# Patient Record
Sex: FEMALE | Race: BLACK OR AFRICAN AMERICAN | Employment: OTHER | URBAN - METROPOLITAN AREA
[De-identification: names, ages, dates, MRNs, and addresses within clinical notes are randomized per-mention and may not be internally consistent; named-entity substitution may affect disease eponyms.]

---

## 2017-03-07 ENCOUNTER — OFFICE VISIT (OUTPATIENT)
Dept: FAMILY MEDICINE CLINIC | Age: 82
End: 2017-03-07

## 2017-03-07 VITALS
BODY MASS INDEX: 31.14 KG/M2 | HEART RATE: 64 BPM | HEIGHT: 62 IN | OXYGEN SATURATION: 99 % | DIASTOLIC BLOOD PRESSURE: 92 MMHG | SYSTOLIC BLOOD PRESSURE: 148 MMHG | WEIGHT: 169.2 LBS | RESPIRATION RATE: 12 BRPM | TEMPERATURE: 97.1 F

## 2017-03-07 DIAGNOSIS — I10 ESSENTIAL HYPERTENSION, MALIGNANT: ICD-10-CM

## 2017-03-07 DIAGNOSIS — B37.89 CANDIDIASIS OF BREAST: ICD-10-CM

## 2017-03-07 DIAGNOSIS — I73.9 PERIPHERAL VASCULAR DISEASE (HCC): ICD-10-CM

## 2017-03-07 DIAGNOSIS — M79.652 LEFT THIGH PAIN: Primary | ICD-10-CM

## 2017-03-07 PROCEDURE — G8419 CALC BMI OUT NRM PARAM NOF/U: HCPCS | Performed by: FAMILY MEDICINE

## 2017-03-07 PROCEDURE — 1036F TOBACCO NON-USER: CPT | Performed by: FAMILY MEDICINE

## 2017-03-07 PROCEDURE — 4040F PNEUMOC VAC/ADMIN/RCVD: CPT | Performed by: FAMILY MEDICINE

## 2017-03-07 PROCEDURE — 99213 OFFICE O/P EST LOW 20 MIN: CPT | Performed by: FAMILY MEDICINE

## 2017-03-07 PROCEDURE — G8484 FLU IMMUNIZE NO ADMIN: HCPCS | Performed by: FAMILY MEDICINE

## 2017-03-07 PROCEDURE — 1123F ACP DISCUSS/DSCN MKR DOCD: CPT | Performed by: FAMILY MEDICINE

## 2017-03-07 PROCEDURE — 1090F PRES/ABSN URINE INCON ASSESS: CPT | Performed by: FAMILY MEDICINE

## 2017-03-07 PROCEDURE — G8427 DOCREV CUR MEDS BY ELIG CLIN: HCPCS | Performed by: FAMILY MEDICINE

## 2017-03-07 PROCEDURE — G8400 PT W/DXA NO RESULTS DOC: HCPCS | Performed by: FAMILY MEDICINE

## 2017-03-07 RX ORDER — CLOTRIMAZOLE 1 %
CREAM (GRAM) TOPICAL
Qty: 1 TUBE | Refills: 1 | Status: SHIPPED | OUTPATIENT
Start: 2017-03-07 | End: 2017-03-14

## 2017-03-07 RX ORDER — ISOSORBIDE MONONITRATE 60 MG/1
60 TABLET, EXTENDED RELEASE ORAL DAILY
Qty: 90 TABLET | Refills: 1 | Status: SHIPPED | OUTPATIENT
Start: 2017-03-07 | End: 2017-09-26 | Stop reason: SDUPTHER

## 2017-03-07 RX ORDER — LISINOPRIL 10 MG/1
10 TABLET ORAL DAILY
Qty: 90 TABLET | Refills: 1 | Status: SHIPPED | OUTPATIENT
Start: 2017-03-07 | End: 2017-10-17 | Stop reason: SDUPTHER

## 2017-03-07 RX ORDER — TIZANIDINE 2 MG/1
2 TABLET ORAL EVERY 8 HOURS PRN
Qty: 24 TABLET | Refills: 0 | Status: SHIPPED | OUTPATIENT
Start: 2017-03-07 | End: 2019-06-25

## 2017-03-07 RX ORDER — ACETAMINOPHEN 325 MG/1
TABLET ORAL
Qty: 90 TABLET | Refills: 2 | Status: ON HOLD | OUTPATIENT
Start: 2017-03-07 | End: 2019-08-24 | Stop reason: HOSPADM

## 2017-03-09 DIAGNOSIS — I10 ESSENTIAL HYPERTENSION, MALIGNANT: ICD-10-CM

## 2017-03-09 LAB
ANION GAP SERPL CALCULATED.3IONS-SCNC: 14 MMOL/L (ref 3–16)
BUN BLDV-MCNC: 36 MG/DL (ref 7–20)
CALCIUM SERPL-MCNC: 9 MG/DL (ref 8.3–10.6)
CHLORIDE BLD-SCNC: 111 MMOL/L (ref 99–110)
CO2: 19 MMOL/L (ref 21–32)
CREAT SERPL-MCNC: 1.7 MG/DL (ref 0.6–1.2)
GFR AFRICAN AMERICAN: 35
GFR NON-AFRICAN AMERICAN: 29
GLUCOSE BLD-MCNC: 82 MG/DL (ref 70–99)
POTASSIUM SERPL-SCNC: 5.1 MMOL/L (ref 3.5–5.1)
SODIUM BLD-SCNC: 144 MMOL/L (ref 136–145)

## 2017-04-18 ENCOUNTER — TELEPHONE (OUTPATIENT)
Dept: PRIMARY CARE CLINIC | Age: 82
End: 2017-04-18

## 2017-07-10 DIAGNOSIS — N18.30 CHRONIC KIDNEY DISEASE, STAGE 3 (MODERATE): Primary | ICD-10-CM

## 2017-09-26 ENCOUNTER — OFFICE VISIT (OUTPATIENT)
Dept: CARDIOLOGY CLINIC | Age: 82
End: 2017-09-26

## 2017-09-26 VITALS — BODY MASS INDEX: 30 KG/M2 | SYSTOLIC BLOOD PRESSURE: 150 MMHG | WEIGHT: 163 LBS | DIASTOLIC BLOOD PRESSURE: 78 MMHG

## 2017-09-26 DIAGNOSIS — I10 ESSENTIAL HYPERTENSION, MALIGNANT: ICD-10-CM

## 2017-09-26 DIAGNOSIS — I49.3 FREQUENT PVCS: Primary | ICD-10-CM

## 2017-09-26 PROCEDURE — 1036F TOBACCO NON-USER: CPT | Performed by: INTERNAL MEDICINE

## 2017-09-26 PROCEDURE — 1090F PRES/ABSN URINE INCON ASSESS: CPT | Performed by: INTERNAL MEDICINE

## 2017-09-26 PROCEDURE — G8419 CALC BMI OUT NRM PARAM NOF/U: HCPCS | Performed by: INTERNAL MEDICINE

## 2017-09-26 PROCEDURE — G8400 PT W/DXA NO RESULTS DOC: HCPCS | Performed by: INTERNAL MEDICINE

## 2017-09-26 PROCEDURE — 99215 OFFICE O/P EST HI 40 MIN: CPT | Performed by: INTERNAL MEDICINE

## 2017-09-26 PROCEDURE — 4040F PNEUMOC VAC/ADMIN/RCVD: CPT | Performed by: INTERNAL MEDICINE

## 2017-09-26 PROCEDURE — 1123F ACP DISCUSS/DSCN MKR DOCD: CPT | Performed by: INTERNAL MEDICINE

## 2017-09-26 PROCEDURE — 93000 ELECTROCARDIOGRAM COMPLETE: CPT | Performed by: INTERNAL MEDICINE

## 2017-09-26 PROCEDURE — G8427 DOCREV CUR MEDS BY ELIG CLIN: HCPCS | Performed by: INTERNAL MEDICINE

## 2017-09-26 RX ORDER — ISOSORBIDE MONONITRATE 60 MG/1
60 TABLET, EXTENDED RELEASE ORAL DAILY
Qty: 90 TABLET | Refills: 1 | Status: SHIPPED | OUTPATIENT
Start: 2017-09-26 | End: 2018-03-28 | Stop reason: SDUPTHER

## 2017-10-17 ENCOUNTER — TELEPHONE (OUTPATIENT)
Dept: FAMILY MEDICINE CLINIC | Age: 82
End: 2017-10-17

## 2017-10-17 DIAGNOSIS — I10 ESSENTIAL HYPERTENSION, MALIGNANT: ICD-10-CM

## 2017-10-17 RX ORDER — LISINOPRIL 10 MG/1
10 TABLET ORAL DAILY
Qty: 30 TABLET | Refills: 0 | Status: SHIPPED | OUTPATIENT
Start: 2017-10-17 | End: 2017-11-10 | Stop reason: SDUPTHER

## 2017-10-17 NOTE — TELEPHONE ENCOUNTER
Patient is requesting a 30 day supply since she is waiting for [facility] to deliver and she is out. Pharmacy is [facility]. Last OV 3-7-2017  Last labs 3-9-2017  Please advise. Thanks.

## 2017-10-20 RX ORDER — METOPROLOL SUCCINATE 50 MG/1
50 TABLET, EXTENDED RELEASE ORAL DAILY
Qty: 90 TABLET | Refills: 3 | Status: SHIPPED | OUTPATIENT
Start: 2017-10-20 | End: 2018-09-17 | Stop reason: SDUPTHER

## 2017-10-20 NOTE — TELEPHONE ENCOUNTER
Medication: Metoprolol succinate     Strength: 50 mg     Directions:  Take one daily     Last visit : 9/26/17    Next Visit :3 /28/18    Last fill: 10/18/16

## 2017-11-07 RX ORDER — AMLODIPINE BESYLATE 10 MG/1
10 TABLET ORAL DAILY
Qty: 90 TABLET | Refills: 3 | Status: SHIPPED | OUTPATIENT
Start: 2017-11-07 | End: 2018-10-15 | Stop reason: SDUPTHER

## 2017-11-10 DIAGNOSIS — I10 ESSENTIAL HYPERTENSION, MALIGNANT: ICD-10-CM

## 2017-11-10 RX ORDER — LISINOPRIL 10 MG/1
TABLET ORAL
Qty: 90 TABLET | Refills: 1 | Status: SHIPPED | OUTPATIENT
Start: 2017-11-10 | End: 2018-03-28 | Stop reason: SDUPTHER

## 2017-11-14 NOTE — TELEPHONE ENCOUNTER
Patient  called states that he called the pharmacy Friday and they did not have his wife refill for Lisinopril 10mg. I placed patient on hold and called the pharmacy and they do have the prescription.

## 2018-03-28 ENCOUNTER — OFFICE VISIT (OUTPATIENT)
Dept: CARDIOLOGY CLINIC | Age: 83
End: 2018-03-28

## 2018-03-28 VITALS
WEIGHT: 166.4 LBS | DIASTOLIC BLOOD PRESSURE: 100 MMHG | HEART RATE: 58 BPM | SYSTOLIC BLOOD PRESSURE: 158 MMHG | BODY MASS INDEX: 30.62 KG/M2

## 2018-03-28 DIAGNOSIS — R07.9 CHEST PAIN, UNSPECIFIED TYPE: Primary | ICD-10-CM

## 2018-03-28 DIAGNOSIS — I10 ESSENTIAL HYPERTENSION: ICD-10-CM

## 2018-03-28 DIAGNOSIS — R55 NEAR SYNCOPE: ICD-10-CM

## 2018-03-28 DIAGNOSIS — R06.02 SOB (SHORTNESS OF BREATH): ICD-10-CM

## 2018-03-28 DIAGNOSIS — I49.3 PVC'S (PREMATURE VENTRICULAR CONTRACTIONS): ICD-10-CM

## 2018-03-28 PROCEDURE — G8400 PT W/DXA NO RESULTS DOC: HCPCS | Performed by: NURSE PRACTITIONER

## 2018-03-28 PROCEDURE — G8484 FLU IMMUNIZE NO ADMIN: HCPCS | Performed by: NURSE PRACTITIONER

## 2018-03-28 PROCEDURE — G8427 DOCREV CUR MEDS BY ELIG CLIN: HCPCS | Performed by: NURSE PRACTITIONER

## 2018-03-28 PROCEDURE — 99214 OFFICE O/P EST MOD 30 MIN: CPT | Performed by: NURSE PRACTITIONER

## 2018-03-28 PROCEDURE — 1036F TOBACCO NON-USER: CPT | Performed by: NURSE PRACTITIONER

## 2018-03-28 PROCEDURE — 1090F PRES/ABSN URINE INCON ASSESS: CPT | Performed by: NURSE PRACTITIONER

## 2018-03-28 PROCEDURE — G8417 CALC BMI ABV UP PARAM F/U: HCPCS | Performed by: NURSE PRACTITIONER

## 2018-03-28 PROCEDURE — 4040F PNEUMOC VAC/ADMIN/RCVD: CPT | Performed by: NURSE PRACTITIONER

## 2018-03-28 PROCEDURE — 1123F ACP DISCUSS/DSCN MKR DOCD: CPT | Performed by: NURSE PRACTITIONER

## 2018-03-28 RX ORDER — ISOSORBIDE MONONITRATE 60 MG/1
60 TABLET, EXTENDED RELEASE ORAL DAILY
Qty: 90 TABLET | Refills: 3 | Status: SHIPPED | OUTPATIENT
Start: 2018-03-28 | End: 2018-03-28 | Stop reason: SDUPTHER

## 2018-03-28 RX ORDER — ISOSORBIDE MONONITRATE 60 MG/1
60 TABLET, EXTENDED RELEASE ORAL DAILY
Qty: 30 TABLET | Refills: 3 | Status: SHIPPED | OUTPATIENT
Start: 2018-03-28 | End: 2018-10-03 | Stop reason: SDUPTHER

## 2018-03-28 RX ORDER — LISINOPRIL 10 MG/1
10 TABLET ORAL DAILY
Qty: 30 TABLET | Refills: 3 | Status: SHIPPED | OUTPATIENT
Start: 2018-03-28 | End: 2018-08-31 | Stop reason: SDUPTHER

## 2018-03-28 RX ORDER — LISINOPRIL 10 MG/1
10 TABLET ORAL DAILY
Qty: 90 TABLET | Refills: 3 | Status: SHIPPED | OUTPATIENT
Start: 2018-03-28 | End: 2018-03-28 | Stop reason: SDUPTHER

## 2018-03-28 NOTE — PROGRESS NOTES
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 Darren Javier, [address] [place] [address] (2004), and pulm HTN originally seen after an episode of near syncope. 24 hour holter (12/2014) showed min HR 54, avg 64 and max 98, > 18,000 PVC's. LVEF normal. On Toprol XL 50 mg daily. Today: Patient has been out of her Imdur and lisinopril for a while and is hypertensive today. States she had some CP when she quit taking those meds. Describes as sharp pain on the left side of her chest, no radiation, lasted a couple of seconds in length, did not wake at night. Nothing makes better or worse. Has now subsided and she c/o SOB with exertion. She has not had any syncopal events and no longer c/o palpitations. Home medications:   Current Outpatient Prescriptions on File Prior to Visit   Medication Sig Dispense Refill    amLODIPine (NORVASC) 10 MG tablet Take 1 tablet by mouth daily 90 tablet 3    metoprolol succinate (TOPROL XL) 50 MG extended release tablet Take 1 tablet by mouth daily 90 tablet 3    acetaminophen (TYLENOL) 325 MG tablet Take 2 to 3 tablets po every 6 hours as needed for pain. Do not exceed 10 tablets/24h. 90 tablet 2    aspirin EC 81 MG EC tablet Take 1 tablet by mouth daily 30 tablet 3    tiZANidine (ZANAFLEX) 2 MG tablet Take 1 tablet by mouth every 8 hours as needed (back, thigh, and leg pain or spasm) 24 tablet 0     No current facility-administered medications on file prior to visit. Past Medical History:   Diagnosis Date    Chronic kidney disease     Hyperlipidemia     borderline per pt    Hypertension     Peripheral vascular disease ([facility] [address]         History reviewed. No pertinent surgical history.     Allergies   Allergen Reactions    Bactrim [Sulfamethoxazole-Trimethoprim]     Nebivolol Hcl Other (See Comments)

## 2018-07-16 NOTE — PROGRESS NOTES
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 Cricket Baker), CVA (2004), and pulm HTN. Originally seen in 11/2014 after an episode of near syncope while on nebivolol. 24 hour holter (12/2014) showed SR with first degree AV block, avg HR of 64 bpm (range of 54-98),  >18,000 PVC's (26% PVC burden). Normal LVEF (11/2014) and negative Lexiscan (11/2014). Currently on Toprol XL 50 mg daily, lisinopril 10 mg daily, amlodipine 10 mg daily, and isosorbide 60 mg daily. She reports SBPs have been running 140-160s. BP is managed by Dr. Cricket Baker, nephrology. She complains of fatigue, but denies complaints of CP, SOB, dizziness, palpitations, orthopnea, or presycope/syncope. She is active taking care of her home and denies difficulties with those activities. Past Medical History:   has a past medical history of Chronic kidney disease; Hyperlipidemia; Hypertension; and Peripheral vascular disease ([address] Surgical History:   has no past surgical history on file. Social History:   reports that she has quit smoking. She has a 0.75 pack-year smoking history. She has never used smokeless tobacco. She reports that she drinks alcohol. She reports that she does not use drugs. Family History:  family history includes High Blood Pressure in her mother; Kidney Disease in her brother.      Home Medications:  Outpatient Encounter Prescriptions as of 7/23/2018   Medication Sig Dispense Refill    lisinopril (PRINIVIL;ZESTRIL) 10 MG tablet Take 1 tablet by mouth daily 30 tablet 3    isosorbide mononitrate (IMDUR) 60 MG extended release tablet Take 1 tablet by mouth daily 30 tablet 3    amLODIPine (NORVASC) 10 MG tablet Take 1 tablet by mouth daily 90 tablet 3    metoprolol succinate (TOPROL XL) 50 MG extended release tablet Take 1 tablet by mouth daily 90 tablet 3    acetaminophen (TYLENOL) 325 MG tablet Take 2 to 3 tablets po every 6 hours as needed for pain. Do not exceed 10 tablets/24h. 90 tablet 2    aspirin EC 81 MG EC tablet Take 1 tablet by mouth daily 30 tablet 3    tiZANidine (ZANAFLEX) 2 MG tablet Take 1 tablet by mouth every 8 hours as needed (back, thigh, and leg pain or spasm) 24 tablet 0     No facility-administered encounter medications on file as of 7/23/2018. Allergies:  Bactrim [sulfamethoxazole-trimethoprim]; Nebivolol hcl; and Other     Review of Systems   Constitutional: Negative. HENT: Negative. Eyes: Negative. Respiratory: Negative. Cardiovascular: Negative. Gastrointestinal: Negative. Genitourinary: Negative. Musculoskeletal: Negative. Skin: Negative. Neurological: Negative. Hematological: Negative. Psychiatric/Behavioral: Negative. BP (!) 140/82   Pulse 56   Wt 149 lb 3.2 oz (67.7 kg)   BMI 27.46 kg/m²     ECG today, personally reviewed    Objective:  Physical Exam   Constitutional: She is oriented to person, place, and time. She appears well-developed and well-nourished. HENT:   Head: Normocephalic and atraumatic. Eyes: Pupils are equal, round, and reactive to light. Neck: Normal range of motion. Cardiovascular: Normal rate, regular rhythm and normal heart sounds. Pulmonary/Chest: Effort normal and breath sounds normal.   Abdominal: Soft. No tenderness. Musculoskeletal: Normal range of motion. She exhibits no edema. Neurological: She is alert and oriented to person, place, and time. Skin: Skin is warm and dry. Psychiatric: She has a normal mood and affect. Echo 11/8/14  Summary   Normal left ventricle size and systolic function with an estimated ejection   fraction of 55%. Moderate cLVH is present.  There is reversal of E/A inflow   velocities across the mitral valve suggesting impaired left ventricular   relaxation. Mitral annular calcification is present. Mild mitral   regurgitation is present.  Wayne Scarlet is moderate tricuspid regurgitation with RVSP estimated at 46   mmHg. Trivial pulmonic regurgitation present. Assessment:  1. Frequent PVC's- she has no palpitations and well preserved LV fuunction   2. Essential hypertension    3. 1st degree AV block with left axis deviation   4. Essential hypertension, malignant          Plan:  1. Continue current medication regimen. 2.  Follow up with nephrologist for BP management. 3.  Follow up with me in 1 year. Haley Borrero RN, am scribing for and in the presence of Dr. Sameer Cavanaugh. 07/23/18 8:41 AM  Yamil Castillo RN    I, Dr. Sameer Cavanaugh, personally performed the services described in this documentation as scribed by Yamil Castillo RN in my presence, and it is both accurate and complete.     Sameer Cavanaugh M.D.

## 2018-07-23 ENCOUNTER — OFFICE VISIT (OUTPATIENT)
Dept: CARDIOLOGY CLINIC | Age: 83
End: 2018-07-23

## 2018-07-23 VITALS
BODY MASS INDEX: 27.46 KG/M2 | WEIGHT: 149.2 LBS | SYSTOLIC BLOOD PRESSURE: 140 MMHG | DIASTOLIC BLOOD PRESSURE: 82 MMHG | HEART RATE: 56 BPM

## 2018-07-23 DIAGNOSIS — I44.0 1ST DEGREE AV BLOCK: ICD-10-CM

## 2018-07-23 DIAGNOSIS — I10 ESSENTIAL HYPERTENSION: ICD-10-CM

## 2018-07-23 DIAGNOSIS — I49.3 FREQUENT PVCS: ICD-10-CM

## 2018-07-23 DIAGNOSIS — I49.3 PVC'S (PREMATURE VENTRICULAR CONTRACTIONS): ICD-10-CM

## 2018-07-23 DIAGNOSIS — I10 ESSENTIAL HYPERTENSION, MALIGNANT: ICD-10-CM

## 2018-07-23 DIAGNOSIS — R55 NEAR SYNCOPE: Primary | ICD-10-CM

## 2018-07-23 PROCEDURE — 1101F PT FALLS ASSESS-DOCD LE1/YR: CPT | Performed by: INTERNAL MEDICINE

## 2018-07-23 PROCEDURE — 1036F TOBACCO NON-USER: CPT | Performed by: INTERNAL MEDICINE

## 2018-07-23 PROCEDURE — 4040F PNEUMOC VAC/ADMIN/RCVD: CPT | Performed by: INTERNAL MEDICINE

## 2018-07-23 PROCEDURE — 1090F PRES/ABSN URINE INCON ASSESS: CPT | Performed by: INTERNAL MEDICINE

## 2018-07-23 PROCEDURE — G8417 CALC BMI ABV UP PARAM F/U: HCPCS | Performed by: INTERNAL MEDICINE

## 2018-07-23 PROCEDURE — G8427 DOCREV CUR MEDS BY ELIG CLIN: HCPCS | Performed by: INTERNAL MEDICINE

## 2018-07-23 PROCEDURE — 99214 OFFICE O/P EST MOD 30 MIN: CPT | Performed by: INTERNAL MEDICINE

## 2018-07-23 PROCEDURE — 1123F ACP DISCUSS/DSCN MKR DOCD: CPT | Performed by: INTERNAL MEDICINE

## 2018-08-31 RX ORDER — LISINOPRIL 10 MG/1
10 TABLET ORAL DAILY
Qty: 30 TABLET | Refills: 5 | Status: ON HOLD | OUTPATIENT
Start: 2018-08-31 | End: 2019-03-22 | Stop reason: HOSPADM

## 2018-08-31 NOTE — TELEPHONE ENCOUNTER
Andre Duckworthr (Son) called to request her Lisinopril 10 mg be sent to [facility] in [place] on [address] for 30 days. It was previously sent to a mail order pharmacy but he would like this changed. Mrs. Carlos Mai saw Dr. Dorian Moyer 7-23-18 and is scheduled again in July 2019

## 2018-09-17 RX ORDER — METOPROLOL SUCCINATE 50 MG/1
50 TABLET, EXTENDED RELEASE ORAL DAILY
Qty: 30 TABLET | Refills: 5 | Status: SHIPPED | OUTPATIENT
Start: 2018-09-17 | End: 2019-05-03 | Stop reason: SDUPTHER

## 2018-10-03 RX ORDER — ISOSORBIDE MONONITRATE 60 MG/1
60 TABLET, EXTENDED RELEASE ORAL DAILY
Qty: 30 TABLET | Refills: 5 | Status: SHIPPED | OUTPATIENT
Start: 2018-10-03 | End: 2019-07-08 | Stop reason: SDUPTHER

## 2018-10-15 ENCOUNTER — TELEPHONE (OUTPATIENT)
Dept: CARDIOLOGY CLINIC | Age: 83
End: 2018-10-15

## 2018-10-16 RX ORDER — AMLODIPINE BESYLATE 10 MG/1
10 TABLET ORAL DAILY
Qty: 90 TABLET | Refills: 3 | Status: ON HOLD | OUTPATIENT
Start: 2018-10-16 | End: 2019-03-22 | Stop reason: HOSPADM

## 2019-03-17 ENCOUNTER — HOSPITAL ENCOUNTER (INPATIENT)
Age: 84
LOS: 5 days | Discharge: HOME HEALTH | DRG: 683 | End: 2019-03-22
Attending: EMERGENCY MEDICINE | Admitting: INTERNAL MEDICINE
Payer: MEDICARE

## 2019-03-17 ENCOUNTER — APPOINTMENT (OUTPATIENT)
Dept: GENERAL RADIOLOGY | Age: 84
DRG: 683 | End: 2019-03-17
Payer: MEDICARE

## 2019-03-17 DIAGNOSIS — R53.1 GENERAL WEAKNESS: ICD-10-CM

## 2019-03-17 DIAGNOSIS — S93.492A SPRAIN OF ANTERIOR TALOFIBULAR LIGAMENT OF LEFT ANKLE, INITIAL ENCOUNTER: ICD-10-CM

## 2019-03-17 DIAGNOSIS — R79.89 ELEVATED BRAIN NATRIURETIC PEPTIDE (BNP) LEVEL: ICD-10-CM

## 2019-03-17 DIAGNOSIS — N39.0 URINARY TRACT INFECTION WITH HEMATURIA, SITE UNSPECIFIED: Primary | ICD-10-CM

## 2019-03-17 DIAGNOSIS — R31.9 URINARY TRACT INFECTION WITH HEMATURIA, SITE UNSPECIFIED: Primary | ICD-10-CM

## 2019-03-17 PROBLEM — I10 UNCONTROLLED HYPERTENSION: Status: ACTIVE | Noted: 2019-03-17

## 2019-03-17 PROBLEM — R53.83 OTHER FATIGUE: Status: ACTIVE | Noted: 2019-03-17

## 2019-03-17 PROBLEM — I16.0 HYPERTENSIVE URGENCY: Status: ACTIVE | Noted: 2019-03-17

## 2019-03-17 LAB
ANION GAP SERPL CALCULATED.3IONS-SCNC: 14 MMOL/L (ref 3–16)
BACTERIA: ABNORMAL /HPF
BASOPHILS ABSOLUTE: 0.1 K/UL (ref 0–0.2)
BASOPHILS RELATIVE PERCENT: 0.9 %
BILIRUBIN URINE: NEGATIVE
BLOOD, URINE: ABNORMAL
BUN BLDV-MCNC: 39 MG/DL (ref 7–20)
C-REACTIVE PROTEIN: 11.3 MG/L (ref 0–5.1)
CALCIUM SERPL-MCNC: 8.7 MG/DL (ref 8.3–10.6)
CHLORIDE BLD-SCNC: 111 MMOL/L (ref 99–110)
CLARITY: ABNORMAL
CO2: 18 MMOL/L (ref 21–32)
COLOR: YELLOW
CREAT SERPL-MCNC: 2 MG/DL (ref 0.6–1.2)
EOSINOPHILS ABSOLUTE: 0.2 K/UL (ref 0–0.6)
EOSINOPHILS RELATIVE PERCENT: 1.8 %
EPITHELIAL CELLS, UA: ABNORMAL /HPF
GFR AFRICAN AMERICAN: 29
GFR NON-AFRICAN AMERICAN: 24
GLUCOSE BLD-MCNC: 99 MG/DL (ref 70–99)
GLUCOSE URINE: NEGATIVE MG/DL
HCT VFR BLD CALC: 42.8 % (ref 36–48)
HEMOGLOBIN: 14.1 G/DL (ref 12–16)
KETONES, URINE: NEGATIVE MG/DL
LEUKOCYTE ESTERASE, URINE: ABNORMAL
LYMPHOCYTES ABSOLUTE: 2.1 K/UL (ref 1–5.1)
LYMPHOCYTES RELATIVE PERCENT: 22.8 %
MCH RBC QN AUTO: 28.1 PG (ref 26–34)
MCHC RBC AUTO-ENTMCNC: 32.9 G/DL (ref 31–36)
MCV RBC AUTO: 85.5 FL (ref 80–100)
MICROSCOPIC EXAMINATION: YES
MONOCYTES ABSOLUTE: 1.1 K/UL (ref 0–1.3)
MONOCYTES RELATIVE PERCENT: 11.8 %
NEUTROPHILS ABSOLUTE: 5.6 K/UL (ref 1.7–7.7)
NEUTROPHILS RELATIVE PERCENT: 62.7 %
NITRITE, URINE: NEGATIVE
PDW BLD-RTO: 15.7 % (ref 12.4–15.4)
PH UA: 8 (ref 5–8)
PLATELET # BLD: 230 K/UL (ref 135–450)
PMV BLD AUTO: 9.1 FL (ref 5–10.5)
POTASSIUM REFLEX MAGNESIUM: 4.9 MMOL/L (ref 3.5–5.1)
PRO-BNP: 1327 PG/ML (ref 0–449)
PROTEIN UA: 100 MG/DL
RAPID INFLUENZA  B AGN: NEGATIVE
RAPID INFLUENZA A AGN: NEGATIVE
RBC # BLD: 5 M/UL (ref 4–5.2)
RBC UA: ABNORMAL /HPF (ref 0–2)
SEDIMENTATION RATE, ERYTHROCYTE: 36 MM/HR (ref 0–30)
SODIUM BLD-SCNC: 143 MMOL/L (ref 136–145)
SPECIFIC GRAVITY UA: 1.02 (ref 1–1.03)
TROPONIN: <0.01 NG/ML
TROPONIN: <0.01 NG/ML
TSH SERPL DL<=0.05 MIU/L-ACNC: 1.89 UIU/ML (ref 0.27–4.2)
URINE TYPE: ABNORMAL
UROBILINOGEN, URINE: 0.2 E.U./DL
VITAMIN D 25-HYDROXY: 11.7 NG/ML
WBC # BLD: 9 K/UL (ref 4–11)
WBC UA: ABNORMAL /HPF (ref 0–5)

## 2019-03-17 PROCEDURE — 84165 PROTEIN E-PHORESIS SERUM: CPT

## 2019-03-17 PROCEDURE — 73560 X-RAY EXAM OF KNEE 1 OR 2: CPT

## 2019-03-17 PROCEDURE — 86140 C-REACTIVE PROTEIN: CPT

## 2019-03-17 PROCEDURE — 84484 ASSAY OF TROPONIN QUANT: CPT

## 2019-03-17 PROCEDURE — 83883 ASSAY NEPHELOMETRY NOT SPEC: CPT

## 2019-03-17 PROCEDURE — 6360000002 HC RX W HCPCS: Performed by: PHYSICIAN ASSISTANT

## 2019-03-17 PROCEDURE — 81001 URINALYSIS AUTO W/SCOPE: CPT

## 2019-03-17 PROCEDURE — 84155 ASSAY OF PROTEIN SERUM: CPT

## 2019-03-17 PROCEDURE — 84443 ASSAY THYROID STIM HORMONE: CPT

## 2019-03-17 PROCEDURE — 71046 X-RAY EXAM CHEST 2 VIEWS: CPT

## 2019-03-17 PROCEDURE — 1200000000 HC SEMI PRIVATE

## 2019-03-17 PROCEDURE — 6360000002 HC RX W HCPCS: Performed by: INTERNAL MEDICINE

## 2019-03-17 PROCEDURE — 36415 COLL VENOUS BLD VENIPUNCTURE: CPT

## 2019-03-17 PROCEDURE — 6370000000 HC RX 637 (ALT 250 FOR IP): Performed by: PHYSICIAN ASSISTANT

## 2019-03-17 PROCEDURE — 6370000000 HC RX 637 (ALT 250 FOR IP): Performed by: INTERNAL MEDICINE

## 2019-03-17 PROCEDURE — 2580000003 HC RX 258: Performed by: INTERNAL MEDICINE

## 2019-03-17 PROCEDURE — 83880 ASSAY OF NATRIURETIC PEPTIDE: CPT

## 2019-03-17 PROCEDURE — 73610 X-RAY EXAM OF ANKLE: CPT

## 2019-03-17 PROCEDURE — 82306 VITAMIN D 25 HYDROXY: CPT

## 2019-03-17 PROCEDURE — 87804 INFLUENZA ASSAY W/OPTIC: CPT

## 2019-03-17 PROCEDURE — 93005 ELECTROCARDIOGRAM TRACING: CPT | Performed by: PHYSICIAN ASSISTANT

## 2019-03-17 PROCEDURE — 85025 COMPLETE CBC W/AUTO DIFF WBC: CPT

## 2019-03-17 PROCEDURE — 85652 RBC SED RATE AUTOMATED: CPT

## 2019-03-17 PROCEDURE — 87040 BLOOD CULTURE FOR BACTERIA: CPT

## 2019-03-17 PROCEDURE — 96361 HYDRATE IV INFUSION ADD-ON: CPT

## 2019-03-17 PROCEDURE — 99285 EMERGENCY DEPT VISIT HI MDM: CPT

## 2019-03-17 PROCEDURE — 80048 BASIC METABOLIC PNL TOTAL CA: CPT

## 2019-03-17 PROCEDURE — 87086 URINE CULTURE/COLONY COUNT: CPT

## 2019-03-17 PROCEDURE — 96365 THER/PROPH/DIAG IV INF INIT: CPT

## 2019-03-17 PROCEDURE — 2580000003 HC RX 258: Performed by: PHYSICIAN ASSISTANT

## 2019-03-17 RX ORDER — HYDROCODONE BITARTRATE AND ACETAMINOPHEN 10; 325 MG/1; MG/1
1 TABLET ORAL ONCE
Status: COMPLETED | OUTPATIENT
Start: 2019-03-17 | End: 2019-03-17

## 2019-03-17 RX ORDER — ONDANSETRON 2 MG/ML
4 INJECTION INTRAMUSCULAR; INTRAVENOUS EVERY 6 HOURS PRN
Status: DISCONTINUED | OUTPATIENT
Start: 2019-03-17 | End: 2019-03-22 | Stop reason: HOSPADM

## 2019-03-17 RX ORDER — ASPIRIN 81 MG/1
81 TABLET ORAL DAILY
Status: DISCONTINUED | OUTPATIENT
Start: 2019-03-17 | End: 2019-03-22 | Stop reason: HOSPADM

## 2019-03-17 RX ORDER — HYDRALAZINE HYDROCHLORIDE 10 MG/1
25 TABLET, FILM COATED ORAL EVERY 8 HOURS SCHEDULED
Status: DISCONTINUED | OUTPATIENT
Start: 2019-03-17 | End: 2019-03-19

## 2019-03-17 RX ORDER — 0.9 % SODIUM CHLORIDE 0.9 %
500 INTRAVENOUS SOLUTION INTRAVENOUS ONCE
Status: DISCONTINUED | OUTPATIENT
Start: 2019-03-17 | End: 2019-03-17

## 2019-03-17 RX ORDER — HEPARIN SODIUM 5000 [USP'U]/ML
5000 INJECTION, SOLUTION INTRAVENOUS; SUBCUTANEOUS EVERY 8 HOURS SCHEDULED
Status: DISCONTINUED | OUTPATIENT
Start: 2019-03-17 | End: 2019-03-22 | Stop reason: HOSPADM

## 2019-03-17 RX ORDER — SODIUM CHLORIDE 0.9 % (FLUSH) 0.9 %
10 SYRINGE (ML) INJECTION EVERY 12 HOURS SCHEDULED
Status: DISCONTINUED | OUTPATIENT
Start: 2019-03-17 | End: 2019-03-22 | Stop reason: HOSPADM

## 2019-03-17 RX ORDER — SODIUM CHLORIDE 0.9 % (FLUSH) 0.9 %
10 SYRINGE (ML) INJECTION PRN
Status: DISCONTINUED | OUTPATIENT
Start: 2019-03-17 | End: 2019-03-22 | Stop reason: HOSPADM

## 2019-03-17 RX ORDER — FUROSEMIDE 10 MG/ML
40 INJECTION INTRAMUSCULAR; INTRAVENOUS ONCE
Status: COMPLETED | OUTPATIENT
Start: 2019-03-17 | End: 2019-03-17

## 2019-03-17 RX ORDER — LISINOPRIL 10 MG/1
10 TABLET ORAL DAILY
Status: DISCONTINUED | OUTPATIENT
Start: 2019-03-17 | End: 2019-03-17

## 2019-03-17 RX ORDER — TIZANIDINE 4 MG/1
2 TABLET ORAL EVERY 8 HOURS PRN
Status: DISCONTINUED | OUTPATIENT
Start: 2019-03-17 | End: 2019-03-22 | Stop reason: HOSPADM

## 2019-03-17 RX ORDER — AMLODIPINE BESYLATE 10 MG/1
10 TABLET ORAL DAILY
Status: DISCONTINUED | OUTPATIENT
Start: 2019-03-17 | End: 2019-03-22 | Stop reason: HOSPADM

## 2019-03-17 RX ORDER — METOPROLOL SUCCINATE 50 MG/1
50 TABLET, EXTENDED RELEASE ORAL DAILY
Status: DISCONTINUED | OUTPATIENT
Start: 2019-03-17 | End: 2019-03-22 | Stop reason: HOSPADM

## 2019-03-17 RX ORDER — ISOSORBIDE MONONITRATE 60 MG/1
60 TABLET, EXTENDED RELEASE ORAL DAILY
Status: DISCONTINUED | OUTPATIENT
Start: 2019-03-17 | End: 2019-03-22 | Stop reason: HOSPADM

## 2019-03-17 RX ADMIN — METOPROLOL SUCCINATE 50 MG: 50 TABLET, EXTENDED RELEASE ORAL at 16:33

## 2019-03-17 RX ADMIN — FUROSEMIDE 40 MG: 10 INJECTION, SOLUTION INTRAMUSCULAR; INTRAVENOUS at 14:53

## 2019-03-17 RX ADMIN — TIZANIDINE 2 MG: 4 TABLET ORAL at 18:28

## 2019-03-17 RX ADMIN — HEPARIN SODIUM 5000 UNITS: 5000 INJECTION INTRAVENOUS; SUBCUTANEOUS at 22:41

## 2019-03-17 RX ADMIN — HYDRALAZINE HYDROCHLORIDE 25 MG: 10 TABLET, FILM COATED ORAL at 14:00

## 2019-03-17 RX ADMIN — Medication 10 ML: at 21:16

## 2019-03-17 RX ADMIN — HYDRALAZINE HYDROCHLORIDE 25 MG: 10 TABLET, FILM COATED ORAL at 22:41

## 2019-03-17 RX ADMIN — ASPIRIN 81 MG: 81 TABLET ORAL at 16:28

## 2019-03-17 RX ADMIN — CEFTRIAXONE 1 G: 1 INJECTION, POWDER, FOR SOLUTION INTRAMUSCULAR; INTRAVENOUS at 13:08

## 2019-03-17 RX ADMIN — ISOSORBIDE MONONITRATE 60 MG: 60 TABLET, EXTENDED RELEASE ORAL at 16:28

## 2019-03-17 RX ADMIN — SODIUM CHLORIDE 500 ML: 9 INJECTION, SOLUTION INTRAVENOUS at 10:24

## 2019-03-17 RX ADMIN — AMLODIPINE BESYLATE 10 MG: 10 TABLET ORAL at 16:28

## 2019-03-17 RX ADMIN — HYDROCODONE BITARTRATE AND ACETAMINOPHEN 1 TABLET: 10; 325 TABLET ORAL at 13:10

## 2019-03-17 RX ADMIN — LISINOPRIL 10 MG: 10 TABLET ORAL at 16:28

## 2019-03-17 ASSESSMENT — PAIN SCALES - GENERAL
PAINLEVEL_OUTOF10: 5
PAINLEVEL_OUTOF10: 3
PAINLEVEL_OUTOF10: 0

## 2019-03-17 ASSESSMENT — ENCOUNTER SYMPTOMS
WHEEZING: 0
VOMITING: 0
CHEST TIGHTNESS: 0
DIARRHEA: 0
COUGH: 0
NAUSEA: 0
ABDOMINAL PAIN: 0
SHORTNESS OF BREATH: 0

## 2019-03-18 ENCOUNTER — APPOINTMENT (OUTPATIENT)
Dept: GENERAL RADIOLOGY | Age: 84
DRG: 683 | End: 2019-03-18
Payer: MEDICARE

## 2019-03-18 LAB
A/G RATIO: 0.6 (ref 1.1–2.2)
ALBUMIN SERPL-MCNC: 2.6 G/DL (ref 3.4–5)
ALP BLD-CCNC: 203 U/L (ref 40–129)
ALT SERPL-CCNC: 19 U/L (ref 10–40)
ANION GAP SERPL CALCULATED.3IONS-SCNC: 14 MMOL/L (ref 3–16)
AST SERPL-CCNC: 28 U/L (ref 15–37)
BASOPHILS ABSOLUTE: 0 K/UL (ref 0–0.2)
BASOPHILS RELATIVE PERCENT: 0 %
BILIRUB SERPL-MCNC: 0.9 MG/DL (ref 0–1)
BUN BLDV-MCNC: 44 MG/DL (ref 7–20)
CALCIUM SERPL-MCNC: 8.5 MG/DL (ref 8.3–10.6)
CHLORIDE BLD-SCNC: 103 MMOL/L (ref 99–110)
CO2: 17 MMOL/L (ref 21–32)
CREAT SERPL-MCNC: 2.3 MG/DL (ref 0.6–1.2)
EKG ATRIAL RATE: 74 BPM
EKG DIAGNOSIS: NORMAL
EKG P AXIS: 54 DEGREES
EKG P-R INTERVAL: 260 MS
EKG Q-T INTERVAL: 420 MS
EKG QRS DURATION: 108 MS
EKG QTC CALCULATION (BAZETT): 466 MS
EKG R AXIS: -62 DEGREES
EKG T AXIS: 48 DEGREES
EKG VENTRICULAR RATE: 74 BPM
EOSINOPHILS ABSOLUTE: 0.1 K/UL (ref 0–0.6)
EOSINOPHILS RELATIVE PERCENT: 1 %
GFR AFRICAN AMERICAN: 24
GFR NON-AFRICAN AMERICAN: 20
GLOBULIN: 4.5 G/DL
GLUCOSE BLD-MCNC: 89 MG/DL (ref 70–99)
HCT VFR BLD CALC: 39.1 % (ref 36–48)
HEMOGLOBIN: 12.5 G/DL (ref 12–16)
LYMPHOCYTES ABSOLUTE: 1.9 K/UL (ref 1–5.1)
LYMPHOCYTES RELATIVE PERCENT: 16 %
MCH RBC QN AUTO: 27.4 PG (ref 26–34)
MCHC RBC AUTO-ENTMCNC: 32 G/DL (ref 31–36)
MCV RBC AUTO: 85.7 FL (ref 80–100)
MONOCYTES ABSOLUTE: 1.4 K/UL (ref 0–1.3)
MONOCYTES RELATIVE PERCENT: 12 %
NEUTROPHILS ABSOLUTE: 8.4 K/UL (ref 1.7–7.7)
NEUTROPHILS RELATIVE PERCENT: 71 %
PDW BLD-RTO: 15.4 % (ref 12.4–15.4)
PLATELET # BLD: 201 K/UL (ref 135–450)
PMV BLD AUTO: 9.2 FL (ref 5–10.5)
POTASSIUM REFLEX MAGNESIUM: 4.6 MMOL/L (ref 3.5–5.1)
RBC # BLD: 4.57 M/UL (ref 4–5.2)
SODIUM BLD-SCNC: 134 MMOL/L (ref 136–145)
TOTAL PROTEIN: 7.1 G/DL (ref 6.4–8.2)
URIC ACID, SERUM: 8.6 MG/DL (ref 2.6–6)
URINE CULTURE, ROUTINE: NORMAL
WBC # BLD: 11.8 K/UL (ref 4–11)

## 2019-03-18 PROCEDURE — 97535 SELF CARE MNGMENT TRAINING: CPT

## 2019-03-18 PROCEDURE — 36415 COLL VENOUS BLD VENIPUNCTURE: CPT

## 2019-03-18 PROCEDURE — 85025 COMPLETE CBC W/AUTO DIFF WBC: CPT

## 2019-03-18 PROCEDURE — 6370000000 HC RX 637 (ALT 250 FOR IP): Performed by: INTERNAL MEDICINE

## 2019-03-18 PROCEDURE — 97530 THERAPEUTIC ACTIVITIES: CPT

## 2019-03-18 PROCEDURE — 84550 ASSAY OF BLOOD/URIC ACID: CPT

## 2019-03-18 PROCEDURE — 80053 COMPREHEN METABOLIC PANEL: CPT

## 2019-03-18 PROCEDURE — 6360000002 HC RX W HCPCS: Performed by: INTERNAL MEDICINE

## 2019-03-18 PROCEDURE — 97162 PT EVAL MOD COMPLEX 30 MIN: CPT

## 2019-03-18 PROCEDURE — 97116 GAIT TRAINING THERAPY: CPT

## 2019-03-18 PROCEDURE — 2580000003 HC RX 258: Performed by: INTERNAL MEDICINE

## 2019-03-18 PROCEDURE — 73630 X-RAY EXAM OF FOOT: CPT

## 2019-03-18 PROCEDURE — 1200000000 HC SEMI PRIVATE

## 2019-03-18 PROCEDURE — 97166 OT EVAL MOD COMPLEX 45 MIN: CPT

## 2019-03-18 RX ORDER — TRAMADOL HYDROCHLORIDE 50 MG/1
50 TABLET ORAL EVERY 6 HOURS PRN
Status: DISCONTINUED | OUTPATIENT
Start: 2019-03-18 | End: 2019-03-18

## 2019-03-18 RX ORDER — SODIUM CHLORIDE, SODIUM LACTATE, POTASSIUM CHLORIDE, CALCIUM CHLORIDE 600; 310; 30; 20 MG/100ML; MG/100ML; MG/100ML; MG/100ML
INJECTION, SOLUTION INTRAVENOUS CONTINUOUS
Status: ACTIVE | OUTPATIENT
Start: 2019-03-18 | End: 2019-03-18

## 2019-03-18 RX ORDER — TRAMADOL HYDROCHLORIDE 50 MG/1
50 TABLET ORAL EVERY 12 HOURS PRN
Status: DISCONTINUED | OUTPATIENT
Start: 2019-03-18 | End: 2019-03-22 | Stop reason: HOSPADM

## 2019-03-18 RX ORDER — PREDNISONE 20 MG/1
40 TABLET ORAL ONCE
Status: COMPLETED | OUTPATIENT
Start: 2019-03-18 | End: 2019-03-18

## 2019-03-18 RX ORDER — ACETAMINOPHEN 325 MG/1
650 TABLET ORAL EVERY 8 HOURS SCHEDULED
Status: DISCONTINUED | OUTPATIENT
Start: 2019-03-18 | End: 2019-03-22 | Stop reason: HOSPADM

## 2019-03-18 RX ADMIN — HEPARIN SODIUM 5000 UNITS: 5000 INJECTION INTRAVENOUS; SUBCUTANEOUS at 22:09

## 2019-03-18 RX ADMIN — HYDRALAZINE HYDROCHLORIDE 25 MG: 10 TABLET, FILM COATED ORAL at 22:04

## 2019-03-18 RX ADMIN — HEPARIN SODIUM 5000 UNITS: 5000 INJECTION INTRAVENOUS; SUBCUTANEOUS at 14:11

## 2019-03-18 RX ADMIN — PREDNISONE 40 MG: 20 TABLET ORAL at 16:32

## 2019-03-18 RX ADMIN — Medication 10 ML: at 22:09

## 2019-03-18 RX ADMIN — SODIUM CHLORIDE, POTASSIUM CHLORIDE, SODIUM LACTATE AND CALCIUM CHLORIDE: 600; 310; 30; 20 INJECTION, SOLUTION INTRAVENOUS at 11:22

## 2019-03-18 RX ADMIN — TIZANIDINE 2 MG: 4 TABLET ORAL at 10:32

## 2019-03-18 RX ADMIN — TIZANIDINE 2 MG: 4 TABLET ORAL at 06:49

## 2019-03-18 RX ADMIN — HEPARIN SODIUM 5000 UNITS: 5000 INJECTION INTRAVENOUS; SUBCUTANEOUS at 05:50

## 2019-03-18 RX ADMIN — ASPIRIN 81 MG: 81 TABLET ORAL at 09:00

## 2019-03-18 RX ADMIN — HYDRALAZINE HYDROCHLORIDE 25 MG: 10 TABLET, FILM COATED ORAL at 14:11

## 2019-03-18 RX ADMIN — ACETAMINOPHEN 650 MG: 325 TABLET ORAL at 22:03

## 2019-03-18 RX ADMIN — HYDRALAZINE HYDROCHLORIDE 25 MG: 10 TABLET, FILM COATED ORAL at 05:50

## 2019-03-18 RX ADMIN — ACETAMINOPHEN 650 MG: 325 TABLET ORAL at 16:32

## 2019-03-18 RX ADMIN — CEFTRIAXONE 1 G: 1 INJECTION, POWDER, FOR SOLUTION INTRAMUSCULAR; INTRAVENOUS at 10:32

## 2019-03-18 RX ADMIN — Medication 10 ML: at 09:03

## 2019-03-18 ASSESSMENT — PAIN DESCRIPTION - FREQUENCY: FREQUENCY: CONTINUOUS

## 2019-03-18 ASSESSMENT — PAIN SCALES - GENERAL
PAINLEVEL_OUTOF10: 0

## 2019-03-18 ASSESSMENT — PAIN DESCRIPTION - ONSET: ONSET: ON-GOING

## 2019-03-18 ASSESSMENT — PAIN DESCRIPTION - LOCATION: LOCATION: FOOT

## 2019-03-18 ASSESSMENT — PAIN DESCRIPTION - DESCRIPTORS: DESCRIPTORS: PATIENT UNABLE TO DESCRIBE

## 2019-03-18 ASSESSMENT — PAIN DESCRIPTION - ORIENTATION: ORIENTATION: LEFT

## 2019-03-18 ASSESSMENT — PAIN DESCRIPTION - PROGRESSION: CLINICAL_PROGRESSION: NOT CHANGED

## 2019-03-18 ASSESSMENT — PAIN DESCRIPTION - PAIN TYPE: TYPE: ACUTE PAIN

## 2019-03-19 LAB
ANION GAP SERPL CALCULATED.3IONS-SCNC: 11 MMOL/L (ref 3–16)
BASOPHILS ABSOLUTE: 0 K/UL (ref 0–0.2)
BASOPHILS RELATIVE PERCENT: 0.1 %
BUN BLDV-MCNC: 56 MG/DL (ref 7–20)
CALCIUM SERPL-MCNC: 8.4 MG/DL (ref 8.3–10.6)
CHLORIDE BLD-SCNC: 102 MMOL/L (ref 99–110)
CO2: 19 MMOL/L (ref 21–32)
CREAT SERPL-MCNC: 2.6 MG/DL (ref 0.6–1.2)
EOSINOPHILS ABSOLUTE: 0 K/UL (ref 0–0.6)
EOSINOPHILS RELATIVE PERCENT: 0 %
FOLATE: 5.08 NG/ML (ref 4.78–24.2)
GFR AFRICAN AMERICAN: 21
GFR NON-AFRICAN AMERICAN: 17
GLUCOSE BLD-MCNC: 133 MG/DL (ref 70–99)
HCT VFR BLD CALC: 39.1 % (ref 36–48)
HEMOGLOBIN: 12.6 G/DL (ref 12–16)
LV EF: 55 %
LVEF MODALITY: NORMAL
LYMPHOCYTES ABSOLUTE: 1.3 K/UL (ref 1–5.1)
LYMPHOCYTES RELATIVE PERCENT: 8.6 %
MCH RBC QN AUTO: 27.9 PG (ref 26–34)
MCHC RBC AUTO-ENTMCNC: 32.2 G/DL (ref 31–36)
MCV RBC AUTO: 86.5 FL (ref 80–100)
MONOCYTES ABSOLUTE: 1.3 K/UL (ref 0–1.3)
MONOCYTES RELATIVE PERCENT: 8.6 %
NEUTROPHILS ABSOLUTE: 12.3 K/UL (ref 1.7–7.7)
NEUTROPHILS RELATIVE PERCENT: 82.7 %
PDW BLD-RTO: 15.9 % (ref 12.4–15.4)
PLATELET # BLD: 220 K/UL (ref 135–450)
PMV BLD AUTO: 8.9 FL (ref 5–10.5)
POTASSIUM REFLEX MAGNESIUM: 5.1 MMOL/L (ref 3.5–5.1)
RBC # BLD: 4.52 M/UL (ref 4–5.2)
SODIUM BLD-SCNC: 132 MMOL/L (ref 136–145)
VITAMIN B-12: 664 PG/ML (ref 211–911)
WBC # BLD: 14.9 K/UL (ref 4–11)

## 2019-03-19 PROCEDURE — 36415 COLL VENOUS BLD VENIPUNCTURE: CPT

## 2019-03-19 PROCEDURE — 6370000000 HC RX 637 (ALT 250 FOR IP): Performed by: INTERNAL MEDICINE

## 2019-03-19 PROCEDURE — 99222 1ST HOSP IP/OBS MODERATE 55: CPT | Performed by: ORTHOPAEDIC SURGERY

## 2019-03-19 PROCEDURE — 85025 COMPLETE CBC W/AUTO DIFF WBC: CPT

## 2019-03-19 PROCEDURE — 82607 VITAMIN B-12: CPT

## 2019-03-19 PROCEDURE — 6360000002 HC RX W HCPCS: Performed by: INTERNAL MEDICINE

## 2019-03-19 PROCEDURE — 80048 BASIC METABOLIC PNL TOTAL CA: CPT

## 2019-03-19 PROCEDURE — 93306 TTE W/DOPPLER COMPLETE: CPT

## 2019-03-19 PROCEDURE — 1200000000 HC SEMI PRIVATE

## 2019-03-19 PROCEDURE — 2580000003 HC RX 258: Performed by: INTERNAL MEDICINE

## 2019-03-19 PROCEDURE — 82746 ASSAY OF FOLIC ACID SERUM: CPT

## 2019-03-19 RX ORDER — PREDNISONE 20 MG/1
40 TABLET ORAL ONCE
Status: COMPLETED | OUTPATIENT
Start: 2019-03-19 | End: 2019-03-19

## 2019-03-19 RX ORDER — HYDRALAZINE HYDROCHLORIDE 50 MG/1
50 TABLET, FILM COATED ORAL EVERY 8 HOURS SCHEDULED
Status: DISCONTINUED | OUTPATIENT
Start: 2019-03-19 | End: 2019-03-22 | Stop reason: HOSPADM

## 2019-03-19 RX ORDER — ERGOCALCIFEROL 1.25 MG/1
50000 CAPSULE ORAL WEEKLY
Status: DISCONTINUED | OUTPATIENT
Start: 2019-03-19 | End: 2019-03-22 | Stop reason: HOSPADM

## 2019-03-19 RX ORDER — HYDRALAZINE HYDROCHLORIDE 10 MG/1
10 TABLET, FILM COATED ORAL EVERY 12 HOURS SCHEDULED
Status: DISCONTINUED | OUTPATIENT
Start: 2019-03-19 | End: 2019-03-19

## 2019-03-19 RX ORDER — SODIUM CHLORIDE 9 MG/ML
INJECTION, SOLUTION INTRAVENOUS CONTINUOUS
Status: DISCONTINUED | OUTPATIENT
Start: 2019-03-19 | End: 2019-03-22

## 2019-03-19 RX ADMIN — PREDNISONE 40 MG: 20 TABLET ORAL at 11:02

## 2019-03-19 RX ADMIN — ACETAMINOPHEN 650 MG: 325 TABLET ORAL at 21:10

## 2019-03-19 RX ADMIN — Medication 10 ML: at 21:10

## 2019-03-19 RX ADMIN — ISOSORBIDE MONONITRATE 60 MG: 60 TABLET, EXTENDED RELEASE ORAL at 08:37

## 2019-03-19 RX ADMIN — AMLODIPINE BESYLATE 10 MG: 10 TABLET ORAL at 08:37

## 2019-03-19 RX ADMIN — ERGOCALCIFEROL 50000 UNITS: 1.25 CAPSULE ORAL at 11:02

## 2019-03-19 RX ADMIN — HYDRALAZINE HYDROCHLORIDE 25 MG: 10 TABLET, FILM COATED ORAL at 06:02

## 2019-03-19 RX ADMIN — HEPARIN SODIUM 5000 UNITS: 5000 INJECTION INTRAVENOUS; SUBCUTANEOUS at 06:03

## 2019-03-19 RX ADMIN — ACETAMINOPHEN 650 MG: 325 TABLET ORAL at 06:02

## 2019-03-19 RX ADMIN — HYDRALAZINE HYDROCHLORIDE 50 MG: 50 TABLET, FILM COATED ORAL at 21:10

## 2019-03-19 RX ADMIN — HEPARIN SODIUM 5000 UNITS: 5000 INJECTION INTRAVENOUS; SUBCUTANEOUS at 21:12

## 2019-03-19 RX ADMIN — METOPROLOL SUCCINATE 50 MG: 50 TABLET, EXTENDED RELEASE ORAL at 08:37

## 2019-03-19 RX ADMIN — ASPIRIN 81 MG: 81 TABLET ORAL at 08:37

## 2019-03-19 RX ADMIN — CEFTRIAXONE 1 G: 1 INJECTION, POWDER, FOR SOLUTION INTRAMUSCULAR; INTRAVENOUS at 10:13

## 2019-03-19 RX ADMIN — SODIUM CHLORIDE: 9 INJECTION, SOLUTION INTRAVENOUS at 11:02

## 2019-03-19 RX ADMIN — ACETAMINOPHEN 650 MG: 325 TABLET ORAL at 14:11

## 2019-03-19 RX ADMIN — HEPARIN SODIUM 5000 UNITS: 5000 INJECTION INTRAVENOUS; SUBCUTANEOUS at 14:11

## 2019-03-19 RX ADMIN — HYDRALAZINE HYDROCHLORIDE 50 MG: 50 TABLET, FILM COATED ORAL at 14:11

## 2019-03-19 ASSESSMENT — PAIN SCALES - GENERAL
PAINLEVEL_OUTOF10: 0

## 2019-03-20 LAB
ALBUMIN SERPL-MCNC: 2.2 G/DL (ref 3.4–5)
ALBUMIN SERPL-MCNC: 2.8 G/DL (ref 3.1–4.9)
ALPHA-1-GLOBULIN: 0.2 G/DL (ref 0.2–0.4)
ALPHA-2-GLOBULIN: 0.7 G/DL (ref 0.4–1.1)
ANION GAP SERPL CALCULATED.3IONS-SCNC: 10 MMOL/L (ref 3–16)
BASOPHILS ABSOLUTE: 0 K/UL (ref 0–0.2)
BASOPHILS RELATIVE PERCENT: 0 %
BETA GLOBULIN: 1.2 G/DL (ref 0.9–1.6)
BUN BLDV-MCNC: 65 MG/DL (ref 7–20)
CALCIUM SERPL-MCNC: 7.9 MG/DL (ref 8.3–10.6)
CHLORIDE BLD-SCNC: 109 MMOL/L (ref 99–110)
CO2: 17 MMOL/L (ref 21–32)
CREAT SERPL-MCNC: 2.9 MG/DL (ref 0.6–1.2)
EOSINOPHILS ABSOLUTE: 0 K/UL (ref 0–0.6)
EOSINOPHILS RELATIVE PERCENT: 0 %
GAMMA GLOBULIN: 2.5 G/DL (ref 0.6–1.8)
GFR AFRICAN AMERICAN: 19
GFR NON-AFRICAN AMERICAN: 15
GLUCOSE BLD-MCNC: 103 MG/DL (ref 70–99)
HCT VFR BLD CALC: 33.7 % (ref 36–48)
HEMOGLOBIN: 11 G/DL (ref 12–16)
KAPPA, FREE LIGHT CHAINS, SERUM: 118.19 MG/L (ref 3.3–19.4)
KAPPA/LAMBDA RATIO: 1.71 (ref 0.26–1.65)
KAPPA/LAMBDA TEST COMMENT: ABNORMAL
LAMBDA, FREE LIGHT CHAINS, SERUM: 69.15 MG/L (ref 5.71–26.3)
LYMPHOCYTES ABSOLUTE: 1.1 K/UL (ref 1–5.1)
LYMPHOCYTES RELATIVE PERCENT: 10.1 %
MCH RBC QN AUTO: 28.6 PG (ref 26–34)
MCHC RBC AUTO-ENTMCNC: 32.6 G/DL (ref 31–36)
MCV RBC AUTO: 87.8 FL (ref 80–100)
MONOCYTES ABSOLUTE: 0.7 K/UL (ref 0–1.3)
MONOCYTES RELATIVE PERCENT: 6.6 %
NEUTROPHILS ABSOLUTE: 8.8 K/UL (ref 1.7–7.7)
NEUTROPHILS RELATIVE PERCENT: 83.3 %
PDW BLD-RTO: 15.7 % (ref 12.4–15.4)
PHOSPHORUS: 3.8 MG/DL (ref 2.5–4.9)
PLATELET # BLD: 195 K/UL (ref 135–450)
PMV BLD AUTO: 9 FL (ref 5–10.5)
POTASSIUM REFLEX MAGNESIUM: 5.2 MMOL/L (ref 3.5–5.1)
POTASSIUM SERPL-SCNC: 5.2 MMOL/L (ref 3.5–5.1)
RBC # BLD: 3.84 M/UL (ref 4–5.2)
SODIUM BLD-SCNC: 136 MMOL/L (ref 136–145)
SPE/IFE INTERPRETATION: NORMAL
TOTAL PROTEIN: 7.4 G/DL (ref 6.4–8.2)
WBC # BLD: 10.6 K/UL (ref 4–11)

## 2019-03-20 PROCEDURE — 2580000003 HC RX 258: Performed by: INTERNAL MEDICINE

## 2019-03-20 PROCEDURE — 6370000000 HC RX 637 (ALT 250 FOR IP): Performed by: INTERNAL MEDICINE

## 2019-03-20 PROCEDURE — 1200000000 HC SEMI PRIVATE

## 2019-03-20 PROCEDURE — 6360000002 HC RX W HCPCS: Performed by: INTERNAL MEDICINE

## 2019-03-20 PROCEDURE — 85025 COMPLETE CBC W/AUTO DIFF WBC: CPT

## 2019-03-20 PROCEDURE — 51798 US URINE CAPACITY MEASURE: CPT

## 2019-03-20 PROCEDURE — 36415 COLL VENOUS BLD VENIPUNCTURE: CPT

## 2019-03-20 PROCEDURE — 80069 RENAL FUNCTION PANEL: CPT

## 2019-03-20 RX ADMIN — TIZANIDINE 2 MG: 4 TABLET ORAL at 00:47

## 2019-03-20 RX ADMIN — HYDRALAZINE HYDROCHLORIDE 50 MG: 50 TABLET, FILM COATED ORAL at 23:13

## 2019-03-20 RX ADMIN — HEPARIN SODIUM 5000 UNITS: 5000 INJECTION INTRAVENOUS; SUBCUTANEOUS at 15:36

## 2019-03-20 RX ADMIN — AMLODIPINE BESYLATE 10 MG: 10 TABLET ORAL at 09:33

## 2019-03-20 RX ADMIN — HYDRALAZINE HYDROCHLORIDE 50 MG: 50 TABLET, FILM COATED ORAL at 06:43

## 2019-03-20 RX ADMIN — HEPARIN SODIUM 5000 UNITS: 5000 INJECTION INTRAVENOUS; SUBCUTANEOUS at 06:43

## 2019-03-20 RX ADMIN — ASPIRIN 81 MG: 81 TABLET ORAL at 09:33

## 2019-03-20 RX ADMIN — ISOSORBIDE MONONITRATE 60 MG: 60 TABLET, EXTENDED RELEASE ORAL at 09:33

## 2019-03-20 RX ADMIN — SODIUM CHLORIDE: 9 INJECTION, SOLUTION INTRAVENOUS at 11:10

## 2019-03-20 RX ADMIN — ACETAMINOPHEN 650 MG: 325 TABLET ORAL at 06:43

## 2019-03-20 RX ADMIN — HYDRALAZINE HYDROCHLORIDE 50 MG: 50 TABLET, FILM COATED ORAL at 15:36

## 2019-03-20 RX ADMIN — ACETAMINOPHEN 650 MG: 325 TABLET ORAL at 15:36

## 2019-03-20 RX ADMIN — ACETAMINOPHEN 650 MG: 325 TABLET ORAL at 21:06

## 2019-03-20 RX ADMIN — HEPARIN SODIUM 5000 UNITS: 5000 INJECTION INTRAVENOUS; SUBCUTANEOUS at 21:07

## 2019-03-20 RX ADMIN — Medication 10 ML: at 21:07

## 2019-03-20 ASSESSMENT — PAIN SCALES - GENERAL
PAINLEVEL_OUTOF10: 0
PAINLEVEL_OUTOF10: 2
PAINLEVEL_OUTOF10: 0

## 2019-03-20 ASSESSMENT — PAIN DESCRIPTION - LOCATION: LOCATION: HEAD

## 2019-03-20 ASSESSMENT — PAIN DESCRIPTION - FREQUENCY: FREQUENCY: CONTINUOUS

## 2019-03-20 ASSESSMENT — PAIN DESCRIPTION - DESCRIPTORS: DESCRIPTORS: ACHING

## 2019-03-20 ASSESSMENT — PAIN DESCRIPTION - ORIENTATION: ORIENTATION: MID

## 2019-03-20 ASSESSMENT — PAIN DESCRIPTION - ONSET: ONSET: ON-GOING

## 2019-03-20 ASSESSMENT — PAIN DESCRIPTION - PROGRESSION: CLINICAL_PROGRESSION: NOT CHANGED

## 2019-03-20 ASSESSMENT — PAIN DESCRIPTION - PAIN TYPE: TYPE: ACUTE PAIN

## 2019-03-21 LAB
ANION GAP SERPL CALCULATED.3IONS-SCNC: 10 MMOL/L (ref 3–16)
BACTERIA: ABNORMAL /HPF
BASOPHILS ABSOLUTE: 0 K/UL (ref 0–0.2)
BASOPHILS RELATIVE PERCENT: 0.2 %
BILIRUBIN URINE: NEGATIVE
BLOOD, URINE: NEGATIVE
BUN BLDV-MCNC: 67 MG/DL (ref 7–20)
CALCIUM SERPL-MCNC: 8 MG/DL (ref 8.3–10.6)
CHLORIDE BLD-SCNC: 112 MMOL/L (ref 99–110)
CLARITY: ABNORMAL
CO2: 15 MMOL/L (ref 21–32)
COLOR: YELLOW
CREAT SERPL-MCNC: 2.4 MG/DL (ref 0.6–1.2)
CREATININE URINE: 104 MG/DL (ref 28–259)
EOSINOPHILS ABSOLUTE: 0.1 K/UL (ref 0–0.6)
EOSINOPHILS RELATIVE PERCENT: 0.8 %
EPITHELIAL CELLS, UA: ABNORMAL /HPF
GFR AFRICAN AMERICAN: 23
GFR NON-AFRICAN AMERICAN: 19
GLUCOSE BLD-MCNC: 78 MG/DL (ref 70–99)
GLUCOSE URINE: NEGATIVE MG/DL
HCT VFR BLD CALC: 37.7 % (ref 36–48)
HEMOGLOBIN: 11.7 G/DL (ref 12–16)
KETONES, URINE: NEGATIVE MG/DL
LEUKOCYTE ESTERASE, URINE: ABNORMAL
LYMPHOCYTES ABSOLUTE: 1.7 K/UL (ref 1–5.1)
LYMPHOCYTES RELATIVE PERCENT: 20.9 %
MCH RBC QN AUTO: 27.4 PG (ref 26–34)
MCHC RBC AUTO-ENTMCNC: 31.2 G/DL (ref 31–36)
MCV RBC AUTO: 87.9 FL (ref 80–100)
MICROALBUMIN UR-MCNC: 27.7 MG/DL
MICROALBUMIN/CREAT UR-RTO: 266.3 MG/G (ref 0–30)
MICROSCOPIC EXAMINATION: YES
MONOCYTES ABSOLUTE: 0.7 K/UL (ref 0–1.3)
MONOCYTES RELATIVE PERCENT: 9 %
NEUTROPHILS ABSOLUTE: 5.6 K/UL (ref 1.7–7.7)
NEUTROPHILS RELATIVE PERCENT: 69.1 %
NITRITE, URINE: NEGATIVE
PDW BLD-RTO: 15.8 % (ref 12.4–15.4)
PH UA: 6 (ref 5–8)
PLATELET # BLD: 266 K/UL (ref 135–450)
PMV BLD AUTO: 8.8 FL (ref 5–10.5)
POTASSIUM REFLEX MAGNESIUM: 4.9 MMOL/L (ref 3.5–5.1)
PROTEIN UA: 30 MG/DL
RBC # BLD: 4.28 M/UL (ref 4–5.2)
RBC UA: ABNORMAL /HPF (ref 0–2)
SODIUM BLD-SCNC: 137 MMOL/L (ref 136–145)
SODIUM URINE: 47 MMOL/L
SPECIFIC GRAVITY UA: 1.02 (ref 1–1.03)
UREA NITROGEN, UR: 866 MG/DL (ref 800–1666)
URINE TYPE: ABNORMAL
UROBILINOGEN, URINE: 0.2 E.U./DL
WBC # BLD: 8.2 K/UL (ref 4–11)
WBC UA: ABNORMAL /HPF (ref 0–5)

## 2019-03-21 PROCEDURE — 84540 ASSAY OF URINE/UREA-N: CPT

## 2019-03-21 PROCEDURE — 84166 PROTEIN E-PHORESIS/URINE/CSF: CPT

## 2019-03-21 PROCEDURE — 84300 ASSAY OF URINE SODIUM: CPT

## 2019-03-21 PROCEDURE — 1200000000 HC SEMI PRIVATE

## 2019-03-21 PROCEDURE — 36415 COLL VENOUS BLD VENIPUNCTURE: CPT

## 2019-03-21 PROCEDURE — 6360000002 HC RX W HCPCS: Performed by: INTERNAL MEDICINE

## 2019-03-21 PROCEDURE — 51798 US URINE CAPACITY MEASURE: CPT

## 2019-03-21 PROCEDURE — 82043 UR ALBUMIN QUANTITATIVE: CPT

## 2019-03-21 PROCEDURE — 6370000000 HC RX 637 (ALT 250 FOR IP): Performed by: INTERNAL MEDICINE

## 2019-03-21 PROCEDURE — 80048 BASIC METABOLIC PNL TOTAL CA: CPT

## 2019-03-21 PROCEDURE — 2580000003 HC RX 258: Performed by: INTERNAL MEDICINE

## 2019-03-21 PROCEDURE — 84156 ASSAY OF PROTEIN URINE: CPT

## 2019-03-21 PROCEDURE — 85025 COMPLETE CBC W/AUTO DIFF WBC: CPT

## 2019-03-21 PROCEDURE — 82570 ASSAY OF URINE CREATININE: CPT

## 2019-03-21 PROCEDURE — 81001 URINALYSIS AUTO W/SCOPE: CPT

## 2019-03-21 RX ADMIN — ISOSORBIDE MONONITRATE 60 MG: 60 TABLET, EXTENDED RELEASE ORAL at 08:23

## 2019-03-21 RX ADMIN — HEPARIN SODIUM 5000 UNITS: 5000 INJECTION INTRAVENOUS; SUBCUTANEOUS at 15:00

## 2019-03-21 RX ADMIN — SODIUM CHLORIDE: 9 INJECTION, SOLUTION INTRAVENOUS at 06:55

## 2019-03-21 RX ADMIN — ACETAMINOPHEN 650 MG: 325 TABLET ORAL at 22:29

## 2019-03-21 RX ADMIN — HYDRALAZINE HYDROCHLORIDE 50 MG: 50 TABLET, FILM COATED ORAL at 15:00

## 2019-03-21 RX ADMIN — TIZANIDINE 2 MG: 4 TABLET ORAL at 20:29

## 2019-03-21 RX ADMIN — HYDRALAZINE HYDROCHLORIDE 50 MG: 50 TABLET, FILM COATED ORAL at 22:28

## 2019-03-21 RX ADMIN — ASPIRIN 81 MG: 81 TABLET ORAL at 08:23

## 2019-03-21 RX ADMIN — HEPARIN SODIUM 5000 UNITS: 5000 INJECTION INTRAVENOUS; SUBCUTANEOUS at 06:50

## 2019-03-21 RX ADMIN — ACETAMINOPHEN 650 MG: 325 TABLET ORAL at 15:00

## 2019-03-21 RX ADMIN — METOPROLOL SUCCINATE 50 MG: 50 TABLET, EXTENDED RELEASE ORAL at 08:23

## 2019-03-21 RX ADMIN — HEPARIN SODIUM 5000 UNITS: 5000 INJECTION INTRAVENOUS; SUBCUTANEOUS at 22:28

## 2019-03-21 RX ADMIN — AMLODIPINE BESYLATE 10 MG: 10 TABLET ORAL at 08:23

## 2019-03-21 RX ADMIN — ACETAMINOPHEN 650 MG: 325 TABLET ORAL at 06:48

## 2019-03-21 RX ADMIN — SODIUM CHLORIDE: 9 INJECTION, SOLUTION INTRAVENOUS at 15:33

## 2019-03-21 RX ADMIN — HYDRALAZINE HYDROCHLORIDE 50 MG: 50 TABLET, FILM COATED ORAL at 06:48

## 2019-03-21 ASSESSMENT — PAIN SCALES - GENERAL
PAINLEVEL_OUTOF10: 0
PAINLEVEL_OUTOF10: 0
PAINLEVEL_OUTOF10: 3
PAINLEVEL_OUTOF10: 0

## 2019-03-21 ASSESSMENT — PAIN DESCRIPTION - DESCRIPTORS: DESCRIPTORS: ACHING

## 2019-03-21 ASSESSMENT — PAIN DESCRIPTION - ONSET: ONSET: ON-GOING

## 2019-03-21 ASSESSMENT — PAIN DESCRIPTION - FREQUENCY: FREQUENCY: CONTINUOUS

## 2019-03-21 ASSESSMENT — PAIN DESCRIPTION - PROGRESSION: CLINICAL_PROGRESSION: GRADUALLY WORSENING

## 2019-03-21 ASSESSMENT — PAIN DESCRIPTION - ORIENTATION: ORIENTATION: LEFT

## 2019-03-21 ASSESSMENT — PAIN DESCRIPTION - PAIN TYPE: TYPE: ACUTE PAIN

## 2019-03-21 ASSESSMENT — PAIN DESCRIPTION - LOCATION: LOCATION: LEG

## 2019-03-22 VITALS
OXYGEN SATURATION: 96 % | WEIGHT: 172.62 LBS | BODY MASS INDEX: 28.76 KG/M2 | TEMPERATURE: 98 F | HEIGHT: 65 IN | DIASTOLIC BLOOD PRESSURE: 92 MMHG | HEART RATE: 72 BPM | RESPIRATION RATE: 16 BRPM | SYSTOLIC BLOOD PRESSURE: 173 MMHG

## 2019-03-22 LAB
ANION GAP SERPL CALCULATED.3IONS-SCNC: 10 MMOL/L (ref 3–16)
BASOPHILS ABSOLUTE: 0 K/UL (ref 0–0.2)
BASOPHILS RELATIVE PERCENT: 0.5 %
BLOOD CULTURE, ROUTINE: NORMAL
BUN BLDV-MCNC: 59 MG/DL (ref 7–20)
CALCIUM SERPL-MCNC: 8.1 MG/DL (ref 8.3–10.6)
CHLORIDE BLD-SCNC: 111 MMOL/L (ref 99–110)
CO2: 16 MMOL/L (ref 21–32)
CREAT SERPL-MCNC: 2.3 MG/DL (ref 0.6–1.2)
CULTURE, BLOOD 2: NORMAL
EOSINOPHILS ABSOLUTE: 0.2 K/UL (ref 0–0.6)
EOSINOPHILS RELATIVE PERCENT: 3.6 %
GFR AFRICAN AMERICAN: 24
GFR NON-AFRICAN AMERICAN: 20
GLUCOSE BLD-MCNC: 75 MG/DL (ref 70–99)
HCT VFR BLD CALC: 34.8 % (ref 36–48)
HEMOGLOBIN: 11.2 G/DL (ref 12–16)
LYMPHOCYTES ABSOLUTE: 1.2 K/UL (ref 1–5.1)
LYMPHOCYTES RELATIVE PERCENT: 21.6 %
MCH RBC QN AUTO: 28.1 PG (ref 26–34)
MCHC RBC AUTO-ENTMCNC: 32 G/DL (ref 31–36)
MCV RBC AUTO: 87.7 FL (ref 80–100)
MONOCYTES ABSOLUTE: 0.6 K/UL (ref 0–1.3)
MONOCYTES RELATIVE PERCENT: 10.8 %
NEUTROPHILS ABSOLUTE: 3.4 K/UL (ref 1.7–7.7)
NEUTROPHILS RELATIVE PERCENT: 63.5 %
PDW BLD-RTO: 15.8 % (ref 12.4–15.4)
PLATELET # BLD: 249 K/UL (ref 135–450)
PMV BLD AUTO: 8.4 FL (ref 5–10.5)
POTASSIUM REFLEX MAGNESIUM: 4.7 MMOL/L (ref 3.5–5.1)
RBC # BLD: 3.97 M/UL (ref 4–5.2)
REASON FOR REJECTION: NORMAL
REJECTED TEST: NORMAL
SODIUM BLD-SCNC: 137 MMOL/L (ref 136–145)
WBC # BLD: 5.4 K/UL (ref 4–11)

## 2019-03-22 PROCEDURE — 6360000002 HC RX W HCPCS: Performed by: INTERNAL MEDICINE

## 2019-03-22 PROCEDURE — 6370000000 HC RX 637 (ALT 250 FOR IP): Performed by: INTERNAL MEDICINE

## 2019-03-22 PROCEDURE — 80048 BASIC METABOLIC PNL TOTAL CA: CPT

## 2019-03-22 PROCEDURE — 36415 COLL VENOUS BLD VENIPUNCTURE: CPT

## 2019-03-22 PROCEDURE — 97116 GAIT TRAINING THERAPY: CPT

## 2019-03-22 PROCEDURE — 97535 SELF CARE MNGMENT TRAINING: CPT

## 2019-03-22 PROCEDURE — 2580000003 HC RX 258: Performed by: INTERNAL MEDICINE

## 2019-03-22 PROCEDURE — 97530 THERAPEUTIC ACTIVITIES: CPT

## 2019-03-22 PROCEDURE — 85025 COMPLETE CBC W/AUTO DIFF WBC: CPT

## 2019-03-22 RX ORDER — ERGOCALCIFEROL (VITAMIN D2) 1250 MCG
50000 CAPSULE ORAL WEEKLY
Qty: 12 CAPSULE | Refills: 0 | Status: SHIPPED | OUTPATIENT
Start: 2019-03-26 | End: 2019-03-22

## 2019-03-22 RX ORDER — TRAMADOL HYDROCHLORIDE 50 MG/1
50 TABLET ORAL EVERY 12 HOURS PRN
Qty: 10 TABLET | Refills: 0 | Status: ON HOLD | OUTPATIENT
Start: 2019-03-22 | End: 2019-03-26 | Stop reason: HOSPADM

## 2019-03-22 RX ORDER — HYDRALAZINE HYDROCHLORIDE 50 MG/1
50 TABLET, FILM COATED ORAL EVERY 8 HOURS SCHEDULED
Qty: 90 TABLET | Refills: 3 | Status: SHIPPED | OUTPATIENT
Start: 2019-03-22 | End: 2019-03-22

## 2019-03-22 RX ORDER — HYDRALAZINE HYDROCHLORIDE 50 MG/1
50 TABLET, FILM COATED ORAL EVERY 8 HOURS SCHEDULED
Qty: 90 TABLET | Refills: 3 | Status: ON HOLD | OUTPATIENT
Start: 2019-03-22 | End: 2019-06-11 | Stop reason: HOSPADM

## 2019-03-22 RX ORDER — ERGOCALCIFEROL (VITAMIN D2) 1250 MCG
50000 CAPSULE ORAL WEEKLY
Qty: 12 CAPSULE | Refills: 0 | Status: SHIPPED | OUTPATIENT
Start: 2019-03-26 | End: 2019-06-25 | Stop reason: SDUPTHER

## 2019-03-22 RX ADMIN — ISOSORBIDE MONONITRATE 60 MG: 60 TABLET, EXTENDED RELEASE ORAL at 09:03

## 2019-03-22 RX ADMIN — AMLODIPINE BESYLATE 10 MG: 10 TABLET ORAL at 09:03

## 2019-03-22 RX ADMIN — HYDRALAZINE HYDROCHLORIDE 50 MG: 50 TABLET, FILM COATED ORAL at 14:11

## 2019-03-22 RX ADMIN — SODIUM CHLORIDE: 9 INJECTION, SOLUTION INTRAVENOUS at 01:45

## 2019-03-22 RX ADMIN — ACETAMINOPHEN 650 MG: 325 TABLET ORAL at 07:59

## 2019-03-22 RX ADMIN — ACETAMINOPHEN 650 MG: 325 TABLET ORAL at 14:11

## 2019-03-22 RX ADMIN — HEPARIN SODIUM 5000 UNITS: 5000 INJECTION INTRAVENOUS; SUBCUTANEOUS at 07:59

## 2019-03-22 RX ADMIN — HYDRALAZINE HYDROCHLORIDE 50 MG: 50 TABLET, FILM COATED ORAL at 07:59

## 2019-03-22 RX ADMIN — METOPROLOL SUCCINATE 50 MG: 50 TABLET, EXTENDED RELEASE ORAL at 09:02

## 2019-03-22 RX ADMIN — ASPIRIN 81 MG: 81 TABLET ORAL at 09:02

## 2019-03-22 ASSESSMENT — PAIN SCALES - GENERAL
PAINLEVEL_OUTOF10: 3
PAINLEVEL_OUTOF10: 3

## 2019-03-22 ASSESSMENT — PAIN DESCRIPTION - ORIENTATION: ORIENTATION: LEFT

## 2019-03-22 ASSESSMENT — PAIN DESCRIPTION - LOCATION: LOCATION: LEG

## 2019-03-22 ASSESSMENT — PAIN DESCRIPTION - FREQUENCY: FREQUENCY: CONTINUOUS

## 2019-03-22 ASSESSMENT — PAIN DESCRIPTION - DESCRIPTORS: DESCRIPTORS: ACHING

## 2019-03-22 ASSESSMENT — PAIN DESCRIPTION - PROGRESSION: CLINICAL_PROGRESSION: NOT CHANGED

## 2019-03-22 ASSESSMENT — PAIN DESCRIPTION - ONSET: ONSET: ON-GOING

## 2019-03-23 ENCOUNTER — CARE COORDINATION (OUTPATIENT)
Dept: CASE MANAGEMENT | Age: 84
End: 2019-03-23

## 2019-03-24 ENCOUNTER — HOSPITAL ENCOUNTER (INPATIENT)
Age: 84
LOS: 1 days | Discharge: HOME | DRG: 291 | End: 2019-03-26
Attending: EMERGENCY MEDICINE | Admitting: INTERNAL MEDICINE
Payer: MEDICARE

## 2019-03-24 ENCOUNTER — APPOINTMENT (OUTPATIENT)
Dept: GENERAL RADIOLOGY | Age: 84
DRG: 291 | End: 2019-03-24
Payer: MEDICARE

## 2019-03-24 ENCOUNTER — APPOINTMENT (OUTPATIENT)
Dept: CT IMAGING | Age: 84
DRG: 291 | End: 2019-03-24
Payer: MEDICARE

## 2019-03-24 DIAGNOSIS — J18.9 PNEUMONIA DUE TO ORGANISM: Primary | ICD-10-CM

## 2019-03-24 DIAGNOSIS — R79.89 ELEVATED BRAIN NATRIURETIC PEPTIDE (BNP) LEVEL: ICD-10-CM

## 2019-03-24 DIAGNOSIS — R06.00 DYSPNEA AND RESPIRATORY ABNORMALITIES: ICD-10-CM

## 2019-03-24 DIAGNOSIS — R06.89 DYSPNEA AND RESPIRATORY ABNORMALITIES: ICD-10-CM

## 2019-03-24 PROBLEM — I50.33 ACUTE ON CHRONIC DIASTOLIC CHF (CONGESTIVE HEART FAILURE) (HCC): Status: ACTIVE | Noted: 2019-03-24

## 2019-03-24 PROBLEM — I48.91 ATRIAL FIBRILLATION (HCC): Status: ACTIVE | Noted: 2019-03-24

## 2019-03-24 PROBLEM — I27.20 PULMONARY HYPERTENSION (HCC): Status: ACTIVE | Noted: 2019-03-24

## 2019-03-24 PROBLEM — R06.02 SHORTNESS OF BREATH: Status: ACTIVE | Noted: 2019-03-24

## 2019-03-24 LAB
ANION GAP SERPL CALCULATED.3IONS-SCNC: 13 MMOL/L (ref 3–16)
BACTERIA: ABNORMAL /HPF
BASE EXCESS VENOUS: -12 (ref -3–3)
BILIRUBIN URINE: NEGATIVE
BLOOD, URINE: ABNORMAL
BUN BLDV-MCNC: 48 MG/DL (ref 7–20)
CALCIUM SERPL-MCNC: 8.6 MG/DL (ref 8.3–10.6)
CHLORIDE BLD-SCNC: 111 MMOL/L (ref 99–110)
CLARITY: CLEAR
CO2: 15 MMOL/L (ref 21–32)
COLOR: YELLOW
CREAT SERPL-MCNC: 2 MG/DL (ref 0.6–1.2)
EPITHELIAL CELLS, UA: ABNORMAL /HPF
GFR AFRICAN AMERICAN: 29
GFR NON-AFRICAN AMERICAN: 24
GLUCOSE BLD-MCNC: 75 MG/DL (ref 70–99)
GLUCOSE URINE: NEGATIVE MG/DL
HCO3 VENOUS: 13.8 MMOL/L (ref 23–29)
HCT VFR BLD CALC: 36.5 % (ref 36–48)
HEMOGLOBIN: 11.8 G/DL (ref 12–16)
KETONES, URINE: NEGATIVE MG/DL
LACTATE: 0.87 MMOL/L (ref 0.4–2)
LEUKOCYTE ESTERASE, URINE: ABNORMAL
MCH RBC QN AUTO: 27.7 PG (ref 26–34)
MCHC RBC AUTO-ENTMCNC: 32.3 G/DL (ref 31–36)
MCV RBC AUTO: 85.8 FL (ref 80–100)
MICROSCOPIC EXAMINATION: YES
NITRITE, URINE: NEGATIVE
O2 SAT, VEN: 74 %
PCO2, VEN: 25.8 MM HG (ref 40–50)
PDW BLD-RTO: 16 % (ref 12.4–15.4)
PERFORMED ON: ABNORMAL
PH UA: 5.5 (ref 5–8)
PH VENOUS: 7.34 (ref 7.35–7.45)
PLATELET # BLD: 266 K/UL (ref 135–450)
PMV BLD AUTO: 7.9 FL (ref 5–10.5)
PO2, VEN: 41 MM HG
POC SAMPLE TYPE: ABNORMAL
POTASSIUM REFLEX MAGNESIUM: 5.2 MMOL/L (ref 3.5–5.1)
PRO-BNP: 5464 PG/ML (ref 0–449)
PROTEIN UA: 30 MG/DL
RBC # BLD: 4.26 M/UL (ref 4–5.2)
RBC UA: ABNORMAL /HPF (ref 0–2)
SODIUM BLD-SCNC: 139 MMOL/L (ref 136–145)
SPECIFIC GRAVITY UA: 1.02 (ref 1–1.03)
TCO2 CALC VENOUS: 15 MMOL/L
TROPONIN: <0.01 NG/ML
URINE TYPE: ABNORMAL
UROBILINOGEN, URINE: 0.2 E.U./DL
WBC # BLD: 7 K/UL (ref 4–11)
WBC UA: ABNORMAL /HPF (ref 0–5)

## 2019-03-24 PROCEDURE — G0378 HOSPITAL OBSERVATION PER HR: HCPCS

## 2019-03-24 PROCEDURE — 2580000003 HC RX 258: Performed by: INTERNAL MEDICINE

## 2019-03-24 PROCEDURE — 6370000000 HC RX 637 (ALT 250 FOR IP): Performed by: STUDENT IN AN ORGANIZED HEALTH CARE EDUCATION/TRAINING PROGRAM

## 2019-03-24 PROCEDURE — 82803 BLOOD GASES ANY COMBINATION: CPT

## 2019-03-24 PROCEDURE — 36415 COLL VENOUS BLD VENIPUNCTURE: CPT

## 2019-03-24 PROCEDURE — 6370000000 HC RX 637 (ALT 250 FOR IP): Performed by: INTERNAL MEDICINE

## 2019-03-24 PROCEDURE — 94640 AIRWAY INHALATION TREATMENT: CPT

## 2019-03-24 PROCEDURE — 96376 TX/PRO/DX INJ SAME DRUG ADON: CPT

## 2019-03-24 PROCEDURE — 96366 THER/PROPH/DIAG IV INF ADDON: CPT

## 2019-03-24 PROCEDURE — 96372 THER/PROPH/DIAG INJ SC/IM: CPT

## 2019-03-24 PROCEDURE — 94760 N-INVAS EAR/PLS OXIMETRY 1: CPT

## 2019-03-24 PROCEDURE — 96368 THER/DIAG CONCURRENT INF: CPT

## 2019-03-24 PROCEDURE — 81001 URINALYSIS AUTO W/SCOPE: CPT

## 2019-03-24 PROCEDURE — 99285 EMERGENCY DEPT VISIT HI MDM: CPT

## 2019-03-24 PROCEDURE — 71046 X-RAY EXAM CHEST 2 VIEWS: CPT

## 2019-03-24 PROCEDURE — 6360000002 HC RX W HCPCS: Performed by: STUDENT IN AN ORGANIZED HEALTH CARE EDUCATION/TRAINING PROGRAM

## 2019-03-24 PROCEDURE — 71250 CT THORAX DX C-: CPT

## 2019-03-24 PROCEDURE — 96365 THER/PROPH/DIAG IV INF INIT: CPT

## 2019-03-24 PROCEDURE — 87086 URINE CULTURE/COLONY COUNT: CPT

## 2019-03-24 PROCEDURE — 94761 N-INVAS EAR/PLS OXIMETRY MLT: CPT

## 2019-03-24 PROCEDURE — 93005 ELECTROCARDIOGRAM TRACING: CPT | Performed by: STUDENT IN AN ORGANIZED HEALTH CARE EDUCATION/TRAINING PROGRAM

## 2019-03-24 PROCEDURE — 84484 ASSAY OF TROPONIN QUANT: CPT

## 2019-03-24 PROCEDURE — 85027 COMPLETE CBC AUTOMATED: CPT

## 2019-03-24 PROCEDURE — 83605 ASSAY OF LACTIC ACID: CPT

## 2019-03-24 PROCEDURE — 83880 ASSAY OF NATRIURETIC PEPTIDE: CPT

## 2019-03-24 PROCEDURE — 87040 BLOOD CULTURE FOR BACTERIA: CPT

## 2019-03-24 PROCEDURE — 6360000002 HC RX W HCPCS: Performed by: INTERNAL MEDICINE

## 2019-03-24 PROCEDURE — 96375 TX/PRO/DX INJ NEW DRUG ADDON: CPT

## 2019-03-24 PROCEDURE — 2580000003 HC RX 258: Performed by: STUDENT IN AN ORGANIZED HEALTH CARE EDUCATION/TRAINING PROGRAM

## 2019-03-24 PROCEDURE — 80048 BASIC METABOLIC PNL TOTAL CA: CPT

## 2019-03-24 RX ORDER — ASPIRIN 81 MG/1
324 TABLET, CHEWABLE ORAL ONCE
Status: COMPLETED | OUTPATIENT
Start: 2019-03-24 | End: 2019-03-24

## 2019-03-24 RX ORDER — FUROSEMIDE 10 MG/ML
40 INJECTION INTRAMUSCULAR; INTRAVENOUS 2 TIMES DAILY
Status: DISCONTINUED | OUTPATIENT
Start: 2019-03-24 | End: 2019-03-26

## 2019-03-24 RX ORDER — HYDRALAZINE HYDROCHLORIDE 50 MG/1
50 TABLET, FILM COATED ORAL EVERY 8 HOURS SCHEDULED
Status: DISCONTINUED | OUTPATIENT
Start: 2019-03-24 | End: 2019-03-26 | Stop reason: HOSPADM

## 2019-03-24 RX ORDER — FUROSEMIDE 10 MG/ML
80 INJECTION INTRAMUSCULAR; INTRAVENOUS ONCE
Status: COMPLETED | OUTPATIENT
Start: 2019-03-24 | End: 2019-03-24

## 2019-03-24 RX ORDER — ONDANSETRON 2 MG/ML
4 INJECTION INTRAMUSCULAR; INTRAVENOUS EVERY 6 HOURS PRN
Status: DISCONTINUED | OUTPATIENT
Start: 2019-03-24 | End: 2019-03-26 | Stop reason: HOSPADM

## 2019-03-24 RX ORDER — ASPIRIN 81 MG/1
81 TABLET ORAL DAILY
Status: DISCONTINUED | OUTPATIENT
Start: 2019-03-24 | End: 2019-03-26 | Stop reason: HOSPADM

## 2019-03-24 RX ORDER — METOPROLOL SUCCINATE 50 MG/1
50 TABLET, EXTENDED RELEASE ORAL DAILY
Status: DISCONTINUED | OUTPATIENT
Start: 2019-03-24 | End: 2019-03-26 | Stop reason: HOSPADM

## 2019-03-24 RX ORDER — SODIUM CHLORIDE 0.9 % (FLUSH) 0.9 %
10 SYRINGE (ML) INJECTION EVERY 12 HOURS SCHEDULED
Status: DISCONTINUED | OUTPATIENT
Start: 2019-03-24 | End: 2019-03-26 | Stop reason: HOSPADM

## 2019-03-24 RX ORDER — TIZANIDINE 4 MG/1
2 TABLET ORAL EVERY 8 HOURS PRN
Status: DISCONTINUED | OUTPATIENT
Start: 2019-03-24 | End: 2019-03-26 | Stop reason: HOSPADM

## 2019-03-24 RX ORDER — ISOSORBIDE MONONITRATE 60 MG/1
60 TABLET, EXTENDED RELEASE ORAL DAILY
Status: DISCONTINUED | OUTPATIENT
Start: 2019-03-24 | End: 2019-03-26 | Stop reason: HOSPADM

## 2019-03-24 RX ORDER — SODIUM CHLORIDE, SODIUM LACTATE, POTASSIUM CHLORIDE, AND CALCIUM CHLORIDE .6; .31; .03; .02 G/100ML; G/100ML; G/100ML; G/100ML
500 INJECTION, SOLUTION INTRAVENOUS ONCE
Status: COMPLETED | OUTPATIENT
Start: 2019-03-24 | End: 2019-03-24

## 2019-03-24 RX ORDER — IPRATROPIUM BROMIDE AND ALBUTEROL SULFATE 2.5; .5 MG/3ML; MG/3ML
1 SOLUTION RESPIRATORY (INHALATION) ONCE
Status: COMPLETED | OUTPATIENT
Start: 2019-03-24 | End: 2019-03-24

## 2019-03-24 RX ORDER — SODIUM CHLORIDE 0.9 % (FLUSH) 0.9 %
10 SYRINGE (ML) INJECTION PRN
Status: DISCONTINUED | OUTPATIENT
Start: 2019-03-24 | End: 2019-03-26 | Stop reason: HOSPADM

## 2019-03-24 RX ADMIN — TIZANIDINE 2 MG: 4 TABLET ORAL at 22:19

## 2019-03-24 RX ADMIN — METOPROLOL SUCCINATE 50 MG: 50 TABLET, EXTENDED RELEASE ORAL at 18:05

## 2019-03-24 RX ADMIN — HYDRALAZINE HYDROCHLORIDE 50 MG: 50 TABLET, FILM COATED ORAL at 18:05

## 2019-03-24 RX ADMIN — IPRATROPIUM BROMIDE AND ALBUTEROL SULFATE 1 AMPULE: .5; 3 SOLUTION RESPIRATORY (INHALATION) at 12:55

## 2019-03-24 RX ADMIN — Medication 10 ML: at 22:13

## 2019-03-24 RX ADMIN — VANCOMYCIN HYDROCHLORIDE 1250 MG: 10 INJECTION, POWDER, LYOPHILIZED, FOR SOLUTION INTRAVENOUS at 15:55

## 2019-03-24 RX ADMIN — HYDRALAZINE HYDROCHLORIDE 50 MG: 50 TABLET, FILM COATED ORAL at 22:19

## 2019-03-24 RX ADMIN — ENOXAPARIN SODIUM 30 MG: 30 INJECTION SUBCUTANEOUS at 18:05

## 2019-03-24 RX ADMIN — ASPIRIN 81 MG: 81 TABLET, COATED ORAL at 18:05

## 2019-03-24 RX ADMIN — ISOSORBIDE MONONITRATE 60 MG: 60 TABLET, EXTENDED RELEASE ORAL at 18:05

## 2019-03-24 RX ADMIN — SODIUM CHLORIDE, POTASSIUM CHLORIDE, SODIUM LACTATE AND CALCIUM CHLORIDE 500 ML: 600; 310; 30; 20 INJECTION, SOLUTION INTRAVENOUS at 13:43

## 2019-03-24 RX ADMIN — FUROSEMIDE 40 MG: 10 INJECTION, SOLUTION INTRAMUSCULAR; INTRAVENOUS at 22:20

## 2019-03-24 RX ADMIN — ASPIRIN 81 MG 324 MG: 81 TABLET ORAL at 12:38

## 2019-03-24 RX ADMIN — FUROSEMIDE 80 MG: 10 INJECTION, SOLUTION INTRAMUSCULAR; INTRAVENOUS at 18:04

## 2019-03-24 RX ADMIN — CEFEPIME HYDROCHLORIDE 2 G: 2 INJECTION, POWDER, FOR SOLUTION INTRAVENOUS at 13:44

## 2019-03-24 ASSESSMENT — ENCOUNTER SYMPTOMS
WHEEZING: 0
CHEST TIGHTNESS: 0
BACK PAIN: 0
SHORTNESS OF BREATH: 1
GASTROINTESTINAL NEGATIVE: 1
SORE THROAT: 0
COUGH: 0
PHOTOPHOBIA: 0

## 2019-03-24 ASSESSMENT — PAIN SCALES - GENERAL: PAINLEVEL_OUTOF10: 0

## 2019-03-24 ASSESSMENT — PAIN DESCRIPTION - PROGRESSION: CLINICAL_PROGRESSION: NOT CHANGED

## 2019-03-24 NOTE — CONSULTS
Pharmacy consulted to dose vancomycin x1 in ED per Dr. Tasha Ortiz. Will send vancomycin 1.25g IV x1. Provides ~ 15 mg/kg. Please re-consult pharmacy if patient is admitted and further dosing per pharmacy is warranted. Please call with questions.     Brooklynn Purcell, PharmD  [id number]  3/24/2019 12:58 PM

## 2019-03-24 NOTE — PROGRESS NOTES
Pharmacy Note - Renal Dosing    Lovenox 40 mg daily ordered for patient. This medication is renally eliminated. Will change to 30 mg daily per renal dose adjustment policy. Estimated Creatinine Clearance: 21 mL/min (A) (based on SCr of 2 mg/dL (H)). Pharmacy will continue to monitor renal function and adjust dose as necessary. Please call with any questions.       Darien Center [facility]   3/24/2019 5:51 PM

## 2019-03-24 NOTE — ED PROVIDER NOTES
8.6 8.3 - 10.6 mg/dL   Brain Natriuretic Peptide   Result Value Ref Range    Pro-BNP 5,464 (H) 0 - 449 pg/mL   Troponin   Result Value Ref Range    Troponin <0.01 <0.01 ng/mL   POCT Venous   Result Value Ref Range    pH, Tomas 7.337 (L) 7.350 - 7.450    pCO2, Tomas 25.8 (L) 40.0 - 50.0 mm Hg    pO2, Tomas 41 Not Established mm Hg    HCO3, Venous 13.8 (L) 23.0 - 29.0 mmol/L    Base Excess, Tomas -12 (L) -3 - 3    O2 Sat, Tomas 74 Not Established %    TC02 (Calc), Tomas 15 Not Established mmol/L    Lactate 0.87 0.40 - 2.00 mmol/L    Sample Type TOMAS     Performed on SEE BELOW        ED BEDSIDE ULTRASOUND:  None    RECENT VITALS:  BP: (!) 173/74, Temp: 97.7 °F (36.5 °C), Pulse: 73,Resp: 19, SpO2: 99 %     Procedures     None    ED Course     Nursing Notes, Past Medical Hx, Past Surgical Hx, Social Hx, Allergies, and Family Hx were reviewed. The patient was given the followingmedications:  Orders Placed This Encounter   Medications    aspirin chewable tablet 324 mg    ipratropium-albuterol (DUONEB) nebulizer solution 1 ampule    cefepime (MAXIPIME) 2 g IVPB minibag    vancomycin (VANCOCIN) 1,250 mg in dextrose 5 % 250 mL IVPB    lactated ringers bolus       CONSULTS:  PHARMACY TO DOSE VANCOMYCIN  IP CONSULT TO HOSPITALIST    MEDICAL DECISION MAKING / ASSESSMENT / Allison Krystina is a 80 y.o. female with CKD, HTN, PVD, AV block, CVA, and recent discharge from a 5 day hospital admission for fatigue and UTI who presents today with complaints of SOB. The patient's primary exam was concerning for volume overload versus pneumonia given the rattling on exam.  A DuoNeb was given to help with her breathing and showed some improvement in the rattling. She was also given aspirin given the concern for possible ACS. EKG showed new A. fib at a normal rate. Her troponin was ordered and was negative. It is possible that her new A. fib is contributing to her shortness of breath.   X-ray showed possible consolidation in her right lower lobe. Given her recent hospital admission and her dyspnea, the patient was placed on make a mycins at the lower possible hospital-acquired pneumonia. Her blood work returned with a pH of 7.33, PCO2 of 25 and a bicarb of 13.8. This increased our concern for infection with pneumonia or possible UTI given the patient was just hospitalized and treated. A UA and urine culture ordered but the patient has been unable to be progressive this time. Blood cultures were also ordered. The patient's BNP returned at 5464 which is elevated from 1500 on the 17th. Patient has no evidence of volume overload on exam and shows no evidence of edema on the chest x-ray. Given these findings, pneumonia is more likely. At this time, the patient will be admitted to the hospitalist service for management of her pneumonia versus congestive heart failure exacerbation. She will continue to be observed in the emergency department until she is moved to her new destination. This patient was also evaluated by the attending physician. All care plans werediscussed and agreed upon. Clinical Impression     1. Pneumonia due to organism    2. Elevated brain natriuretic peptide (BNP) level    3. Dyspnea and respiratory abnormalities        Disposition     PATIENT REFERRED TO:  No follow-up provider specified.     DISCHARGE MEDICATIONS:  New Prescriptions    No medications on file       Kamilah Comer MD  Resident  03/24/19 [id number]

## 2019-03-24 NOTE — H&P
Hospital Medicine History & Physical      PCP: FRANCESCO Tejada - CNP    Date of Admission: 3/24/2019    Date of Service: Pt seen/examined on 3/24/2019 and Placed in Observation. Chief Complaint:  Shortness of breath      History Of Present Illness:      Dereje Liz is a 80 y.o. female who presented with acute onset of shortness of breath with exertion after being discharged home 2 days ago. She was admitted for acute kidney injury, urinary tract infection and received IV hydration prior to being discharged. After discharge, patient reports feeling okay for a few hours, then developed dyspnea with minimal exertion. She denies having any fevers, chills, cough, chest pain, phlegm production. She does endorse wheezing when she gets dyspneic. She does report that she was unable to get all of her prescriptions filled on discharge and that she has not been taking her medications as prescribed. Due to persistent shortness of breath, she presented to the ED today. She was found to have a systolic blood pressure greater than 200. On initial evaluation, her oxygen saturation is 98% on room air and she is otherwise hemodynamically stable and afebrile. X-ray appears without acute infiltrate, however, there is suggestion of possible left lower lobe pneumonia. Her proBNP is elevated from prior discharge, creatinine is lower to 2.0 and potassium was increased to 5.2. Lactate is within normal limits, CBC is overall within normal limits, normal white cell count and stable anemia. Due to suspicion of pneumonia, she was given some IV fluids and broad-spectrum IV antibiotics in the ED. Old medical chart reviewed, and incorporated above    Past Medical History:          Diagnosis Date    Chronic kidney disease     Hyperlipidemia     borderline per pt    Hypertension     Peripheral vascular disease ([facility] [address]        Past Surgical History:      History reviewed.  No pertinent surgical history. Medications Prior to Admission:      Prior to Admission medications    Medication Sig Start Date End Date Taking? Authorizing Provider   traMADol (ULTRAM) 50 MG tablet Take 1 tablet by mouth every 12 hours as needed for Pain for up to 3 days. 3/22/19 3/25/19  Jamar Christianson MD   hydrALAZINE (APRESOLINE) 50 MG tablet Take 1 tablet by mouth every 8 hours 3/22/19   Jamar Christianson MD   diclofenac sodium 1 % GEL Apply 4 g topically 4 times daily as needed for Pain 3/22/19   Elmira Rodarte MD   ergocalciferol (ERGOCALCIFEROL) 63006 units capsule Take 1 capsule by mouth once a week for 12 doses 3/26/19 6/12/19  Jamar Christianson MD   isosorbide mononitrate (IMDUR) 60 MG extended release tablet Take 1 tablet by mouth daily 10/3/18   FRANCESCO Baum CNP   metoprolol succinate (TOPROL XL) 50 MG extended release tablet Take 1 tablet by mouth daily 9/17/18   FRANCESCO Baum CNP   acetaminophen (TYLENOL) 325 MG tablet Take 2 to 3 tablets po every 6 hours as needed for pain. Do not exceed 10 tablets/24h. 3/7/17   Domenic Snowden DO   tiZANidine (ZANAFLEX) 2 MG tablet Take 1 tablet by mouth every 8 hours as needed (back, thigh, and leg pain or spasm) 3/7/17   Domenic Snowden DO   aspirin EC 81 MG EC tablet Take 1 tablet by mouth daily 9/22/15   Yelitza Johnson MD       Allergies:  Bactrim [sulfamethoxazole-trimethoprim]; Nebivolol hcl; and Other    Social History:      The patient currently lives at home with her family    TOBACCO:   reports that she has quit smoking. She has a 0.75 pack-year smoking history. She has never used smokeless tobacco.  ETOH:   reports that she drinks alcohol. Family History:      Reviewed in detail and Positive as follows:        Problem Relation Age of Onset    High Blood Pressure Mother     Kidney Disease Brother        REVIEW OF SYSTEMS:   10 point ROS obtained. Pertinent positives as noted in the HPI. All other systems reviewed and negative.     PHYSICAL cardiology. Repeat chest x-ray tomorrow a.m.  UA suspicious for pyuria, however, patient denies any urinary symptoms. Obtain culture. May start empiric antibiotics if patient has urinary symptoms      DVT Prophylaxis: Lovenox  Diet: DIET CARDIAC; Daily Fluid Restriction: 1800 ml  Code Status: Full Code    PT/OT Eval Status: Ordered    Dispo - observation. Anticipate discharge in 24-48 hours after adequate diuresis      Sridhar Chappell MD  3/24/2019 3:28 PM    Thank you FRANCESCO Shirley CNP for the opportunity to be involved in this patient's care. If you have any questions or concerns please feel free to contact me at [phone].

## 2019-03-24 NOTE — ED PROVIDER NOTES
ED Attending Attestation Note     Date of evaluation: 3/24/2019      Medical Decision Making      This patient was seen by the resident. I have seen and examined the patient, agree with the workup, evaluation, management and diagnosis. The care plan has been discussed. My assessment reveals 79-year-old female recently discharged from the hospital presenting with 2 days of difficulty breathing. Patient satting mid 90s on room air. No gross tachypnea though does have coarse breath sounds without stridor. Imaging and laboratory studies are suggestive of possible right lower lobe pneumonia. Given recent auscultation we'll cover for healthcare associated pneumonia and plan admission to the hospital for further management with IV antibiotics especially given multiple risk factors and comorbidities. Had discussion with patient and family and they understand plan. Reevaluation [id number]  Patient has remained hemodynamically stable  Patient admitted for further management with IV abx      Diagnostic Results     EKG   Performed 1210 interpreted by ER physician  Atrial fibrillation rate 69  QTc 465 poor inferior and anterior R-wave  No ST changes    RADIOLOGY:  XR CHEST STANDARD (2 VW)   Final Result      Prominent appearance of the right hilum. Correlation with chest CT with IV contrast is recommended. Patchy consolidation right lung base which could represent pneumonia. LABS:   Labs Reviewed   CBC - Abnormal; Notable for the following components:       Result Value    Hemoglobin 11.8 (*)     RDW 16.0 (*)     All other components within normal limits    Narrative:     Performed at:   [facility]  [address], [facility] [address]   Phone [phone]   BASIC METABOLIC PANEL W/ REFLEX TO MG FOR LOW K - Abnormal; Notable for the following components:    Potassium reflex Magnesium 5.2 (*)     Chloride 111 (*)     CO2 15 (*)     BUN 48 (*)

## 2019-03-25 ENCOUNTER — APPOINTMENT (OUTPATIENT)
Dept: GENERAL RADIOLOGY | Age: 84
DRG: 291 | End: 2019-03-25
Payer: MEDICARE

## 2019-03-25 PROBLEM — N18.9 ACUTE KIDNEY INJURY SUPERIMPOSED ON CHRONIC KIDNEY DISEASE (HCC): Status: ACTIVE | Noted: 2019-03-25

## 2019-03-25 PROBLEM — I50.32 CHRONIC DIASTOLIC CHF (CONGESTIVE HEART FAILURE) (HCC): Status: ACTIVE | Noted: 2019-03-24

## 2019-03-25 PROBLEM — I50.33 ACUTE ON CHRONIC DIASTOLIC CHF (CONGESTIVE HEART FAILURE) (HCC): Status: ACTIVE | Noted: 2019-03-25

## 2019-03-25 PROBLEM — N17.9 ACUTE KIDNEY INJURY SUPERIMPOSED ON CHRONIC KIDNEY DISEASE (HCC): Status: ACTIVE | Noted: 2019-03-25

## 2019-03-25 LAB
ANION GAP SERPL CALCULATED.3IONS-SCNC: 11 MMOL/L (ref 3–16)
BUN BLDV-MCNC: 44 MG/DL (ref 7–20)
CALCIUM SERPL-MCNC: 8.4 MG/DL (ref 8.3–10.6)
CHLORIDE BLD-SCNC: 111 MMOL/L (ref 99–110)
CHOLESTEROL, TOTAL: 154 MG/DL (ref 0–199)
CO2: 17 MMOL/L (ref 21–32)
CREAT SERPL-MCNC: 2.1 MG/DL (ref 0.6–1.2)
EKG ATRIAL RATE: 68 BPM
EKG DIAGNOSIS: NORMAL
EKG Q-T INTERVAL: 434 MS
EKG QRS DURATION: 110 MS
EKG QTC CALCULATION (BAZETT): 465 MS
EKG R AXIS: -47 DEGREES
EKG T AXIS: 22 DEGREES
EKG VENTRICULAR RATE: 69 BPM
GFR AFRICAN AMERICAN: 27
GFR NON-AFRICAN AMERICAN: 22
GLUCOSE BLD-MCNC: 90 MG/DL (ref 70–99)
HDLC SERPL-MCNC: 42 MG/DL (ref 40–60)
LDL CHOLESTEROL CALCULATED: 97 MG/DL
MAGNESIUM: 2.1 MG/DL (ref 1.8–2.4)
POTASSIUM SERPL-SCNC: 4.9 MMOL/L (ref 3.5–5.1)
PROCALCITONIN: 0.81 NG/ML (ref 0–0.15)
SODIUM BLD-SCNC: 139 MMOL/L (ref 136–145)
TRIGL SERPL-MCNC: 73 MG/DL (ref 0–150)
VLDLC SERPL CALC-MCNC: 15 MG/DL

## 2019-03-25 PROCEDURE — 36415 COLL VENOUS BLD VENIPUNCTURE: CPT

## 2019-03-25 PROCEDURE — 84145 PROCALCITONIN (PCT): CPT

## 2019-03-25 PROCEDURE — 2580000003 HC RX 258: Performed by: INTERNAL MEDICINE

## 2019-03-25 PROCEDURE — 83735 ASSAY OF MAGNESIUM: CPT

## 2019-03-25 PROCEDURE — 80048 BASIC METABOLIC PNL TOTAL CA: CPT

## 2019-03-25 PROCEDURE — 99222 1ST HOSP IP/OBS MODERATE 55: CPT | Performed by: INTERNAL MEDICINE

## 2019-03-25 PROCEDURE — 6360000002 HC RX W HCPCS: Performed by: INTERNAL MEDICINE

## 2019-03-25 PROCEDURE — 1200000000 HC SEMI PRIVATE

## 2019-03-25 PROCEDURE — 71046 X-RAY EXAM CHEST 2 VIEWS: CPT

## 2019-03-25 PROCEDURE — 6370000000 HC RX 637 (ALT 250 FOR IP): Performed by: INTERNAL MEDICINE

## 2019-03-25 PROCEDURE — 80061 LIPID PANEL: CPT

## 2019-03-25 RX ADMIN — FUROSEMIDE 40 MG: 10 INJECTION, SOLUTION INTRAMUSCULAR; INTRAVENOUS at 22:42

## 2019-03-25 RX ADMIN — ASPIRIN 81 MG: 81 TABLET, COATED ORAL at 09:50

## 2019-03-25 RX ADMIN — HYDRALAZINE HYDROCHLORIDE 50 MG: 50 TABLET, FILM COATED ORAL at 04:40

## 2019-03-25 RX ADMIN — HYDRALAZINE HYDROCHLORIDE 50 MG: 50 TABLET, FILM COATED ORAL at 15:02

## 2019-03-25 RX ADMIN — HYDRALAZINE HYDROCHLORIDE 50 MG: 50 TABLET, FILM COATED ORAL at 22:42

## 2019-03-25 RX ADMIN — TIZANIDINE 2 MG: 4 TABLET ORAL at 22:42

## 2019-03-25 RX ADMIN — ISOSORBIDE MONONITRATE 60 MG: 60 TABLET, EXTENDED RELEASE ORAL at 09:46

## 2019-03-25 RX ADMIN — METOPROLOL SUCCINATE 50 MG: 50 TABLET, EXTENDED RELEASE ORAL at 09:46

## 2019-03-25 RX ADMIN — Medication 10 ML: at 22:43

## 2019-03-25 RX ADMIN — Medication 10 ML: at 09:47

## 2019-03-25 RX ADMIN — FUROSEMIDE 40 MG: 10 INJECTION, SOLUTION INTRAMUSCULAR; INTRAVENOUS at 09:46

## 2019-03-25 RX ADMIN — ENOXAPARIN SODIUM 30 MG: 30 INJECTION SUBCUTANEOUS at 09:46

## 2019-03-25 ASSESSMENT — PAIN DESCRIPTION - PROGRESSION
CLINICAL_PROGRESSION: NOT CHANGED
CLINICAL_PROGRESSION: GRADUALLY WORSENING
CLINICAL_PROGRESSION: NOT CHANGED

## 2019-03-25 ASSESSMENT — PAIN DESCRIPTION - ONSET: ONSET: ON-GOING

## 2019-03-25 ASSESSMENT — PAIN SCALES - GENERAL
PAINLEVEL_OUTOF10: 0
PAINLEVEL_OUTOF10: 4

## 2019-03-25 ASSESSMENT — PAIN DESCRIPTION - PAIN TYPE: TYPE: ACUTE PAIN

## 2019-03-25 ASSESSMENT — PAIN DESCRIPTION - LOCATION: LOCATION: LEG

## 2019-03-25 ASSESSMENT — PAIN DESCRIPTION - DESCRIPTORS: DESCRIPTORS: ACHING

## 2019-03-25 ASSESSMENT — PAIN DESCRIPTION - FREQUENCY: FREQUENCY: CONTINUOUS

## 2019-03-25 ASSESSMENT — PAIN DESCRIPTION - ORIENTATION: ORIENTATION: LEFT

## 2019-03-25 NOTE — CONSULTS
History:   has no past surgical history on file. Social History:   reports that she has quit smoking. She has a 0.75 pack-year smoking history. She has never used smokeless tobacco. She reports that she drinks alcohol. She reports that she does not use drugs. Family History:  No evidence for sudden cardiac death or premature CAD      Medications:       Home Medications  Were reviewed and are listed in nursing record. and/or listed below  Prior to Admission medications    Medication Sig Start Date End Date Taking? Authorizing Provider   isosorbide mononitrate (IMDUR) 60 MG extended release tablet Take 1 tablet by mouth daily 10/3/18  Yes FRANCESCO Izaguirre CNP   metoprolol succinate (TOPROL XL) 50 MG extended release tablet Take 1 tablet by mouth daily 9/17/18  Yes FRANCESCO Izaguirre CNP   acetaminophen (TYLENOL) 325 MG tablet Take 2 to 3 tablets po every 6 hours as needed for pain. Do not exceed 10 tablets/24h. 3/7/17  Yes Maury Snowden, DO   tiZANidine (ZANAFLEX) 2 MG tablet Take 1 tablet by mouth every 8 hours as needed (back, thigh, and leg pain or spasm) 3/7/17  Yes Maury Snowden, DO   aspirin EC 81 MG EC tablet Take 1 tablet by mouth daily 9/22/15  Yes Mary De Paz MD   traMADol (ULTRAM) 50 MG tablet Take 1 tablet by mouth every 12 hours as needed for Pain for up to 3 days.  3/22/19 3/25/19  Jamar Christianson MD   hydrALAZINE (APRESOLINE) 50 MG tablet Take 1 tablet by mouth every 8 hours 3/22/19   You Dwyer MD   diclofenac sodium 1 % GEL Apply 4 g topically 4 times daily as needed for Pain 3/22/19   You Dwyer MD   ergocalciferol (ERGOCALCIFEROL) 77564 units capsule Take 1 capsule by mouth once a week for 12 doses 3/26/19 6/12/19  You Dwyer MD          Inpatient Medications:   aspirin EC  81 mg Oral Daily    hydrALAZINE  50 mg Oral 3 times per day    isosorbide mononitrate  60 mg Oral Daily    metoprolol succinate  50 mg Oral Daily    sodium chloride flush  10 mL

## 2019-03-25 NOTE — CONSULTS
elevated. Creatinine was at his baseline. She was discharged with out DIURETICS. Is worth mentioning she received IV fluids during a prior admission due to acute kidney injury. Creatinine did improve with hydration. She is now admitted with fluid overload and possible pneumonia. Interval History:     She was given Lasix. She feels slightly better. Edema is stable. ROS:     Seen with- resident    positives in bold   Constitutional:  fever, chills, weakness, weight change, fatigue  Skin:  rash, pruritus, hair loss, bruising, dry skin, petechiae  Head, Face, Neck   headaches, swelling,  cervical adenopathy  Respiratory: shortness of breath, cough, or wheezing  Cardiovascular: chest pain, palpitations, dizzy, edema  Gastrointestinal: nausea, vomiting, diarrhea, constipation,belly pain    Yellow skin, blood in stool  Musculoskeletal:  back pain, muscle weakness, gait problems,       joint pain or swelling. Genitourinary:  dysuria, poor urine flow, flank pain, blood in urine  Neurologic:  vertigo, TIA'S, syncope, seizures, focal weakness  Psychosocial:  insomnia, anxiety, or depression. Additional positive findings:                          All other remaining systems are negative. PMH/PSH/SH/Family History:     Past Medical History:   Diagnosis Date    Acute on chronic diastolic CHF (congestive heart failure) (HCC) 3/24/2019    Atrial fibrillation ([facility] [address] 3/24/2019    Chronic kidney disease     Hyperlipidemia     borderline per pt    Hypertension     [facility]        History reviewed. No pertinent surgical history. Social History     Socioeconomic History    Marital status:       Spouse name: Not on file    Number of children: Not on file    Years of education: Not on file    Highest education level: Not on file   Occupational History    Not on file   Social Needs    Financial resource strain: Not on file    Food insecurity:     Worry: Not on file Inability: Not on file    Transportation needs:     Medical: Not on file     Non-medical: Not on file   Tobacco Use    Smoking status: Former Smoker     Packs/day: 0.25     Years: 3.00     Pack years: 0.75    Smokeless tobacco: Never Used   Substance and Sexual Activity    Alcohol use: Yes     Comment: occ    Drug use: No    Sexual activity: Never   Lifestyle    Physical activity:     Days per week: Not on file     Minutes per session: Not on file    Stress: Not on file   Relationships    Social connections:     Talks on phone: Not on file     Gets together: Not on file     Attends Jewish service: Not on file     Active member of club or organization: Not on file     Attends meetings of clubs or organizations: Not on file     Relationship status: Not on file    Intimate partner violence:     Fear of current or ex partner: Not on file     Emotionally abused: Not on file     Physically abused: Not on file     Forced sexual activity: Not on file   Other Topics Concern    Not on file   Social History Narrative    Not on file           Problem Relation Age of Onset    High Blood Pressure Mother     Kidney Disease Brother           Medication:     Scheduled Meds:   aspirin EC  81 mg Oral Daily    hydrALAZINE  50 mg Oral 3 times per day    isosorbide mononitrate  60 mg Oral Daily    metoprolol succinate  50 mg Oral Daily    sodium chloride flush  10 mL Intravenous 2 times per day    enoxaparin  30 mg Subcutaneous Daily    furosemide  40 mg Intravenous BID     Continuous Infusions:  PRN Meds:.diclofenac sodium, tiZANidine, sodium chloride flush, magnesium hydroxide, ondansetron       Vitals :     Vitals:    03/25/19 0852   BP: (!) 142/63   Pulse: 69   Resp: 18   Temp: 98.2 °F (36.8 °C)   SpO2: 99%       I & O :       Intake/Output Summary (Last 24 hours) at 3/25/2019 1119  Last data filed at 3/25/2019 1015  Gross per 24 hour   Intake 300 ml   Output 3125 ml   Net -2825 ml        Physical Examination :     General appearance: Anxious- no, distressed- no, in good spirits- yes  HEENT: Lips- normal, teeth- ok , oral mucosa- moist  Neck : Mass- no, appears symmetrical, JVD- not visible  Respiratory: Respiratory effort-  OK, wheeze- no, crackles - no  Cardiovascular:  Ausculation- No M/R/G, Edema ++  Abdomen: visible mass- no, distention- no, scar- no, tenderness- no                            hepatosplenomegaly-  no  Musculoskeletal:  clubbing no,cyanosis- no , digital ischemia- no                           muscle strength- grossly normal , tone - grossly normal  Skin: rashes- no , ulcers- no, induration- no, tightening - no  Psychiatric:  Judgement and insight- normal           AAO X 3     LABS:     Recent Labs     03/24/19  1249   WBC 7.0   HGB 11.8*   HCT 36.5        Recent Labs     03/24/19  1249 03/25/19  0820    139   K 5.2* 4.9   * 111*   CO2 15* 17*   BUN 48* 44*   CREATININE 2.0* 2.1*   GLUCOSE 75 90   MG  --  2.10      Nephrology  Rayray Gonzalez [address], [address]  Office: [phone]  Cell: [phone]  Fax: [phone]

## 2019-03-26 VITALS
HEART RATE: 65 BPM | WEIGHT: 158.07 LBS | SYSTOLIC BLOOD PRESSURE: 180 MMHG | TEMPERATURE: 97.7 F | DIASTOLIC BLOOD PRESSURE: 83 MMHG | HEIGHT: 66 IN | BODY MASS INDEX: 25.4 KG/M2 | OXYGEN SATURATION: 98 % | RESPIRATION RATE: 18 BRPM

## 2019-03-26 LAB
ANION GAP SERPL CALCULATED.3IONS-SCNC: 9 MMOL/L (ref 3–16)
BUN BLDV-MCNC: 44 MG/DL (ref 7–20)
CALCIUM SERPL-MCNC: 8.3 MG/DL (ref 8.3–10.6)
CHLORIDE BLD-SCNC: 109 MMOL/L (ref 99–110)
CO2: 19 MMOL/L (ref 21–32)
CREAT SERPL-MCNC: 2.2 MG/DL (ref 0.6–1.2)
GFR AFRICAN AMERICAN: 26
GFR NON-AFRICAN AMERICAN: 21
GLUCOSE BLD-MCNC: 82 MG/DL (ref 70–99)
MAGNESIUM: 1.9 MG/DL (ref 1.8–2.4)
POTASSIUM SERPL-SCNC: 4.5 MMOL/L (ref 3.5–5.1)
PRO-BNP: 4006 PG/ML (ref 0–449)
SODIUM BLD-SCNC: 137 MMOL/L (ref 136–145)
URINE CULTURE, ROUTINE: NORMAL

## 2019-03-26 PROCEDURE — 83735 ASSAY OF MAGNESIUM: CPT

## 2019-03-26 PROCEDURE — 6370000000 HC RX 637 (ALT 250 FOR IP): Performed by: INTERNAL MEDICINE

## 2019-03-26 PROCEDURE — 99232 SBSQ HOSP IP/OBS MODERATE 35: CPT | Performed by: INTERNAL MEDICINE

## 2019-03-26 PROCEDURE — 2580000003 HC RX 258: Performed by: INTERNAL MEDICINE

## 2019-03-26 PROCEDURE — 80048 BASIC METABOLIC PNL TOTAL CA: CPT

## 2019-03-26 PROCEDURE — 83880 ASSAY OF NATRIURETIC PEPTIDE: CPT

## 2019-03-26 PROCEDURE — 97116 GAIT TRAINING THERAPY: CPT

## 2019-03-26 PROCEDURE — 6360000002 HC RX W HCPCS: Performed by: INTERNAL MEDICINE

## 2019-03-26 PROCEDURE — 97161 PT EVAL LOW COMPLEX 20 MIN: CPT

## 2019-03-26 PROCEDURE — 36415 COLL VENOUS BLD VENIPUNCTURE: CPT

## 2019-03-26 RX ORDER — FUROSEMIDE 40 MG/1
40 TABLET ORAL DAILY
Qty: 60 TABLET | Refills: 3 | Status: SHIPPED | OUTPATIENT
Start: 2019-03-27 | End: 2019-04-05

## 2019-03-26 RX ORDER — FUROSEMIDE 40 MG/1
40 TABLET ORAL DAILY
Status: DISCONTINUED | OUTPATIENT
Start: 2019-03-26 | End: 2019-03-26 | Stop reason: HOSPADM

## 2019-03-26 RX ADMIN — ENOXAPARIN SODIUM 30 MG: 30 INJECTION SUBCUTANEOUS at 09:56

## 2019-03-26 RX ADMIN — TIZANIDINE 2 MG: 4 TABLET ORAL at 15:09

## 2019-03-26 RX ADMIN — HYDRALAZINE HYDROCHLORIDE 50 MG: 50 TABLET, FILM COATED ORAL at 15:09

## 2019-03-26 RX ADMIN — ISOSORBIDE MONONITRATE 60 MG: 60 TABLET, EXTENDED RELEASE ORAL at 09:56

## 2019-03-26 RX ADMIN — FUROSEMIDE 40 MG: 40 TABLET ORAL at 09:56

## 2019-03-26 RX ADMIN — METOPROLOL SUCCINATE 50 MG: 50 TABLET, EXTENDED RELEASE ORAL at 09:56

## 2019-03-26 RX ADMIN — DICLOFENAC 4 G: 10 GEL TOPICAL at 01:26

## 2019-03-26 RX ADMIN — Medication 10 ML: at 09:57

## 2019-03-26 RX ADMIN — HYDRALAZINE HYDROCHLORIDE 50 MG: 50 TABLET, FILM COATED ORAL at 08:03

## 2019-03-26 RX ADMIN — ASPIRIN 81 MG: 81 TABLET, COATED ORAL at 09:56

## 2019-03-26 ASSESSMENT — PAIN DESCRIPTION - ONSET: ONSET: ON-GOING

## 2019-03-26 ASSESSMENT — PAIN SCALES - GENERAL
PAINLEVEL_OUTOF10: 3
PAINLEVEL_OUTOF10: 0

## 2019-03-26 ASSESSMENT — PAIN DESCRIPTION - PROGRESSION: CLINICAL_PROGRESSION: GRADUALLY IMPROVING

## 2019-03-26 ASSESSMENT — PAIN DESCRIPTION - DESCRIPTORS: DESCRIPTORS: ACHING

## 2019-03-26 ASSESSMENT — PAIN DESCRIPTION - LOCATION: LOCATION: LEG

## 2019-03-26 ASSESSMENT — PAIN DESCRIPTION - FREQUENCY: FREQUENCY: CONTINUOUS

## 2019-03-26 ASSESSMENT — PAIN DESCRIPTION - PAIN TYPE: TYPE: ACUTE PAIN

## 2019-03-26 ASSESSMENT — PAIN DESCRIPTION - ORIENTATION: ORIENTATION: LEFT

## 2019-03-26 NOTE — PROGRESS NOTES
Appearance:  Alert, cooperative, no distress, appears stated age Appropriate weight   Head:  Normocephalic, without obvious abnormality, atraumatic   Eyes:  PERRL, conjunctiva/corneas clear EOM intact  Ears normal   Throat no lesions       Nose: Nares normal, no drainage or sinus tenderness   Throat: Lips, mucosa, and tongue normal   Neck: Supple, symmetrical, trachea midline, no adenopathy, thyroid: not enlarged, symmetric, no tenderness/mass/nodules, no carotid bruit. Lungs:   Normal respiratory rate, lungs clear to auscultation without any wheezes, rubs or ronchi bilaterally. Chest Wall:  No tenderness or deformity   Heart:  Regular rhythm, rate is controlled, S1, S2 normal, there is no murmur, there is no rub or gallop, no jvd, no bilateral lower extremity edema   Abdomen:   Soft, non-tender, bowel sounds active all four quadrants,  no masses, no organomegaly       Extremities: Extremities normal, atraumatic, no cyanosis. Pulses: 2+ and symmetric   Skin: Skin color, texture, turgor normal, no rashes or lesions   Pysch: Normal mood and affect   Neurologic: Normal gross motor and sensory exam.  Cranial nerves intact       Labs:   Recent Labs     03/24/19  1249 03/25/19  0820 03/26/19  0737    139 137   K 5.2* 4.9 4.5   BUN 48* 44* 44*   CREATININE 2.0* 2.1* 2.2*   * 111* 109   CO2 15* 17* 19*   GLUCOSE 75 90 82   CALCIUM 8.6 8.4 8.3   MG  --  2.10 1.90     Recent Labs     03/24/19  1249   WBC 7.0   HGB 11.8*   HCT 36.5      MCV 85.8     Recent Labs     03/25/19  0820   TRIG 73   HDL 42     No results for input(s): PTT, INR in the last 72 hours. Invalid input(s): PT  Recent Labs     03/24/19  1249 03/24/19  1704 03/24/19 2021   TROPONINI <0.01 <0.01 <0.01     No results for input(s): BNP in the last 72 hours. No results for input(s): NTPROBNP in the last 72 hours.   No results for input(s): TSH in the last 72 hours.       Telemetry:  NSR     I have personally reviewed patient's Statement Selected

## 2019-03-26 NOTE — PROGRESS NOTES
Yes  Additional Pertinent Hx: Pt admitted on 3/25/19 with SOB. Pt diagnosed with pneumonia. H/o acute on chronic CHF, afib, CKD, HTN, PVD. Family / Caregiver Present: No  Referring Practitioner: Dr. Delfin Roe  Diagnosis: SOB  Subjective  Subjective: Pt supine in bed and agreeable to PT. Pt hopeful to get up OOB.   Pain Screening  Patient Currently in Pain: Denies     Orientation  Orientation  Overall Orientation Status: Within Normal Limits     Social/Functional History  Social/Functional History  Lives With: Son  Type of Home: House  Home Layout: Able to Live on Main level with bedroom/bathroom, Two level  Home Access: Stairs to enter with rails  Entrance Stairs - Number of Steps: 1  Bathroom Shower/Tub: Tub/Shower unit  Bathroom Equipment: Shower chair  Home Equipment: [employer], Rolling walker, Cane  ADL Assistance: Independent  Homemaking Assistance: Needs assistance(Shares duties)  Ambulation Assistance: Independent(With or without wheeled walker)  Active : No  Leisure & Hobbies: Enjoys TV  Additional Comments: H/o falls    Objective  AROM RLE (degrees)  RLE AROM: WFL  AROM LLE (degrees)  LLE AROM : WFL     Strength RLE  Strength RLE: WFL  Strength LLE  Strength LLE: WFL     Bed mobility  Supine to Sit: Supervision(HOB elevated)  Scooting: Supervision     Transfers  Sit to Stand: Supervision  Stand to sit: Supervision     Ambulation  Ambulation?: Yes  Ambulation 1  Device: Rolling Walker  Assistance: Supervision  Quality of Gait: Slow and steady gait, flexed trunk, small B steps, decreased stance through L LE due to recent ankle sprain  Distance: 80 feet     Balance  Sitting - Static: Good  Standing - Dynamic: (Supervision with wheeled walker)     Exercises  Comments: Placed ice pack on pt's L ankle x 20 mins with OK from nursing     Treatment:  Functional mobility training and pt education    Plan   Plan  Times per week: 2-5  Current Treatment Recommendations: Gait Training, Balance Training,

## 2019-03-26 NOTE — PROGRESS NOTES
Transportation needs:     Medical: Not on file     Non-medical: Not on file   Tobacco Use    Smoking status: Former Smoker     Packs/day: 0.25     Years: 3.00     Pack years: 0.75    Smokeless tobacco: Never Used   Substance and Sexual Activity    Alcohol use: Yes     Comment: occ    Drug use: No    Sexual activity: Never   Lifestyle    Physical activity:     Days per week: Not on file     Minutes per session: Not on file    Stress: Not on file   Relationships    Social connections:     Talks on phone: Not on file     Gets together: Not on file     Attends Anglican service: Not on file     Active member of club or organization: Not on file     Attends meetings of clubs or organizations: Not on file     Relationship status: Not on file    Intimate partner violence:     Fear of current or ex partner: Not on file     Emotionally abused: Not on file     Physically abused: Not on file     Forced sexual activity: Not on file   Other Topics Concern    Not on file   Social History Narrative    Not on file           Problem Relation Age of Onset    High Blood Pressure Mother     Kidney Disease Brother           Medication:     Scheduled Meds:   influenza virus vaccine  0.5 mL Intramuscular Once    aspirin EC  81 mg Oral Daily    hydrALAZINE  50 mg Oral 3 times per day    isosorbide mononitrate  60 mg Oral Daily    metoprolol succinate  50 mg Oral Daily    sodium chloride flush  10 mL Intravenous 2 times per day    enoxaparin  30 mg Subcutaneous Daily    furosemide  40 mg Intravenous BID     Continuous Infusions:  PRN Meds:.diclofenac sodium, tiZANidine, sodium chloride flush, magnesium hydroxide, ondansetron       Vitals :     Vitals:    03/26/19 0822   BP: (!) 174/75   Pulse: 63   Resp: 18   Temp: 98.4 °F (36.9 °C)   SpO2: 97%       I & O :       Intake/Output Summary (Last 24 hours) at 3/26/2019 0914  Last data filed at 3/26/2019 3673  Gross per 24 hour   Intake 420 ml   Output 3650 ml   Net -3230 ml Physical Examination :     General appearance: Anxious- no, distressed- no, in good spirits- yes  HEENT: Lips- normal, teeth- ok , oral mucosa- moist  Neck : Mass- no, appears symmetrical, JVD- not visible  Respiratory: Respiratory effort-  OK, wheeze- no, crackles - no  Cardiovascular:  Ausculation- No M/R/G, Edema ++  Abdomen: visible mass- no, distention- no, scar- no, tenderness- no                            hepatosplenomegaly-  no  Musculoskeletal:  clubbing no,cyanosis- no , digital ischemia- no                           muscle strength- grossly normal , tone - grossly normal  Skin: rashes- no , ulcers- no, induration- no, tightening - no  Psychiatric:  Judgement and insight- normal           AAO X 3     LABS:     Recent Labs     03/24/19  1249   WBC 7.0   HGB 11.8*   HCT 36.5        Recent Labs     03/24/19  1249 03/25/19  0820 03/26/19  0737    139 137   K 5.2* 4.9 4.5   * 111* 109   CO2 15* 17* 19*   BUN 48* 44* 44*   CREATININE 2.0* 2.1* 2.2*   GLUCOSE 75 90 82   MG  --  2.10 1.90      Nephrology  Rayray Gonzalez [address], [address]  Office: [phone]  Cell: [phone]  Fax: [phone]

## 2019-03-26 NOTE — CARE COORDINATION
Case Management Discharge Assessment    3/26/2019  [facility] Case Management Department    Patient: Lore Bermudez  MRN: [id number] / : 1932  ACCT: [de-identified]          Admission Documentation  Attending Provider: Galileo Ivan MD  Admit date/time: 3/24/2019 11:58 AM  Status: Inpatient [101]  Diagnosis: Acute on chronic diastolic CHF (congestive heart failure) ([facility] [address]     Readmission within last 30 days:  yes   Discharge order noted. Patient returning home with her son-Reza. [facility] liaison-Amparo espinal orders. No further needs,  intervention complete. Living Situation  Discharge Planning  Living Arrangements: Children(son)  Support Systems: Family Members, Friends/Neighbors, Denominational/[employer] Community  Potential Assistance Needed: Home Care, Outpatient PT/OT  DME: Bedside Commode, Oxygen Therapy (Comment), Walker  Type of Home Care Services: None  Patient expects to be discharged to[de-identified] home  Expected Discharge Date: 19    Service Assessment:       Values / Beliefs  Do you have any ethnic, cultural, sacramental, or spiritual Baptist needs you would like us to be aware of while you are in the hospital?: No    Advance Directives (For Healthcare)  Pre-existing DNR Comfort Care/DNR Arrest/DNI Order: No  Healthcare Directive: Unknown, patient unable to respond due to medical condition(pt states does not know )  Information on Healthcare Directives Requested: Yes  Patient Requests Assistance: Yes, advice to complete post discharge  Advance Directives: Documents explained    Pharmacy:   [facility]  Potential Assistance Purchasing Medications:  Yes  Does patient want to participate in local refill/meds to beds program?: Not Assessed    Notification completed in [facility]/PAS?  No     IMM  No     Discharge disposition: Home     LOC -[facility]     Mode of Transport-private vehicle    Sasha and her family were provided with choice of provider; she and her family are in agreement with the
function   appears normal with an ejection fraction visually estimated at 55%. No   regional wall motion abnormalities are noted. Diastolic filling parameters   suggest grade II diastolic dysfunction.   Mild mitral regurgitation is present.   Mild to Moderate tricuspid regurgitation. Estimated pulmonary artery   systolic pressure is elevated at 43 mmHg assuming a right atrial pressure of   3 mmHg.   The left atrium is mildly dilated. Has patient been diagnosed with VALERIA: No  Is patient being treated: No    Cardiology Consulted: Yes  Heart Failure Order Set Used: Yes  Complete Intake and Output: Yes  Complete Daily Weights: Yes    BMP:  Lab Results   Component Value Date     03/25/2019     03/24/2019     03/22/2019    K 4.9 03/25/2019    K 5.2 03/24/2019    K 4.7 03/22/2019    K 4.9 03/21/2019     03/25/2019     03/24/2019     03/22/2019    CO2 17 03/25/2019    CO2 15 03/24/2019    CO2 16 03/22/2019    PHOS 3.8 03/20/2019    PHOS 3.8 12/16/2014    PHOS 3.6 11/13/2014    BUN 44 03/25/2019    BUN 48 03/24/2019    BUN 59 03/22/2019    CREATININE 2.1 03/25/2019    CREATININE 2.0 03/24/2019    CREATININE 2.3 03/22/2019     BNP:   Lab Results   Component Value Date    PROBNP 5,464 03/24/2019    PROBNP 1,327 03/17/2019           ACTIVITY/FUNCTIONAL ASSESSMENT:     PT/OT:  Assessment   Assessment: Pt is 81 yo F with decreased mobility related to pneumonia. Pt reports she is moving not far from her baseline status. Pt is hopeful to go home with home PT and son's assist.  No DME needs. Will follow. Treatment Diagnosis: Decreased functional mobility related to pneumonia  Prognosis: Good  Decision Making: Low Complexity  Patient Education: Role of PT- pt verbalized understanding  REQUIRES PT FOLLOW UP: Yes  Activity Tolerance  Activity Tolerance: Patient Tolerated treatment well       Patient Diagnosis(es): The primary encounter diagnosis was Pneumonia due to organism.  Diagnoses of

## 2019-03-27 ENCOUNTER — CARE COORDINATION (OUTPATIENT)
Dept: CASE MANAGEMENT | Age: 84
End: 2019-03-27

## 2019-03-27 NOTE — DISCHARGE SUMMARY
gallops. Abdomen: Soft, non-tender, non-distended with normal bowel sounds. Musculoskeletal:  No clubbing, cyanosis or edema bilaterally. Full range of motion without deformity. Skin: Skin color, texture, turgor normal.  No rashes or lesions. Neurologic:  Neurovascularly intact without any focal sensory/motor deficits. Cranial nerves: II-XII intact, grossly non-focal.  Psychiatric:  Alert and oriented, thought content appropriate, normal insight  Capillary Refill: Brisk,< 3 seconds   Peripheral Pulses: +2 palpable, equal bilaterally       Labs: For convenience and continuity at follow-up the following most recent labs are provided:      CBC:    Lab Results   Component Value Date    WBC 7.0 03/24/2019    HGB 11.8 03/24/2019    HCT 36.5 03/24/2019     03/24/2019       Renal:    Lab Results   Component Value Date     03/26/2019    K 4.5 03/26/2019    K 5.2 03/24/2019     03/26/2019    CO2 19 03/26/2019    BUN 44 03/26/2019    CREATININE 2.2 03/26/2019    CALCIUM 8.3 03/26/2019    PHOS 3.8 03/20/2019         Significant Diagnostic Studies    Radiology:   XR CHEST STANDARD (2 VW)   Final Result      No significant interval change in bilateral lower lobe pleural-parenchymal opacities consistent with pleural fluid atelectasis and consolidation or edema. 7 mm pulmonary nodule right lower lobe best demonstrated on previous CT. CT CHEST WO CONTRAST   Final Result   Impression:   1. No evidence of hilar mass. 2. Dilated main pulmonary artery consistent with arterial hypertension. 3. Bilateral pleural effusions. 4. Mild dependent consolidation in the lungs with mild ground glass opacity. 5. Subcutaneous edema. 6. Question mild cirrhosis, incompletely evaluated. Recommend correlation. XR CHEST STANDARD (2 VW)   Final Result   Addendum 1 of 1   [ ADDENDUM #1    Addendum:      These findings were communicated to Dr. Skyla Kirkpatrick at [phone] PM on March 24, 2019.        Final Consults:     PHARMACY TO DOSE VANCOMYCIN  IP CONSULT TO HOSPITALIST  IP CONSULT TO HEART FAILURE NURSE/COORDINATOR  IP CONSULT TO DIETITIAN  IP CONSULT TO NEPHROLOGY  IP CONSULT TO CARDIOLOGY  IP CONSULT TO HOME CARE NEEDS    Disposition:  Home     Condition at Discharge: Stable    Discharge Instructions/Follow-up:      Eber Gonzalez, APRN - CNP  [address]  [address] [facility],[address]          Sunita Mallory Commander, DO  [address] [facility]  [address] [facility],[address] Aaliyah, APRN - CNP  [address] [place] [address] [address]  [phone]    Go on 4/1/2019  Heart Failure Clinic hospital follow up @ 3:30 PM    Davonte Medina MD  [address] [facility]  [phone]            Code Status:  Prior     Activity: activity as tolerated    Diet: cardiac diet      Discharge Medications:     Discharge Medication List as of 3/26/2019  2:45 PM           Details   furosemide (LASIX) 40 MG tablet Take 1 tablet by mouth daily, Disp-60 tablet, R-3Print              Details   isosorbide mononitrate (IMDUR) 60 MG extended release tablet Take 1 tablet by mouth daily, Disp-30 tablet, R-5Normal      metoprolol succinate (TOPROL XL) 50 MG extended release tablet Take 1 tablet by mouth daily, Disp-30 tablet, R-5Normal      acetaminophen (TYLENOL) 325 MG tablet Take 2 to 3 tablets po every 6 hours as needed for pain.  Do not exceed 10 tablets/24h., Disp-90 tablet, R-2Normal      tiZANidine (ZANAFLEX) 2 MG tablet Take 1 tablet by mouth every 8 hours as needed (back, thigh, and leg pain or spasm), Disp-24 tablet, R-0Normal      aspirin EC 81 MG EC tablet Take 1 tablet by mouth daily, Disp-30 tablet, R-3      hydrALAZINE (APRESOLINE) 50 MG tablet Take 1 tablet by mouth every 8 hours, Disp-90 tablet, R-3Print      diclofenac sodium 1 % GEL Apply 4 g topically 4 times daily as needed for Pain, Topical, 4 TIMES DAILY PRN Starting Fri 3/22/2019,

## 2019-03-28 NOTE — PROGRESS NOTES
[facility]   Congestive Heart Failure    Primary Care Doctor:  No primary care provider on file. Primary Cardiologist: Kimberli Lou Pt: No    Chief Complaint:  fatigue    History of Present Illness:  Corrinne Gehrig is a 80 y.o. female with PMH PAF, HFpEF, PH, HTN, CKD4 who presents today for hospital f/u. Corrinne Gehrig was readmitted 3/24/19 with CHF exacerbation and discharged 3/26/19. Hospital course: admitted for acute on chronic diastolic heart failure due to fluid overload and noncompliance with home medications. She was treated with IV Lasix with good urine output and resolution of her respiratory symptoms. Since hospitalization she reports fatigue, edema and denies chest pain, dyspnea, palpitations, orthopnea, PND, exertional chest pressure/discomfort, early saiety, syncope. hospital weight on d/c was 158lb and discharge home weight: unsure, son is weighing her but she does not remember wts and he is not here with her    Baseline Weight: 158lb hosp scale    Admit BNP:  5464  Discharge BNP: 4006    EF: 55%  Cardiac Imaging: Echo 3/17/19  Normal left ventricle size. There is mild to moderately increased left   ventricular wall thickness. Overall left ventricular systolic function   appears normal with an ejection fraction visually estimated at 55%. No   regional wall motion abnormalities are noted. Diastolic filling parameters   suggest grade II diastolic dysfunction.   Mild mitral regurgitation is present.   Mild to Moderate tricuspid regurgitation. Estimated pulmonary artery   systolic pressure is elevated at 43 mmHg assuming a right atrial pressure of   3 mmHg.   The left atrium is mildly dilated.     Device: No     Activity: below baseline, has HHN, should have PT/OT  Can you walk 1-2 blocks or do a moderate amount of house/yard work? No  Cardiac Rehab Referral: No     NYHA Class: II     Sodium Restrictions: 2g  Fluid Restrictions: 48-64 oz/day  Sodium and fluid restriction compliance: good    Pt Education: The patient has received education on the following topics: dietary sodium restriction, heart failure medications, the importance of physical activity, symptom management and weight monitoring         Past Medical History:   has a past medical history of Acute on chronic diastolic CHF (congestive heart failure) ([facility] [address] Atrial fibrillation ([facility] [address] Chronic kidney disease, Hyperlipidemia, Hypertension, and Peripheral vascular disease ([facility] [address] Surgical History:   has no past surgical history on file. Social History:   reports that she has quit smoking. She has a 0.75 pack-year smoking history. She has never used smokeless tobacco. She reports that she drinks alcohol. She reports that she does not use drugs. Family History:   Family History   Problem Relation Age of Onset    High Blood Pressure Mother     Kidney Disease Brother        Home Medications:  Prior to Admission medications    Medication Sig Start Date End Date Taking? Authorizing Provider   furosemide (LASIX) 40 MG tablet Take 1 tablet by mouth daily 3/27/19   Jamar Christianson MD   hydrALAZINE (APRESOLINE) 50 MG tablet Take 1 tablet by mouth every 8 hours 3/22/19   Johnna Severance, MD   diclofenac sodium 1 % GEL Apply 4 g topically 4 times daily as needed for Pain 3/22/19   Johnna Severance, MD   ergocalciferol (ERGOCALCIFEROL) 45453 units capsule Take 1 capsule by mouth once a week for 12 doses 3/26/19 6/12/19  Jamar Christianson MD   isosorbide mononitrate (IMDUR) 60 MG extended release tablet Take 1 tablet by mouth daily 10/3/18   Leanne CoastFRANCESCO - CNP   metoprolol succinate (TOPROL XL) 50 MG extended release tablet Take 1 tablet by mouth daily 9/17/18   Leanne CoastFRANCESCO - CNP   acetaminophen (TYLENOL) 325 MG tablet Take 2 to 3 tablets po every 6 hours as needed for pain.  Do not exceed 10 tablets/24h. 3/7/17   Nuzhat Snowden DO   tiZANidine (ZANAFLEX) 2 MG tablet Take 1 tablet by mouth every 8 hours as needed (back, thigh, and leg pain or spasm) 3/7/17   Nettie Snowden,    aspirin EC 81 MG EC tablet Take 1 tablet by mouth daily 9/22/15   Vidhya Sanders MD        Allergies:  Bactrim [sulfamethoxazole-trimethoprim]; Nebivolol hcl; and Other     ROS:   Review of Systems   Constitutional: Positive for fatigue. Negative for chills and unexpected weight change. Respiratory: Negative. Cardiovascular: Negative. Gastrointestinal: Negative. Genitourinary: Negative. Musculoskeletal: Negative. Skin: Negative. Neurological: Negative. Hematological: Negative. Psychiatric/Behavioral: Negative. Physical Examination:    Vitals:    04/01/19 1538 04/01/19 1544   BP: (!) 150/76 130/64   Pulse: 64    Weight: 144 lb 3.2 oz (65.4 kg)            Physical Exam   Constitutional: She is oriented to person, place, and time. She appears well-developed and well-nourished. HENT:   Head: Normocephalic and atraumatic. Eyes: Pupils are equal, round, and reactive to light. EOM are normal.   Neck: Normal range of motion. Neck supple. Cardiovascular: Normal rate, regular rhythm, normal heart sounds and intact distal pulses. Pulmonary/Chest: Effort normal and breath sounds normal.   Abdominal: Soft. Musculoskeletal: Normal range of motion. She exhibits edema. Trace left   Neurological: She is alert and oriented to person, place, and time. Skin: Skin is warm and dry. Psychiatric: She has a normal mood and affect.  Her behavior is normal. Judgment and thought content normal.       Lab Data:    CBC:   Lab Results   Component Value Date    WBC 7.0 03/24/2019    WBC 5.4 03/22/2019    WBC 8.2 03/21/2019    RBC 4.26 03/24/2019    RBC 3.97 03/22/2019    RBC 4.28 03/21/2019    HGB 11.8 03/24/2019    HGB 11.2 03/22/2019    HGB 11.7 03/21/2019    HCT 36.5 03/24/2019    HCT 34.8 03/22/2019    HCT 37.7 03/21/2019    MCV 85.8 03/24/2019    MCV 87.7 03/22/2019    MCV 87.9 03/21/2019    RDW 16.0 03/24/2019    RDW 15.8 03/22/2019    RDW 15.8 03/21/2019     03/24/2019     03/22/2019     03/21/2019     BMP:  Lab Results   Component Value Date     03/26/2019     03/25/2019     03/24/2019    K 4.5 03/26/2019    K 4.9 03/25/2019    K 5.2 03/24/2019    K 4.7 03/22/2019    K 4.9 03/21/2019     03/26/2019     03/25/2019     03/24/2019    CO2 19 03/26/2019    CO2 17 03/25/2019    CO2 15 03/24/2019    PHOS 3.8 03/20/2019    PHOS 3.8 12/16/2014    PHOS 3.6 11/13/2014    BUN 44 03/26/2019    BUN 44 03/25/2019    BUN 48 03/24/2019    CREATININE 2.2 03/26/2019    CREATININE 2.1 03/25/2019    CREATININE 2.0 03/24/2019     BNP:   Lab Results   Component Value Date    PROBNP 4,006 03/26/2019    PROBNP 5,464 03/24/2019    PROBNP 1,327 03/17/2019     Iron Studies:  No results found for: FERRITIN  Iron Deficiency Anemia:  No IV Iron Therapy:  No  2017 ACC/AHA HF Guidelines:   intravenous iron replacement in patients with New York Heart Association (NYHA) class II and III HF and iron deficiency(ferritin <100 ng/ml or 100-300 ng/ml if transferrin saturation <20%), to improve functional status and QoL. Assessment/Plan:    1. Chronic diastolic congestive heart failure ([address] - compensated, labs next week       3. Essential hypertension, malignant - controlled   4. Shortness of breath - improved         Instructions:   1. Medications: Continue current medications  2. Labs: one week per Home Health  3. Lifestyle Recommendations: Weigh yourself every day in the morning after urination, call Humphrey if wt increases 2-3lb in one day or 5lb in one week, Limit sodium to 2000mg/day and fluids to 2L or 64oz/day. Add a fish oil supplement. 4. Follow up: Dr. Scottie Laws 2-3 weeks      CHF Resource Line: [phone] - [facility]    Heart Failure Websites:   www.heart. org  Www.cardiosmart. org    Websites with Low Sodium Recipes:

## 2019-03-29 ENCOUNTER — CARE COORDINATION (OUTPATIENT)
Dept: CASE MANAGEMENT | Age: 84
End: 2019-03-29

## 2019-03-29 LAB
BLOOD CULTURE, ROUTINE: NORMAL
CULTURE, BLOOD 2: NORMAL

## 2019-04-01 ENCOUNTER — CARE COORDINATION (OUTPATIENT)
Dept: CASE MANAGEMENT | Age: 84
End: 2019-04-01

## 2019-04-01 ENCOUNTER — OFFICE VISIT (OUTPATIENT)
Dept: CARDIOLOGY CLINIC | Age: 84
End: 2019-04-01
Payer: MEDICARE

## 2019-04-01 VITALS
HEART RATE: 64 BPM | SYSTOLIC BLOOD PRESSURE: 130 MMHG | DIASTOLIC BLOOD PRESSURE: 64 MMHG | WEIGHT: 144.2 LBS | BODY MASS INDEX: 23.27 KG/M2

## 2019-04-01 DIAGNOSIS — I10 ESSENTIAL HYPERTENSION, MALIGNANT: ICD-10-CM

## 2019-04-01 DIAGNOSIS — I50.32 CHRONIC DIASTOLIC CONGESTIVE HEART FAILURE (HCC): Primary | ICD-10-CM

## 2019-04-01 DIAGNOSIS — R06.02 SHORTNESS OF BREATH: ICD-10-CM

## 2019-04-01 PROCEDURE — 1111F DSCHRG MED/CURRENT MED MERGE: CPT | Performed by: NURSE PRACTITIONER

## 2019-04-01 PROCEDURE — 1036F TOBACCO NON-USER: CPT | Performed by: NURSE PRACTITIONER

## 2019-04-01 PROCEDURE — 4040F PNEUMOC VAC/ADMIN/RCVD: CPT | Performed by: NURSE PRACTITIONER

## 2019-04-01 PROCEDURE — G8420 CALC BMI NORM PARAMETERS: HCPCS | Performed by: NURSE PRACTITIONER

## 2019-04-01 PROCEDURE — G8427 DOCREV CUR MEDS BY ELIG CLIN: HCPCS | Performed by: NURSE PRACTITIONER

## 2019-04-01 PROCEDURE — 1123F ACP DISCUSS/DSCN MKR DOCD: CPT | Performed by: NURSE PRACTITIONER

## 2019-04-01 PROCEDURE — 1090F PRES/ABSN URINE INCON ASSESS: CPT | Performed by: NURSE PRACTITIONER

## 2019-04-01 PROCEDURE — 99214 OFFICE O/P EST MOD 30 MIN: CPT | Performed by: NURSE PRACTITIONER

## 2019-04-01 ASSESSMENT — ENCOUNTER SYMPTOMS
GASTROINTESTINAL NEGATIVE: 1
RESPIRATORY NEGATIVE: 1

## 2019-04-01 NOTE — PATIENT INSTRUCTIONS
Instructions:   1. Medications: Continue current medications  2. Labs: one week per Home Health  3. Lifestyle Recommendations: Weigh yourself every day in the morning after urination, call Bibiana Patel if wt increases 2-3lb in one day or 5lb in one week, Limit sodium to 2000mg/day and fluids to 2L or 64oz/day. Add a fish oil supplement. 4. Follow up: Dr. Cruz Boston 2-3 weeks      [facility] Resource Line: [phone] - [facility]  Daily Weight Instructions   Weigh yourself every morning after you go to the bathroom and before you have anything to eat or drink.  Weigh yourself in the same amount of clothing every day.  Keep a log of your daily weights.  Call the doctor if you gain 3 or more pounds over 1-7 days.  Work with your doctor to find the ideal weight gain or loss.     Baseline Weight: (hospital discharge wt)   Call Bibiana Patel at [phone] for weight changes

## 2019-04-01 NOTE — CARE COORDINATION
Received call from Adam Meredith son. He had multiple questions about [facility] and physician follow up. Writer provided answers to all his questions. Heather Boston was appreciative of information.

## 2019-04-02 ENCOUNTER — CARE COORDINATION (OUTPATIENT)
Dept: CASE MANAGEMENT | Age: 84
End: 2019-04-02

## 2019-04-02 NOTE — CARE COORDINATION
[facility] Transitions Follow Up Call    2019    Patient: Paula Bustamante  Patient : 1932   MRN: [id number]   Reason for Admission: SOB  Discharge Date: 3/26/19 RARS: Readmission Risk Score: 12         Spoke with: Paula Bustamante and Son     Care Transitions Subsequent and Final Call    Schedule Follow Up Appointment with PCP:  Declined  Subsequent and Final Calls  Do you have any ongoing symptoms?:  No  Have your medications changed?:  No  Do you have any questions related to your medications?:  No  Do you currently have any active services?:  Yes  Are you currently active with any services?:  Home Health  Do you have any needs or concerns that I can assist you with?:  No  Identified Barriers:  None  Care Transitions Interventions  No Identified Needs  Other Interventions:          Summary  CTC spoke with patient and Son this afternoon for follow up CTC call. Patient states she is doing well, denies any complaints of nausea, vomiting, fevers, chills, SOB or Cough. Patient with some swelling to L Ankle from fall several weeks ago, but no LE edema to to fluid retention. Patients weight this am was 144.8 lbs, states [facility] SN was in home today, no issues or concerns. Patient had follow up appointment with Cardiologist on yesterday, no new or changed medications at this time. nausea, vomiting, fevers, chills, SOB or Cough.     Follow Up  Future Appointments   Date Time Provider Fany Schreiber   2019  2:30 PM MD Victoria Coy   2019  8:30 AM MD Victoria Garcia RN

## 2019-04-05 ENCOUNTER — CARE COORDINATION (OUTPATIENT)
Dept: CASE MANAGEMENT | Age: 84
End: 2019-04-05

## 2019-04-05 RX ORDER — FUROSEMIDE 40 MG/1
20 TABLET ORAL DAILY
Qty: 60 TABLET | Refills: 3 | Status: ON HOLD | OUTPATIENT
Start: 2019-04-05 | End: 2019-06-11 | Stop reason: SDUPTHER

## 2019-04-05 NOTE — CARE COORDINATION
[facility] Transitions Follow Up Call    2019    Patient: Yoana Dumont  Patient : 1932   MRN: [id number]   Reason for Admission: SOB  Discharge Date: 3/26/19 RARS: Readmission Risk Score: 16         Spoke with: 12 José Antonio Helms Transitions Subsequent and Final Call    Schedule Follow Up Appointment with PCP:  Declined  Subsequent and Final Calls  Do you have any ongoing symptoms?:  No  Have your medications changed?:  No  Do you have any questions related to your medications?:  No  Do you currently have any active services?:  Yes  Are you currently active with any services?:  Home Health  Do you have any needs or concerns that I can assist you with?:  No  Identified Barriers:  None  Care Transitions Interventions  No Identified Needs  Other Interventions:          Summary  CTC spoke with patient this afternoon for follow up CTC call. Patient states she is doing well, denies any complaints of nausea, vomiting, fevers, chills, SOB or Cough. No LE edema, states she was not weighed this am, CTC encouraged patient to weigh herself daily, reporting a 3 lb weight gain overnight, or a 5 lb weight gain in a week. [facility] still following patient, PT in home today, SN was in home on yesterday, per patient. Follow up appointments have been scheduled with Cardiologist.  CTC advised Pt of use of urgent care or physicians 24 hr access line if assistance is needed after hours or the CTC provided education on s/s that require medical attention and when to seek medical attention. CTC weekend. Pt denies any needs or concerns and is agreeable with additional calls.     Follow Up  Future Appointments   Date Time Provider Fany Schreiber   2019  2:30 PM MD Victoria Petersen   2019  8:30 AM Griselda Scale, MD [facility] Sherilyn Sports, RN

## 2019-04-09 ENCOUNTER — TELEPHONE (OUTPATIENT)
Dept: FAMILY MEDICINE CLINIC | Age: 84
End: 2019-04-09

## 2019-04-09 ENCOUNTER — CARE COORDINATION (OUTPATIENT)
Dept: CASE MANAGEMENT | Age: 84
End: 2019-04-09

## 2019-04-09 LAB
ANION GAP SERPL CALCULATED.3IONS-SCNC: 13 MMOL/L (ref 3–16)
BUN BLDV-MCNC: 48 MG/DL (ref 7–20)
CALCIUM SERPL-MCNC: 8.7 MG/DL (ref 8.3–10.6)
CHLORIDE BLD-SCNC: 104 MMOL/L (ref 99–110)
CO2: 24 MMOL/L (ref 21–32)
CREAT SERPL-MCNC: 2.7 MG/DL (ref 0.6–1.2)
GFR AFRICAN AMERICAN: 20
GFR NON-AFRICAN AMERICAN: 17
GLUCOSE BLD-MCNC: 89 MG/DL (ref 70–99)
POTASSIUM SERPL-SCNC: 4 MMOL/L (ref 3.5–5.1)
PRO-BNP: 1623 PG/ML (ref 0–449)
SODIUM BLD-SCNC: 141 MMOL/L (ref 136–145)

## 2019-04-14 PROCEDURE — G0180 MD CERTIFICATION HHA PATIENT: HCPCS | Performed by: FAMILY MEDICINE

## 2019-04-15 DIAGNOSIS — R79.89 CREATININE ELEVATION: Primary | ICD-10-CM

## 2019-04-15 NOTE — PROGRESS NOTES
I recommend backing down to 0.5 tab of Lasix daily. Call with weight check at the end of this week (Friday). BMP is in place. I advise recheck early next week April 22 before her April 23 cardiology appt.

## 2019-04-17 ENCOUNTER — TELEPHONE (OUTPATIENT)
Dept: CASE MANAGEMENT | Age: 84
End: 2019-04-17

## 2019-04-23 ENCOUNTER — TELEPHONE (OUTPATIENT)
Dept: FAMILY MEDICINE CLINIC | Age: 84
End: 2019-04-23
Payer: MEDICARE

## 2019-04-23 DIAGNOSIS — I50.33 ACUTE ON CHRONIC DIASTOLIC HEART FAILURE (HCC): ICD-10-CM

## 2019-04-23 DIAGNOSIS — N18.4 CHRONIC KIDNEY DISEASE, STAGE IV (SEVERE) (HCC): ICD-10-CM

## 2019-04-23 DIAGNOSIS — Z79.82 ENCOUNTER FOR LONG-TERM (CURRENT) USE OF ASPIRIN: ICD-10-CM

## 2019-04-23 DIAGNOSIS — I48.0 PAROXYSMAL ATRIAL FIBRILLATION (HCC): ICD-10-CM

## 2019-04-23 DIAGNOSIS — Z87.891 PERSONAL HISTORY OF TOBACCO USE, PRESENTING HAZARDS TO HEALTH: ICD-10-CM

## 2019-04-23 DIAGNOSIS — I27.20 PROGRESSIVE PULMONARY HYPERTENSION (HCC): ICD-10-CM

## 2019-04-23 DIAGNOSIS — I11.0 HYPERTENSIVE HEART DISEASE WITH CONGESTIVE HEART FAILURE, UNSPECIFIED HEART FAILURE TYPE (HCC): Primary | ICD-10-CM

## 2019-04-23 DIAGNOSIS — I73.9 PERIPHERAL VASCULAR DISEASE, UNSPECIFIED (HCC): ICD-10-CM

## 2019-04-25 LAB
ANION GAP SERPL CALCULATED.3IONS-SCNC: 12 MMOL/L (ref 3–16)
BUN BLDV-MCNC: 35 MG/DL (ref 7–20)
CALCIUM SERPL-MCNC: 8.6 MG/DL (ref 8.3–10.6)
CHLORIDE BLD-SCNC: 106 MMOL/L (ref 99–110)
CO2: 18 MMOL/L (ref 21–32)
CREAT SERPL-MCNC: 2.1 MG/DL (ref 0.6–1.2)
GFR AFRICAN AMERICAN: 27
GFR NON-AFRICAN AMERICAN: 22
GLUCOSE BLD-MCNC: 95 MG/DL (ref 70–99)
POTASSIUM SERPL-SCNC: 4.8 MMOL/L (ref 3.5–5.1)
PRO-BNP: 5191 PG/ML (ref 0–449)
SODIUM BLD-SCNC: 136 MMOL/L (ref 136–145)

## 2019-05-03 RX ORDER — METOPROLOL SUCCINATE 50 MG/1
50 TABLET, EXTENDED RELEASE ORAL DAILY
Qty: 30 TABLET | Refills: 6 | Status: ON HOLD | OUTPATIENT
Start: 2019-05-03 | End: 2019-08-21 | Stop reason: CLARIF

## 2019-05-03 NOTE — TELEPHONE ENCOUNTER
[facility] Medication Refills:    Requested Prescriptions     Pending Prescriptions Disp Refills    metoprolol succinate (TOPROL XL) 50 MG extended release tablet 90 tablet 3     Sig: Take 1 tablet by mouth daily                     Last Office Visit:4/1/19    Next Office Visit: 7/22/19  Last Refill: 9/17/18  Last Labs:4/24/19

## 2019-06-05 ENCOUNTER — APPOINTMENT (OUTPATIENT)
Dept: GENERAL RADIOLOGY | Age: 84
DRG: 281 | End: 2019-06-05
Payer: MEDICARE

## 2019-06-05 ENCOUNTER — HOSPITAL ENCOUNTER (INPATIENT)
Age: 84
LOS: 6 days | Discharge: HOME | DRG: 281 | End: 2019-06-11
Attending: EMERGENCY MEDICINE | Admitting: INTERNAL MEDICINE
Payer: MEDICARE

## 2019-06-05 DIAGNOSIS — I21.4 NSTEMI (NON-ST ELEVATED MYOCARDIAL INFARCTION) (HCC): Primary | ICD-10-CM

## 2019-06-05 LAB
ANION GAP SERPL CALCULATED.3IONS-SCNC: 10 MMOL/L (ref 3–16)
ANTI-XA UNFRAC HEPARIN: 1.11 IU/ML (ref 0.3–0.7)
BACTERIA: ABNORMAL /HPF
BASOPHILS ABSOLUTE: 0.1 K/UL (ref 0–0.2)
BASOPHILS RELATIVE PERCENT: 1.3 %
BILIRUBIN URINE: NEGATIVE
BLOOD, URINE: NEGATIVE
BUN BLDV-MCNC: 37 MG/DL (ref 7–20)
CALCIUM SERPL-MCNC: 8.4 MG/DL (ref 8.3–10.6)
CHLORIDE BLD-SCNC: 110 MMOL/L (ref 99–110)
CLARITY: CLEAR
CO2: 16 MMOL/L (ref 21–32)
COLOR: YELLOW
CREAT SERPL-MCNC: 1.9 MG/DL (ref 0.6–1.2)
EKG ATRIAL RATE: 66 BPM
EKG ATRIAL RATE: 68 BPM
EKG DIAGNOSIS: NORMAL
EKG DIAGNOSIS: NORMAL
EKG P AXIS: 55 DEGREES
EKG P AXIS: 70 DEGREES
EKG P-R INTERVAL: 230 MS
EKG P-R INTERVAL: 264 MS
EKG Q-T INTERVAL: 468 MS
EKG Q-T INTERVAL: 468 MS
EKG QRS DURATION: 110 MS
EKG QRS DURATION: 112 MS
EKG QTC CALCULATION (BAZETT): 490 MS
EKG QTC CALCULATION (BAZETT): 497 MS
EKG R AXIS: -55 DEGREES
EKG R AXIS: -55 DEGREES
EKG T AXIS: 153 DEGREES
EKG T AXIS: 170 DEGREES
EKG VENTRICULAR RATE: 66 BPM
EKG VENTRICULAR RATE: 68 BPM
EOSINOPHILS ABSOLUTE: 0.2 K/UL (ref 0–0.6)
EOSINOPHILS RELATIVE PERCENT: 3.7 %
EPITHELIAL CELLS, UA: ABNORMAL /HPF
GFR AFRICAN AMERICAN: 30
GFR NON-AFRICAN AMERICAN: 25
GLUCOSE BLD-MCNC: 114 MG/DL (ref 70–99)
GLUCOSE URINE: NEGATIVE MG/DL
HCT VFR BLD CALC: 38.1 % (ref 36–48)
HCT VFR BLD CALC: 38.5 % (ref 36–48)
HEMOGLOBIN: 12.3 G/DL (ref 12–16)
HEMOGLOBIN: 12.3 G/DL (ref 12–16)
INR BLD: 1.18 (ref 0.86–1.14)
KETONES, URINE: NEGATIVE MG/DL
LEUKOCYTE ESTERASE, URINE: ABNORMAL
LYMPHOCYTES ABSOLUTE: 2.6 K/UL (ref 1–5.1)
LYMPHOCYTES RELATIVE PERCENT: 38.3 %
MCH RBC QN AUTO: 27.7 PG (ref 26–34)
MCH RBC QN AUTO: 27.7 PG (ref 26–34)
MCHC RBC AUTO-ENTMCNC: 32 G/DL (ref 31–36)
MCHC RBC AUTO-ENTMCNC: 32.4 G/DL (ref 31–36)
MCV RBC AUTO: 85.5 FL (ref 80–100)
MCV RBC AUTO: 86.5 FL (ref 80–100)
MICROSCOPIC EXAMINATION: YES
MONOCYTES ABSOLUTE: 0.6 K/UL (ref 0–1.3)
MONOCYTES RELATIVE PERCENT: 8.5 %
NEUTROPHILS ABSOLUTE: 3.2 K/UL (ref 1.7–7.7)
NEUTROPHILS RELATIVE PERCENT: 48.2 %
NITRITE, URINE: NEGATIVE
PDW BLD-RTO: 17.8 % (ref 12.4–15.4)
PDW BLD-RTO: 18.5 % (ref 12.4–15.4)
PH UA: 6 (ref 5–8)
PLATELET # BLD: 201 K/UL (ref 135–450)
PLATELET # BLD: 204 K/UL (ref 135–450)
PMV BLD AUTO: 8.9 FL (ref 5–10.5)
PMV BLD AUTO: 9.6 FL (ref 5–10.5)
POTASSIUM REFLEX MAGNESIUM: 5.4 MMOL/L (ref 3.5–5.1)
PROTEIN UA: 30 MG/DL
PROTHROMBIN TIME: 13.4 SEC (ref 9.8–13)
RBC # BLD: 4.45 M/UL (ref 4–5.2)
RBC # BLD: 4.46 M/UL (ref 4–5.2)
RBC UA: ABNORMAL /HPF (ref 0–2)
SODIUM BLD-SCNC: 136 MMOL/L (ref 136–145)
SPECIFIC GRAVITY UA: 1.01 (ref 1–1.03)
TROPONIN: 0.03 NG/ML
TROPONIN: 0.05 NG/ML
URINE REFLEX TO CULTURE: YES
URINE TYPE: ABNORMAL
UROBILINOGEN, URINE: 0.2 E.U./DL
WBC # BLD: 6.5 K/UL (ref 4–11)
WBC # BLD: 6.7 K/UL (ref 4–11)
WBC UA: ABNORMAL /HPF (ref 0–5)

## 2019-06-05 PROCEDURE — 93005 ELECTROCARDIOGRAM TRACING: CPT | Performed by: PHYSICIAN ASSISTANT

## 2019-06-05 PROCEDURE — 96365 THER/PROPH/DIAG IV INF INIT: CPT

## 2019-06-05 PROCEDURE — 87086 URINE CULTURE/COLONY COUNT: CPT

## 2019-06-05 PROCEDURE — 71046 X-RAY EXAM CHEST 2 VIEWS: CPT

## 2019-06-05 PROCEDURE — 85025 COMPLETE CBC W/AUTO DIFF WBC: CPT

## 2019-06-05 PROCEDURE — 84484 ASSAY OF TROPONIN QUANT: CPT

## 2019-06-05 PROCEDURE — 81001 URINALYSIS AUTO W/SCOPE: CPT

## 2019-06-05 PROCEDURE — 80048 BASIC METABOLIC PNL TOTAL CA: CPT

## 2019-06-05 PROCEDURE — 99285 EMERGENCY DEPT VISIT HI MDM: CPT

## 2019-06-05 PROCEDURE — 36415 COLL VENOUS BLD VENIPUNCTURE: CPT

## 2019-06-05 PROCEDURE — 85027 COMPLETE CBC AUTOMATED: CPT

## 2019-06-05 PROCEDURE — 93005 ELECTROCARDIOGRAM TRACING: CPT | Performed by: EMERGENCY MEDICINE

## 2019-06-05 PROCEDURE — 1200000000 HC SEMI PRIVATE

## 2019-06-05 PROCEDURE — 6370000000 HC RX 637 (ALT 250 FOR IP): Performed by: PHYSICIAN ASSISTANT

## 2019-06-05 PROCEDURE — 6360000002 HC RX W HCPCS: Performed by: PHYSICIAN ASSISTANT

## 2019-06-05 PROCEDURE — 85520 HEPARIN ASSAY: CPT

## 2019-06-05 PROCEDURE — 6370000000 HC RX 637 (ALT 250 FOR IP): Performed by: STUDENT IN AN ORGANIZED HEALTH CARE EDUCATION/TRAINING PROGRAM

## 2019-06-05 PROCEDURE — 96376 TX/PRO/DX INJ SAME DRUG ADON: CPT

## 2019-06-05 PROCEDURE — 85610 PROTHROMBIN TIME: CPT

## 2019-06-05 RX ORDER — HEPARIN SODIUM 5000 [USP'U]/ML
60 INJECTION, SOLUTION INTRAVENOUS; SUBCUTANEOUS ONCE
Status: COMPLETED | OUTPATIENT
Start: 2019-06-05 | End: 2019-06-05

## 2019-06-05 RX ORDER — METOPROLOL SUCCINATE 50 MG/1
50 TABLET, EXTENDED RELEASE ORAL DAILY
Status: DISCONTINUED | OUTPATIENT
Start: 2019-06-06 | End: 2019-06-11 | Stop reason: HOSPADM

## 2019-06-05 RX ORDER — TIZANIDINE 4 MG/1
2 TABLET ORAL EVERY 8 HOURS PRN
Status: DISCONTINUED | OUTPATIENT
Start: 2019-06-05 | End: 2019-06-11 | Stop reason: HOSPADM

## 2019-06-05 RX ORDER — ASPIRIN 325 MG
325 TABLET ORAL ONCE
Status: COMPLETED | OUTPATIENT
Start: 2019-06-05 | End: 2019-06-05

## 2019-06-05 RX ORDER — HEPARIN SODIUM 5000 [USP'U]/ML
30 INJECTION, SOLUTION INTRAVENOUS; SUBCUTANEOUS PRN
Status: DISCONTINUED | OUTPATIENT
Start: 2019-06-05 | End: 2019-06-07 | Stop reason: ALTCHOICE

## 2019-06-05 RX ORDER — SODIUM CHLORIDE 0.9 % (FLUSH) 0.9 %
10 SYRINGE (ML) INJECTION PRN
Status: DISCONTINUED | OUTPATIENT
Start: 2019-06-05 | End: 2019-06-11 | Stop reason: HOSPADM

## 2019-06-05 RX ORDER — HEPARIN SODIUM 10000 [USP'U]/100ML
10 INJECTION, SOLUTION INTRAVENOUS CONTINUOUS
Status: DISCONTINUED | OUTPATIENT
Start: 2019-06-05 | End: 2019-06-07

## 2019-06-05 RX ORDER — ASPIRIN 81 MG/1
81 TABLET ORAL DAILY
Status: DISCONTINUED | OUTPATIENT
Start: 2019-06-06 | End: 2019-06-11 | Stop reason: HOSPADM

## 2019-06-05 RX ORDER — HYDRALAZINE HYDROCHLORIDE 20 MG/ML
20 INJECTION INTRAMUSCULAR; INTRAVENOUS ONCE
Status: DISCONTINUED | OUTPATIENT
Start: 2019-06-05 | End: 2019-06-06

## 2019-06-05 RX ORDER — HYDRALAZINE HYDROCHLORIDE 50 MG/1
50 TABLET, FILM COATED ORAL EVERY 8 HOURS SCHEDULED
Status: DISCONTINUED | OUTPATIENT
Start: 2019-06-05 | End: 2019-06-06

## 2019-06-05 RX ORDER — HEPARIN SODIUM 5000 [USP'U]/ML
60 INJECTION, SOLUTION INTRAVENOUS; SUBCUTANEOUS PRN
Status: DISCONTINUED | OUTPATIENT
Start: 2019-06-05 | End: 2019-06-07 | Stop reason: ALTCHOICE

## 2019-06-05 RX ORDER — ONDANSETRON 2 MG/ML
4 INJECTION INTRAMUSCULAR; INTRAVENOUS EVERY 6 HOURS PRN
Status: DISCONTINUED | OUTPATIENT
Start: 2019-06-05 | End: 2019-06-11 | Stop reason: HOSPADM

## 2019-06-05 RX ORDER — HYDRALAZINE HYDROCHLORIDE 20 MG/ML
10 INJECTION INTRAMUSCULAR; INTRAVENOUS EVERY 6 HOURS PRN
Status: DISCONTINUED | OUTPATIENT
Start: 2019-06-05 | End: 2019-06-11 | Stop reason: HOSPADM

## 2019-06-05 RX ORDER — SODIUM CHLORIDE 0.9 % (FLUSH) 0.9 %
10 SYRINGE (ML) INJECTION EVERY 12 HOURS SCHEDULED
Status: DISCONTINUED | OUTPATIENT
Start: 2019-06-05 | End: 2019-06-11 | Stop reason: HOSPADM

## 2019-06-05 RX ORDER — ISOSORBIDE MONONITRATE 30 MG/1
60 TABLET, EXTENDED RELEASE ORAL DAILY
Status: DISCONTINUED | OUTPATIENT
Start: 2019-06-06 | End: 2019-06-11 | Stop reason: HOSPADM

## 2019-06-05 RX ADMIN — HEPARIN SODIUM 3900 UNITS: 5000 INJECTION INTRAVENOUS; SUBCUTANEOUS at 18:05

## 2019-06-05 RX ADMIN — HYDRALAZINE HYDROCHLORIDE 50 MG: 50 TABLET, FILM COATED ORAL at 22:55

## 2019-06-05 RX ADMIN — HEPARIN SODIUM AND DEXTROSE 12 UNITS/KG/HR: 10000; 5 INJECTION INTRAVENOUS at 18:13

## 2019-06-05 RX ADMIN — ASPIRIN 325 MG: 325 TABLET ORAL at 18:18

## 2019-06-05 NOTE — ED PROVIDER NOTES
ED Attending Attestation Note     Date of evaluation: 6/5/2019    This patient was seen by the advance practice provider. I have seen and examined the patient, agree with the workup, evaluation, management and diagnosis. The care plan has been discussed. I have reviewed the ECG and concur with the HEATHER's interpretation. My assessment reveals patient here with acute SOB. Found to have ECG changes (T wave inversions) and elevated troponin. Will admit for NSTEMI. Due to the immediate potential for life-threatening deterioration due to NSTEMI, I spent 30 minutes providing critical care. This time excludes time spent performing procedures but includes time spent on direct patient care, history retrieval, review of the chart, and discussions with patient, family, and consultant(s).        Dianna Harris MD  06/05/19 2339

## 2019-06-05 NOTE — ED PROVIDER NOTES
[id number] [facility]          PHYSICIAN ASSISTANT NOTE       Date of evaluation: 6/5/2019    Chief Complaint     Shortness of Breath      History of Present Illness     Sasha Verma is a 80 y.o. female with past medical history of CHF, A. fib, CK 80, hyperlipidemia, hypertension who presents to the emergency department complaining of shortness of breath. The patient states that she was resting I when she all of a sudden felt as if she could not catch her breath. She denies associated chest pain,  cough, fevers, chills, nausea, vomiting, recent travel, recent lower extremity, swelling or pain,  Changes to bowel or bladder habits. The patient states that her shortness of nonexertional in nature and denies a positional component. She states that it lasted until EMS arrived and put oxygen on her. Per EMS, the patient was satting 100% on room air, however the oxygen made her feel better. Review of Systems     Review of Systems  As above is otherwise negative. Past Medical, Surgical, Family, and Social History     She has a past medical history of Acute on chronic diastolic CHF (congestive heart failure) ([address] Atrial fibrillation ([address] Chronic kidney disease, Hyperlipidemia, Hypertension, and Peripheral vascular disease ([address] She has no past surgical history on file. Her family history includes High Blood Pressure in her mother; Kidney Disease in her brother. She reports that she has quit smoking. She has a 0.75 pack-year smoking history. She has never used smokeless tobacco. She reports that she drinks alcohol. She reports that she does not use drugs. Medications     Previous Medications    ACETAMINOPHEN (TYLENOL) 325 MG TABLET    Take 2 to 3 tablets po every 6 hours as needed for pain. Do not exceed 10 tablets/24h.     ASPIRIN EC 81 MG EC TABLET    Take 1 tablet by mouth daily    DICLOFENAC SODIUM 1 % GEL    Apply 4 g topically 4 times daily as needed for Pain Medical Hx,Past Surgical Hx, Social Hx, Allergies, and Family Hx were reviewed. The patient was given the following medications:  Orders Placed This Encounter   Medications    heparin (porcine) injection 3,900 Units    heparin (porcine) injection 3,900 Units    heparin (porcine) injection 1,950 Units    heparin 25,000 units in dextrose 5% 250 mL infusion    aspirin tablet 325 mg       CONSULTS:  IP CONSULT TO CARDIOLOGY  IP CONSULT TO HOSPITALIST    MEDICAL DECISION MAKING / ASSESSMENT / Sesar Que is a 80 y.o. female with past medical history of CHF as well as above who presents to the emergency department complaining of acute shortness of breath that came on all of a sudden while she was resting in her bed. She denies any associated chest pain, lower extremity swelling or pain, fevers, chills, cough. Per EMS, the patient was satting at 100% on room air on arrival, however she felt better after they placed her on 4 L of oxygen via nasal cannula. On arrival here, the patient is resting comfortably. She is immediately taken off of her oxygen and continues to saturate at 100% on room air. No increased work of breathing. Heart and lung sounds are normal.  No lower extremity swelling, edema, calf tenderness or tightness are appreciated. Vital signs are otherwise mildly hypertensive but not tachycardic. EKG reveals normal sinus rhythm with nonspecific changes including inverted T waves in her lateral leads that are new from her previous EKG. BMP, CBC are within normal limits. Troponin is mildly elevated to 0.03. Chest x-ray reveals mild bibasilar atelectasis but is otherwise clear. Given the patient's nonspecific EKG changes as well as her mildly elevated troponin, cardiology was consulted and recommended treating this as a NSTEMI with heparin drip and aspirin and admission to trend troponins overnight. I spoke with the patient and her family regarding this plan and they are amenable.   I spoke with the accepting hospitalist who is also amenable at this time. Heparin drip, aspirin, repeat troponin were ordered in the emergency department. She remained hemodynamically stable throughout her stay in the emergency department and will continue to be observed here until such time as she is transported to her inpatient bed. This patient was also evaluated by the attending physician. All care plans were discussed and agreed upon. Clinical Impression     1. NSTEMI (non-ST elevated myocardial infarction) ([facility] [address]        Disposition     PATIENT REFERRED TO:  No follow-up provider specified.     DISCHARGE MEDICATIONS:  New Prescriptions    No medications on file       DISPOSITION Decision To Admit 06/05/2019 05:11:09 PM        Loan Reno PA-C  06/05/19 1677

## 2019-06-05 NOTE — PROGRESS NOTES
Home Med List is unable to be completed. Source of medications in list is recent refill history. Pt was unable to recall what she takes or if she was given anything this morning. Her brother helps with her medications but could not be reached.       [id number] Jos Fischer intern   6/5/2019 7:07 PM

## 2019-06-05 NOTE — H&P
Internal Medicine PGY-1 Resident History & Physical      PCP: Suly Gonzalez DO    Date of Admission: 6/5/2019    Date of Service: Pt seen/examined on 6/5/2019 and Admitted to Inpatient with expected LOS greater than two midnights due to medical therapy. Chief Complaint:  SOB at rest      History Of Present Illness:  80 y.o. female w/ Hx CHF, afib, HLD, HTN presented to [facility] ED complaining of shortness of breath while resting at~3 pm earlier in the afternoon. She suddenly felt as if she could not catch her breath. She reports that the SOB does worsen when she exerts herself. EMS was called. She was satting at 100% - but she reported to EMS that she felt she could breathe better with 4L oxygen. No jaw, shoulder or arm pain. No associated chest pain, chest tightness, palpitations, cough, fevers, chills, nausea, vomiting, recent travel, recent lower extremity, swelling or pain. No abd pain. No changes in BM. No dysuria (no itching or burning) No orthopnea or PND. In the ED she was sating 100% on RA wo increased work of breathing. Hypertensive (-163) without abnormal pulse, respiration and temperature    EKG: NSR w nonspecific changes including inverted T waves in lateral leads (new from her previous)    BMP and CBC unremarkable. Tn 0.03. CXR: bibasilar atelectasis but otherwise clear. Given the patient's nonspecific EKG changes and mild troponin elevated, cardiology was consulted and recommended treating this as a NSTEMI with heparin gtt and ASA and admission to trend troponins overnight. Past Medical History:          Diagnosis Date    Acute on chronic diastolic CHF (congestive heart failure) (HCC) 3/24/2019    Atrial fibrillation ([address] 3/24/2019    Chronic kidney disease     Hyperlipidemia     borderline per pt    Hypertension     Peripheral vascular disease ([facility] [address]        Past Surgical History:      History reviewed. No pertinent surgical history.     Medications Prior to Admission:      Prior to Admission medications    Medication Sig Start Date End Date Taking? Authorizing Provider   metoprolol succinate (TOPROL XL) 50 MG extended release tablet Take 1 tablet by mouth daily 5/3/19   Mimi Diop MD   furosemide (LASIX) 40 MG tablet Take 0.5 tablets by mouth daily If wt increases by 3 pounds then take 20mg twice a day 4/5/19   FRANCESCO Jonas CNP   hydrALAZINE (APRESOLINE) 50 MG tablet Take 1 tablet by mouth every 8 hours 3/22/19   Ramya Delaney MD   diclofenac sodium 1 % GEL Apply 4 g topically 4 times daily as needed for Pain 3/22/19   Ramya Delaney MD   ergocalciferol (ERGOCALCIFEROL) 75090 units capsule Take 1 capsule by mouth once a week for 12 doses 3/26/19 6/12/19  Jamar Christianson MD   isosorbide mononitrate (IMDUR) 60 MG extended release tablet Take 1 tablet by mouth daily 10/3/18   FRANCESCO Danielle CNP   acetaminophen (TYLENOL) 325 MG tablet Take 2 to 3 tablets po every 6 hours as needed for pain. Do not exceed 10 tablets/24h. 3/7/17   Shar Snowden,    tiZANidine (ZANAFLEX) 2 MG tablet Take 1 tablet by mouth every 8 hours as needed (back, thigh, and leg pain or spasm) 3/7/17   Shar Snowden, DO   aspirin EC 81 MG EC tablet Take 1 tablet by mouth daily 9/22/15   Ana Webb MD       Allergies:  Bactrim [sulfamethoxazole-trimethoprim]; Nebivolol hcl; and Other    Social History:      The patient currently lives at home    TOBACCO:   reports that she has quit smoking. She has a 0.75 pack-year smoking history. She has never used smokeless tobacco.  ETOH:   reports that she drinks alcohol. Family History:          Problem Relation Age of Onset    High Blood Pressure Mother     Kidney Disease Brother        REVIEW OF SYSTEMS:   Pertinent positives as noted in the HPI. All other systems reviewed and negative.   ROS: negative except as above    PHYSICAL EXAM PERFORMED:    BP (!) 163/83   Pulse 64   Temp 98.2 °F (36.8 °C) (Oral) Resp 16   Ht 5' 3\" (1.6 m)   Wt 144 lb (65.3 kg)   BMI 25.51 kg/m²     General appearance:  No apparent distress. HEENT:  Normal cephalic, atraumatic without obvious deformity. PERRL. Extra ocular muscles intact. Conjunctivae/corneas clear. Neck: Supple. No JVD. Trachea midline. Respiratory:  Normal respiratory effort. CTAB w/o r/r/w. Cardiovascular:  RRR; nl S1&S2; w/o m/r/g. Abdomen: Soft, nt/nd w/ normal bowel sounds. Musculoskeletal:  No clubbing, cyanosis or edema bilaterally. Skin: Skin color, texture, turgor normal.  No rashes or lesions. Neurologic:  Neurovascularly intact without any focal sensory/motor deficits. Cranial nerves: II-XII intact, grossly non-focal.  Psychiatric:  Alert and oriented, thought content appropriate, normal insight  Capillary Refill: Brisk,< 3 seconds   Peripheral Pulses: +2 palpable, equal bilaterally       Labs:     Recent Labs     06/05/19  1554   WBC 6.7   HGB 12.3   HCT 38.5        Recent Labs     06/05/19  1554      K 5.4*      CO2 16*   BUN 37*   CREATININE 1.9*   CALCIUM 8.4     No results for input(s): AST, ALT, BILIDIR, BILITOT, ALKPHOS in the last 72 hours. No results for input(s): INR in the last 72 hours. Recent Labs     06/05/19  1554   TROPONINI 0.03*       Urinalysis:      Lab Results   Component Value Date    NITRU Negative 03/24/2019    WBCUA 6-10 03/24/2019    BACTERIA 1+ 03/24/2019    RBCUA 20-50 03/24/2019    BLOODU LARGE 03/24/2019    SPECGRAV 1.025 03/24/2019    GLUCOSEU Negative 03/24/2019       Radiology:     XR CHEST STANDARD (2 VW)   Final Result      Bibasilar atelectasis      Stable cardiac enlargement                ASSESSMENT & PLAN:  Akila Ordaz is a 80 y.o. female w/ Hx CHF, afib, HLD, HTN presented to [facility] ED complaining of shortness of breath while resting at~3 pm earlier in the afternoon. She suddenly felt as if she could not catch her breath. NSTEMI  SOB at rest. Tn 0.03.  EKG nonspecific ST changes & T-wave

## 2019-06-06 ENCOUNTER — APPOINTMENT (OUTPATIENT)
Dept: VASCULAR LAB | Age: 84
DRG: 281 | End: 2019-06-06
Payer: MEDICARE

## 2019-06-06 LAB
ALBUMIN SERPL-MCNC: 2.9 G/DL (ref 3.4–5)
ANION GAP SERPL CALCULATED.3IONS-SCNC: 15 MMOL/L (ref 3–16)
ANTI-XA UNFRAC HEPARIN: 0.44 IU/ML (ref 0.3–0.7)
ANTI-XA UNFRAC HEPARIN: 0.46 IU/ML (ref 0.3–0.7)
ANTI-XA UNFRAC HEPARIN: 0.68 IU/ML (ref 0.3–0.7)
BASOPHILS ABSOLUTE: 0.1 K/UL (ref 0–0.2)
BASOPHILS RELATIVE PERCENT: 0.9 %
BUN BLDV-MCNC: 40 MG/DL (ref 7–20)
CALCIUM SERPL-MCNC: 8.5 MG/DL (ref 8.3–10.6)
CHLORIDE BLD-SCNC: 109 MMOL/L (ref 99–110)
CO2: 15 MMOL/L (ref 21–32)
CREAT SERPL-MCNC: 2 MG/DL (ref 0.6–1.2)
EOSINOPHILS ABSOLUTE: 0.3 K/UL (ref 0–0.6)
EOSINOPHILS RELATIVE PERCENT: 4.4 %
GFR AFRICAN AMERICAN: 29
GFR NON-AFRICAN AMERICAN: 24
GLUCOSE BLD-MCNC: 111 MG/DL (ref 70–99)
HCT VFR BLD CALC: 36.6 % (ref 36–48)
HEMOGLOBIN: 11.7 G/DL (ref 12–16)
LV EF: 74 %
LVEF MODALITY: NORMAL
LYMPHOCYTES ABSOLUTE: 1.4 K/UL (ref 1–5.1)
LYMPHOCYTES RELATIVE PERCENT: 23.5 %
MCH RBC QN AUTO: 27.6 PG (ref 26–34)
MCHC RBC AUTO-ENTMCNC: 32 G/DL (ref 31–36)
MCV RBC AUTO: 86.2 FL (ref 80–100)
MONOCYTES ABSOLUTE: 0.7 K/UL (ref 0–1.3)
MONOCYTES RELATIVE PERCENT: 11.6 %
NEUTROPHILS ABSOLUTE: 3.6 K/UL (ref 1.7–7.7)
NEUTROPHILS RELATIVE PERCENT: 59.6 %
PDW BLD-RTO: 18.5 % (ref 12.4–15.4)
PHOSPHORUS: 3.7 MG/DL (ref 2.5–4.9)
PLATELET # BLD: 185 K/UL (ref 135–450)
PMV BLD AUTO: 8.9 FL (ref 5–10.5)
POTASSIUM SERPL-SCNC: 4.4 MMOL/L (ref 3.5–5.1)
POTASSIUM SERPL-SCNC: 4.5 MMOL/L (ref 3.5–5.1)
PRO-BNP: 5839 PG/ML (ref 0–449)
RBC # BLD: 4.25 M/UL (ref 4–5.2)
SODIUM BLD-SCNC: 139 MMOL/L (ref 136–145)
TROPONIN: 0.05 NG/ML
TROPONIN: 0.05 NG/ML
TROPONIN: 0.06 NG/ML
WBC # BLD: 6.1 K/UL (ref 4–11)

## 2019-06-06 PROCEDURE — 78452 HT MUSCLE IMAGE SPECT MULT: CPT

## 2019-06-06 PROCEDURE — A9502 TC99M TETROFOSMIN: HCPCS | Performed by: INTERNAL MEDICINE

## 2019-06-06 PROCEDURE — 6370000000 HC RX 637 (ALT 250 FOR IP): Performed by: STUDENT IN AN ORGANIZED HEALTH CARE EDUCATION/TRAINING PROGRAM

## 2019-06-06 PROCEDURE — 6360000002 HC RX W HCPCS: Performed by: INTERNAL MEDICINE

## 2019-06-06 PROCEDURE — 6370000000 HC RX 637 (ALT 250 FOR IP): Performed by: INTERNAL MEDICINE

## 2019-06-06 PROCEDURE — 80069 RENAL FUNCTION PANEL: CPT

## 2019-06-06 PROCEDURE — 3430000000 HC RX DIAGNOSTIC RADIOPHARMACEUTICAL: Performed by: INTERNAL MEDICINE

## 2019-06-06 PROCEDURE — 2580000003 HC RX 258: Performed by: STUDENT IN AN ORGANIZED HEALTH CARE EDUCATION/TRAINING PROGRAM

## 2019-06-06 PROCEDURE — 99222 1ST HOSP IP/OBS MODERATE 55: CPT | Performed by: INTERNAL MEDICINE

## 2019-06-06 PROCEDURE — 85025 COMPLETE CBC W/AUTO DIFF WBC: CPT

## 2019-06-06 PROCEDURE — 93017 CV STRESS TEST TRACING ONLY: CPT

## 2019-06-06 PROCEDURE — 1200000000 HC SEMI PRIVATE

## 2019-06-06 PROCEDURE — 94150 VITAL CAPACITY TEST: CPT

## 2019-06-06 PROCEDURE — 36415 COLL VENOUS BLD VENIPUNCTURE: CPT

## 2019-06-06 PROCEDURE — 83880 ASSAY OF NATRIURETIC PEPTIDE: CPT

## 2019-06-06 PROCEDURE — 84484 ASSAY OF TROPONIN QUANT: CPT

## 2019-06-06 PROCEDURE — 84132 ASSAY OF SERUM POTASSIUM: CPT

## 2019-06-06 PROCEDURE — 93971 EXTREMITY STUDY: CPT

## 2019-06-06 PROCEDURE — 85520 HEPARIN ASSAY: CPT

## 2019-06-06 RX ORDER — DEXTROSE MONOHYDRATE 50 MG/ML
100 INJECTION, SOLUTION INTRAVENOUS PRN
Status: DISCONTINUED | OUTPATIENT
Start: 2019-06-06 | End: 2019-06-11 | Stop reason: HOSPADM

## 2019-06-06 RX ORDER — ATORVASTATIN CALCIUM 40 MG/1
40 TABLET, FILM COATED ORAL NIGHTLY
Status: DISCONTINUED | OUTPATIENT
Start: 2019-06-06 | End: 2019-06-11 | Stop reason: HOSPADM

## 2019-06-06 RX ORDER — HYDRALAZINE HYDROCHLORIDE 20 MG/ML
INJECTION INTRAMUSCULAR; INTRAVENOUS
Status: DISPENSED
Start: 2019-06-06 | End: 2019-06-06

## 2019-06-06 RX ORDER — ACETAMINOPHEN 325 MG/1
650 TABLET ORAL EVERY 4 HOURS PRN
Status: DISCONTINUED | OUTPATIENT
Start: 2019-06-06 | End: 2019-06-11 | Stop reason: HOSPADM

## 2019-06-06 RX ORDER — HYDRALAZINE HYDROCHLORIDE 10 MG/1
25 TABLET, FILM COATED ORAL ONCE
Status: COMPLETED | OUTPATIENT
Start: 2019-06-06 | End: 2019-06-06

## 2019-06-06 RX ORDER — DEXTROSE MONOHYDRATE 25 G/50ML
12.5 INJECTION, SOLUTION INTRAVENOUS PRN
Status: DISCONTINUED | OUTPATIENT
Start: 2019-06-06 | End: 2019-06-11 | Stop reason: HOSPADM

## 2019-06-06 RX ORDER — NICOTINE POLACRILEX 4 MG
15 LOZENGE BUCCAL PRN
Status: DISCONTINUED | OUTPATIENT
Start: 2019-06-06 | End: 2019-06-11 | Stop reason: HOSPADM

## 2019-06-06 RX ADMIN — TETROFOSMIN 10 MILLICURIE: 1.38 INJECTION, POWDER, LYOPHILIZED, FOR SOLUTION INTRAVENOUS at 12:11

## 2019-06-06 RX ADMIN — Medication 10 ML: at 12:11

## 2019-06-06 RX ADMIN — REGADENOSON 0.4 MG: 0.08 INJECTION, SOLUTION INTRAVENOUS at 13:17

## 2019-06-06 RX ADMIN — ASPIRIN 81 MG: 81 TABLET, COATED ORAL at 08:15

## 2019-06-06 RX ADMIN — HYDRALAZINE HYDROCHLORIDE 75 MG: 50 TABLET, FILM COATED ORAL at 15:01

## 2019-06-06 RX ADMIN — ATORVASTATIN CALCIUM 40 MG: 40 TABLET, FILM COATED ORAL at 21:28

## 2019-06-06 RX ADMIN — HYDRALAZINE HYDROCHLORIDE 75 MG: 50 TABLET, FILM COATED ORAL at 21:29

## 2019-06-06 RX ADMIN — TETROFOSMIN 30 MILLICURIE: 1.38 INJECTION, POWDER, LYOPHILIZED, FOR SOLUTION INTRAVENOUS at 13:17

## 2019-06-06 RX ADMIN — ISOSORBIDE MONONITRATE 60 MG: 30 TABLET, EXTENDED RELEASE ORAL at 08:15

## 2019-06-06 RX ADMIN — HYDRALAZINE HYDROCHLORIDE 50 MG: 50 TABLET, FILM COATED ORAL at 06:16

## 2019-06-06 RX ADMIN — HYDRALAZINE HYDROCHLORIDE 25 MG: 10 TABLET, FILM COATED ORAL at 12:13

## 2019-06-06 RX ADMIN — Medication 10 ML: at 13:17

## 2019-06-06 RX ADMIN — METOPROLOL SUCCINATE 50 MG: 50 TABLET, EXTENDED RELEASE ORAL at 11:24

## 2019-06-06 NOTE — CONSULTS
[facility]  Cardiology Inpatient Consult Service                                                                                          Pt Name: Yoana Dumont  Age: 80 y.o. Sex: female  : 1932  Location: [place] [address]    Referring Physician: Nick Perez MD      Reason for Consult:       Reason for Consultation/Chief Complaint: Shortness of breath of 1 day duration       HPI:      Yoana Dumont is a 80 y.o. female with a past medical history of HFpEF, HTN, CKD, PAF  who presented to the hospital with  Shortness of breath of I day duration, patient symptoms started yesterday, constant, while she was in bed, pt denies any chest pain, palpitation, leg swelling, fever, weight change , nausea, vomiting, recent travel, recent lower extremity, swelling or pain. No abd pain. In ED trop was elevated 0.03- 0.05, EKG: NSR w nonspecific changes including inverted T waves in lateral leads (new from her previous), Bp 160/80  Last echo 2019 showed EF 55, diastolic dysfunction grade 2. Histories     Past Medical History:   has a past medical history of Acute on chronic diastolic CHF (congestive heart failure) ([address] Atrial fibrillation ([address] Chronic kidney disease, Hyperlipidemia, Hypertension, and Peripheral vascular disease ([facility] [address] Surgical History:   has no past surgical history on file. Social History:   reports that she has quit smoking. She has a 0.75 pack-year smoking history. She has never used smokeless tobacco. She reports that she drinks alcohol. She reports that she does not use drugs. Family History:  No evidence for sudden cardiac death or premature CAD      Medications:       Home Medications  Were reviewed and are listed in nursing record. and/or listed below  Prior to Admission medications    Medication Sig Start Date End Date Taking?  Authorizing Provider   metoprolol succinate (TOPROL XL) 50 MG extended release tablet Take 1 tablet by mouth daily 5/3/19   Bronson Ortiz MD   furosemide (LASIX) 40 MG tablet Take 0.5 tablets by mouth daily If wt increases by 3 pounds then take 20mg twice a day 4/5/19   FRANCESCO Montalvo CNP   hydrALAZINE (APRESOLINE) 50 MG tablet Take 1 tablet by mouth every 8 hours 3/22/19   Shruthi Reyes MD   diclofenac sodium 1 % GEL Apply 4 g topically 4 times daily as needed for Pain 3/22/19   Shruthi Reyes MD   ergocalciferol (ERGOCALCIFEROL) 57327 units capsule Take 1 capsule by mouth once a week for 12 doses 3/26/19 6/12/19  Jamar Christianson MD   isosorbide mononitrate (IMDUR) 60 MG extended release tablet Take 1 tablet by mouth daily 10/3/18   FRANCESCO Enamorado CNP   acetaminophen (TYLENOL) 325 MG tablet Take 2 to 3 tablets po every 6 hours as needed for pain. Do not exceed 10 tablets/24h. 3/7/17   Althea Snowden DO   tiZANidine (ZANAFLEX) 2 MG tablet Take 1 tablet by mouth every 8 hours as needed (back, thigh, and leg pain or spasm) 3/7/17   Suzette Masrhall DO   aspirin EC 81 MG EC tablet Take 1 tablet by mouth daily 9/22/15   Sean Paredes MD          Inpatient Medications:   aspirin EC  81 mg Oral Daily    hydrALAZINE  50 mg Oral 3 times per day    isosorbide mononitrate  60 mg Oral Daily    metoprolol succinate  50 mg Oral Daily    sodium chloride flush  10 mL Intravenous 2 times per day    hydrALAZINE  20 mg Intravenous Once       IV drips:   heparin (porcine) 10 Units/kg/hr (06/06/19 0217)       PRN:  heparin (porcine), heparin (porcine), tiZANidine, sodium chloride flush, ondansetron, hydrALAZINE    Allergy:     Bactrim [sulfamethoxazole-trimethoprim]; Nebivolol hcl; and Other       Review of Systems:     All 12 point review of symptoms completed. Pertinent positives identified in the HPI, all other review of symptoms negative as below. CONSTITUTIONAL: Nounanticipated weight loss. No change in energy level, sleep pattern, or activity level. SKIN: No rash or pruritis.   EYES: No visual changes or diplopia. No scleral icterus. ENT: No Headaches, hearing loss or vertigo. No mouth sores or sore throat. CARDIOVASCULAR: No chest pain/chest pressure/chest discomfort. No palpitations. RESPIRATORY: No cough or wheezing, no sputum production. No hematemesis. GASTROINTESTINAL: No N/V/D. No abdominal pain, appetite loss, blood in stools. GENITOURINARY: No dysuria, trouble voiding, or hematuria. MUSCULOSKELETAL:  No gait disturbance, weakness or joint complaints. NEUROLOGICAL: No headache, diplopia, change in muscle strength, numbness or tingling. No change in gait, balance, coordination, mood, affect, memory, mentation, behavior. PSHYCH: No anxiety, loss of interest, change in sexual behavior, feelings of self-harm, or confusion. ENDOCRINE: No malaise, fatigue or temperature intolerance. No excessive thirst, fluid intake, or urination. No tremor. HEMATOLOGIC: No abnormal bruising or bleeding. ALLERGY: No nasal congestion or hives.       Physical Examination:     Vitals:    06/06/19 0630 06/06/19 0702 06/06/19 0813 06/06/19 0955   BP: (!) 181/82 (!) 162/89 136/79    Pulse: 66 77 76 81   Resp: 16 16 18 22   Temp:   97.6 °F (36.4 °C)    TempSrc:   Oral    SpO2:   99%    Weight:       Height:           Wt Readings from Last 3 Encounters:   06/05/19 144 lb (65.3 kg)   04/01/19 144 lb 3.2 oz (65.4 kg)   03/26/19 158 lb 1.1 oz (71.7 kg)       Objective      General Appearance:  Alert, cooperative, no distress, appears stated age Appropriate weight   Head:  Normocephalic, without obvious abnormality, atraumatic   Eyes:  PERRL, conjunctiva/corneas clear EOM intact  Ears normal   Throat no lesions       Nose: Nares normal, no drainage or sinus tenderness   Throat: Lips, mucosa, and tongue normal   Neck: Supple, symmetrical, trachea midline, no adenopathy, thyroid: not enlarged, symmetric, no tenderness/mass/nodules, no carotid bruit or JVD       Lungs:   Clear to auscultation bilaterally, respirations unlabored Chest Wall:  No tenderness or deformity   Heart:  Regular rate and rhythm, S1, S2 normal, no murmur, rub or gallop PMI intact   Abdomen:   Soft, non-tender, bowel sounds active all four quadrants,  no masses, no organomegaly       Extremities: Extremities normal, atraumatic, no cyanosis or edema   Pulses: 2+ and symmetric   Skin: Skin color, texture, turgor normal, no rashes or lesions   Pysch: Normal mood and affect   Neurologic: Normal gross motor and sensory exam.  Cranial nerves intact        Labs:     Recent Labs     06/05/19  1554 06/06/19  0151 06/06/19  0504     --  139   K 5.4* 4.4 4.5   BUN 37*  --  40*   CREATININE 1.9*  --  2.0*     --  109   CO2 16*  --  15*   GLUCOSE 114*  --  111*   CALCIUM 8.4  --  8.5     Recent Labs     06/05/19  1554 06/05/19  1940 06/06/19  0504   WBC 6.7 6.5 6.1   HGB 12.3 12.3 11.7*   HCT 38.5 38.1 36.6    201 185   MCV 86.5 85.5 86.2     No results for input(s): CHOLTOT, TRIG, HDL in the last 72 hours. Invalid input(s): LIPIDCOMM, CHOLHDL, VLDCHOL, LDL  Recent Labs     06/05/19  1940   INR 1.18*     Recent Labs     06/05/19  1554 06/05/19  1941 06/06/19  0151 06/06/19  0504   TROPONINI 0.03* 0.05* 0.05* 0.05*     No results for input(s): BNP in the last 72 hours. No results for input(s): TSH in the last 72 hours.   No results for input(s): CHOL, HDL, LDLCALC, TRIG in the last 72 hours.]    Lab Results   Component Value Date    TROPONINI 0.05 (H) 06/06/2019           Assessment / Plan:      80 y.o. female with a past medical history of HFpEF, HTN, CKD, PAF  who presented to the hospital with  Shortness of breath of I day duration, patient symptoms started yesterday, constant, while she was in bed, pt denies any chest pain, palpitation, leg swelling, fever      NSTEMI:  80Year old lady w SOB, Trop elevated, no chest pain, unspecific EKG changes  -Stress test  -Continue heparin infusion for 48 hours, we will recommend stop it if stress test is negative  -continue home metoprolol XL 50 mg   - ASA    Chronic heart failure with Preserved ejection fraction:  -stable  -continue home hydralazine+imdur  -BB    HTN:  Continue home bb, hydralazine imdur. Tobacco use was discussed with the patient and educated on the negative effects. I have asked the patient to not utilize these agents. Thank you for allowing to us to participate in the care or Catrina MANNING. Further evaluation will be based upon the patient's clinical course and testing results. All questions and concerns were addressed to the patient/family. Alternatives to my treatment were discussed. The note was completed using EMR. Every effort was made to ensure accuracy; however, inadvertent computerized transcription errors may be present. Fatoumata Camarena MD  PGY1 internal medicine   [facility].  [address]ir Tang MD, [facility]

## 2019-06-06 NOTE — PROGRESS NOTES
Internal Medicine PGY-1 Resident Progress Note        PCP: Emilee Meléndez DO    Date of Admission: 6/5/2019    Chief Complaint: SOB at rest     Subjective:   Still endorses some SOB but mostly fatigue, is anxious about results. No CP       Medications:  Reviewed    Infusion Medications    heparin (porcine) 10 Units/kg/hr (06/06/19 0217)     Scheduled Medications    atorvastatin  40 mg Oral Nightly    hydrALAZINE  75 mg Oral 3 times per day    aspirin EC  81 mg Oral Daily    isosorbide mononitrate  60 mg Oral Daily    metoprolol succinate  50 mg Oral Daily    sodium chloride flush  10 mL Intravenous 2 times per day     PRN Meds: heparin (porcine), heparin (porcine), tiZANidine, sodium chloride flush, ondansetron, hydrALAZINE      Intake/Output Summary (Last 24 hours) at 6/6/2019 1523  Last data filed at 6/5/2019 2343  Gross per 24 hour   Intake 240 ml   Output 0 ml   Net 240 ml       Physical Exam Performed:    BP (!) 189/93   Pulse 78   Temp 97.4 °F (36.3 °C) (Oral)   Resp 18   Ht 5' 3\" (1.6 m)   Wt 144 lb (65.3 kg)   SpO2 97%   BMI 25.51 kg/m²     General appearance: Thin appearing AA F, No apparent distress, appears stated age and cooperative. HEENT: Pupils equal, round, and reactive to light. Conjunctivae/corneas clear. Neck: Supple, with full range of motion. No jugular venous distention. Trachea midline. Respiratory:  Normal respiratory effort. Clear to auscultation, bilaterally without Rales/Wheezes/Rhonchi. Cardiovascular: Regular rate and rhythm with normal S1/S2 without murmurs, rubs or gallops. Abdomen: Soft, non-tender, non-distended with normal bowel sounds. Musculoskeletal: No clubbing, cyanosis. LLE edema   Skin: Skin color, texture, turgor normal.  No rashes or lesions. Neurologic:  Neurovascularly intact without any focal sensory/motor deficits.  Cranial nerves: II-XII intact, grossly non-focal.  Psychiatric: Alert and oriented, thought content appropriate, normal insight  Capillary Refill: Brisk,< 3 seconds   Peripheral Pulses: +2 palpable, equal bilaterally       Labs:   Recent Labs     06/05/19  1554 06/05/19  1940 06/06/19  0504   WBC 6.7 6.5 6.1   HGB 12.3 12.3 11.7*   HCT 38.5 38.1 36.6    201 185     Recent Labs     06/05/19  1554 06/06/19  0151 06/06/19  0504     --  139   K 5.4* 4.4 4.5     --  109   CO2 16*  --  15*   BUN 37*  --  40*   CREATININE 1.9*  --  2.0*   CALCIUM 8.4  --  8.5   PHOS  --   --  3.7     No results for input(s): AST, ALT, BILIDIR, BILITOT, ALKPHOS in the last 72 hours. Recent Labs     06/05/19  1940   INR 1.18*     Recent Labs     06/06/19  0151 06/06/19  0504 06/06/19  1036   TROPONINI 0.05* 0.05* 0.06*       Urinalysis:      Lab Results   Component Value Date    NITRU Negative 06/05/2019    WBCUA 3-5 06/05/2019    BACTERIA Rare 06/05/2019    RBCUA 0-2 06/05/2019    BLOODU Negative 06/05/2019    SPECGRAV 1.015 06/05/2019    GLUCOSEU Negative 06/05/2019       Radiology:  VL Extremity Venous Left         XR CHEST STANDARD (2 VW)   Final Result      Bibasilar atelectasis      Stable cardiac enlargement            NM MYOCARDIAL SPECT REST EXERCISE OR RX    (Results Pending)           Assessment/Plan:    Swapna Martines is a 80 y.o. female w/ Hx CHF, afib, HLD, HTN presented to [facility] ED complaining of shortness of breath while resting at~3 pm earlier in the afternoon. She suddenly felt as if she could not catch her breath. Active Hospital Problems    Diagnosis Date Noted    NSTEMI (non-ST elevated myocardial infarction) ([facility] [address] [I21.4] 06/05/2019     NSTEMI  SOB at rest. Tn 0.03. EKG nonspecific ST changes & T-wave inversion at lateral leads. Cardiology (Michael)consulted by ED.  Recommended admitting and trending tn w/ heparin gtt + ASA.  - Cardiology following -stress test today   - cont heparin gtt  - cont ASA 81 mg  - PT/OT  - telemetry     HTN  - increase from 50 to 75 mg hydralazine TID, 60 mg IMDUR QD, 50 mg Toprol XL QD     DVT Prophylaxis: hep gtt  Diet: DIET CARDIAC; Diet NPO, After Midnight  Code Status: Full Code    PT/OT Eval Status: pending     Dispo - [facility]     Petr Moore MD    I will discuss the patient with the senior resident and MD Petr Lucio M.D.   Internal Medicine PGY-1  Pager: [phone]

## 2019-06-06 NOTE — PROGRESS NOTES
Patient seen and examined, plan of care discussed with residents. Agree with their assessment and plan with following addendum:  Patient continues to have exertional symptoms of fatigue, shortness of breath and fleeting chest discomfort. Everything resolves with rest. Her trop mika initially and plateau. Cardiology advised on stress test- will order. Increase hydralazine for HTN. Leg doppler given left leg edema. Cont with heparin drip, asa, add statin.        Nyra Begin

## 2019-06-06 NOTE — CARE COORDINATION
Met with pt at the bedside to complete assessment. She is alert and oriented x 3. She is able to provide answers to some questions. She stated, \"I can't remember things. I know I'm not thinking right right now. \" She lives at home with her son, Toni Bustamante. They take care of each other. Toni Bustamante goes to HD three times a week. She is independent with ADL's. She ambulates around the house holding onto the wall and furniture. No recent falls. She was active with [facility] but discharged at the end of May. Son assists with transportation to appointments and does the grocery shopping. At discharge she expects to return home. She was interested in ROS with [facility] if warranted.     Alice Mars RN  Case Management  [phone]

## 2019-06-06 NOTE — PROGRESS NOTES
Bedside report completed with Ulises White RN. Patient updated on plan of care, all questions answered. Denies any further needs at this time.  Corby Cancino R.N.

## 2019-06-06 NOTE — PROGRESS NOTES
Pts bp has been elevated in the 200's. Scheduled hydralazine 50mg given and rechecked and still elevated. MD notified, will cont to monitor pt at the bedside.

## 2019-06-06 NOTE — PROGRESS NOTES
4 Eyes Admission Assessment     I agree as the admission nurse that 2 RN's have performed a thorough Head to Toe Skin Assessment on the patient. ALL assessment sites listed below have been assessed on admission.        Areas assessed by both nurses:  [x]   Head, Face, and Ears   [x]   Shoulders, Back, and Chest  [x]   Arms, Elbows, and Hands   [x]   Coccyx, Sacrum, and Ischum  [x]   Legs, Feet, and Heels        Does the Patient have Skin Breakdown? no        Negro Prevention initiated: no  Wound Care Orders initiated:  no      [facility] nurse consulted for Pressure Injury (Stage 3,4, Unstageable, DTI, NWPT, and Complex wounds):  no      Nurse 1 eSignature: Electronically signed by Shabnam Gavin RN on 6/6/19 at 4:03 AM        **SHARE this note so that the co-signing nurse is able to place an eSignature**    Nurse 2 eSignature: Electronically signed by Marlin Mcdaniel RN on 6/6/19 at 4:06 AM

## 2019-06-06 NOTE — ED NOTES
Critical ANTI XA should be disregarded as it was not drawn per protocol at 6 hours after heparin infusion and from different arm than infusing. Resident team agrees to disregard this value and wait for 6 hour ANTI XA level. Silvia Chapman RN  06/05/19 2426

## 2019-06-07 LAB
ALBUMIN SERPL-MCNC: 2.9 G/DL (ref 3.4–5)
ANION GAP SERPL CALCULATED.3IONS-SCNC: 13 MMOL/L (ref 3–16)
ANTI-XA UNFRAC HEPARIN: 0.38 IU/ML (ref 0.3–0.7)
BASOPHILS ABSOLUTE: 0.1 K/UL (ref 0–0.2)
BASOPHILS RELATIVE PERCENT: 0.8 %
BUN BLDV-MCNC: 40 MG/DL (ref 7–20)
CALCIUM SERPL-MCNC: 8.5 MG/DL (ref 8.3–10.6)
CHLORIDE BLD-SCNC: 111 MMOL/L (ref 99–110)
CO2: 17 MMOL/L (ref 21–32)
CREAT SERPL-MCNC: 1.9 MG/DL (ref 0.6–1.2)
EOSINOPHILS ABSOLUTE: 0.2 K/UL (ref 0–0.6)
EOSINOPHILS RELATIVE PERCENT: 2.7 %
GFR AFRICAN AMERICAN: 30
GFR NON-AFRICAN AMERICAN: 25
GLUCOSE BLD-MCNC: 78 MG/DL (ref 70–99)
HCT VFR BLD CALC: 35.4 % (ref 36–48)
HEMOGLOBIN: 11.4 G/DL (ref 12–16)
LYMPHOCYTES ABSOLUTE: 1.7 K/UL (ref 1–5.1)
LYMPHOCYTES RELATIVE PERCENT: 26.3 %
MCH RBC QN AUTO: 27.9 PG (ref 26–34)
MCHC RBC AUTO-ENTMCNC: 32.4 G/DL (ref 31–36)
MCV RBC AUTO: 86.3 FL (ref 80–100)
MONOCYTES ABSOLUTE: 0.7 K/UL (ref 0–1.3)
MONOCYTES RELATIVE PERCENT: 11.3 %
NEUTROPHILS ABSOLUTE: 3.9 K/UL (ref 1.7–7.7)
NEUTROPHILS RELATIVE PERCENT: 58.9 %
PDW BLD-RTO: 18.1 % (ref 12.4–15.4)
PHOSPHORUS: 3.7 MG/DL (ref 2.5–4.9)
PLATELET # BLD: 201 K/UL (ref 135–450)
PMV BLD AUTO: 9.2 FL (ref 5–10.5)
POTASSIUM SERPL-SCNC: 4.5 MMOL/L (ref 3.5–5.1)
RBC # BLD: 4.1 M/UL (ref 4–5.2)
SODIUM BLD-SCNC: 141 MMOL/L (ref 136–145)
TROPONIN: 0.07 NG/ML
URINE CULTURE, ROUTINE: NORMAL
WBC # BLD: 6.6 K/UL (ref 4–11)

## 2019-06-07 PROCEDURE — 97165 OT EVAL LOW COMPLEX 30 MIN: CPT

## 2019-06-07 PROCEDURE — 97530 THERAPEUTIC ACTIVITIES: CPT

## 2019-06-07 PROCEDURE — 6370000000 HC RX 637 (ALT 250 FOR IP): Performed by: INTERNAL MEDICINE

## 2019-06-07 PROCEDURE — 97161 PT EVAL LOW COMPLEX 20 MIN: CPT

## 2019-06-07 PROCEDURE — 36415 COLL VENOUS BLD VENIPUNCTURE: CPT

## 2019-06-07 PROCEDURE — 6360000002 HC RX W HCPCS: Performed by: INTERNAL MEDICINE

## 2019-06-07 PROCEDURE — 1200000000 HC SEMI PRIVATE

## 2019-06-07 PROCEDURE — 6370000000 HC RX 637 (ALT 250 FOR IP): Performed by: STUDENT IN AN ORGANIZED HEALTH CARE EDUCATION/TRAINING PROGRAM

## 2019-06-07 PROCEDURE — 97535 SELF CARE MNGMENT TRAINING: CPT

## 2019-06-07 PROCEDURE — 84484 ASSAY OF TROPONIN QUANT: CPT

## 2019-06-07 PROCEDURE — 85025 COMPLETE CBC W/AUTO DIFF WBC: CPT

## 2019-06-07 PROCEDURE — 80069 RENAL FUNCTION PANEL: CPT

## 2019-06-07 PROCEDURE — 97116 GAIT TRAINING THERAPY: CPT

## 2019-06-07 PROCEDURE — 85520 HEPARIN ASSAY: CPT

## 2019-06-07 PROCEDURE — 99232 SBSQ HOSP IP/OBS MODERATE 35: CPT | Performed by: INTERNAL MEDICINE

## 2019-06-07 PROCEDURE — 2580000003 HC RX 258: Performed by: STUDENT IN AN ORGANIZED HEALTH CARE EDUCATION/TRAINING PROGRAM

## 2019-06-07 RX ORDER — FUROSEMIDE 10 MG/ML
40 INJECTION INTRAMUSCULAR; INTRAVENOUS 2 TIMES DAILY
Status: DISCONTINUED | OUTPATIENT
Start: 2019-06-07 | End: 2019-06-09

## 2019-06-07 RX ORDER — HEPARIN SODIUM 5000 [USP'U]/ML
5000 INJECTION, SOLUTION INTRAVENOUS; SUBCUTANEOUS EVERY 8 HOURS SCHEDULED
Status: DISCONTINUED | OUTPATIENT
Start: 2019-06-07 | End: 2019-06-11 | Stop reason: HOSPADM

## 2019-06-07 RX ADMIN — ASPIRIN 81 MG: 81 TABLET, COATED ORAL at 09:22

## 2019-06-07 RX ADMIN — HYDRALAZINE HYDROCHLORIDE 75 MG: 50 TABLET, FILM COATED ORAL at 06:14

## 2019-06-07 RX ADMIN — ATORVASTATIN CALCIUM 40 MG: 40 TABLET, FILM COATED ORAL at 20:47

## 2019-06-07 RX ADMIN — HEPARIN SODIUM AND DEXTROSE 10 UNITS/KG/HR: 10000; 5 INJECTION INTRAVENOUS at 02:40

## 2019-06-07 RX ADMIN — HEPARIN SODIUM 5000 UNITS: 5000 INJECTION INTRAVENOUS; SUBCUTANEOUS at 20:47

## 2019-06-07 RX ADMIN — FUROSEMIDE 40 MG: 10 INJECTION, SOLUTION INTRAMUSCULAR; INTRAVENOUS at 11:31

## 2019-06-07 RX ADMIN — FUROSEMIDE 40 MG: 10 INJECTION, SOLUTION INTRAMUSCULAR; INTRAVENOUS at 18:26

## 2019-06-07 RX ADMIN — HYDRALAZINE HYDROCHLORIDE 75 MG: 50 TABLET, FILM COATED ORAL at 20:47

## 2019-06-07 RX ADMIN — Medication 10 ML: at 20:52

## 2019-06-07 RX ADMIN — HEPARIN SODIUM 5000 UNITS: 5000 INJECTION INTRAVENOUS; SUBCUTANEOUS at 14:25

## 2019-06-07 RX ADMIN — METOPROLOL SUCCINATE 50 MG: 50 TABLET, EXTENDED RELEASE ORAL at 09:22

## 2019-06-07 RX ADMIN — ISOSORBIDE MONONITRATE 60 MG: 30 TABLET, EXTENDED RELEASE ORAL at 09:22

## 2019-06-07 RX ADMIN — HYDRALAZINE HYDROCHLORIDE 75 MG: 50 TABLET, FILM COATED ORAL at 14:25

## 2019-06-07 ASSESSMENT — PAIN SCALES - GENERAL
PAINLEVEL_OUTOF10: 0

## 2019-06-07 NOTE — PROGRESS NOTES
Internal Medicine PGY-1 Resident Progress Note        PCP: Edmond Motley DO    Date of Admission: 6/5/2019    Chief Complaint: SOB at rest    Subjective: Was confused overnight was calling out for her son thinking she was at home. Met pt at chairside. No more episodes of SOB at rest. She did feel SOB ambulating from the bathroom. No chest pain, chest tightness or palpitations. No abd pain. No changes in BM or diarrhea. No dysuria (no itching or burning). Overnight Hypertensive (-189); otherwise VS wnl afebrile sating appropriately on RA    Cr 1.9 (BL ~2.2)  Tn 0.03-->0.05-->0.05-->0.05-->0.06    Stress test: No evidence of myocardial ischemia. EF 74%  Doppler:  No evidence of deep vein or superficial thrombosis    Per Cardiology:  - dc heparin  - cont ASA + statin. - cont metoprolol XL 50 mg   - cont hydralazine + imdur    Will diurese w/ IV lasix 40 BID  PT 20    Hospital Course:   80 y.o. female w/ Hx CHF, afib, HLD, HTN presented to [facility] ED complaining of shortness of breath while resting at~3 pm earlier in the afternoon. She suddenly felt as if she could not catch her breath. She reports that the SOB does worsen when she exerts herself. EMS was called. She was satting at 100% - but she reported to EMS that she felt she could breathe better with 4L oxygen.     No jaw, shoulder or arm pain. No associated chest pain, chest tightness, palpitations, cough, fevers, chills, nausea, vomiting, recent travel, recent lower extremity, swelling or pain. No abd pain. No changes in BM. No dysuria (no itching or burning) No orthopnea or PND.     In the ED she was sating 100% on RA wo increased work of breathing.       Hypertensive (-163) without abnormal pulse, respiration and temperature     EKG: NSR w nonspecific changes including inverted T waves in lateral leads (new from her previous)     BMP and CBC unremarkable.       Tn 0.03.       CXR: bibasilar atelectasis but otherwise clear.       Given the patient's nonspecific EKG changes and mild troponin elevated, cardiology was consulted and recommended treating this as a NSTEMI with heparin gtt and ASA and admission to trend troponins overnight. Medications:  Reviewed    Infusion Medications    dextrose      heparin (porcine) 10 Units/kg/hr (06/07/19 0240)     Scheduled Medications    atorvastatin  40 mg Oral Nightly    hydrALAZINE  75 mg Oral 3 times per day    aspirin EC  81 mg Oral Daily    isosorbide mononitrate  60 mg Oral Daily    metoprolol succinate  50 mg Oral Daily    sodium chloride flush  10 mL Intravenous 2 times per day     PRN Meds: acetaminophen, glucose, dextrose, glucagon (rDNA), dextrose, heparin (porcine), heparin (porcine), tiZANidine, sodium chloride flush, ondansetron, hydrALAZINE      Intake/Output Summary (Last 24 hours) at 6/7/2019 0647  Last data filed at 6/7/2019 0547  Gross per 24 hour   Intake 120 ml   Output --   Net 120 ml       Physical Exam Performed:    BP (!) 173/86   Pulse 71   Temp 97.9 °F (36.6 °C) (Oral)   Resp 18   Ht 5' 3\" (1.6 m)   Wt 144 lb (65.3 kg)   SpO2 98%   BMI 25.51 kg/m²     General appearance:  No apparent distress. HEENT:  Normal cephalic, atraumatic without obvious deformity. PERRL. Extra ocular muscles intact. Conjunctivae/corneas clear. Neck: Supple. No JVD. Trachea midline. Respiratory:  Normal respiratory effort. CTAB w/o r/r/w. Cardiovascular:  RRR; nl S1&S2; w/o m/r/g. Abdomen: Soft, nt/nd w/ normal bowel sounds. Musculoskeletal:  No clubbing, cyanosis or edema bilaterally. Skin: Skin color, texture, turgor normal.  No rashes or lesions. Neurologic:  Neurovascularly intact without any focal sensory/motor deficits.  Cranial nerves: II-XII intact, grossly non-focal.  Psychiatric:  Alert and oriented, thought content appropriate, normal insight  Capillary Refill: Brisk,< 3 seconds   Peripheral Pulses: +2 palpable, equal bilaterally     Labs:   Recent Labs 06/05/19  1940 06/06/19  0504 06/07/19  0521   WBC 6.5 6.1 6.6   HGB 12.3 11.7* 11.4*   HCT 38.1 36.6 35.4*    185 201     Recent Labs     06/05/19  1554 06/06/19  0151 06/06/19  0504     --  139   K 5.4* 4.4 4.5     --  109   CO2 16*  --  15*   BUN 37*  --  40*   CREATININE 1.9*  --  2.0*   CALCIUM 8.4  --  8.5   PHOS  --   --  3.7     No results for input(s): AST, ALT, BILIDIR, BILITOT, ALKPHOS in the last 72 hours. Recent Labs     06/05/19  1940   INR 1.18*     Recent Labs     06/06/19  0151 06/06/19  0504 06/06/19  1036   TROPONINI 0.05* 0.05* 0.06*       Urinalysis:      Lab Results   Component Value Date    NITRU Negative 06/05/2019    WBCUA 3-5 06/05/2019    BACTERIA Rare 06/05/2019    RBCUA 0-2 06/05/2019    BLOODU Negative 06/05/2019    SPECGRAV 1.015 06/05/2019    GLUCOSEU Negative 06/05/2019       Radiology:  VL Extremity Venous Left   Final Result      NM MYOCARDIAL SPECT REST EXERCISE OR RX   Final Result      XR CHEST STANDARD (2 VW)   Final Result      Bibasilar atelectasis      Stable cardiac enlargement                    Assessment/Plan:    Kike Mo, 80 y.o. female w/ Hx CHF, afib, HLD, HTN presented to [facility] ED complaining of shortness of breath while resting at~3 pm earlier in the afternoon. She suddenly felt as if she could not catch her breath.     NSTEMI  SOB at rest. Tn 0.03. EKG nonspecific ST changes & T-wave inversion at lateral leads. Cardiology (Michael)consulted by ED.  Recommended admitting and trending tn w/ heparin gtt + ASA.  - Cardiology following -stress test: neg for ischemia   -   - cont ASA 81 mg, 40 mg lipitor QHS  - PT/OT  - telemetry     HTN  - increase from 50 to 75 mg hydralazine TID, 60 mg IMDUR QD, 50 mg Toprol XL QD    Left leg edema  - doppler:  No evidence of deep vein or superficial thrombosis     DVT Prophylaxis: hep gtt  Diet: Diet NPO, After Midnight  Code Status: Full Code    Dispo - Gerardo MD Negin    I will discuss the patient with the senior resident and MD Michelle Jacques MD  Internal Medicine Resident, PGY-1  Pager: [phone]        Patient seen and examined, plan of care discussed with residents. Agree with their assessment and plan with following addendum:    Patient had transient confusion overnight after being moved to new room, she is oriented now. Continues to have ALEMAN. Discussed with cardiology, Will add diuretics.     Khalif Sexton

## 2019-06-07 NOTE — CARE COORDINATION
Case Management Assessment           Initial Evaluation                Date / Time of Evaluation: 6/7/2019 4:18 PM                 Assessment Completed by: Peter Mo    Patient Name: Jo Adams     YOB: 1932  Diagnosis: NSTEMI (non-ST elevated myocardial infarction) [facility]) [I21.4]  NSTEMI (non-ST elevated myocardial infarction) [facility]) [I21.4]     Date / Time: 6/5/2019  2:54 PM    Patient Admission Status: Inpatient    If patient is discharged prior to next notation, then this note serves as note for discharge by case management.      Current PCP: Marianna Alexis DO  Clinic Patient: No    Chart Reviewed: Yes  Patient/ Family Interviewed: Yes    Initial assessment completed at bedside with: patient    Hospitalization in the last 30 days: No    Emergency Contacts:  Extended Emergency Contact Information  Primary Emergency Contact: [facility]  Address: [address] [place] [address] of [address]  Phone: [phone]  Work Phone: [phone]  Relation: Child  Secondary Emergency Contact: [address] [facility] of [address]  Phone: [phone]  Relation: Child    Advance Directives:   Code Status: [address]: No  Agent:   Contact Number:   Copy present: No      In paper Chart: No  Scanned into EMR No    Financial  Payor: MEDICARE / Plan: MEDICARE PART A AND B / Product Type: *No Product type* /     Pre-cert required for SNF: No    Pharmacy    [address] [facility], [address] [facility] [phone] - F [phone]  [facility] [address]  Phone: [phone] Fax: Fany Araizasusan Escamilla[address] [facility]  [address] [address]  Phone: [phone] Fax: [address] [facility], [address] [facility] 165  [address] [facility] [address]  Phone: [phone] Fax: [phone]      Potential assistance Purchasing Medications: Potential Assistance Purchasing Medications: No  Does Patient want to participate in local refill/ meds to beds program?: Not Assessed    Meds To Beds General Rules:  1. Can ONLY be done Monday- Friday between 8:30am-5pm  2. Prescription(s) must be in pharmacy by 3pm to be filled same day  3. Copy of patient's insurance/ prescription drug card and patient face sheet must be sent along with the prescription(s)  4. Cost of Rx cannot be added to hospital bill. If financial assistance is needed, please contact unit  or ;  or  CANNOT provide pharmacy voucher for patients co-pays  5.  Patients can then  the prescription on their way out of the hospital at discharge, or pharmacy can deliver to the bedside if staff is available. (payment due at time of pick-up or delivery - cash, check, or card accepted)     ADLS  Support Systems: Children, Family Members    PT AM-PAC: 20 /24  OT AM-PAC: 23 /24    [place]: from home with son; plans to return home with daughter at D/C  Steps: n/a    Plans to RETURN to current housing: No  Barriers to RETURNING to current housing: plans on returning to her daughters home at discharge    [place] [address]  Currently ACTIVE with [id number]: Yes  [address]: Vicenta Fischer Phone: [phone] Fax: [phone]    Currently ACTIVE with [place] on Aging: No  Passport/ Waiver: No  :  Phone:   Passport/ Waiver Services:     [address] [facility] Provider:   Equipment: walker    Home Oxygen and [address] [place] prior to admission: No  [address]:  Phone:     Informed of need to bring portable home O2 tank on day of DISCHARGE for nursing to connect prior to leaving: No  Verbalized agreement/Understanding: No  Person to bring portable tank at discharge:     Dialysis  Active with HD/PD prior to admission: No  Nephrologist:     HD Center:  Name:   Address:  Dialysis Schedule:  Phone:  Fax:    DISCHARGE PLAN:  Disposition: Home with [address] for discharge: family     Factors facilitating achievement of predicted outcomes: Family support, Cooperative and Pleasant    Barriers to discharge: Medical complications and Medication managment    Additional Case Management Notes:     Sasha and her family were provided with choice of provider; she and her family are in agreement with the discharge plan.     Care Transition patient: Yes    Nya Fish RN  [facility] [employer] [place]  Case Management Department  Ph: [phone]   Fax: [phone]

## 2019-06-07 NOTE — PROGRESS NOTES
Reviewed: Yes  Additional Pertinent Hx: Admit 6/5 with chest pain, found to have NSTEMI; LE dopplers: (-) DVT, stress test: LVEF 74%; PMHx: acute CHF, a-fib, CKD, HLD, HTN, PVD  Referring Practitioner: Allison Daugherty MD  Diagnosis: NSTEMI  Subjective  Subjective: Pt found supine in bed. Agreeable to PT. Voices concern about decreased memory and mild confusion lately but states its not new.   Pain Screening  Patient Currently in Pain: Denies       Orientation  Orientation  Overall Orientation Status: Within Functional Limits(\"I keep thinking I'm at home but I know I'm at the hospital\")  Social/Functional History  Social/Functional History  Lives With: Son(son attends dialysis 3x/week)  Type of Home: House  Home Layout: Multi-level, Able to Live on Main level with bedroom/bathroom(Only negotiates stairs with assist.)  Home Access: Stairs to enter without rails(hold onto screened in porch )  Entrance Stairs - Number of Steps: 1 step  Bathroom Shower/Tub: Tub/Shower unit  Bathroom Equipment: Shower chair  Home Equipment: Rolling walker, [address] Help From: Family  ADL Assistance: Needs assistance(assist from dtr with bathing, indep with dressing)  Homemaking Assistance: (Son cooks, cleans, and buys groceries. )  Homemaking Responsibilities: No(Son and daughter.)  Ambulation Assistance: Independent(furniture walking around house, recently Bulb" from using RW)  Transfer Assistance: Independent  Active : No  Mode of Transportation: Family  Cognition        Objective          AROM RLE (degrees)  RLE AROM: WFL  AROM LLE (degrees)  LLE AROM : WFL  Strength RLE  Comment: 4/5 throughout  Strength LLE  Comment: 4+/5 throughout (pt reports L LE is weaker leg during amb)        Bed mobility  Supine to Sit: Supervision(HOB up)  Scooting: Supervision  Transfers  Sit to Stand: Contact guard assistance  Stand to sit: Contact guard assistance  Ambulation  Ambulation?: Yes  Ambulation 1  Device: Rolling Walker  Assistance: Contact guard assistance  Quality of Gait: increased effort and limp noted with distance, decreased stance time L LE, effortful, ALEMAN, unsteady but no overt LOB  Distance: 30 ft  Comments: pt fatigued after amb, appears mildly SOB during conversation, O2 sats on RA: 100%, HR: 75bpm.  Improved after ~5 min seated rest in chair. RN notified. Balance  Sitting - Static: Good  Sitting - Dynamic: Good  Standing - Static: Fair  Standing - Dynamic: Fair(CGA with RW)      Treatment included gait and transfer training, pt education. Plan   Plan  Times per week: 2-5  Current Treatment Recommendations: Strengthening, Transfer Training, Endurance Training, Patient/Caregiver Education & Training, Balance Training, Functional Mobility Training, Gait Training, Equipment Evaluation, Education, & procurement  Safety Devices  Type of devices: Call light within reach, Chair alarm in place, Left in chair, Nurse notified, Gait belt    G-Code       OutComes Score                                                  AM-PAC Score  AM-PAC Inpatient Mobility Raw Score : 20 (06/07/19 0946)  AM-PAC Inpatient T-Scale Score : 47.67 (06/07/19 0946)  Mobility Inpatient CMS 0-100% Score: 35.83 (06/07/19 0946)  Mobility Inpatient CMS G-Code Modifier : [place] (06/07/19 8769)          Goals  Short term goals  Time Frame for Short term goals: discharge  Short term goal 1: Pt will transfer sit <--> stand with supervision  Short term goal 2: Pt will amb 50 ft with RW and supervision  Short term goal 3: Pt will participate in LE HEP  Patient Goals   Patient goals : eventually return home       Therapy Time   Individual Concurrent Group Co-treatment   Time In 0819         Time Out 0859         Minutes 40                 Timed Code Treatment Minutes:  25    Total Treatment Minutes:  40    If patient is discharged prior to next treatment, this note will serve as the discharge summary.   Rachele Camarena, PT, DPT  [id number]

## 2019-06-07 NOTE — PLAN OF CARE
Problem: Falls - Risk of:  Goal: Will remain free from falls  Description  Will remain free from falls  Outcome: Ongoing  Note:   Pt with absence of falls this shift. Pt has nonskid socks in place and bed alarm is on. Pt demonstrates safety awareness and calls to RN appropriately. Will continue to monitor. Problem: Cardiac Output - Decreased:  Goal: Hemodynamic stability will improve  Description  Hemodynamic stability will improve  Outcome: Ongoing  Note:   Pt hypertensive this shift. RN administers scheduled BP meds per orders. All other VSS. No complaints of chest pain or shortness of breath. Remains on heparin drip infusing at 10 units/kg/hr. Will continue to monitor.

## 2019-06-07 NOTE — CARE COORDINATION
[address] Transitions Interview     2019    Patient: Jim Mata Patient : 1932   MRN: [id number]   Reason for Admission: NSTEMI  RARS: Readmission Risk Score: 25         Spoke with: Jim Mata        Readmission Risk  Patient Active Problem List   Diagnosis    Chronic kidney disease    Essential hypertension, malignant    PVD [facility]peripheral vascular disease) ([facility]    2nd degree AV block    1st degree AV block    Heart block    Adverse reaction to beta-blocker    History of CVA (cerebrovascular accident)    Frequent PVCs    Near syncope    Other fatigue    Sprain of anterior talofibular ligament of left ankle    Shortness of breath    Chronic diastolic CHF (congestive heart failure) (HCC)    Atrial fibrillation (HCC)    Pulmonary hypertension ([facility]    Pneumonia due to organism    Acute kidney injury superimposed on chronic kidney disease ([facility]    Acute on chronic diastolic CHF (congestive heart failure) ([facility]    NSTEMI (non-ST elevated myocardial infarction) ([facility]    Essential hypertension    Abnormal ECG    (HFpEF) heart failure with preserved ejection fraction [facility])       Inpatient Assessment  Care Transitions Summary    Care Transitions Inpatient Review  Medication Review  Do you have all of your prescriptions and are they filled?:  Yes   Are you able to afford your medications?:  Yes  How often do you have difficulty taking your medications?:  I always take them as prescribed. Housing Review  Who do you live with?:  Partner/Spouse/SO  Are you an active caregiver in your home?:  No  Social Support  Do you have a ?:  No  Do you have a [facility]?:  No  Durable Medical Equipment  Patient DME:  Straight cane, Walker, Other  Other Patient DME:  [facility]  Functional Review  Ability to seek help/take action for Emergent/Urgent situations i.e. fire, crime, inclement weather or health crisis. :  Independent  Ability handle personal hygiene needs (bathing/dressing/grooming):  Needs Assistance  Ability to manage medications:  Dependent  Ability to prepare food:  Dependent  Ability to maintain home (clean home, laundry):  Dependent  Ability to drive and/or has transportation:  Dependent  Ability to do shopping:  Dependent  Ability to manage finances:  Dependent  Is patient able to live independently?:  No  Hearing and Vision  Visual Impairment:  Visual impairment (Glasses/contacts)  Hearing Impairment:  None  Care Transitions Interventions  No Identified Needs       Summary  CTC spoke with patient this afternoon for initial face to face interview. Patient is very familiar to CTC Team, was being followed after last discharge with follow up CTC calls to home. Patient reports nothing has changed in living situation, still lives in home with son. Did report Son has taken over managing her medications and most of Homemaking. Patient says [facility] had discharged her from services a couple weeks ago. Patient plans to discharge to daughter's apartment once discharged for a couple weeks, would like to resume services with [facility]. CTC spoke with patient again about CTC program, and ongoing follow up CTC calls, patient is agreeable at this time. Follow Up  Future Appointments   Date Time Provider Fany Schreiber   7/12/2019  8:30 AM [facility]       Health Maintenance  There are no preventive care reminders to display for this patient.     Cee Adam, RN

## 2019-06-07 NOTE — PROGRESS NOTES
Occupational Therapy   Occupational Therapy Initial Assessment/Treatment  Date: 2019   Patient Name: Nisa Hoyt  MRN: [id number]     : 1932    Date of Service: 2019    Discharge Recommendations:    Nisa Hoyt scored a 19/24 on the AM-PAC ADL Inpatient form. Current research shows that an AM-PAC score of 18 or greater is typically associated with a discharge to the patient's home setting. Based on the patients AM-PAC score and their current ADL deficits, it is recommended that the patient have 2-3 sessions per week of Occupational Therapy at d/c to increase the patients independence. OT Equipment Recommendations  Equipment Needed: No    Assessment   Performance deficits / Impairments: Decreased ADL status; Decreased endurance(Decreased activity tolerance)  Assessment: Pt standing balance and ambulation limited due to fatigue and SOB. Pt unable to complete ADL tasks she initally intended due to the fatigue and SOB. Pt would benefit from continued OT during acute stay for increased activity tolerance, ADL status and endurance. Pt plans to d/c to daughters home for 24 assist/supervision before returning to her home with son. Pt would benefit from home health OT for evaluation and intervention of safety and endurance strategies in home environment. Treatment Diagnosis: Decreased activity tolerance, ADL status and endurance  Prognosis: Good  Decision Making: Low Complexity  Patient Education: ot role, walker safety, transfer safety, energy conservation techniques during ADL tasks, pursed lip breathing- verbalized understanding  REQUIRES OT FOLLOW UP: Yes  Activity Tolerance  Activity Tolerance: Patient limited by fatigue;Treatment limited secondary to medical complications (free text)  Activity Tolerance: SOB  Safety Devices  Safety Devices in place: Yes  Type of devices: Left in chair;Call light within reach; Chair alarm in place;Nurse notified           Patient Diagnosis(es): The encounter diagnosis was NSTEMI (non-ST elevated myocardial infarction) ([facility] [address] has a past medical history of Acute on chronic diastolic CHF (congestive heart failure) ([facility] [address] Atrial fibrillation ([facility] [address] Chronic kidney disease, Hyperlipidemia, Hypertension, and Peripheral vascular disease ([facility] [address] has no past surgical history on file. Treatment Diagnosis: Decreased activity tolerance, ADL status and endurance      Restrictions  Position Activity Restriction  Other position/activity restrictions: up with assist    Subjective   General  Chart Reviewed: Yes  Patient assessed for rehabilitation services?: Yes  Additional Pertinent Hx: Pt presented at ER 6/5 with cheif complaint of SOB at rest. EKG showed non specific changes (T-wave inversions), mild troponin elevation, admitted with diagnosis of NSTEMI. Droppler (-) stress test (-) for MI, EF 74%. PMH: CHF, HTN, HLD, CJD, PVD, Afib. Diagnosis: NSTEMI (non-ST elevated myocardial infarction)   Subjective  Subjective: Pt seated in recliner upon arrival. Pt very pleasant and agreeable to therapy evaluation.  Pt denied pain     Social/Functional History  Social/Functional History  Lives With: Son(Son has Dialysis 3x/Wk)  Type of Home: House  Home Layout: Multi-level, Able to Live on Main level with bedroom/bathroom  Home Access: Stairs to enter without rails  Entrance Stairs - Number of Steps: 1 step  Bathroom Shower/Tub: Tub/Shower unit  Bathroom Toilet: Standard(Sink close for leverage)  Bathroom Equipment: Shower chair  Home Equipment: Rolling walker, [employer]  Receives Help From: Family  ADL Assistance: Needs assistance(Assist with bathing from daughter, I in dressing)  Homemaking Assistance: (Son cooks, cleans, and gets groceries)  Homemaking Responsibilities: No(Son)  Ambulation Assistance: Independent(Furnature walks but uses RW when she feels she needs, used all the time when having home OT/PT)  Transfer Assistance: Independent  Active : No  Mode of Transportation: Family  Additional Comments: Pt states she had 3 falls end of 2018/beginning of 2019 and was admitted to hospital after at least 2 of them. Pt states plan to d/c to daughter's second floor apartment. Objective   Vision: Impaired  Vision Exceptions: (Unable to read board in room. Pt states glasses need updating.)  Hearing: Within functional limits    Orientation  Overall Orientation Status: Within Functional Limits     Balance  Sitting Balance: Stand by assistance  Standing Balance: Minimal assistance  Standing Balance  Comment: Pt had LOB during standing activity at sink requiring min A to regain balance and seated to rest. SOB and fatigue came quickly, but once seated recovered quickly    Functional Mobility  Functional - Mobility Device: Rolling Walker  Activity: To/from bathroom; Other(To/from door)  Assist Level: Contact guard assistance  Functional Mobility Comments: slow and effortful, pt Steady during ambulation and did not have LOB. Fatigued easily and became SOB with minimal exertion. O2 was 100% after activity    Toilet Transfers  Toilet - Technique: Ambulating  Equipment Used: Standard toilet  Toilet Transfer: Contact guard assistance(Relied heavily on grab bar, needed VC for walker safety during transfer)    ADL  Grooming: Setup(At sink-Washing hands, rinsing mouth with mouthwash (seated at sink). Standing Balance fluctuated from CGA-Min A) Initiated task while standing, needed 2 seated rests 2/2 SOB/fatigue  Toileting: Stand by assistance     Transfers  Sit to stand: Minimal assistance(recliner, chair at sink 2 trials- at recliner CGA, first trial for chair at sink was Min A, second was CGA)  Stand to sit: Minimal assistance(Recliner, chair at sink.  CGA at recliner, Min A for chair at sink)     Cognition  Overall Cognitive Status: WFL           LUE AROM (degrees)  LUE AROM : WFL  RUE AROM (degrees)  RUE AROM : WFL  LUE Strength  L Hand General: 4/5  RUE Strength  R Hand General:

## 2019-06-07 NOTE — PLAN OF CARE
Problem: Falls - Risk of:  Goal: Will remain free from falls  Description  Will remain free from falls  6/7/2019 1209 by Cr Jackman  Outcome: Ongoing  6/7/2019 0319 by Alecia Hammond RN  Outcome: Ongoing  Note:   Pt with absence of falls this shift. Pt has nonskid socks in place and bed alarm is on. Pt demonstrates safety awareness and calls to RN appropriately. Will continue to monitor. Remains free from falls and accidental injury. Assessed as moderate fall risk, all precautions in place, utilizing call light appropriately for needs. Will monitor. Problem: Cardiac Output - Decreased:  Goal: Hemodynamic stability will improve  Description  Hemodynamic stability will improve  6/7/2019 1209 by Cr Jackman  Outcome: Ongoing  6/7/2019 0319 by Alecia Hammond RN  Outcome: Ongoing  Note:   Pt hypertensive this shift. RN administers scheduled BP meds per orders. All other VSS. No complaints of chest pain or shortness of breath. Remains on heparin drip infusing at 10 units/kg/hr. Will continue to monitor. Vss other than Bp. BP elevated in 160s. Heparin gtt dc. Continuing to monitor. Problem: Risk for Impaired Skin Integrity  Goal: Tissue integrity - skin and mucous membranes  Description  Structural intactness and normal physiological function of skin and  mucous membranes. Outcome: Ongoing   No new skin breakdown noted during this shift. Continuing to monitor.

## 2019-06-07 NOTE — PROGRESS NOTES
[facility]  Cardiology Inpatient Consult Service  Daily Progress Note        Admit Date:  6/5/2019    Referring Physician: Sharonda Cooper MD    Reason for Consultation/Chief Complaint: Shortness of breath     Subjective: Interval history:   80 y.o. female with a past medical history of HFpEF, HTN, CKD, PAF  who presented to the hospital with  Shortness of breath of I day duration,  -Pt was better today   -/81,   -Stress test yesterday was normal   -we will stop heparin since stress test was negative            Medications:   furosemide  40 mg Intravenous BID    atorvastatin  40 mg Oral Nightly    hydrALAZINE  75 mg Oral 3 times per day    aspirin EC  81 mg Oral Daily    isosorbide mononitrate  60 mg Oral Daily    metoprolol succinate  50 mg Oral Daily    sodium chloride flush  10 mL Intravenous 2 times per day       IV drips:   dextrose         PRN:  acetaminophen, glucose, dextrose, glucagon (rDNA), dextrose, tiZANidine, sodium chloride flush, ondansetron, hydrALAZINE      Objective:     Vitals:    06/06/19 2004 06/07/19 0001 06/07/19 0347 06/07/19 0918   BP: (!) 179/90 (!) 160/83 (!) 173/86 (!) 161/81   Pulse: 72 77 71 67   Resp: 18 18 18 20   Temp: 97.6 °F (36.4 °C) 98.3 °F (36.8 °C) 97.9 °F (36.6 °C) 98 °F (36.7 °C)   TempSrc: Oral Oral Oral Oral   SpO2: 100% 96% 98% 97%   Weight:       Height:           Intake/Output Summary (Last 24 hours) at 6/7/2019 1118  Last data filed at 6/7/2019 0918  Gross per 24 hour   Intake 120 ml   Output --   Net 120 ml     I/O last 3 completed shifts:   In: 120 [P.O.:120]  Out: -   Wt Readings from Last 3 Encounters:   06/05/19 144 lb (65.3 kg)   04/01/19 144 lb 3.2 oz (65.4 kg)   03/26/19 158 lb 1.1 oz (71.7 kg)       Admit Wt: Weight: 144 lb (65.3 kg)   Todays Wt: Weight: 144 lb (65.3 kg)    TELEMETRY: Sinus     Physical Exam:     General:  Awake, alert, NAD  Skin:  Warm and dry  Neck:   No JVP  Chest:  Clear to auscultation, respiration normal  Cardiovascular:  RRR S1S2  Abdomen:  Soft   Extremities:  No leg  edema      Objective    Labs:   Recent Labs     06/05/19  1554 06/06/19  0151 06/06/19  0504 06/07/19  0521     --  139 141   K 5.4* 4.4 4.5 4.5   BUN 37*  --  40* 40*   CREATININE 1.9*  --  2.0* 1.9*     --  109 111*   CO2 16*  --  15* 17*   GLUCOSE 114*  --  111* 78   CALCIUM 8.4  --  8.5 8.5     Recent Labs     06/05/19  1940 06/06/19  0504 06/07/19  0521   WBC 6.5 6.1 6.6   HGB 12.3 11.7* 11.4*   HCT 38.1 36.6 35.4*    185 201   MCV 85.5 86.2 86.3     No results for input(s): CHOLTOT, TRIG, HDL in the last 72 hours. Invalid input(s): LIPIDCOMM, CHOLHDL, VLDCHOL, LDL  Recent Labs     06/05/19  1940   INR 1.18*     Recent Labs     06/05/19  1941 06/06/19  0151 06/06/19  0504 06/06/19  1036 06/07/19  0521   TROPONINI 0.05* 0.05* 0.05* 0.06* 0.07*     No results for input(s): BNP in the last 72 hours. No results for input(s): NTPROBNP in the last 72 hours. No results for input(s): TSH in the last 72 hours. Imaging:   I personally reviewed imaging studies including CXR, Stress test, TTE/WON.       Assessment & Plan:      80 y.o. female with a past medical history of HFpEF, HTN, CKD, PAF  who presented to the hospital with  Shortness of breath of I day duration, patient symptoms started yesterday, constant, while she was in bed, pt denies any chest pain, palpitation, leg swelling, fever        NSTEMI:  80 Year old lady w SOB, Trop elevated, no chest pain, unspecific EKG changes  -Stress test ws negative yesterday   -we will stop heparin infusion since stress test is negative.   -continue home metoprolol XL 50 mg   - ASA     Chronic heart failure with Preserved ejection fraction:  -stable  -continue home hydralazine+imdur  -Continue IV lasix today, recommend to switch to oral Lasix 40 mg BID tomorrow,.   -BB     HTN:  Continue home bb, hydralazine imdur.            Thank you for allowing to us to participate in the care of Catrina SIMPSON Please call our service with questions. All questions and concerns were addressed to the patient/family. Alternatives to my treatment were discussed. The note was completed using EMR. Every effort was made to ensure accuracy; however, inadvertent computerized transcription errors may be present. Malaika Armando MD  pgy 1 Internal medicine     Everette Johnston MD, [facility]    6/7/2019 11:18 AM

## 2019-06-08 LAB
ALBUMIN SERPL-MCNC: 2.9 G/DL (ref 3.4–5)
ANION GAP SERPL CALCULATED.3IONS-SCNC: 14 MMOL/L (ref 3–16)
BASOPHILS ABSOLUTE: 0.1 K/UL (ref 0–0.2)
BASOPHILS RELATIVE PERCENT: 1 %
BUN BLDV-MCNC: 41 MG/DL (ref 7–20)
CALCIUM SERPL-MCNC: 8.6 MG/DL (ref 8.3–10.6)
CHLORIDE BLD-SCNC: 108 MMOL/L (ref 99–110)
CO2: 17 MMOL/L (ref 21–32)
CREAT SERPL-MCNC: 2.1 MG/DL (ref 0.6–1.2)
EOSINOPHILS ABSOLUTE: 0.2 K/UL (ref 0–0.6)
EOSINOPHILS RELATIVE PERCENT: 4 %
GFR AFRICAN AMERICAN: 27
GFR NON-AFRICAN AMERICAN: 22
GLUCOSE BLD-MCNC: 76 MG/DL (ref 70–99)
HCT VFR BLD CALC: 33.9 % (ref 36–48)
HEMOGLOBIN: 11 G/DL (ref 12–16)
LYMPHOCYTES ABSOLUTE: 1.5 K/UL (ref 1–5.1)
LYMPHOCYTES RELATIVE PERCENT: 26.2 %
MCH RBC QN AUTO: 28 PG (ref 26–34)
MCHC RBC AUTO-ENTMCNC: 32.5 G/DL (ref 31–36)
MCV RBC AUTO: 86.1 FL (ref 80–100)
MONOCYTES ABSOLUTE: 0.7 K/UL (ref 0–1.3)
MONOCYTES RELATIVE PERCENT: 11.7 %
NEUTROPHILS ABSOLUTE: 3.4 K/UL (ref 1.7–7.7)
NEUTROPHILS RELATIVE PERCENT: 57.1 %
PDW BLD-RTO: 18 % (ref 12.4–15.4)
PHOSPHORUS: 4.3 MG/DL (ref 2.5–4.9)
PLATELET # BLD: 201 K/UL (ref 135–450)
PMV BLD AUTO: 8.7 FL (ref 5–10.5)
POTASSIUM SERPL-SCNC: 4.3 MMOL/L (ref 3.5–5.1)
RBC # BLD: 3.94 M/UL (ref 4–5.2)
SODIUM BLD-SCNC: 139 MMOL/L (ref 136–145)
WBC # BLD: 5.9 K/UL (ref 4–11)

## 2019-06-08 PROCEDURE — 36415 COLL VENOUS BLD VENIPUNCTURE: CPT

## 2019-06-08 PROCEDURE — 99233 SBSQ HOSP IP/OBS HIGH 50: CPT | Performed by: NURSE PRACTITIONER

## 2019-06-08 PROCEDURE — 6360000002 HC RX W HCPCS: Performed by: INTERNAL MEDICINE

## 2019-06-08 PROCEDURE — 1200000000 HC SEMI PRIVATE

## 2019-06-08 PROCEDURE — 80069 RENAL FUNCTION PANEL: CPT

## 2019-06-08 PROCEDURE — 6370000000 HC RX 637 (ALT 250 FOR IP): Performed by: NURSE PRACTITIONER

## 2019-06-08 PROCEDURE — 2580000003 HC RX 258: Performed by: STUDENT IN AN ORGANIZED HEALTH CARE EDUCATION/TRAINING PROGRAM

## 2019-06-08 PROCEDURE — 6370000000 HC RX 637 (ALT 250 FOR IP): Performed by: STUDENT IN AN ORGANIZED HEALTH CARE EDUCATION/TRAINING PROGRAM

## 2019-06-08 PROCEDURE — 85025 COMPLETE CBC W/AUTO DIFF WBC: CPT

## 2019-06-08 PROCEDURE — 6370000000 HC RX 637 (ALT 250 FOR IP): Performed by: INTERNAL MEDICINE

## 2019-06-08 RX ORDER — HYDRALAZINE HYDROCHLORIDE 100 MG/1
100 TABLET, FILM COATED ORAL EVERY 8 HOURS SCHEDULED
Qty: 60 TABLET | Refills: 3 | Status: CANCELLED | OUTPATIENT
Start: 2019-06-08

## 2019-06-08 RX ORDER — AMLODIPINE BESYLATE 5 MG/1
5 TABLET ORAL DAILY
Status: DISCONTINUED | OUTPATIENT
Start: 2019-06-08 | End: 2019-06-09

## 2019-06-08 RX ORDER — HYDRALAZINE HYDROCHLORIDE 100 MG/1
100 TABLET, FILM COATED ORAL EVERY 8 HOURS SCHEDULED
Status: DISCONTINUED | OUTPATIENT
Start: 2019-06-08 | End: 2019-06-11 | Stop reason: HOSPADM

## 2019-06-08 RX ADMIN — HYDRALAZINE HYDROCHLORIDE 100 MG: 100 TABLET, FILM COATED ORAL at 13:49

## 2019-06-08 RX ADMIN — METOPROLOL SUCCINATE 50 MG: 50 TABLET, EXTENDED RELEASE ORAL at 10:20

## 2019-06-08 RX ADMIN — Medication 10 ML: at 10:21

## 2019-06-08 RX ADMIN — HEPARIN SODIUM 5000 UNITS: 5000 INJECTION INTRAVENOUS; SUBCUTANEOUS at 21:56

## 2019-06-08 RX ADMIN — ASPIRIN 81 MG: 81 TABLET, COATED ORAL at 10:20

## 2019-06-08 RX ADMIN — FUROSEMIDE 40 MG: 10 INJECTION, SOLUTION INTRAMUSCULAR; INTRAVENOUS at 10:21

## 2019-06-08 RX ADMIN — HEPARIN SODIUM 5000 UNITS: 5000 INJECTION INTRAVENOUS; SUBCUTANEOUS at 06:39

## 2019-06-08 RX ADMIN — Medication 10 ML: at 21:55

## 2019-06-08 RX ADMIN — ATORVASTATIN CALCIUM 40 MG: 40 TABLET, FILM COATED ORAL at 21:54

## 2019-06-08 RX ADMIN — HYDRALAZINE HYDROCHLORIDE 75 MG: 50 TABLET, FILM COATED ORAL at 06:39

## 2019-06-08 RX ADMIN — HEPARIN SODIUM 5000 UNITS: 5000 INJECTION INTRAVENOUS; SUBCUTANEOUS at 13:49

## 2019-06-08 RX ADMIN — AMLODIPINE BESYLATE 5 MG: 5 TABLET ORAL at 13:49

## 2019-06-08 RX ADMIN — HYDRALAZINE HYDROCHLORIDE 100 MG: 100 TABLET, FILM COATED ORAL at 21:54

## 2019-06-08 RX ADMIN — ISOSORBIDE MONONITRATE 60 MG: 30 TABLET, EXTENDED RELEASE ORAL at 10:20

## 2019-06-08 RX ADMIN — FUROSEMIDE 40 MG: 10 INJECTION, SOLUTION INTRAMUSCULAR; INTRAVENOUS at 19:34

## 2019-06-08 ASSESSMENT — PAIN SCALES - GENERAL
PAINLEVEL_OUTOF10: 0

## 2019-06-08 NOTE — PLAN OF CARE
Problem: Falls - Risk of:  Goal: Will remain free from falls  Description  Will remain free from falls  Outcome: Ongoing  Note:   Pt with absence of falls this shift. Pt has nonskid socks in place and bed alarm is on. Pt demonstrates safety awareness and calls to RN appropriately. Will continue to monitor. Problem: Cardiac Output - Decreased:  Goal: Hemodynamic stability will improve  Description  Hemodynamic stability will improve  Outcome: Ongoing  Note:   VSS this shift. Pt does not complain of chest pain or shortness of breath. Will continue to monitor.

## 2019-06-08 NOTE — PROGRESS NOTES
Resident Progress Note  PGY 3    Admit Date: 2019    PCP: Jorge Chapman DO                  : 1932  MRN: [id number]    Subjective: Interval History:     No acute events overnight. Patient seen at bedside this morning. She is eager to get home, states that she has been feeling well today. Swelling has gone down. The patient denies fevers, chills, chest pain, shortness of breath, nausea, vomiting, diarrhea, or constipation. Diet: DIET CARDIAC;    Data:     Scheduled Meds:   furosemide  40 mg Intravenous BID    heparin (porcine)  5,000 Units Subcutaneous 3 times per day    atorvastatin  40 mg Oral Nightly    hydrALAZINE  75 mg Oral 3 times per day    aspirin EC  81 mg Oral Daily    isosorbide mononitrate  60 mg Oral Daily    metoprolol succinate  50 mg Oral Daily    sodium chloride flush  10 mL Intravenous 2 times per day     Continuous Infusions:   dextrose       PRN Meds:acetaminophen, glucose, dextrose, glucagon (rDNA), dextrose, tiZANidine, sodium chloride flush, ondansetron, hydrALAZINE  I/O last 3 completed shifts: In: 260 [P.O.:260]  Out: 500 [Urine:500]  No intake/output data recorded. Intake/Output Summary (Last 24 hours) at 2019 0751  Last data filed at 2019 0616  Gross per 24 hour   Intake 260 ml   Output 500 ml   Net -240 ml       Objective:     Vitals: BP (!) 153/70   Pulse 69   Temp 97.5 °F (36.4 °C) (Oral)   Resp 18   Ht 5' 3\" (1.6 m)   Wt 144 lb (65.3 kg)   SpO2 99%   BMI 25.51 kg/m²     Physical Exam  General appearance:  No apparent distress. HEENT:  Normal cephalic, atraumatic without obvious deformity. PERRL. Extra ocular muscles intact. Conjunctivae/corneas clear. Neck: Supple. No JVD. Trachea midline. Respiratory:  Normal respiratory effort. CTAB w/o r/r/w. Cardiovascular:  RRR; nl S1&S2; w/o m/r/g. Abdomen: Soft, nt/nd w/ normal bowel sounds. Musculoskeletal:  No clubbing, cyanosis or edema bilaterally.   Skin: Skin color, texture, turgor normal.  No rashes or lesions. Neurologic:  Neurovascularly intact without any focal sensory/motor deficits. Cranial nerves: II-XII intact, grossly non-focal.  Psychiatric:  Alert and oriented, thought content appropriate, normal insight  Capillary Refill: Brisk,< 3 seconds   Peripheral Pulses: +2 palpable, equal bilaterally     LABS:    CBC:   Recent Labs     06/06/19  0504 06/07/19  0521 06/08/19  0517   WBC 6.1 6.6 5.9   HGB 11.7* 11.4* 11.0*   HCT 36.6 35.4* 33.9*   MCV 86.2 86.3 86.1    201 201                                                                BMP:    Recent Labs     06/06/19  0504 06/07/19  0521 06/08/19  0517    141 139   K 4.5 4.5 4.3    111* 108   CO2 15* 17* 17*   BUN 40* 40* 41*   CREATININE 2.0* 1.9* 2.1*   GLUCOSE 111* 78 76     Troponin:   Recent Labs     06/06/19  0504 06/06/19  1036 06/07/19  0521   TROPONINI 0.05* 0.06* 0.07*       INR:   Recent Labs     06/05/19 1940   INR 1.18*       U/A:  Recent Labs     06/05/19 2029   COLORU Yellow   PHUR 6.0   WBCUA 3-5   RBCUA 0-2   BACTERIA Rare*   CLARITYU Clear   SPECGRAV 1.015   LEUKOCYTESUR TRACE*   UROBILINOGEN 0.2   BILIRUBINUR Negative   BLOODU Negative   GLUCOSEU Negative      -----------------------------------------------------------------  RAD:   VL Extremity Venous Left   Final Result      NM MYOCARDIAL SPECT REST EXERCISE OR RX   Final Result      XR CHEST STANDARD (2 VW)   Final Result      Bibasilar atelectasis      Stable cardiac enlargement                Assessment/Plan:     NSTEMI - patient with shortness of breath and troponin to 0.03. Nonspecific ST-T wave changes on EKG.  Now off heparin gtt  - cardiology consulted  - continue ASA  - continue atorvastatin   - PT/OT  - tele    HFpEF - LVEF 55%, does not appear to be in acute exacerbation   - metoprolol  - furosemide  - not on ACEI/ARB due to prior hyperkalemia    HTN  - continue metoprolol  - continue hydralazine  - continue imdur    Left left edema  - lasix 40mg IV BID    CKD III - creatinine 2.1 today, baseline ~2.   - monitor     Code Status: Full Code   FEN: DIET CARDIAC;  PPx: heparin   Dispo: Floor    Patient will be discussed with attending, Luis Wolff MD.    Eleazar Roz  6/8/2019  7:51 AM        Patient seen and examined, plan of care discussed with residents. Agree with their assessment and plan with following addendum:    Patient had a restful night. She does not feel short of breath at rest, but has not gotten up much yet today. Her BP responding to medications. Creatinine is at baseline with diuresis. Would keep on IV lasix for one more day and change tmrw, but will follow cardiology recommendations.      Luis Wolff

## 2019-06-08 NOTE — DISCHARGE SUMMARY
Normocephalic, without obvious abnormality, atraumatic  Eyes: conjunctivae/corneas clear. PERRL, EOM's intact. Fundi benign. Ears: normal TM's and external ear canals both ears  Nose: Nares normal. Septum midline. Mucosa normal. No drainage or sinus tenderness. Throat: lips, mucosa, and tongue normal; teeth and gums normal  Neck: no adenopathy, no carotid bruit, no JVD, supple, symmetrical, trachea midline and thyroid not enlarged, symmetric, no tenderness/mass/nodules  Lungs: clear to auscultation bilaterally  Heart: regular rate and rhythm, S1, S2 normal, no murmur, click, rub or gallop  Abdomen: soft, non-tender; bowel sounds normal; no masses,  no organomegaly  Extremities: extremities normal, atraumatic, no cyanosis or edema  Neurologic: Grossly normal    Consults:  IP CONSULT TO CARDIOLOGY  IP CONSULT TO HOSPITALIST  IP CONSULT TO SOCIAL WORK  IP CONSULT TO CARDIOLOGY    Disposition: {disposition:47640}  Discharged Condition: {STABLE/UNSTABLE:835174945}  Follow Up: Primary Care Physician in {Time; 1 week to 1 month:82152}    DISCHARGE MEDICATION:     Medication List      ASK your doctor about these medications    acetaminophen 325 MG tablet  Commonly known as:  TYLENOL  Take 2 to 3 tablets po every 6 hours as needed for pain.  Do not exceed 10 tablets/24h.     aspirin EC 81 MG EC tablet  Take 1 tablet by mouth daily     diclofenac sodium 1 % Gel  Apply 4 g topically 4 times daily as needed for Pain     ergocalciferol 27387 units capsule  Commonly known as:  ERGOCALCIFEROL  Take 1 capsule by mouth once a week for 12 doses     furosemide 40 MG tablet  Commonly known as:  LASIX  Take 0.5 tablets by mouth daily If wt increases by 3 pounds then take 20mg twice a day     hydrALAZINE 50 MG tablet  Commonly known as:  APRESOLINE  Take 1 tablet by mouth every 8 hours     isosorbide mononitrate 60 MG extended release tablet  Commonly known as:  IMDUR  Take 1 tablet by mouth daily     metoprolol succinate 50 MG extended release tablet  Commonly known as:  TOPROL XL  Take 1 tablet by mouth daily     tiZANidine 2 MG tablet  Commonly known as:  ZANAFLEX  Take 1 tablet by mouth every 8 hours as needed (back, thigh, and leg pain or spasm)          Activity: {discharge activity:19698}  Diet: {diet:62920}  Wound Care: {wound care:58999}    Time Spent on discharge is more than {Time; 15 min - 8 hours:61134}    Signed:  Leeanne Bunn MD   6/8/2019

## 2019-06-08 NOTE — PLAN OF CARE
Alert and oriented. Able to follow commands. In bed with call light in reach. Will keep room free of clutter and monitor for safety.

## 2019-06-08 NOTE — PROGRESS NOTES
Electrophysiology - PROGRESS NOTE    Admit Date: 6/5/2019     Chief Complaint: SOB    Interval History:   Patient seen and examined and notes reviewed. Admitted for SOB x 1 day at home, on IV lasix, BNP 5,839 (6/6/19). Elevated trop on admission with a negative MPI (6/6/19)  This am she had c/o SOB. Has had SOB in the past with elevated BP.     In: 61 [P.O.:60]  Out: 950    Wt Readings from Last 2 Encounters:   06/05/19 144 lb (65.3 kg)   04/01/19 144 lb 3.2 oz (65.4 kg)         Data:   Scheduled Meds:   Scheduled Meds:   hydrALAZINE  100 mg Oral 3 times per day    furosemide  40 mg Intravenous BID    heparin (porcine)  5,000 Units Subcutaneous 3 times per day    atorvastatin  40 mg Oral Nightly    aspirin EC  81 mg Oral Daily    isosorbide mononitrate  60 mg Oral Daily    metoprolol succinate  50 mg Oral Daily    sodium chloride flush  10 mL Intravenous 2 times per day     Continuous Infusions:   dextrose       PRN Meds:.acetaminophen, glucose, dextrose, glucagon (rDNA), dextrose, tiZANidine, sodium chloride flush, ondansetron, hydrALAZINE  Continuous Infusions:   dextrose         Intake/Output Summary (Last 24 hours) at 6/8/2019 1011  Last data filed at 6/8/2019 0942  Gross per 24 hour   Intake 260 ml   Output 950 ml   Net -690 ml       CBC:   Lab Results   Component Value Date    WBC 5.9 06/08/2019    HGB 11.0 06/08/2019     06/08/2019     BMP:  Lab Results   Component Value Date     06/08/2019    K 4.3 06/08/2019    K 5.4 06/05/2019     06/08/2019    CO2 17 06/08/2019    BUN 41 06/08/2019    CREATININE 2.1 06/08/2019    GLUCOSE 76 06/08/2019     INR:   Lab Results   Component Value Date    INR 1.18 06/05/2019    INR 1.15 11/08/2014        CARDIAC LABS  ENZYMES:  Recent Labs     06/06/19  0504 06/06/19  1036 06/07/19  0521   TROPONINI 0.05* 0.06* 0.07*     FASTING LIPID PANEL:  Lab Results   Component Value Date    HDL 42 03/25/2019    LDLCALC 97 03/25/2019    TRIG 73 03/25/2019    TSH 1.89 03/17/2019     LIVER PROFILE:  Lab Results   Component Value Date    AST 28 03/18/2019    AST 25 11/11/2014    ALT 19 03/18/2019    ALT 22 11/11/2014       -----------------------------------------------------------------  Telemetry: Personally reviewed NSR    Objective:   Vitals: BP (!) 165/83   Pulse 62   Temp 97.5 °F (36.4 °C) (Oral)   Resp 16   Ht 5' 3\" (1.6 m)   Wt 144 lb (65.3 kg)   SpO2 99%   BMI 25.51 kg/m²   General appearance: alert, appears stated age and cooperative, No acute distress   Skin: Skin color, texture, turgor normal. No rashes or ecchymosis. Neck: no JVD, supple, symmetrical, trachea midline   Lungs: , no accessory muscle use, no respiratory distress  Heart: S1S2  Abdomen: soft, non-tender; bowel sounds normal  Extremities: No edema, DP +  Psychiatric: normal insight and affect    Patient Active Problem List:     Chronic kidney disease     Essential hypertension, malignant     PVD (peripheral vascular disease) ([facility])     2nd degree AV block     1st degree AV block     Heart block     Adverse reaction to beta-blocker     History of CVA (cerebrovascular accident)     Frequent PVCs     Near syncope     Other fatigue     Sprain of anterior talofibular ligament of left ankle     Shortness of breath     Chronic diastolic CHF (congestive heart failure) (HCC)     Atrial fibrillation (HCC)     Pulmonary hypertension (HCC)     Pneumonia due to organism     Acute kidney injury superimposed on chronic kidney disease (HCC)     Acute on chronic diastolic CHF (congestive heart failure) (HCC)     NSTEMI (non-ST elevated myocardial infarction) (HCC)     Essential hypertension     Abnormal ECG     (HFpEF) heart failure with preserved ejection fraction ([facility] [address]        Assessment & Plan:      1. Elevated trops  2. Acute on chronic diastolic heart failure  3.  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 Cherelle Salazar, [facility] (2004). Admitted to hosp with SOB on exertion,trops elevated,placed on IV Lasix.        Elevated trops   -MPI no perfusion defect  -on ASA 81 mg, statin    HFpEF  -EF 55%  -Echo shows grade II DD  -Lasix 40 mg IV BID, neg 0.3 L  -Cr 2.1 - at baseline  -BNP 5,839   -NYHA Class II  -metoprolol 50 mg daily     HTN  -not well controlled - SBP's 160- 180  -on hydralizine 100 mg TID  - metoprolol 50 mg daily  -Imdur 60 mg daily  -add amlopdipine 5mg daily           Brock Query [address]

## 2019-06-09 LAB
ALBUMIN SERPL-MCNC: 3.2 G/DL (ref 3.4–5)
ANION GAP SERPL CALCULATED.3IONS-SCNC: 15 MMOL/L (ref 3–16)
BASOPHILS ABSOLUTE: 0.1 K/UL (ref 0–0.2)
BASOPHILS RELATIVE PERCENT: 0.9 %
BUN BLDV-MCNC: 46 MG/DL (ref 7–20)
CALCIUM SERPL-MCNC: 8.8 MG/DL (ref 8.3–10.6)
CHLORIDE BLD-SCNC: 107 MMOL/L (ref 99–110)
CO2: 17 MMOL/L (ref 21–32)
CREAT SERPL-MCNC: 2.5 MG/DL (ref 0.6–1.2)
EOSINOPHILS ABSOLUTE: 0.2 K/UL (ref 0–0.6)
EOSINOPHILS RELATIVE PERCENT: 2.9 %
GFR AFRICAN AMERICAN: 22
GFR NON-AFRICAN AMERICAN: 18
GLUCOSE BLD-MCNC: 82 MG/DL (ref 70–99)
HCT VFR BLD CALC: 37.6 % (ref 36–48)
HEMOGLOBIN: 12 G/DL (ref 12–16)
LYMPHOCYTES ABSOLUTE: 1.5 K/UL (ref 1–5.1)
LYMPHOCYTES RELATIVE PERCENT: 25.4 %
MCH RBC QN AUTO: 27.8 PG (ref 26–34)
MCHC RBC AUTO-ENTMCNC: 32.1 G/DL (ref 31–36)
MCV RBC AUTO: 86.6 FL (ref 80–100)
MONOCYTES ABSOLUTE: 0.7 K/UL (ref 0–1.3)
MONOCYTES RELATIVE PERCENT: 13 %
NEUTROPHILS ABSOLUTE: 3.3 K/UL (ref 1.7–7.7)
NEUTROPHILS RELATIVE PERCENT: 57.8 %
PDW BLD-RTO: 18.2 % (ref 12.4–15.4)
PHOSPHORUS: 4.2 MG/DL (ref 2.5–4.9)
PLATELET # BLD: 190 K/UL (ref 135–450)
PMV BLD AUTO: 8.8 FL (ref 5–10.5)
POTASSIUM SERPL-SCNC: 4.4 MMOL/L (ref 3.5–5.1)
RBC # BLD: 4.34 M/UL (ref 4–5.2)
SODIUM BLD-SCNC: 139 MMOL/L (ref 136–145)
WBC # BLD: 5.8 K/UL (ref 4–11)

## 2019-06-09 PROCEDURE — 6370000000 HC RX 637 (ALT 250 FOR IP): Performed by: STUDENT IN AN ORGANIZED HEALTH CARE EDUCATION/TRAINING PROGRAM

## 2019-06-09 PROCEDURE — 6360000002 HC RX W HCPCS: Performed by: INTERNAL MEDICINE

## 2019-06-09 PROCEDURE — 1200000000 HC SEMI PRIVATE

## 2019-06-09 PROCEDURE — 6370000000 HC RX 637 (ALT 250 FOR IP): Performed by: INTERNAL MEDICINE

## 2019-06-09 PROCEDURE — 36415 COLL VENOUS BLD VENIPUNCTURE: CPT

## 2019-06-09 PROCEDURE — 80069 RENAL FUNCTION PANEL: CPT

## 2019-06-09 PROCEDURE — 99233 SBSQ HOSP IP/OBS HIGH 50: CPT | Performed by: NURSE PRACTITIONER

## 2019-06-09 PROCEDURE — 85025 COMPLETE CBC W/AUTO DIFF WBC: CPT

## 2019-06-09 PROCEDURE — 2580000003 HC RX 258: Performed by: STUDENT IN AN ORGANIZED HEALTH CARE EDUCATION/TRAINING PROGRAM

## 2019-06-09 RX ORDER — AMLODIPINE BESYLATE 5 MG/1
5 TABLET ORAL ONCE
Status: DISCONTINUED | OUTPATIENT
Start: 2019-06-09 | End: 2019-06-09 | Stop reason: SDUPTHER

## 2019-06-09 RX ORDER — AMLODIPINE BESYLATE 10 MG/1
10 TABLET ORAL DAILY
Status: DISCONTINUED | OUTPATIENT
Start: 2019-06-09 | End: 2019-06-11 | Stop reason: HOSPADM

## 2019-06-09 RX ADMIN — ISOSORBIDE MONONITRATE 60 MG: 30 TABLET, EXTENDED RELEASE ORAL at 10:01

## 2019-06-09 RX ADMIN — ATORVASTATIN CALCIUM 40 MG: 40 TABLET, FILM COATED ORAL at 22:10

## 2019-06-09 RX ADMIN — METOPROLOL SUCCINATE 50 MG: 50 TABLET, EXTENDED RELEASE ORAL at 10:01

## 2019-06-09 RX ADMIN — AMLODIPINE BESYLATE 10 MG: 10 TABLET ORAL at 10:01

## 2019-06-09 RX ADMIN — HEPARIN SODIUM 5000 UNITS: 5000 INJECTION INTRAVENOUS; SUBCUTANEOUS at 14:31

## 2019-06-09 RX ADMIN — HYDRALAZINE HYDROCHLORIDE 100 MG: 100 TABLET, FILM COATED ORAL at 06:49

## 2019-06-09 RX ADMIN — ASPIRIN 81 MG: 81 TABLET, COATED ORAL at 10:01

## 2019-06-09 RX ADMIN — HEPARIN SODIUM 5000 UNITS: 5000 INJECTION INTRAVENOUS; SUBCUTANEOUS at 22:09

## 2019-06-09 RX ADMIN — Medication 10 ML: at 22:10

## 2019-06-09 RX ADMIN — HYDRALAZINE HYDROCHLORIDE 100 MG: 100 TABLET, FILM COATED ORAL at 14:31

## 2019-06-09 RX ADMIN — HYDRALAZINE HYDROCHLORIDE 100 MG: 100 TABLET, FILM COATED ORAL at 22:09

## 2019-06-09 RX ADMIN — ACETAMINOPHEN 650 MG: 325 TABLET ORAL at 20:04

## 2019-06-09 RX ADMIN — HEPARIN SODIUM 5000 UNITS: 5000 INJECTION INTRAVENOUS; SUBCUTANEOUS at 06:49

## 2019-06-09 RX ADMIN — Medication 10 ML: at 10:02

## 2019-06-09 ASSESSMENT — PAIN DESCRIPTION - PROGRESSION: CLINICAL_PROGRESSION: GRADUALLY WORSENING

## 2019-06-09 ASSESSMENT — PAIN DESCRIPTION - DESCRIPTORS: DESCRIPTORS: DISCOMFORT

## 2019-06-09 ASSESSMENT — PAIN SCALES - GENERAL
PAINLEVEL_OUTOF10: 5
PAINLEVEL_OUTOF10: 0

## 2019-06-09 ASSESSMENT — PAIN DESCRIPTION - ORIENTATION: ORIENTATION: MID;LOWER

## 2019-06-09 ASSESSMENT — PAIN DESCRIPTION - LOCATION: LOCATION: BACK

## 2019-06-09 ASSESSMENT — PAIN DESCRIPTION - FREQUENCY: FREQUENCY: INTERMITTENT

## 2019-06-09 ASSESSMENT — PAIN - FUNCTIONAL ASSESSMENT: PAIN_FUNCTIONAL_ASSESSMENT: ACTIVITIES ARE NOT PREVENTED

## 2019-06-09 ASSESSMENT — PAIN DESCRIPTION - PAIN TYPE: TYPE: ACUTE PAIN

## 2019-06-09 ASSESSMENT — PAIN DESCRIPTION - ONSET: ONSET: ON-GOING

## 2019-06-09 NOTE — PLAN OF CARE
Resting in bed with call light in reach. Has remained free of injury. Calls out appropriately for assistance. Will continue to monitor for safety.

## 2019-06-09 NOTE — PLAN OF CARE
Remains NSR with HR in the 70's. BP continues to be elevated, last at 160/70. Has denied chest pain. Will continue to monitor and treat elevated BP as ordered.

## 2019-06-09 NOTE — PLAN OF CARE
Problem: Falls - Risk of:  Goal: Will remain free from falls  Description  Will remain free from falls  Outcome: Ongoing   Fall precautions are in place. Bed alarm is on and in lowest position. Pt is using call light appropriately. Will continue to monitor. Problem: Cardiac Output - Decreased:  Goal: Hemodynamic stability will improve  Description  Hemodynamic stability will improve  Outcome: Ongoing   Pt's VSS. NSR on tele.    Vitals:    06/09/19 0107   BP: 135/72   Pulse: 64   Resp: 20   Temp: 97.2 °F (36.2 °C)   SpO2: 99%

## 2019-06-09 NOTE — PROGRESS NOTES
Electrophysiology - PROGRESS NOTE    Admit Date: 6/5/2019     Chief Complaint: SOB    Interval History:   Patient seen and examined and notes reviewed. Admitted for SOB x 1 day at home,started on IV lasix,now off,  BNP 5,839 (6/6/19). Elevated trop on admission with a negative MPI (6/6/19)  No c/o SOB today.         In: 360 [P.O.:360]  Out: 925    Wt Readings from Last 2 Encounters:   06/09/19 138 lb 7.2 oz (62.8 kg)   04/01/19 144 lb 3.2 oz (65.4 kg)         Data:   Scheduled Meds:   Scheduled Meds:   hydrALAZINE  100 mg Oral 3 times per day    amLODIPine  5 mg Oral Daily    heparin (porcine)  5,000 Units Subcutaneous 3 times per day    atorvastatin  40 mg Oral Nightly    aspirin EC  81 mg Oral Daily    isosorbide mononitrate  60 mg Oral Daily    metoprolol succinate  50 mg Oral Daily    sodium chloride flush  10 mL Intravenous 2 times per day     Continuous Infusions:   dextrose       PRN Meds:.acetaminophen, glucose, dextrose, glucagon (rDNA), dextrose, tiZANidine, sodium chloride flush, ondansetron, hydrALAZINE  Continuous Infusions:   dextrose         Intake/Output Summary (Last 24 hours) at 6/9/2019 0841  Last data filed at 6/9/2019 0400  Gross per 24 hour   Intake 490 ml   Output 1375 ml   Net -885 ml       CBC:   Lab Results   Component Value Date    WBC 5.8 06/09/2019    HGB 12.0 06/09/2019     06/09/2019     BMP:  Lab Results   Component Value Date     06/09/2019    K 4.4 06/09/2019    K 5.4 06/05/2019     06/09/2019    CO2 17 06/09/2019    BUN 46 06/09/2019    CREATININE 2.5 06/09/2019    GLUCOSE 82 06/09/2019     INR:   Lab Results   Component Value Date    INR 1.18 06/05/2019    INR 1.15 11/08/2014        CARDIAC LABS  ENZYMES:  Recent Labs     06/06/19  1036 06/07/19  0521   TROPONINI 0.06* 0.07*     FASTING LIPID PANEL:  Lab Results   Component Value Date    HDL 42 03/25/2019    LDLCALC 97 03/25/2019    TRIG 73 03/25/2019    TSH 1.89 03/17/2019     LIVER PROFILE:  Lab Results   Component Value Date    AST 28 03/18/2019    AST 25 11/11/2014    ALT 19 03/18/2019    ALT 22 11/11/2014       -----------------------------------------------------------------  Telemetry: Personally reviewed NSR    Objective:   Vitals: BP (!) 160/70   Pulse 72   Temp 97.8 °F (36.6 °C) (Oral)   Resp 20   Ht 5' 3\" (1.6 m)   Wt 138 lb 7.2 oz (62.8 kg)   SpO2 96%   BMI 24.53 kg/m²   General appearance: alert, appears stated age and cooperative, No acute distress   Skin: Skin color, texture, turgor normal. No rashes or ecchymosis. Neck: no JVD, supple, symmetrical, trachea midline   Lungs: , no accessory muscle use, no respiratory distress  Heart: S1S2  Abdomen: soft, non-tender; bowel sounds normal  Extremities: No edema, DP +  Psychiatric: normal insight and affect    Patient Active Problem List:     Chronic kidney disease     Essential hypertension, malignant     PVD (peripheral vascular disease) ([facility])     2nd degree AV block     1st degree AV block     Heart block     Adverse reaction to beta-blocker     History of CVA (cerebrovascular accident)     Frequent PVCs     Near syncope     Other fatigue     Sprain of anterior talofibular ligament of left ankle     Shortness of breath     Chronic diastolic CHF (congestive heart failure) (HCC)     Atrial fibrillation (HCC)     Pulmonary hypertension (HCC)     Pneumonia due to organism     Acute kidney injury superimposed on chronic kidney disease (HCC)     Acute on chronic diastolic CHF (congestive heart failure) (HCC)     NSTEMI (non-ST elevated myocardial infarction) (HCC)     Essential hypertension     Abnormal ECG     (HFpEF) heart failure with preserved ejection fraction ([facility]        Assessment & Plan:      1. Elevated trops  2. Acute on chronic diastolic heart failure  3.  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 Gertrudis Garcia, [address] [place] [address] (2004). Admitted to hosp with SOB on exertion,trops elevated,placed on IV Lasix.        Elevated trops   -MPI no perfusion defect  -on ASA 81 mg, statin    HFpEF  -EF 55%  -Echo shows grade II DD  -Off Lasix, neg 0.3 L - consider starting home dose   -Cr 2.5 - baseline 1.9-2.2, follows with Dr. Nilo Alexander  -BNP 5,839 (6/6/19), check in am   -NYHA Class II  -Toprol XL 50 mg daily     HTN  -not well controlled - SBP's 150-160  -on hydralizine 100 mg TID  -metoprolol 50 mg daily  -Imdur 60 mg daily  -amlopdipine 5mg daily - increased to 10 mg today           Jose Burton [address]

## 2019-06-10 LAB
ALBUMIN SERPL-MCNC: 3.1 G/DL (ref 3.4–5)
ANION GAP SERPL CALCULATED.3IONS-SCNC: 12 MMOL/L (ref 3–16)
BASOPHILS ABSOLUTE: 0.1 K/UL (ref 0–0.2)
BASOPHILS RELATIVE PERCENT: 1.4 %
BUN BLDV-MCNC: 48 MG/DL (ref 7–20)
CALCIUM SERPL-MCNC: 8.6 MG/DL (ref 8.3–10.6)
CHLORIDE BLD-SCNC: 105 MMOL/L (ref 99–110)
CO2: 19 MMOL/L (ref 21–32)
CREAT SERPL-MCNC: 2.7 MG/DL (ref 0.6–1.2)
EOSINOPHILS ABSOLUTE: 0.2 K/UL (ref 0–0.6)
EOSINOPHILS RELATIVE PERCENT: 4.1 %
GFR AFRICAN AMERICAN: 20
GFR NON-AFRICAN AMERICAN: 17
GLUCOSE BLD-MCNC: 86 MG/DL (ref 70–99)
HCT VFR BLD CALC: 34.5 % (ref 36–48)
HEMOGLOBIN: 11.1 G/DL (ref 12–16)
LYMPHOCYTES ABSOLUTE: 1.9 K/UL (ref 1–5.1)
LYMPHOCYTES RELATIVE PERCENT: 31.9 %
MCH RBC QN AUTO: 27.7 PG (ref 26–34)
MCHC RBC AUTO-ENTMCNC: 32.3 G/DL (ref 31–36)
MCV RBC AUTO: 85.6 FL (ref 80–100)
MONOCYTES ABSOLUTE: 0.7 K/UL (ref 0–1.3)
MONOCYTES RELATIVE PERCENT: 11.9 %
NEUTROPHILS ABSOLUTE: 2.9 K/UL (ref 1.7–7.7)
NEUTROPHILS RELATIVE PERCENT: 50.7 %
PARATHYROID HORMONE INTACT: 81.3 PG/ML (ref 14–72)
PDW BLD-RTO: 17.8 % (ref 12.4–15.4)
PHOSPHORUS: 3.9 MG/DL (ref 2.5–4.9)
PLATELET # BLD: 202 K/UL (ref 135–450)
PMV BLD AUTO: 8.5 FL (ref 5–10.5)
POTASSIUM SERPL-SCNC: 4 MMOL/L (ref 3.5–5.1)
PRO-BNP: 2662 PG/ML (ref 0–449)
RBC # BLD: 4.03 M/UL (ref 4–5.2)
SODIUM BLD-SCNC: 136 MMOL/L (ref 136–145)
WBC # BLD: 5.8 K/UL (ref 4–11)

## 2019-06-10 PROCEDURE — 36415 COLL VENOUS BLD VENIPUNCTURE: CPT

## 2019-06-10 PROCEDURE — 99232 SBSQ HOSP IP/OBS MODERATE 35: CPT | Performed by: INTERNAL MEDICINE

## 2019-06-10 PROCEDURE — 83880 ASSAY OF NATRIURETIC PEPTIDE: CPT

## 2019-06-10 PROCEDURE — 6370000000 HC RX 637 (ALT 250 FOR IP): Performed by: STUDENT IN AN ORGANIZED HEALTH CARE EDUCATION/TRAINING PROGRAM

## 2019-06-10 PROCEDURE — 1200000000 HC SEMI PRIVATE

## 2019-06-10 PROCEDURE — 6370000000 HC RX 637 (ALT 250 FOR IP): Performed by: INTERNAL MEDICINE

## 2019-06-10 PROCEDURE — 80069 RENAL FUNCTION PANEL: CPT

## 2019-06-10 PROCEDURE — 2580000003 HC RX 258: Performed by: INTERNAL MEDICINE

## 2019-06-10 PROCEDURE — 83970 ASSAY OF PARATHORMONE: CPT

## 2019-06-10 PROCEDURE — 6360000002 HC RX W HCPCS: Performed by: INTERNAL MEDICINE

## 2019-06-10 PROCEDURE — 85025 COMPLETE CBC W/AUTO DIFF WBC: CPT

## 2019-06-10 PROCEDURE — 2580000003 HC RX 258: Performed by: STUDENT IN AN ORGANIZED HEALTH CARE EDUCATION/TRAINING PROGRAM

## 2019-06-10 RX ORDER — SODIUM CHLORIDE 9 MG/ML
INJECTION, SOLUTION INTRAVENOUS CONTINUOUS
Status: DISCONTINUED | OUTPATIENT
Start: 2019-06-10 | End: 2019-06-11 | Stop reason: HOSPADM

## 2019-06-10 RX ADMIN — METOPROLOL SUCCINATE 50 MG: 50 TABLET, EXTENDED RELEASE ORAL at 08:31

## 2019-06-10 RX ADMIN — HEPARIN SODIUM 5000 UNITS: 5000 INJECTION INTRAVENOUS; SUBCUTANEOUS at 16:15

## 2019-06-10 RX ADMIN — HYDRALAZINE HYDROCHLORIDE 100 MG: 100 TABLET, FILM COATED ORAL at 16:15

## 2019-06-10 RX ADMIN — AMLODIPINE BESYLATE 10 MG: 10 TABLET ORAL at 08:29

## 2019-06-10 RX ADMIN — HEPARIN SODIUM 5000 UNITS: 5000 INJECTION INTRAVENOUS; SUBCUTANEOUS at 21:22

## 2019-06-10 RX ADMIN — Medication 10 ML: at 08:31

## 2019-06-10 RX ADMIN — ISOSORBIDE MONONITRATE 60 MG: 30 TABLET, EXTENDED RELEASE ORAL at 08:29

## 2019-06-10 RX ADMIN — HYDRALAZINE HYDROCHLORIDE 100 MG: 100 TABLET, FILM COATED ORAL at 06:27

## 2019-06-10 RX ADMIN — SODIUM CHLORIDE: 9 INJECTION, SOLUTION INTRAVENOUS at 22:55

## 2019-06-10 RX ADMIN — Medication 10 ML: at 21:22

## 2019-06-10 RX ADMIN — ATORVASTATIN CALCIUM 40 MG: 40 TABLET, FILM COATED ORAL at 21:22

## 2019-06-10 RX ADMIN — SODIUM CHLORIDE: 9 INJECTION, SOLUTION INTRAVENOUS at 12:17

## 2019-06-10 RX ADMIN — HEPARIN SODIUM 5000 UNITS: 5000 INJECTION INTRAVENOUS; SUBCUTANEOUS at 06:27

## 2019-06-10 RX ADMIN — HYDRALAZINE HYDROCHLORIDE 100 MG: 100 TABLET, FILM COATED ORAL at 21:22

## 2019-06-10 RX ADMIN — ASPIRIN 81 MG: 81 TABLET, COATED ORAL at 08:29

## 2019-06-10 ASSESSMENT — PAIN SCALES - GENERAL
PAINLEVEL_OUTOF10: 0

## 2019-06-10 NOTE — CONSULTS
[facility]    [facility]              [phone]                       Plan :       Hypertension is well controlled and no changes are required. Acute elevation of creatinine is due to aggressive diuresis and I will hold further Lasix. I will give her 1 L of normal saline overnight  I will check PTH, urine for protein creatinine   renal panel every day. No further work-up is needed. Assessment :       Stage III B chronic kidney disease with acute kidney injury: Current creatinine is 2.7 with a BUN of 48.     Stop Lasix     Echocardiogram showed LVEF of 55%.  She has grade 2 diastolic dysfunction.     Baseline creatinine is 1.9 with a GFR of 30 mL per minute.  She has underlying stage III B chronic kidney dis lisinopril was discontinued ease from hypertensive nephrosclerosis.     She also has 1 g per day of proteinuria and was on lisinopril.  She has history of hyperkalemia and is on low potassium diet. In the face of acute kidney injury    Hypertension: It is  labile.  She arrived with a high blood pressure due to noncompliance with medication.     I will continue with amlodipine, hydralazine, Imdur and Toprol-XL.    Blood pressure is well controlled now     No further workup is required as she had extensive workup.       Free light chain ratio is 1.7. [facility] would like to thank Iain Alvarez MD   for opportunity to serve this patient      Please call with questions at-   24 Hrs Answering service [phone] or  7 am- 5 pm via Perfect serve or cell phone  Shannon Salazar          CC/reason for consult :     Chronic kidney disease with acute kidney injury     HPI :     Rigoberto Magaña is a 80 y.o. female presented to   the hospital on 6/5/2019 with shortness of breath. She has past medical history of congestive heart failure, atrial fibrillation, hyperlipidemia, hypertension and chronic kidney disease.   She presented with shortness of breath on June 5, 2019. Breathing was worse on exertion. EMS was called and she was saturating 100%. P breath better with 4 L of oxygen. There was no associated chest pain, chest tightness or palpitation. She was noted to be hypertensive with nonspecific EKG changes. Cardiology was consulted and stress test was negative for ischemia. She was placed on aspirin and Lipitor. She is also on metoprolol, hydralazine and Imdur. Amlodipine was also added. Dopplers of lower extremity did not reveal any evidence for DVT. She arrived with a creatinine of 1.9 and has gone up to 2.7 with diuresis. I was called for further management of acute on chronic kidney disease. She was last seen by me in the hospital in March 2019 for stage III kidney disease. She was seen by me in the office in August 2018. Interval History:     She was initiated on Lasix 40 mg IV twice daily for shortness of breath and possible fluid overload. ROS:     Seen with-residents    positives in bold   Constitutional:  fever, chills, weakness, weight change, fatigue  Skin:  rash, pruritus, hair loss, bruising, dry skin, petechiae  Head, Face, Neck   headaches, swelling,  cervical adenopathy  Respiratory: shortness of breath, cough, or wheezing  Cardiovascular: chest pain, palpitations, dizzy, edema  Gastrointestinal: nausea, vomiting, diarrhea, constipation,belly pain    Yellow skin, blood in stool  Musculoskeletal:  back pain, muscle weakness, gait problems,       joint pain or swelling. Genitourinary:  dysuria, poor urine flow, flank pain, blood in urine  Neurologic:  vertigo, TIA'S, syncope, seizures, focal weakness  Psychosocial:  insomnia, anxiety, or depression. Additional positive findings:                          All other remaining systems are negative.         PMH/PSH/SH/Family History:     Past Medical History:   Diagnosis Date    Acute on chronic diastolic CHF (congestive heart failure) (HCC) 3/24/2019    Atrial fibrillation ([facility] 3/24/2019    Chronic kidney disease     Hyperlipidemia     borderline per pt    Hypertension     [facility]        History reviewed. No pertinent surgical history. Social History     Socioeconomic History    Marital status:       Spouse name: Not on file    Number of children: Not on file    Years of education: Not on file    Highest education level: Not on file   Occupational History    Not on file   Social Needs    Financial resource strain: Not on file    Food insecurity:     Worry: Not on file     Inability: Not on file    Transportation needs:     Medical: Not on file     Non-medical: Not on file   Tobacco Use    Smoking status: Former Smoker     Packs/day: 0.25     Years: 3.00     Pack years: 0.75    Smokeless tobacco: Never Used   Substance and Sexual Activity    Alcohol use: Yes     Comment: occ    Drug use: No    Sexual activity: Never   Lifestyle    Physical activity:     Days per week: Not on file     Minutes per session: Not on file    Stress: Not on file   Relationships    Social connections:     Talks on phone: Not on file     Gets together: Not on file     Attends Gnosticist service: Not on file     Active member of club or organization: Not on file     Attends meetings of clubs or organizations: Not on file     Relationship status: Not on file    Intimate partner violence:     Fear of current or ex partner: Not on file     Emotionally abused: Not on file     Physically abused: Not on file     Forced sexual activity: Not on file   Other Topics Concern    Not on file   Social History Narrative    Not on file           Problem Relation Age of Onset    High Blood Pressure Mother     Kidney Disease Brother           Medication:     Scheduled Meds:   amLODIPine  10 mg Oral Daily    hydrALAZINE  100 mg Oral 3 times per day    heparin (porcine)  5,000 Units Subcutaneous 3 times per day    atorvastatin  40 mg Oral Nightly    aspirin EC  81 mg

## 2019-06-10 NOTE — PLAN OF CARE
Problem: Falls - Risk of:  Goal: Will remain free from falls  Description  Will remain free from falls  Outcome: Ongoing   Fall precautions are in place. Bed alarm is on and in lowest position. Pt is using call light appropriately. Will continue to monitor. Problem: Cardiac Output - Decreased:  Goal: Hemodynamic stability will improve  Description  Hemodynamic stability will improve  Outcome: Ongoing     VSS. BP slightly elevated. NSR with 1st degree block on tele. Denies SOB and chest pain. Will continue to monitor.    Vitals:    06/09/19 2345   BP: (!) 140/75   Pulse: 72   Resp: 18   Temp: 96.9 °F (36.1 °C)   SpO2: 94%

## 2019-06-10 NOTE — PROGRESS NOTES
Internal Medicine PGY-1 Resident Progress Note        PCP: Cookie Setting, DO    Date of Admission: 6/5/2019    Chief Complaint: SOB at rest    Subjective: Stew Shea. Met pt at bedside and she reports no more episodes of SOB at rest or dyspnea when ambulating from the bathroom. She denies chest pain, chest tightness or palpitations. No lightheadedness, dizziness, headache. No more reported confusion. No abd pain. No changes in BM or diarrhea. No dysuria (no itching or burning). Overnight (140/75); otherwise VS wnl afebrile sating appropriately on RA  Yesterday -140    Cr 2.7 up from 2.5 yesterday (BL ~2.2)  Stopped IV lasix 6/9 due to GREG  Pro-BMP 2662 down from 5839 on 6/6    I yesterday: 240mL; O yesterday 250mL (2 unmeasured O's); net since admission -775 mL    Stress test: No evidence of myocardial ischemia. EF 74%  Doppler:  No evidence of deep vein or superficial thrombosis    Per EP: consider starting home dose of lasix; recheck BNP today    Per Cardiology:  - dc heparin  - cont ASA + statin. - cont metoprolol XL 50 mg   - cont hydralazine 100 mg TID + imdur 60 mg QD    Per Nephrology: HTN well controlled; no changes in meds  - 1L NS overnight, check PTH, and urine for protein creatinine    Nephrology consulted for GREG  Will ambulate Q shift  possible dc tomorrow if Cr improves (6/11)    PT 20    Hospital Course:   80 y.o. female w/ Hx CHF, afib, HLD, HTN presented to [facility] ED complaining of shortness of breath while resting at~3 pm earlier in the afternoon. She suddenly felt as if she could not catch her breath. She reports that the SOB does worsen when she exerts herself. EMS was called. She was satting at 100% - but she reported to EMS that she felt she could breathe better with 4L oxygen.     No jaw, shoulder or arm pain. No associated chest pain, chest tightness, palpitations, cough, fevers, chills, nausea, vomiting, recent travel, recent lower extremity, swelling or pain.  No abd pain. No changes in BM. No dysuria (no itching or burning) No orthopnea or PND.     In the ED she was sating 100% on RA wo increased work of breathing.       Hypertensive (-163) without abnormal pulse, respiration and temperature     EKG: NSR w nonspecific changes including inverted T waves in lateral leads (new from her previous)     BMP and CBC unremarkable.       Tn 0.03.       CXR: bibasilar atelectasis but otherwise clear.       Given the patient's nonspecific EKG changes and mild troponin elevated, cardiology was consulted and recommended treating this as a NSTEMI with heparin gtt and ASA and admission to trend troponins overnight. Medications:  Reviewed    Infusion Medications    dextrose       Scheduled Medications    amLODIPine  10 mg Oral Daily    hydrALAZINE  100 mg Oral 3 times per day    heparin (porcine)  5,000 Units Subcutaneous 3 times per day    atorvastatin  40 mg Oral Nightly    aspirin EC  81 mg Oral Daily    isosorbide mononitrate  60 mg Oral Daily    metoprolol succinate  50 mg Oral Daily    sodium chloride flush  10 mL Intravenous 2 times per day     PRN Meds: acetaminophen, glucose, dextrose, glucagon (rDNA), dextrose, tiZANidine, sodium chloride flush, ondansetron, hydrALAZINE      Intake/Output Summary (Last 24 hours) at 6/10/2019 0607  Last data filed at 6/10/2019 0435  Gross per 24 hour   Intake 240 ml   Output 250 ml   Net -10 ml       Physical Exam Performed:    /65   Pulse 60   Temp 97.6 °F (36.4 °C) (Oral)   Resp 18   Ht 5' 3\" (1.6 m)   Wt 138 lb 0.1 oz (62.6 kg)   SpO2 97%   BMI 24.45 kg/m²     General appearance:  No apparent distress. HEENT:  Normal cephalic, atraumatic without obvious deformity. PERRL. Extra ocular muscles intact. Conjunctivae/corneas clear. Neck: Supple. No JVD. Trachea midline. Respiratory:  Normal respiratory effort. CTAB w/o r/r/w. Cardiovascular:  RRR; nl S1&S2; w/o m/r/g.   Abdomen: Soft, nt/nd w/ normal bowel sounds. Musculoskeletal:  No clubbing, cyanosis or edema bilaterally. Skin: Skin color, texture, turgor normal.  No rashes or lesions. Neurologic:  Neurovascularly intact without any focal sensory/motor deficits. Cranial nerves: II-XII intact, grossly non-focal.  Psychiatric:  Alert and oriented, thought content appropriate, normal insight  Capillary Refill: Brisk,< 3 seconds   Peripheral Pulses: +2 palpable, equal bilaterally     Labs:   Recent Labs     06/08/19  0517 06/09/19  0457 06/10/19  0513   WBC 5.9 5.8 5.8   HGB 11.0* 12.0 11.1*   HCT 33.9* 37.6 34.5*    190 202     Recent Labs     06/08/19  0517 06/09/19  0457 06/10/19  0513    139 136   K 4.3 4.4 4.0    107 105   CO2 17* 17* 19*   BUN 41* 46* 48*   CREATININE 2.1* 2.5* 2.7*   CALCIUM 8.6 8.8 8.6   PHOS 4.3 4.2 3.9     No results for input(s): AST, ALT, BILIDIR, BILITOT, ALKPHOS in the last 72 hours. No results for input(s): INR in the last 72 hours. No results for input(s): [place] Daily in the last 72 hours. Urinalysis:      Lab Results   Component Value Date    NITRU Negative 06/05/2019    WBCUA 3-5 06/05/2019    BACTERIA Rare 06/05/2019    RBCUA 0-2 06/05/2019    BLOODU Negative 06/05/2019    SPECGRAV 1.015 06/05/2019    GLUCOSEU Negative 06/05/2019       Radiology:  VL Extremity Venous Left   Final Result      NM MYOCARDIAL SPECT REST EXERCISE OR RX   Final Result      XR CHEST STANDARD (2 VW)   Final Result      Bibasilar atelectasis      Stable cardiac enlargement                Assessment/Plan:    Yoana Dumont, 80 y.o. female w/ Hx CHF, afib, HLD, HTN presented to [facility] ED complaining of shortness of breath while resting at~3 pm earlier in the afternoon. She suddenly felt as if she could not catch her breath.     NSTEMI  SOB at rest. Tn 0.03. EKG nonspecific ST changes & T-wave inversion at lateral leads. Cardiology (Michael)consulted by ED. Recommended admitting and trending tn w/ heparin gtt + ASA.   - Tn 0.03-->0.05-->0.05-->0.05-->0.06-->0.07  - Cardiology following -stress test: neg for ischemia   -   - cont ASA 81 mg, 40 mg lipitor QHS  - PT/OT  - telemetry  Per cardiology: - cont ASA + statin.    - cont metoprolol XL 50 mg   - cont hydralazine 100 mg TID + imdur 60 mg QD, amlodipine 10 mg QD    GREG BL Cr 2.2; Cr 2.7 (6/10)  - nephrology consulted     HTN  - cont75 mg hydralazine TID, 60 mg IMDUR QD, 50 mg Toprol XL QD, cont amlodipine 10 mg QD    Left leg edema  - doppler:  No evidence of deep vein or superficial thrombosis     DVT Prophylaxis: SQ heparin  Diet: DIET CARDIAC;  Code Status: Full Code    Dispo - GMF    Sunita Cody MD    I will discuss the patient with the senior resident and MD Sunita Ritter Caro, MD  Internal Medicine Resident, PGY-1  Pager: [phone]

## 2019-06-10 NOTE — PLAN OF CARE
Problem: Falls - Risk of:  Goal: Will remain free from falls  Description  Will remain free from falls  Outcome: Ongoing  Note:   Pt free from injury or falls at this time, fall precautions in place, bed in low position, side rail up x2, [place] Fall Risk: Medium (25-44), bed alarm on, reoriented to room and call light, reminded not to get up without assistance. Pt verbalizes understanding of fall risk procedures. Will continue with hourly rounds for PO intake, pain needs, toileting, and repositioning as needed. No needs expressed at this time. Call light in reach, will continue to monitor. Problem: HEMODYNAMIC STATUS  Goal: Patient has stable vital signs and fluid balance  Outcome: Ongoing  Note:   Pt remains hemodynamically stable at this time. Vss. Denies chest pain, sob, lightheadedness, dizziness, or palpitations. Strict I/Os maintained with daily weights. SB on tele. SpO2 remains >92% on room air . Will continue to monitor.

## 2019-06-10 NOTE — PROGRESS NOTES
[facility]  Cardiology Inpatient Consult Service  Daily Progress Note        Admit Date:  6/5/2019    Referring Physician: Alejandra Urbina MD    Reason for Consultation/Chief Complaint: HFpEF/HTN/SOB/NSTEMI/CKD    Subjective: Interval history:  FARAZ  Cr trending up   BP better    Medications:   amLODIPine  10 mg Oral Daily    hydrALAZINE  100 mg Oral 3 times per day    heparin (porcine)  5,000 Units Subcutaneous 3 times per day    atorvastatin  40 mg Oral Nightly    aspirin EC  81 mg Oral Daily    isosorbide mononitrate  60 mg Oral Daily    metoprolol succinate  50 mg Oral Daily    sodium chloride flush  10 mL Intravenous 2 times per day       IV drips:   dextrose         PRN:  acetaminophen, glucose, dextrose, glucagon (rDNA), dextrose, tiZANidine, sodium chloride flush, ondansetron, hydrALAZINE      Objective:     Vitals:    06/09/19 1954 06/09/19 2345 06/10/19 0435 06/10/19 0827   BP: (!) 108/55 (!) 140/75 127/65 103/60   Pulse: 62 72 60 58   Resp: 18 18 18 18   Temp: 96.7 °F (35.9 °C) 96.9 °F (36.1 °C) 97.6 °F (36.4 °C) 97.5 °F (36.4 °C)   TempSrc: Oral Oral Oral Oral   SpO2: 100% 94% 97% 96%   Weight:   138 lb 0.1 oz (62.6 kg)    Height:           Intake/Output Summary (Last 24 hours) at 6/10/2019 0928  Last data filed at 6/10/2019 0435  Gross per 24 hour   Intake 240 ml   Output 250 ml   Net -10 ml     I/O last 3 completed shifts:   In: 240 [P.O.:240]  Out: 250 [Urine:250]  Wt Readings from Last 3 Encounters:   06/10/19 138 lb 0.1 oz (62.6 kg)   04/01/19 144 lb 3.2 oz (65.4 kg)   03/26/19 158 lb 1.1 oz (71.7 kg)       Admit Wt: Weight: 144 lb (65.3 kg)   Todays Wt: Weight: 138 lb 0.1 oz (62.6 kg)    TELEMETRY: Sinus     Physical Exam:     General:  Awake, alert, NAD  Skin:  Warm and dry  Neck:  -JVP  Chest:  Clear to auscultation, respiration normal  Cardiovascular:  RRR S1S2  Abdomen:  Soft -  Extremities:  - edema      Objective    Labs:   Recent Labs 06/08/19 0517 06/09/19 0457 06/10/19  0513    139 136   K 4.3 4.4 4.0   BUN 41* 46* 48*   CREATININE 2.1* 2.5* 2.7*    107 105   CO2 17* 17* 19*   GLUCOSE 76 82 86   CALCIUM 8.6 8.8 8.6     Recent Labs     06/08/19  0517 06/09/19  0457 06/10/19  0513   WBC 5.9 5.8 5.8   HGB 11.0* 12.0 11.1*   HCT 33.9* 37.6 34.5*    190 202   MCV 86.1 86.6 85.6     No results for input(s): CHOLTOT, TRIG, HDL in the last 72 hours. Invalid input(s): LIPIDCOMM, CHOLHDL, VLDCHOL, LDL  No results for input(s): PTT, INR in the last 72 hours. Invalid input(s): PT  No results for input(s): CKTOTAL, CKMB, CKMBINDEX, TROPONINI in the last 72 hours. No results for input(s): BNP in the last 72 hours. No results for input(s): NTPROBNP in the last 72 hours. No results for input(s): TSH in the last 72 hours. Imaging:   I personally reviewed imaging studies including CXR, Stress test, TTE/WON. Assessment & Plan:     HFpEF/HTN/SOB/NSTEMI/CKD  - SOB significantly improved  - Cr trending up, 2.7 today. - Negative MPI, no need for further ischemic w/u  - continue ASA,statin  - follows with renal  - may need slightly higher dose than usual home dose once Cr improves. - continue Metoprolol  - continue Hydralazine, Imdur, Norvasc    Thank you for allowing to us to participate in the care of Catrina MANNING. Please call our service with questions. All questions and concerns were addressed to the patient/family. Alternatives to my treatment were discussed. The note was completed using EMR. Every effort was made to ensure accuracy; however, inadvertent computerized transcription errors may be present. Everette Dean MD, [facility]    6/10/2019 9:28 AM

## 2019-06-10 NOTE — CARE COORDINATION
The [address] [place]  CHF Team Conference Note      Patient ID: Georges Julian 80 y.o. 5/12/1932    Admission Date: 6/5/2019   Admission Weight: 144 lb  Discharge Date: 6/11/2019  Discharge Weight: 141 lb    30 Day Readmit? No    Pt History and Precipitating Cause of Decompensation:   Hx CHF, afib, HLD, HTN presented to [facility] ED complaining of shortness of breath while resting at~3 pm earlier in the afternoon. She suddenly felt as if she could not catch her breath. She reports that the SOB does worsen when she exerts herself. EMS was called. She was satting at 100% - but she reported to EMS that she felt she could breathe better with 4L oxygen. Ejection Fraction: ECHO 3/19/19   Normal left ventricle size. There is mild to moderately increased left   ventricular wall thickness. Overall left ventricular systolic function   appears normal with an ejection fraction visually estimated at 55%. No   regional wall motion abnormalities are noted. Diastolic filling parameters   suggest grade II diastolic dysfunction.   Mild mitral regurgitation is present.   Mild to Moderate tricuspid regurgitation. Estimated pulmonary artery   systolic pressure is elevated at 43 mmHg assuming a right atrial pressure of   3 mmHg.   The left atrium is mildly dilated.     Has patient been diagnosed with VALERIA: No  Is patient being treated: No    Cardiology Consulted: Yes  Heart Failure Order Set Used: No  Complete Intake and Output: Yes  Complete Daily Weights: Yes    BMP:  Lab Results   Component Value Date     06/10/2019     06/09/2019     06/08/2019    K 4.0 06/10/2019    K 4.4 06/09/2019    K 4.3 06/08/2019    K 5.4 06/05/2019    K 5.2 03/24/2019    K 4.7 03/22/2019     06/10/2019     06/09/2019     06/08/2019    CO2 19 06/10/2019    CO2 17 06/09/2019    CO2 17 06/08/2019    PHOS 3.9 06/10/2019    PHOS 4.2 06/09/2019    PHOS 4.3 06/08/2019    BUN 48 06/10/2019    BUN 46 06/09/2019    BUN 41 Stand: Stand by assistance(x2 trials; slow and effortful)  Stand to sit: Stand by assistance(x2 trials; slow and effortful)      Ambulation 1  Device: Rolling Walker  Assistance: Contact guard assistance  Quality of Gait: increased effort and limp noted with distance, decreased stance time L LE, effortful, ALEMAN, unsteady but no overt LOB  Distance: 40 ft, 20 ft  Comments: Pt fatigued quickly and required rest breaks.                          AM-PAC Score  AM-PAC Inpatient Mobility Raw Score : 20 (06/11/19 1116)  AM-PAC Inpatient T-Scale Score : 47.67 (06/11/19 1116)  Mobility Inpatient CMS 0-100% Score: 35.83 (06/11/19 1116)  Mobility Inpatient CMS G-Code Modifier : CJ (06/11/19 1116)     Goals  Short term goals  Time Frame for Short term goals: discharge  Short term goal 1: Pt will transfer sit <--> stand with supervision  ongoing  Short term goal 2: Pt will amb 50 ft with RW and supervision   ongoing  Short term goal 3: Pt will participate in LE HEP  ongoing  Patient Goals   Patient goals : eventually return home     Plan    Plan  Times per week: 2-5  Current Treatment Recommendations: Strengthening, Transfer Training, Endurance Training, Patient/Caregiver Education & Training, Balance Training, Functional Mobility Training, Gait Training, Equipment Evaluation, Education, & procurement  Safety Devices  Type of devices: Call light within reach, Chair alarm in place, Left in chair, Nurse notified, Gait belt(pt reclined)      Therapy Time    Individual Concurrent Group Co-treatment   Time In 1050      Time Out 1115      Minutes 25        Timed Code Treatment Minutes: 25       Total Treatment Minutes:  25     Jonathan Almanza PT            Cardiac Rehab:  Exercise Summary    Active Range of motion exercises performed:  Yes    Chair Exercises:arm abductions, arm adductions, arm butterflies, straight arm raises, knee raises, leg adductions and leg abductions     Ambulation by day 2: Yes  Cardiac Rehab Referral for Ph2: No  Time Spent Educating/Exercising:       NUTRITION:  Type and Reason for Visit:  Consult, Patient Education    Diet Orders / Intake / Nutrition Support  Current diet/supplement order: DIET CARDIAC;       Subjective:  Consult received for CHF diet education. Pt accepted verbal review and Heart Failure folder. Will follow per standards of care. Pt currently tries to follow a low sodium diet at home and does not add salt to food. Pt states understanding of education. Provided heart failure diet education that included:     Instructed the Patient on:   [x] Low Sodium foods  [] Sodium free  [x] Fluids       Educational Materials Provided:   [x] [facility] Failure Education folder- Nutrition Therapy       -General Diet Recommendations: low fat, low cholesterol, substitute healthy fats, such as olive oil, canola oil, grapeseed oil for saturated fats like butter, margarine, and shortening, minimize processed foods and reduce sodium intake.     Time spent with patient: 15 minutes     Kings Wilkerson RD, LD  Pager:  [phone]  Office:  [phone]        PHARMACY  During Admission  If EF <40% ACE ordered ___, or ARB ___ or contraindication documented ____EF >40%  If EF <40% Evidence-Based Beta Blocker ordered ____ or contraindication documented____ EF >40%, Toprol XL  If EF 35% or less, K <5on admit, Cr<2 (female) <2.5 (male), Aldosterone antagonist ordered_ EF >35%  Upon Discharge  If EF <40% ACE ordered ___, or ARB ___ or contraindication documented ____EF >40%  If EF <40% Evidence-Based Beta Blocker ordered ____ or contraindication documented____ EF >40%, Toprol XL  If EF 35% or less, K <5on admit, Cr<2 (female) <2.5 (male), Aldosterone antagonist ordered_ EF >35%  Received Influenza Vaccine (Oct-Mar) N/A  DVT Prophylaxis by Day 2: Yes  Prescription drug coverage: Yes  Can afford prescription co-pay: Yes  Time Spent Educatin minutes      NURSING  Patient Education:       Worsening HF symptoms Yes    fluid restriction  Yes    Tobacco cessation (Smoked in the last 12 months) No  daily weights and parameters  Yes  Avoid NSAIDS in HF Yes  written HF education given Yes     Follow up appointment Yes    ICD counseling No and EF >40%  Patient can Teach Back:   Worsening HF symptoms and when to report Yes   Weight gain to report to healthcare provider Yes  Amount of sodium to eat per day Yes  Name of their Theoplis Nice Yes    Time Spent Educatin MINUTES      Total Time Spent on Patient Education:  60minutesYes    CASE MANAGEMENT:  Assessment:      DISCHARGE PLAN:  [facility]  Community Resources:   Equipment at Discharge: wheelchair and walker  Destination: home health    Patient Self-Management:   Working scale at home: Yes  Reliable transportation: Yes  PCP: Yes  Advance Directive: No  Palliative Care Consult: No    Discharge Instructions:   Daily Weights: Weigh each morning at the same time and write down weights to bring to clinic visit  Goal Weight: 140 lb    Follow Up Instructions: 7 day hospital f/u scheduled  FRANCESCO Jordan CNP  Go on 2019  CHF Clinic hospital follow up @ [address] [address]  [phone]       I approve the established CHF interdisciplinary plan of care as documented within the medical record of [address]        Signature Cuca Yee, RN  EXT [phone]

## 2019-06-11 VITALS
OXYGEN SATURATION: 97 % | HEIGHT: 63 IN | TEMPERATURE: 98 F | RESPIRATION RATE: 18 BRPM | DIASTOLIC BLOOD PRESSURE: 72 MMHG | BODY MASS INDEX: 25 KG/M2 | WEIGHT: 141.09 LBS | HEART RATE: 61 BPM | SYSTOLIC BLOOD PRESSURE: 129 MMHG

## 2019-06-11 LAB
ALBUMIN SERPL-MCNC: 3.1 G/DL (ref 3.4–5)
ANION GAP SERPL CALCULATED.3IONS-SCNC: 12 MMOL/L (ref 3–16)
BASOPHILS ABSOLUTE: 0.1 K/UL (ref 0–0.2)
BASOPHILS RELATIVE PERCENT: 1 %
BUN BLDV-MCNC: 45 MG/DL (ref 7–20)
CALCIUM SERPL-MCNC: 8.3 MG/DL (ref 8.3–10.6)
CHLORIDE BLD-SCNC: 106 MMOL/L (ref 99–110)
CO2: 19 MMOL/L (ref 21–32)
CREAT SERPL-MCNC: 2.6 MG/DL (ref 0.6–1.2)
EOSINOPHILS ABSOLUTE: 0.2 K/UL (ref 0–0.6)
EOSINOPHILS RELATIVE PERCENT: 3.3 %
GFR AFRICAN AMERICAN: 21
GFR NON-AFRICAN AMERICAN: 17
GLUCOSE BLD-MCNC: 83 MG/DL (ref 70–99)
HCT VFR BLD CALC: 34.1 % (ref 36–48)
HEMOGLOBIN: 11.1 G/DL (ref 12–16)
LYMPHOCYTES ABSOLUTE: 1.8 K/UL (ref 1–5.1)
LYMPHOCYTES RELATIVE PERCENT: 31 %
MCH RBC QN AUTO: 28.2 PG (ref 26–34)
MCHC RBC AUTO-ENTMCNC: 32.7 G/DL (ref 31–36)
MCV RBC AUTO: 86.1 FL (ref 80–100)
MONOCYTES ABSOLUTE: 0.9 K/UL (ref 0–1.3)
MONOCYTES RELATIVE PERCENT: 14.6 %
NEUTROPHILS ABSOLUTE: 3 K/UL (ref 1.7–7.7)
NEUTROPHILS RELATIVE PERCENT: 50.1 %
PDW BLD-RTO: 17.4 % (ref 12.4–15.4)
PHOSPHORUS: 3.4 MG/DL (ref 2.5–4.9)
PLATELET # BLD: 199 K/UL (ref 135–450)
PMV BLD AUTO: 8.7 FL (ref 5–10.5)
POTASSIUM SERPL-SCNC: 3.9 MMOL/L (ref 3.5–5.1)
RBC # BLD: 3.95 M/UL (ref 4–5.2)
SODIUM BLD-SCNC: 137 MMOL/L (ref 136–145)
WBC # BLD: 5.9 K/UL (ref 4–11)

## 2019-06-11 PROCEDURE — 6370000000 HC RX 637 (ALT 250 FOR IP): Performed by: INTERNAL MEDICINE

## 2019-06-11 PROCEDURE — 6360000002 HC RX W HCPCS: Performed by: INTERNAL MEDICINE

## 2019-06-11 PROCEDURE — 97116 GAIT TRAINING THERAPY: CPT

## 2019-06-11 PROCEDURE — 85025 COMPLETE CBC W/AUTO DIFF WBC: CPT

## 2019-06-11 PROCEDURE — 6370000000 HC RX 637 (ALT 250 FOR IP): Performed by: STUDENT IN AN ORGANIZED HEALTH CARE EDUCATION/TRAINING PROGRAM

## 2019-06-11 PROCEDURE — 97535 SELF CARE MNGMENT TRAINING: CPT

## 2019-06-11 PROCEDURE — 2580000003 HC RX 258: Performed by: STUDENT IN AN ORGANIZED HEALTH CARE EDUCATION/TRAINING PROGRAM

## 2019-06-11 PROCEDURE — 80069 RENAL FUNCTION PANEL: CPT

## 2019-06-11 PROCEDURE — 36415 COLL VENOUS BLD VENIPUNCTURE: CPT

## 2019-06-11 PROCEDURE — 97530 THERAPEUTIC ACTIVITIES: CPT

## 2019-06-11 PROCEDURE — 99232 SBSQ HOSP IP/OBS MODERATE 35: CPT | Performed by: INTERNAL MEDICINE

## 2019-06-11 RX ORDER — PSEUDOEPHEDRINE HCL 30 MG
100 TABLET ORAL DAILY
Qty: 30 CAPSULE | Refills: 0 | Status: SHIPPED | OUTPATIENT
Start: 2019-06-11 | End: 2019-06-25 | Stop reason: SDUPTHER

## 2019-06-11 RX ORDER — HYDRALAZINE HYDROCHLORIDE 100 MG/1
100 TABLET, FILM COATED ORAL EVERY 8 HOURS SCHEDULED
Qty: 60 TABLET | Refills: 0 | Status: SHIPPED | OUTPATIENT
Start: 2019-06-11 | End: 2019-06-25 | Stop reason: SDUPTHER

## 2019-06-11 RX ORDER — ATORVASTATIN CALCIUM 40 MG/1
40 TABLET, FILM COATED ORAL NIGHTLY
Qty: 30 TABLET | Refills: 3 | Status: ON HOLD | OUTPATIENT
Start: 2019-06-11 | End: 2019-08-21

## 2019-06-11 RX ORDER — FUROSEMIDE 40 MG/1
20 TABLET ORAL DAILY
Qty: 60 TABLET | Refills: 0 | Status: SHIPPED | OUTPATIENT
Start: 2019-06-11 | End: 2019-06-25 | Stop reason: SDUPTHER

## 2019-06-11 RX ORDER — DOCUSATE SODIUM 100 MG/1
100 CAPSULE, LIQUID FILLED ORAL DAILY
Status: DISCONTINUED | OUTPATIENT
Start: 2019-06-11 | End: 2019-06-11 | Stop reason: HOSPADM

## 2019-06-11 RX ORDER — AMLODIPINE BESYLATE 10 MG/1
10 TABLET ORAL DAILY
Qty: 30 TABLET | Refills: 0 | Status: SHIPPED | OUTPATIENT
Start: 2019-06-12 | End: 2019-06-25 | Stop reason: SDUPTHER

## 2019-06-11 RX ADMIN — ACETAMINOPHEN 650 MG: 325 TABLET ORAL at 15:41

## 2019-06-11 RX ADMIN — DOCUSATE SODIUM 100 MG: 100 CAPSULE, LIQUID FILLED ORAL at 11:41

## 2019-06-11 RX ADMIN — METOPROLOL SUCCINATE 50 MG: 50 TABLET, EXTENDED RELEASE ORAL at 09:49

## 2019-06-11 RX ADMIN — TIZANIDINE 2 MG: 4 TABLET ORAL at 15:41

## 2019-06-11 RX ADMIN — HEPARIN SODIUM 5000 UNITS: 5000 INJECTION INTRAVENOUS; SUBCUTANEOUS at 06:25

## 2019-06-11 RX ADMIN — Medication 10 ML: at 09:50

## 2019-06-11 RX ADMIN — ASPIRIN 81 MG: 81 TABLET, COATED ORAL at 09:49

## 2019-06-11 RX ADMIN — ISOSORBIDE MONONITRATE 60 MG: 30 TABLET, EXTENDED RELEASE ORAL at 09:50

## 2019-06-11 RX ADMIN — HYDRALAZINE HYDROCHLORIDE 100 MG: 100 TABLET, FILM COATED ORAL at 06:26

## 2019-06-11 RX ADMIN — AMLODIPINE BESYLATE 10 MG: 10 TABLET ORAL at 09:49

## 2019-06-11 ASSESSMENT — PAIN SCALES - GENERAL
PAINLEVEL_OUTOF10: 0
PAINLEVEL_OUTOF10: 5
PAINLEVEL_OUTOF10: 0

## 2019-06-11 NOTE — DISCHARGE INSTR - COC
Continuity of Care Form    Patient Name: Jose Huntley   :  1932  MRN:  [id number]    Admit date:  2019  Discharge date:  ***    Code Status Order: Full Code   Advance Directives:   Advance Care Flowsheet Documentation     Date/Time Healthcare Directive Type of Healthcare Directive Copy in [address] Agent's Name Healthcare Agent's Phone Number    [phone]  No, patient does not have an advance directive for healthcare treatment -- -- -- -- --          Admitting Physician:  Sharonda Cooper MD  PCP: Art Canas DO    Discharging Nurse: [facility] Unit/Room#: 3886/0021-73  Discharging Unit Phone Number: ***    Emergency Contact:   Extended Emergency Contact Information  Primary Emergency Contact: [facility]  Address: [address] [place] [address] Phone: [phone]  Work Phone: [phone]  Relation: Child  Secondary Emergency Contact: [address] Phone: [phone]  Relation: Child    Past Surgical History:  History reviewed. No pertinent surgical history. Immunization History: There is no immunization history for the selected administration types on file for this patient. Active Problems:  Patient Active Problem List   Diagnosis Code    Chronic kidney disease N18.9    Essential hypertension, malignant I10    PVD (peripheral vascular disease) ([facility]) I73.9    2nd degree AV block I44.1    1st degree AV block I44.0    Heart block I45.9    Adverse reaction to beta-blocker T44. 10X5A    History of CVA (cerebrovascular accident) Z80.78    Frequent PVCs I49.3    Near syncope R55    Other fatigue R53.83    Sprain of anterior talofibular ligament of left ankle S93.492A    Shortness of breath R06.02    Chronic diastolic CHF (congestive heart failure) ([facility]) I50.32    Atrial fibrillation ([facility]) I48.91    Pulmonary hypertension ([facility]) I27.20    Other Feedings:691994250}  Liquids: {Slp liquid thickness:34711}  Daily Fluid Restriction: {CHP DME Yes amt example:712016344}  Last Modified Barium Swallow with Video (Video Swallowing Test): {Done Not Done UY:853977364}    Treatments at the Time of Hospital Discharge:   Respiratory Treatments: ***  Oxygen Therapy:  {Therapy; copd oxygen:58571}  Ventilator:    {[facility] Vent XEOM:717369180}    Rehab Therapies: {THERAPEUTIC INTERVENTION:5244609956}  Weight Bearing Status/Restrictions: { CC Weight Bearin}  Other Medical Equipment (for information only, NOT a DME order):  {EQUIPMENT:932299751}  Other Treatments: ***    Patient's personal belongings (please select all that are sent with patient):  {P DME Belongings:932350040}    RN SIGNATURE:  {Esignature:479264825}    CASE MANAGEMENT/SOCIAL WORK SECTION    Inpatient Status Date: ***    Readmission Risk Assessment Score:  Readmission Risk              Risk of Unplanned Readmission:        24           Discharging to Facility/ Agency   · Name:   · Address:  · Phone:  · Fax:    Dialysis Facility (if applicable)   · Name:  · Address:  · Dialysis Schedule:  · Phone:  · Fax:    / signature: {Esignature:119192988}    PHYSICIAN SECTION    Prognosis: {Prognosis:0366659617}    Condition at Discharge: [facility] Patient Condition:976272666}    Rehab Potential (if transferring to Rehab): {Prognosis:4909358189}    Recommended Labs or Other Treatments After Discharge: ***    Physician Certification: I certify the above information and transfer of Kike Mo  is necessary for the continuing treatment of the diagnosis listed and that she requires {Admit to Appropriate Level of Care:75185} for {GREATER/LESS:008579123} 30 days.      Update Admission H&P: {CHP DME Changes in LIFKO:839882757}    PHYSICIAN SIGNATURE:  {Esignature:836608908}

## 2019-06-11 NOTE — PROGRESS NOTES
Physical Therapy  Facility/Department: [facility] 4 PCU  Daily Treatment Note  NAME: Jose Huntley  : 1932  MRN: [id number]    Date of Service: 2019    Discharge Recommendations:Sasha Tidwell scored a 20/24 on the AM-PAC short mobility form. Current research shows that an AM-PAC score of 18 or greater is typically associated with a discharge to the patient's home setting. Based on the patients AM-PAC score and their current functional mobility deficits, it is recommended that the patient have 2-3 sessions per week of Physical Therapy at d/c to increase the patients independence. PT Equipment Recommendations  Equipment Needed: No    Assessment   Body structures, Functions, Activity limitations: Decreased functional mobility   Assessment: Pt is below her functional baseline. Demonstrates decreased activity tolerance and requires CGA for safe amb. Would benefit from continued IP PT to maximize endurance prior to returning home. Pt plans to go to her daughters house at discharge. Rec 24hr assist initially if pt goes home and home PT  Treatment Diagnosis: impaired gait, decreased endurance  Patient Education: Pt educated on importance of OOB mobility, use of call light and she verbalized understanding. REQUIRES PT FOLLOW UP: Yes  Activity Tolerance  Activity Tolerance: Patient limited by endurance     Patient Diagnosis(es): The encounter diagnosis was NSTEMI (non-ST elevated myocardial infarction) ([address] has a past medical history of Acute on chronic diastolic CHF (congestive heart failure) ([address] Atrial fibrillation ([address] Chronic kidney disease, Hyperlipidemia, Hypertension, and Peripheral vascular disease ([address] has no past surgical history on file. Restrictions  Position Activity Restriction  Other position/activity restrictions: up with assist  Subjective   General  Chart Reviewed:  Yes  Additional Pertinent Hx: Admit  with chest pain, found to have NSTEMI; LE dopplers: (-) DVT, stress test: LVEF 74%; PMHx: acute CHF, a-fib, CKD, HLD, HTN, PVD  Family / Caregiver Present: Yes(son)  Subjective  Subjective: Pt found reclined in chair and agreeable to PT. \" I'm going home today. \"  Pain Screening  Patient Currently in Pain: Denies         Orientation  Orientation  Overall Orientation Status: Within Functional Limits       Objective      Transfers  Sit to Stand: Stand by assistance(x2 trials; slow and effortful)  Stand to sit: Stand by assistance(x2 trials; slow and effortful)     Ambulation 1  Device: Rolling Walker  Assistance: Contact guard assistance  Quality of Gait: increased effort and limp noted with distance, decreased stance time L LE, effortful, ALEMAN, unsteady but no overt LOB  Distance: 40 ft, 20 ft  Comments: Pt fatigued quickly and required rest breaks.                                 AM-PAC Score  AM-PAC Inpatient Mobility Raw Score : 20 (06/11/19 1116)  -PAC Inpatient T-Scale Score : 47.67 (06/11/19 1116)  Mobility Inpatient CMS 0-100% Score: 35.83 (06/11/19 1116)  Mobility Inpatient CMS G-Code Modifier : CJ (06/11/19 1116)          Goals  Short term goals  Time Frame for Short term goals: discharge  Short term goal 1: Pt will transfer sit <--> stand with supervision  ongoing  Short term goal 2: Pt will amb 50 ft with RW and supervision   ongoing  Short term goal 3: Pt will participate in LE HEP  ongoing  Patient Goals   Patient goals : eventually return home    Plan    Plan  Times per week: 2-5  Current Treatment Recommendations: Strengthening, Transfer Training, Endurance Training, Patient/Caregiver Education & Training, Balance Training, Functional Mobility Training, Gait Training, Equipment Evaluation, Education, & procurement  Safety Devices  Type of devices: Call light within reach, Chair alarm in place, Left in chair, Nurse notified, Gait belt(pt reclined)     Therapy Time   Individual Concurrent Group Co-treatment   Time In 1050         Time Out 1115         Minutes 25 Timed Code Treatment Minutes: 25      Total Treatment Minutes:  25    This note will serve as a discharge summary if patient is discharged from hospital before next treatment session.        Jennifer Alves, PT

## 2019-06-11 NOTE — PROGRESS NOTES
Discharge instructions reviewed with pt/family, discussed medications, VU, denies any further questions or anxiety related to discharge. Educational handouts in regards to new medications, amlodipine, atorvastatin, and colace, given via Lexicomp, side effects and purpose of medcations discussed, VU. IV/tele discontinued. Pt discharged to home with family. Taken from room via wheelchair by PCA, personal belongings taken with. Follow up appointment made with Cynthia Verma for patient. New medications supplied through outpatient pharmacy. Son called and said he will be picking up the prescriptions for the patient and will be taking patient to her daughters house where she will be staying.

## 2019-06-11 NOTE — CARE COORDINATION
patient: Yes    CAROL Patel, [facility] [employer]  Case Management Department  Ph: [phone]

## 2019-06-11 NOTE — PROGRESS NOTES
CLINICAL PHARMACY NOTE: MEDS TO [address] Select Patient?: Yes  Total # of Prescriptions Filled: 5   The following medications were delivered to the patient:  · On file  ·   Total # of Interventions Completed: 0  Time Spent (min): 30    Additional Documentation:

## 2019-06-11 NOTE — PLAN OF CARE
Problem: Cardiac Output - Decreased:  Goal: Hemodynamic stability will improve  Description  Hemodynamic stability will improve  Outcome: Ongoing  Note:   Pt vss this shift with no s/s of hemodynamic instability. Problem: ACTIVITY INTOLERANCE/IMPAIRED MOBILITY  Goal: Mobility/activity is maintained at optimum level for patient  Outcome: Ongoing  Note:   Pt tolerated activity well this shift, ambulating with a standby assist to the bathroom this shift. However pt refused to ambulate in the oswaldo d/t SOB and fatigue.

## 2019-06-11 NOTE — PROGRESS NOTES
S1S2  Abdomen:  Soft, Nontender, Nondistended  Extremities:  No Lower extremity edema      Objective    Labs:   Recent Labs     06/09/19  0457 06/10/19  0513 06/11/19  0508    136 137   K 4.4 4.0 3.9   BUN 46* 48* 45*   CREATININE 2.5* 2.7* 2.6*    105 106   CO2 17* 19* 19*   GLUCOSE 82 86 83   CALCIUM 8.8 8.6 8.3     Recent Labs     06/09/19 0457 06/10/19  0513 06/11/19  0508   WBC 5.8 5.8 5.9   HGB 12.0 11.1* 11.1*   HCT 37.6 34.5* 34.1*    202 199   MCV 86.6 85.6 86.1     No results for input(s): CHOLTOT, TRIG, HDL in the last 72 hours. Invalid input(s): LIPIDCOMM, CHOLHDL, VLDCHOL, LDL  No results for input(s): PTT, INR in the last 72 hours. Invalid input(s): PT  No results for input(s): CKTOTAL, CKMB, CKMBINDEX, TROPONINI in the last 72 hours. No results for input(s): BNP in the last 72 hours. No results for input(s): NTPROBNP in the last 72 hours. No results for input(s): TSH in the last 72 hours. Imaging:   I personally reviewed imaging studies including CXR, Stress test, TTE/WON. Assessment & Plan:     HFpEF/HTN/NSTEMI/SOB/CKD  - Feels a lot better  - Cr improving, remains off diuretics  - Negative ischemic w/u  - diuretic as outpatient, appreciate renal input. - continue current meds   Aspirin 81 mg and Atorvastatin 40mg   Amlodipine, Hydralazine, Metoprolol and Imdur      Thank you for allowing to us to participate in the care of Catrina MANNING. Please call our service with questions. All questions and concerns were addressed to the patient/family. Alternatives to my treatment were discussed. The note was completed using EMR. Every effort was made to ensure accuracy; however, inadvertent computerized transcription errors may be present. Everette Dean MD, [facility]    6/11/2019 9:59 AM

## 2019-06-11 NOTE — CARE COORDINATION
[address] predict tool not given, recommending SNF placement. Patient's plan is to return home with family and resume services with [address] [place] [address]. [facility] forwarded the above to [facility] and RN with [facility].     Thank Lisette Prescott RN  Care Transition Coordinator  Contact QVYQUZ:[phone]

## 2019-06-11 NOTE — DISCHARGE SUMMARY
Hospital Medicine Discharge Summary    Patient ID: Dmitriy Dumont   Gender: female  : 1932   Age: 80 y.o. MRN: [id number]  Code Status: Full Code    Patient's PCP: Ramon Padilla DO    Admit Date: 2019     Discharge Date:   2019    Admitting Physician: Pressley Shone, MD     Discharge Physician: Margo Blandon MD     Discharge Diagnoses: Active Hospital Problems    Diagnosis Date Noted    (HFpEF) heart failure with preserved ejection fraction (HCC) [I50.30]     NSTEMI (non-ST elevated myocardial infarction) ([facility] [address] [I21.4] 2019       The patient was seen and examined on day of discharge and this discharge summary is in conjunction with any daily progress note from day of discharge. Hospital Course:   80 y. o. female w/ Hx CHF, afib, HLD, HTN presented to [facility] ED complaining of shortness of breath while resting at~3 pm earlier in the afternoon. She suddenly felt as if she could not catch her breath. She reports that the SOB does worsen when she exerts herself. EMS was called. She was satting at 100% - but she reported to EMS that she felt she could breathe better with 4L oxygen.     No jaw, shoulder or arm pain. No associated chest pain, chest tightness, palpitations, cough, fevers, chills, nausea, vomiting, recent travel, recent lower extremity, swelling or pain. No abd pain. No changes in BM.  No dysuria (no itching or burning) No orthopnea or PND.     In the ED she was sating 100% on RA wo increased work of breathing.       Hypertensive (-163) without abnormal pulse, respiration and temperature     EKG: NSR w nonspecific changes including inverted T waves in lateral leads (new from her previous)     BMP and CBC unremarkable.       Tn 0.03.       CXR: bibasilar atelectasis but otherwise clear.       Given the patient's nonspecific EKG changes and mild troponin elevated, cardiology was consulted and recommended treating this as a NSTEMI with heparin gtt and ASA and admission to trend troponins overnight.     NSTEMI  SOB at rest. Tn 0.03. EKG nonspecific ST changes & T-wave inversion at lateral leads. Cardiology (Michael)consulted by ED. Recommended admitting and trending tn w/ heparin gtt + ASA. - Tn 0.03-->0.05-->0.05-->0.05-->0.06-->0.07  - Cardiology following -stress test: neg for ischemia   -   - cont ASA 81 mg, 40 mg lipitor QHS  - PT/OT  - telemetry  Per cardiology: - cont ASA + statin. - cont metoprolol XL 50 mg   - cont hydralazine 100 mg TID + imdur 60 mg QD, amlodipine 10 mg QD     GREG BL Cr 2.2; Cr 2.7 (6/10)  - nephrology consulted     HTN  - cont75 mg hydralazine TID, 60 mg IMDUR QD, 50 mg Toprol XL QD, cont amlodipine 10 mg QD     Left leg edema  - doppler:  No evidence of deep vein or superficial thrombosis     Met pt at chairside. She had just finished walking with PT/OT and said she felt better. No SOB at rest or dyspnea when ambulating. No BM since admission.     No chest pain, chest tightness or palpitations. No lightheadedness, dizziness, headache. No more reported confusion. No abd pain. No changes in BM or diarrhea. No dysuria (no itching or burning). Disposition:  Home with Home Health Care    Physical Exam Performed:     /79   Pulse 62   Temp 97.7 °F (36.5 °C) (Oral)   Resp 16   Ht 5' 3\" (1.6 m)   Wt 141 lb 1.5 oz (64 kg)   SpO2 96%   BMI 24.99 kg/m²       General appearance:  No apparent distress, appears stated age and cooperative. HEENT:  Normal cephalic, atraumatic without obvious deformity. Pupils equal, round, and reactive to light. Extra ocular muscles intact. Conjunctivae/corneas clear. Neck: Supple, with full range of motion. No jugular venous distention. Trachea midline. Respiratory:  Normal respiratory effort. Clear to auscultation, bilaterally without Rales/Wheezes/Rhonchi. Cardiovascular:  Regular rate and rhythm with normal S1/S2 without murmurs, rubs or gallops.   Abdomen: Soft, non-tender, non-distended with normal bowel sounds. Musculoskeletal:  No clubbing, cyanosis or edema bilaterally. Full range of motion without deformity. Skin: Skin color, texture, turgor normal.  No rashes or lesions. Neurologic:  Neurovascularly intact without any focal sensory/motor deficits. Cranial nerves: II-XII intact, grossly non-focal.  Psychiatric:  Alert and oriented, thought content appropriate, normal insight  Capillary Refill: Brisk,< 3 seconds   Peripheral Pulses: +2 palpable, equal bilaterally       Labs: For convenience and continuity at follow-up the following most recent labs are provided:      CBC:    Lab Results   Component Value Date    WBC 5.9 06/11/2019    HGB 11.1 06/11/2019    HCT 34.1 06/11/2019     06/11/2019       Renal:    Lab Results   Component Value Date     06/11/2019    K 3.9 06/11/2019    K 5.4 06/05/2019     06/11/2019    CO2 19 06/11/2019    BUN 45 06/11/2019    CREATININE 2.6 06/11/2019    CALCIUM 8.3 06/11/2019    PHOS 3.4 06/11/2019         Significant Diagnostic Studies    Radiology:   VL Extremity Venous Left   Final Result      NM MYOCARDIAL SPECT REST EXERCISE OR RX   Final Result      XR CHEST STANDARD (2 VW)   Final Result      Bibasilar atelectasis      Stable cardiac enlargement                   Consults:     IP CONSULT TO CARDIOLOGY  IP CONSULT TO HOSPITALIST  IP CONSULT TO SOCIAL WORK  IP CONSULT TO CARDIOLOGY  IP CONSULT TO HEART FAILURE NURSE/COORDINATOR  IP CONSULT TO DIETITIAN  IP CONSULT TO NEPHROLOGY    Disposition[place]  [place] [address]     Condition at Discharge: Stable    Discharge Instructions/Follow-up:    Follow up with PCP in 1 week. Follow up with Nephrology in 1 week. Follow up with CHF Clinic on 6/17/2019 at 4 pm.  Stop taking Lasix. Measure your weight daily before breakfast. If wt increases by 2 pounds in 1 day or or 5 pounds in 1 week then take 20mg lasix twice a day. Start taking 40 mg Lipitor nightly. Start taking 10 mg norvasc daily.   Start taking 100 mg colace daily. Increase hydralazine dose to 100 mg three times per day. Code Status:  Full Code     Activity: activity as tolerated    Diet: cardiac diet      Discharge Medications:     Current Discharge Medication List           Details   metoprolol succinate (TOPROL XL) 50 MG extended release tablet Take 1 tablet by mouth daily  Qty: 30 tablet, Refills: 6      furosemide (LASIX) 40 MG tablet Take 0.5 tablets by mouth daily If wt increases by 3 pounds then take 20mg twice a day  Qty: 60 tablet, Refills: 3      hydrALAZINE (APRESOLINE) 50 MG tablet Take 1 tablet by mouth every 8 hours  Qty: 90 tablet, Refills: 3      diclofenac sodium 1 % GEL Apply 4 g topically 4 times daily as needed for Pain  Qty: 1 Tube, Refills: 3      ergocalciferol (ERGOCALCIFEROL) 73247 units capsule Take 1 capsule by mouth once a week for 12 doses  Qty: 12 capsule, Refills: 0      isosorbide mononitrate (IMDUR) 60 MG extended release tablet Take 1 tablet by mouth daily  Qty: 30 tablet, Refills: 5      acetaminophen (TYLENOL) 325 MG tablet Take 2 to 3 tablets po every 6 hours as needed for pain. Do not exceed 10 tablets/24h. Qty: 90 tablet, Refills: 2    Associated Diagnoses: Left thigh pain      tiZANidine (ZANAFLEX) 2 MG tablet Take 1 tablet by mouth every 8 hours as needed (back, thigh, and leg pain or spasm)  Qty: 24 tablet, Refills: 0    Associated Diagnoses: Left thigh pain      aspirin EC 81 MG EC tablet Take 1 tablet by mouth daily  Qty: 30 tablet, Refills: 3    Associated Diagnoses: History of CVA (cerebrovascular accident)             Time Spent on discharge is more than 30 minutes in the examination, evaluation, counseling and review of medications and discharge plan. Signed:    Paola Pendleton MD   6/11/2019      Thank you Edmond Motley DO for the opportunity to be involved in this patient's care. If you have any questions or concerns please feel free to contact me at [phone].

## 2019-06-11 NOTE — PROGRESS NOTES
[facility]    [facility]              [phone]                       Plan :       Hypertension is well controlled and no changes are required. Acute elevation of creatinine is due to aggressive diuresis and I will hold further Lasix. SP IVF with mild improvement in creatinine  PTH is 81    OK to DC from renal perspective and will follow up in 2 weeks. Assessment :       Stage III B chronic kidney disease with acute kidney injury: Current creatinine is 2.6 with a BUN of 45.     Stop Lasix     Echocardiogram showed LVEF of 55%.  She has grade 2 diastolic dysfunction.     Baseline creatinine is 1.9 with a GFR of 30 mL per minute.  She has underlying stage III B chronic kidney dis lisinopril was discontinued ease from hypertensive nephrosclerosis.     She also has 1 g per day of proteinuria and was on lisinopril.  She has history of hyperkalemia and is on low potassium diet. In the face of acute kidney injury    Hypertension: It is  labile.  She arrived with a high blood pressure due to noncompliance with medication.     I will continue with amlodipine, hydralazine, Imdur and Toprol-XL.    Blood pressure is well controlled now     No further workup is required as she had extensive workup.       Free light chain ratio is 1.7. [facility] would like to thank Makenzie Milan MD   for opportunity to serve this patient      Please call with questions at-   24 Hrs Answering service [phone] or  7 am- 5 pm via Perfect serve or cell phone  Brianna Alegria          CC/reason for consult :     Chronic kidney disease with acute kidney injury     HPI :     Diaz Sinclair is a 80 y.o. female presented to   the hospital on 6/5/2019 with shortness of breath. She has past medical history of congestive heart failure, atrial fibrillation, hyperlipidemia, hypertension and chronic kidney disease. She presented with shortness of breath on June 5, 2019.   Breathing was worse on exertion. EMS was called and she was saturating 100%. P breath better with 4 L of oxygen. There was no associated chest pain, chest tightness or palpitation. She was noted to be hypertensive with nonspecific EKG changes. Cardiology was consulted and stress test was negative for ischemia. She was placed on aspirin and Lipitor. She is also on metoprolol, hydralazine and Imdur. Amlodipine was also added. Dopplers of lower extremity did not reveal any evidence for DVT. She arrived with a creatinine of 1.9 and has gone up to 2.7 with diuresis. I was called for further management of acute on chronic kidney disease. She was last seen by me in the hospital in March 2019 for stage III kidney disease. She was seen by me in the office in August 2018. Interval History:     She was initiated on Lasix 40 mg IV twice daily for shortness of breath and possible fluid overload. Urine not collected  BP is stable    ROS:     Seen with-residents    positives in bold   Constitutional:  fever, chills, weakness, weight change, fatigue  Skin:  rash, pruritus, hair loss, bruising, dry skin, petechiae  Head, Face, Neck   headaches, swelling,  cervical adenopathy  Respiratory: shortness of breath, cough, or wheezing  Cardiovascular: chest pain, palpitations, dizzy, edema  Gastrointestinal: nausea, vomiting, diarrhea, constipation,belly pain    Yellow skin, blood in stool  Musculoskeletal:  back pain, muscle weakness, gait problems,       joint pain or swelling. Genitourinary:  dysuria, poor urine flow, flank pain, blood in urine  Neurologic:  vertigo, TIA'S, syncope, seizures, focal weakness  Psychosocial:  insomnia, anxiety, or depression. Additional positive findings:                          All other remaining systems are negative.         PMH/PSH/SH/Family History:     Past Medical History:   Diagnosis Date    Acute on chronic diastolic [facility]congestive [facility] 3/24/2019    Atrial fibrillation ([facility] 3/24/2019    Chronic kidney disease     Hyperlipidemia     borderline per pt    Hypertension     [facility]        History reviewed. No pertinent surgical history. Social History     Socioeconomic History    Marital status:       Spouse name: Not on file    Number of children: Not on file    Years of education: Not on file    Highest education level: Not on file   Occupational History    Not on file   Social Needs    Financial resource strain: Not on file    Food insecurity:     Worry: Not on file     Inability: Not on file    Transportation needs:     Medical: Not on file     Non-medical: Not on file   Tobacco Use    Smoking status: Former Smoker     Packs/day: 0.25     Years: 3.00     Pack years: 0.75    Smokeless tobacco: Never Used   Substance and Sexual Activity    Alcohol use: Yes     Comment: occ    Drug use: No    Sexual activity: Never   Lifestyle    Physical activity:     Days per week: Not on file     Minutes per session: Not on file    Stress: Not on file   Relationships    Social connections:     Talks on phone: Not on file     Gets together: Not on file     Attends Rastafarian service: Not on file     Active member of club or organization: Not on file     Attends meetings of clubs or organizations: Not on file     Relationship status: Not on file    Intimate partner violence:     Fear of current or ex partner: Not on file     Emotionally abused: Not on file     Physically abused: Not on file     Forced sexual activity: Not on file   Other Topics Concern    Not on file   Social History Narrative    Not on file           Problem Relation Age of Onset    High Blood Pressure Mother     Kidney Disease Brother           Medication:     Scheduled Meds:   amLODIPine  10 mg Oral Daily    hydrALAZINE  100 mg Oral 3 times per day    heparin (porcine)  5,000 Units Subcutaneous 3 times per day    atorvastatin  40 mg Oral Nightly    aspirin EC  81 mg Oral Daily    isosorbide mononitrate  60 mg Oral Daily    metoprolol succinate  50 mg Oral Daily    sodium chloride flush  10 mL Intravenous 2 times per day     Continuous Infusions:   sodium chloride 100 mL/hr at 06/10/19 2255    dextrose       PRN Meds:.acetaminophen, glucose, dextrose, glucagon (rDNA), dextrose, tiZANidine, sodium chloride flush, ondansetron, hydrALAZINE       Vitals :     Vitals:    06/11/19 0826   BP: 138/79   Pulse: 62   Resp: 16   Temp: 97.7 °F (36.5 °C)   SpO2: 96%       I & O :       Intake/Output Summary (Last 24 hours) at 6/11/2019 0925  Last data filed at 6/11/2019 0344  Gross per 24 hour   Intake 1992 ml   Output 500 ml   Net 1492 ml        Physical Examination :     General appearance: Anxious- no, distressed- no, in good spirits- yes  HEENT: Lips- normal, teeth- ok , oral mucosa- moist  Neck : Mass- no, appears symmetrical, JVD- not visible  Respiratory: Respiratory effort-OK, wheeze- no, crackles - no  Cardiovascular:  Ausculation- No M/R/G, Edema ++  Abdomen: visible mass- no, distention- no, scar- no, tenderness- no                            hepatosplenomegaly-  no  Musculoskeletal:  clubbing no,cyanosis- no , digital ischemia- no                           muscle strength- grossly normal , tone - grossly normal  Skin: rashes- no , ulcers- no, induration- no, tightening - no  Psychiatric:  Judgement and insight- normal           AAO X 3     LABS:     Recent Labs     06/09/19  0457 06/10/19  0513 06/11/19  0508   WBC 5.8 5.8 5.9   HGB 12.0 11.1* 11.1*   HCT 37.6 34.5* 34.1*    202 199     Recent Labs     06/09/19  0457 06/10/19  0513 06/11/19  0508    136 137   K 4.4 4.0 3.9    105 106   CO2 17* 19* 19*   BUN 46* 48* 45*   CREATININE 2.5* 2.7* 2.6*   GLUCOSE 82 86 83   PHOS 4.2 3.9 3.4      Nephrology  [facility] [address], [address]  Office: [phone]  Cell: [phone]  Fax: [phone]

## 2019-06-11 NOTE — CARE COORDINATION
[facility]    Spoke with patient regarding [facility] services. Patient aware and agreeable to services. Faxed orders to [facility].     Danielito Villar LPN  Care Transition Nurse  [address]  [phone]      Patient will be staying with daughter Maryelizabeth Eisenmenger [phone]  [id number]

## 2019-06-11 NOTE — PROGRESS NOTES
activity tolerance, ADL status and endurance  Prognosis: Good  Patient Education: ot role, walker safety, transfer safety, energy conservation techniques during ADL tasks, pursed lip breathing- verbalized understanding  Activity Tolerance  Activity Tolerance: Patient limited by fatigue;Patient Tolerated treatment well  Activity Tolerance: Pt demos increased exertion following ADLs standing sink, requires brief seated rest period. Pulse ox reads 98% O2 seated, /80. RN aware. Safety Devices  Safety Devices in place: Yes  Type of devices: Left in chair;Call light within reach; Chair alarm in place;Nurse notified         Patient Diagnosis(es): The encounter diagnosis was NSTEMI (non-ST elevated myocardial infarction) ([facility] [address] has a past medical history of Acute on chronic diastolic CHF (congestive heart failure) ([facility] [address] Atrial fibrillation ([facility] [address] Chronic kidney disease, Hyperlipidemia, Hypertension, and Peripheral vascular disease ([facility] [address] has no past surgical history on file. Restrictions  Position Activity Restriction  Other position/activity restrictions: up with assist     Subjective   General  Chart Reviewed: Yes  Patient assessed for rehabilitation services?: Yes  Additional Pertinent Hx: Pt presented at ER 6/5 with cheif complaint of SOB at rest. EKG showed non specific changes (T-wave inversions), mild troponin elevation, admitted with diagnosis of NSTEMI. Droppler (-) stress test (-) for MI, EF 74%. PMH: CHF, HTN, HLD, CJD, PVD, Afib. Diagnosis: NSTEMI (non-ST elevated myocardial infarction)   Subjective  Subjective: Pt seated in recliner upon entry; pleasant and agreeable throughout session, \"It feels good to be out of that bed. \"  Vital Signs  Patient Currently in Pain: Other (comment)(unrated mild pain with movement in B LE's; RN aware.)     Orientation  Orientation  Overall Orientation Status: Within Functional Limits     Objective    ADL  Grooming: Supervision(standing at sink complete hand/face washing.)  Toileting: Stand by assistance(with heavy reliance on grab bar; completes thorough carly-care standing.)     Balance  Sitting Balance: Supervision(unsupported seated EOB; independent in recliner.)  Standing Balance: Contact guard assistance(with RW; occasional cueing for hand placement.)  Standing Balance  Time: ~3min + 2 min + 3.5 min  Activity: room/bathroom mobility, ADLs standing, carroll/room mobility. Comment: Requires brief rest period following ADLs standing at sink. Functional Mobility  Functional - Mobility Device: Rolling Walker  Activity: To/from bathroom; Other(and carroll.)  Assist Level: Contact guard assistance  Functional Mobility Comments: Steady, slow and cautious during functional mobility.     Toilet Transfers  Toilet - Technique: Ambulating  Equipment Used: Standard toilet  Toilet Transfer: (heavy reliance on grab bar.)    Bed mobility  Supine to Sit: Supervision(HOB elevated, slow and effortful.)  Scooting: Supervision(to EOB; back in chair.)     Transfers  Sit to stand: Contact guard assistance(x3 trials; from EOB/standard commode/recliner)  Stand to sit: Contact guard assistance(x3; standard commode/EOB/recliner, cueing required for hand placement.)      Cognition  Overall Cognitive Status: WFL     Type of ROM/Therapeutic Exercise  Type of ROM/Therapeutic Exercise: AROM  Comment: Bilateral UE exercises seated in chair - shoulder shrugs x10 reps, elbow flexion/extension x10 reps, horizontal ab/adduction x10 reps        Plan   Plan  Times per week: 2-5x  Times per day: Daily  Current Treatment Recommendations: Endurance Training, Self-Care / ADL, Other (comment)    AM-PAC Score  AM-[facility] Inpatient Daily Activity Raw Score: 19 (06/11/19 1010)  AM-PAC Inpatient ADL T-Scale Score : 40.22 (06/11/19 1010)  ADL Inpatient CMS 0-100% Score: 42.8 (06/11/19 1010)  ADL Inpatient CMS G-Code Modifier : CK (06/11/19 1010)    Goals  Short term goals  Time Frame for Short term goals: by d/c  Short term goal 1: Increase standing tolerance to 3 minutes without SOB for increased activity tolerance - Met 6/11; Increase standing tolerance to 6 min - Not met  Short term goal 2: Complete toilet transfer SBA with minimal reliance of grab bar and no VC for walker safety - Not met  Short term goal 3: Complete LB dressing assessment - Not assessed  Short term goal 4: Verbalize 3 energy conservation techniques for increased ADL independence - Not met       Therapy Time   Individual Concurrent Group Co-treatment   Time In 0820         Time Out 0910         Minutes 50         Timed Code Treatment Minutes: 50  Total Treatment Minutes: 50    If pt discharges prior to next treatment session, this note will serve as discharge summary. Boom Martino, Student DOTY    Occupational Therapist was present, directed patient care, made skilled judgement, and was responsible for the assessment and treatment of the patient.     (Kathy Jensen, OTR/L, [id number])

## 2019-06-12 ENCOUNTER — TELEPHONE (OUTPATIENT)
Dept: FAMILY MEDICINE CLINIC | Age: 84
End: 2019-06-12

## 2019-06-12 ENCOUNTER — CARE COORDINATION (OUTPATIENT)
Dept: CASE MANAGEMENT | Age: 84
End: 2019-06-12

## 2019-06-12 NOTE — CARE COORDINATION
[facility] Transitions Initial Follow Up Call    Call within 2 business days of discharge: Yes    Patient: Airam Cook Patient : [id number]   MRN: [id number]   Reason for Admission: NSTEMI, CHF   Discharge Date: 19 RARS: Readmission Risk Score: 24      Last Discharge [facility]       Complaint Diagnosis Description Type Department Provider    19 Shortness of Breath NSTEMI (non-ST elevated myocardial infarction) [facility]) ED to Hosp-Admission (Discharged) (ADMITTED) [facility] 4 U Malu Zamora MD; Thyra Mortimer Will. .. Spoke with: Daughter Simon Ivan: [facility]     Non-face-to-face services provided:  Obtained and reviewed discharge summary and/or continuity of care documents  Education of patient/family/caregiver/guardian to support self-management-. Assessment and support for treatment adherence and medication management-.    Care Transitions 24 Hour Call    Schedule Follow Up Appointment with PCP:  Declined  Do you have any ongoing symptoms?:  No  Do you have a copy of your discharge instructions?:  Yes  Do you have all of your prescriptions and are they filled?:  Yes  Have you been contacted by a [facility] Pharmacist?:  No  Have you scheduled your follow up appointment?:  Yes  How are you going to get to your appointment?:  Car - family or friend to transport  Were you discharged with any Home Care or Post Acute Services:  Yes  Post Acute Services:  Home Health (Comment: [facility])  Patient DME:  Sabina cane, Walker, Other  Other Patient DME:  [facility]  Do you have support at home?:  Partner/Spouse/SO  Do you feel like you have everything you need to keep you well at home?:  Yes  Are you an active caregiver in your home?:  No  Care Transitions Interventions  No Identified Needs       Summary  CTC spoke with patient's daughter Tricia Llamas this am for initial 24 hour discharge follow up CTC call.  Daughter states patient is doing well, denies any complaints of nausea, vomiting, fevers, chills, SOB or Cough. No LE Edema, states patients weight was 143 lbs this am, denies any chest pain, dizziness or lightheadedness. Patient with no difficulty with urination, BM's or Appetite. CTC encouraged daughter to make sure patient is weighing herself daily, reporting a 3 lb weight gain overnight, or a 5 lb weight gain in week. CTC also encouraged daughter to have patient follow low sodium diet and fluid restriction. SN with [facility] in home at time of CTC call. Daughter declined to review medications with CTC, reviewed meds with SN from [facility] already. Follow up with Cardiologist has been scheduled already. CTC advised Pt of use of urgent care or physicians 24 hr access line if assistance is needed after hours or CTC weekend. CTC provided education on s/s that require medical attention and when to seek medical attention. Pt denies any needs or concerns and is agreeable with additional calls.     Follow Up  Future Appointments   Date Time Provider Fany Schreiber   6/17/2019  4:00 PM FRANCESCO Hernadez - CNP [place] [facility]   7/12/2019  8:30 AM MD Victoria [facility]       Jermaine Guerrero RN

## 2019-06-14 ENCOUNTER — CARE COORDINATION (OUTPATIENT)
Dept: CASE MANAGEMENT | Age: 84
End: 2019-06-14

## 2019-06-14 NOTE — CARE COORDINATION
[facility] Transitions Follow Up Call    2019    Patient: Yoana Dumont  Patient : 1932   MRN: [id number]   Reason for Admission: NSTEMI  Discharge Date: 19 RARS: Readmission Risk Score: 24         Spoke with: Yoana Dumont and Daughter [facility] Transitions Subsequent and Final Call    Schedule Follow Up Appointment with PCP:  Declined  Subsequent and Final Calls  Do you have any ongoing symptoms?:  No  Have your medications changed?:  No  Do you have any questions related to your medications?:  No  Do you currently have any active services?:  Yes  Are you currently active with any services?:  Home Health  Do you have any needs or concerns that I can assist you with?:  No  Identified Barriers:  None  Care Transitions Interventions  No Identified Needs  Other Interventions:          Summary  CTC spoke with patient and daughter Benjamín Blue this afternoon for follow up CTC call. Daughter reports weight was 143 lbs, no LE Edema present, no chest pain at this time. CTC encouraged daughter to continue to monitor weight and when to report to MD.  No complaints of nausea, vomiting, fevers, chills, SOB or Cough. Patient with no difficulty with urination, BM's or Appetite. Daughter states weakness is improving slowly. BP was 116/71, HR was 68. CTC encouraged daughter to have patient rest as needed, SN to be back on 2019. CTC advised Pt of use of urgent care or physicians 24 hr access line if assistance is needed after hours or CTC weekend. CTC provided education on s/s that require medical attention and when to seek medical attention. Pt denies any needs or concerns and is agreeable with additional calls.     Follow Up  Future Appointments   Date Time Provider Fany Schreiber   2019  4:00 PM FRANCESCO Zee - CNP [place] [facility]   2019  8:30 AM MD Victoria Watson RN

## 2019-06-18 ENCOUNTER — TELEPHONE (OUTPATIENT)
Dept: PHARMACY | Facility: CLINIC | Age: 84
End: 2019-06-18

## 2019-06-18 DIAGNOSIS — I21.4 NSTEMI (NON-ST ELEVATED MYOCARDIAL INFARCTION) (HCC): Primary | ICD-10-CM

## 2019-06-18 PROCEDURE — 1111F DSCHRG MED/CURRENT MED MERGE: CPT | Performed by: PHARMACIST

## 2019-06-18 NOTE — TELEPHONE ENCOUNTER
daily To cook, daughter uses spices/olive oil/taking out salt to help dx states    diclofenac sodium 1 % GEL Apply 4 g topically 4 times daily as needed for Pain Does not have a supply of at the moment     ergocalciferol (ERGOCALCIFEROL) 26761 units capsule Take 1 capsule by mouth once a week for 12 doses     isosorbide mononitrate (IMDUR) 60 MG extended release tablet Take 1 tablet by mouth daily     acetaminophen (TYLENOL) 325 MG tablet Take 2 to 3 tablets po every 6 hours as needed for pain. Do not exceed 10 tablets/24h. Takes usually once daily at least. Drugs to avoid: NSAIDs.  tiZANidine (ZANAFLEX) 2 MG tablet Take 1 tablet by mouth every 8 hours as needed (back, thigh, and leg pain or spasm) Pt's daughter states she reads all side effects of drugs, and states most can cause dizziness/drowzines. Counseled on BEER's criteria/fall risk and that this one should especially be watched when given for these side effects. Also, not so good for her because of renal function.  aspirin EC 81 MG EC tablet Take 1 tablet by mouth daily        Additional Medications:  May try melatonin to help sleep better, she has not decided yet. Educated on natural product, risk vs benefit, and no interactions with current medications. No pill box used -  Written down - sectioned off with bottles accordingly (fior and son have used this system for a long time and its worked). Estimated Creatinine Clearance: 14 mL/min (A) (based on SCr of 2.6 mg/dL (H)). Assessment/Plan:  - Medication reconciliation completed. - Pt is taking medications as directed by discharging physician. Number of discrepancies: 1. Instructions per discharge list provided except per below documentation. Identified medication discrepancies/issues:  · Category 4 (1):  1. Patient refused to take atorvastatin last two days. Daughter gives medications/who I spoke with, therefore education was provided to her.  Education included age considerations, why she was put on med after NSTEMI, and long term (10 year) prevention goals are (prevention of another heart attack or a stroke). Daughter felt confident giving statin to pt, is well educated on side effects, and will call the doctor right away if she notices any myalgia or myopathy.     - Identified Potential Medication Interactions: The following clinically significant interactions were identified via [employer] Interaction Analysis as category D or higher: metoprolol and tizanidine; Alpha2-Agonists may enhance the AV-blocking effect of Beta-Blockers. Sinus node dysfunction may also be enhanced. Beta-Blockers may enhance the rebound hypertensive effect of Alpha2-Agonists. Significance of this interaction may be greater in patients with heart failure. - Renal Dosing: According to [employer], the following should have renal adjustment: hydralazine (accounted for) and tizanidine (CrCl <25 mL/minute: Use with caution; clearance reduced >50%). .  Component      Latest Ref Rng & Units 6/5/2019 4/24/2019 4/9/2019           3:54 PM  1:10 PM  2:30 PM   GFR Non-      >60 25 (A) 22 (A) 17 (A)   GFR       >60 30 (A) 27 (A) 20 (A)     Estimated Creatinine Clearance: 14.8 mL/min (based on SCr of 2.6 mg/dL).     - Follow up appointment date (7 days for more severe illness, 14 days for others):  · Patient encouraged to follow up with PCP/specialists as advised at discharge: Cardiologist (6/24/19 and 7/12/19)    Thank you,    Miles Dorsey, PharmD [place] [address]  Dept: [phone] (toll free [phone], option 7)

## 2019-06-18 NOTE — TELEPHONE ENCOUNTER
CLINICAL PHARMACY NOTE  Post-Discharge Transitions of Care (NEGIN)    Patient was discharged from [address] [place] [address] on 6/11/19. Please attempt to reach patient/daughter to review medications.      Princess Alfonso, PharmD, [address] [facility]  Direct: [phone]  Department, toll free: [phone], option 7

## 2019-06-19 LAB
ANION GAP SERPL CALCULATED.3IONS-SCNC: 13 MMOL/L (ref 3–16)
BUN BLDV-MCNC: 40 MG/DL (ref 7–20)
CALCIUM SERPL-MCNC: 8.4 MG/DL (ref 8.3–10.6)
CHLORIDE BLD-SCNC: 102 MMOL/L (ref 99–110)
CO2: 17 MMOL/L (ref 21–32)
CREAT SERPL-MCNC: 2.4 MG/DL (ref 0.6–1.2)
GFR AFRICAN AMERICAN: 23
GFR NON-AFRICAN AMERICAN: 19
GLUCOSE BLD-MCNC: 109 MG/DL (ref 70–99)
POTASSIUM SERPL-SCNC: 3.6 MMOL/L (ref 3.5–5.1)
PRO-BNP: 3605 PG/ML (ref 0–449)
SODIUM BLD-SCNC: 132 MMOL/L (ref 136–145)

## 2019-06-19 NOTE — TELEPHONE ENCOUNTER
Reviewed and agree with PharmD candidate below.     Lei Alfonso PharmD, [address]  Direct: [phone]  Department, toll free: [phone], option 7     =======================================================   For Pharmacy Admin Tracking Only    TCM Call Made?: Yes  [facility] Select Patient?: Yes  Total # of Interventions Recommended: 0  Total # Interventions Accepted: 0  Intervention Severity:   - Level 1 Intervention Present?: No   - Level 2 #: 0   - Level 3 #: 0  Outreach Status: Review Complete  Care Coordinator Outreach to Patient?: Yes  Provider Contacted?: No  Time Spent (min): 20

## 2019-06-20 ENCOUNTER — CARE COORDINATION (OUTPATIENT)
Dept: CASE MANAGEMENT | Age: 84
End: 2019-06-20

## 2019-06-20 NOTE — CARE COORDINATION
[facility] Transitions Follow Up Call    2019    Patient: Swapna Martines  Patient : 1932   MRN: [id number]   Reason for Admission: NSTEMI  Discharge Date: 19 RARS: Readmission Risk Score: 24         Spoke with: daughter- Salvador Lopez Transitions Subsequent and Final Call    Schedule Follow Up Appointment with PCP:  Declined  Subsequent and Final Calls  Do you have any ongoing symptoms?:  No  Have your medications changed?:  No  Do you currently have any active services?:  Yes  Are you currently active with any services?:  Home Health  Do you have any needs or concerns that I can assist you with?:  No  Identified Barriers:  None  Care Transitions Interventions  No Identified Needs  Other Interventions:          Summary  CTN spoke with patient's daughter this afternoon for follow up CTN call. Daughter states patient is doing well, denies any complaints of nausea, vomiting, fevers, chills, SOB or Cough. No difficulty with urination, BM's or appetite. Patient states PT with [facility] was in home today, SN to be in tomorrow. Patients weight was 139 lbs this am.  CTN encouraged daughter to continue with current care, doing a great job with patient's daily weights and keeping weight off. No new or changed medications at this time, no issues or concerns. CTN advised Pt of use of urgent care or physicians 24 hr access line if assistance is needed after hours or CTN weekend. CTN provided education on s/s that require medical attention and when to seek medical attention. Pt denies any needs or concerns and is agreeable with additional calls.       Follow Up  Future Appointments   Date Time Provider Fany Schreiber   2019 10:30 AM FRANCESCO Contreras - CNP [place] [facility]   2019 12:20 PM Eduardo Sahu, DO [facility] [facility]   2019  8:30 AM MD Victoria Bishop [facility]       Liliam Campos RN

## 2019-06-21 ASSESSMENT — ENCOUNTER SYMPTOMS
RESPIRATORY NEGATIVE: 1
GASTROINTESTINAL NEGATIVE: 1

## 2019-06-21 NOTE — PROGRESS NOTES
[facility]   Congestive Heart Failure    Primary Care Doctor:  Francy Porter DO  Primary Cardiologist: Lawrence Velasco Pt: yes    Chief Complaint:  fatigue    History of Present Illness:  Brandee Poole is a 80 y.o. female with PMH HFpEF, PH, HTN, CKD4 who presents today for hospital f/u. Brandee Poole was readmitted 6/5/19 with CHF exacerbation and discharged 6/11/19. Presents today with improvement in SOB, and increased exercise tolerance. Reports only taking 20 mg lasix prn when weight increases >2lb in one day, or >5lb weight gain in one week, hasn't taken Lasix since hospital discharge. Hospital course: admitted for NSTEMI, and acute on chronic diastolic heart failure due to fluid overload. Stress test was negative. She was treated with IV Lasix with good urine output and resolution of her respiratory symptoms. Since hospitalization she reports mild fatigue, denies chest pain, dyspnea, palpitations, orthopnea, PND, exertional chest pressure/discomfort, early saiety, syncope. Hospital weight on d/c was Wt 141 lb; Today's Home Wt: 139 lb    Baseline Weight: 139-home scale-Consistent       Admit BNP:  5,839     Discharge BNP: 2,662  EF: 55%  Cardiac Imaging: Echo 3/17/19  Normal left ventricle size. There is mild to moderately increased left   ventricular wall thickness. Overall left ventricular systolic function   appears normal with an ejection fraction visually estimated at 55%. No   regional wall motion abnormalities are noted. Diastolic filling parameters   suggest grade II diastolic dysfunction.   Mild mitral regurgitation is present.   Mild to Moderate tricuspid regurgitation.  Estimated pulmonary artery   systolic pressure is elevated at 43 mmHg assuming a right atrial pressure of   3 mmHg.   The left atrium is mildly dilated.      NM MYOCARDIAL SPECT REST EXERCISE OR RX 6/5/19    Summary    There is normal isotope uptake at stress and rest. There is no evidence of  myocardial ischemia or scar.    Normal LV size and systolic function.    Left ventricular ejection fraction of 74 %.    Septal wall hypokinesis.    Overall findings represent a low risk scan.         Device: No     Activity: below baseline, but improving, has HHN  Can you walk 1-2 blocks or do a moderate amount of house/yard work? No  Cardiac Rehab Referral: No     NYHA Class: II     Sodium Restrictions: 2g  Fluid Restrictions: 48-64 oz/day  Sodium and fluid restriction compliance: good    Pt Education: The patient has received education on the following topics: dietary sodium restriction, heart failure medications, the importance of physical activity, symptom management and weight monitoring         Past Medical History:   has a past medical history of Acute on chronic diastolic CHF (congestive heart failure) ([facility] [address] Atrial fibrillation ([facility] [address] Chronic kidney disease, Hyperlipidemia, Hypertension, and Peripheral vascular disease ([facility] [address] Surgical History:   has no past surgical history on file. Social History:   reports that she has quit smoking. She has a 0.75 pack-year smoking history. She has never used smokeless tobacco. She reports that she drinks alcohol. She reports that she does not use drugs. Family History:   Family History   Problem Relation Age of Onset    High Blood Pressure Mother     Kidney Disease Brother        Home Medications:  Prior to Admission medications    Medication Sig Start Date End Date Taking?  Authorizing Provider   atorvastatin (LIPITOR) 40 MG tablet Take 1 tablet by mouth nightly 6/11/19   Lluvia Mendoza MD   hydrALAZINE (APRESOLINE) 100 MG tablet Take 1 tablet by mouth every 8 hours 6/11/19   Lluvia Mendoza MD   amLODIPine (NORVASC) 10 MG tablet Take 1 tablet by mouth daily 6/12/19   Lluvia Mendoza MD   furosemide (LASIX) 40 MG tablet Take 0.5 tablets by mouth daily If wt increases by 2 pounds in 1 day or or 5 pounds in 1 week then take 20mg twice a day 6/11/19   [facility] Cardiovascular: Normal rate, regular rhythm, normal heart sounds and intact distal pulses. Pulmonary/Chest: Effort normal and breath sounds normal.   Abdominal: Soft. Musculoskeletal: Normal range of motion. She exhibits no edema. Neurological: She is alert and oriented to person, place, and time. Skin: Skin is warm and dry. Psychiatric: She has a normal mood and affect.  Her behavior is normal. Judgment and thought content normal.       Lab Data:    CBC:   Lab Results   Component Value Date    WBC 5.9 06/11/2019    WBC 5.8 06/10/2019    WBC 5.8 06/09/2019    RBC 3.95 06/11/2019    RBC 4.03 06/10/2019    RBC 4.34 06/09/2019    HGB 11.1 06/11/2019    HGB 11.1 06/10/2019    HGB 12.0 06/09/2019    HCT 34.1 06/11/2019    HCT 34.5 06/10/2019    HCT 37.6 06/09/2019    MCV 86.1 06/11/2019    MCV 85.6 06/10/2019    MCV 86.6 06/09/2019    RDW 17.4 06/11/2019    RDW 17.8 06/10/2019    RDW 18.2 06/09/2019     06/11/2019     06/10/2019     06/09/2019     BMP:  Lab Results   Component Value Date     06/19/2019     06/11/2019     06/10/2019    K 3.6 06/19/2019    K 3.9 06/11/2019    K 4.0 06/10/2019    K 5.4 06/05/2019    K 5.2 03/24/2019    K 4.7 03/22/2019     06/19/2019     06/11/2019     06/10/2019    CO2 17 06/19/2019    CO2 19 06/11/2019    CO2 19 06/10/2019    PHOS 3.4 06/11/2019    PHOS 3.9 06/10/2019    PHOS 4.2 06/09/2019    BUN 40 06/19/2019    BUN 45 06/11/2019    BUN 48 06/10/2019    CREATININE 2.4 06/19/2019    CREATININE 2.6 06/11/2019    CREATININE 2.7 06/10/2019     BNP:   Lab Results   Component Value Date    PROBNP 3,605 06/19/2019    PROBNP 2,662 06/10/2019    PROBNP 5,839 06/06/2019     Iron Studies:  No results found for: FERRITIN  Iron Deficiency Anemia:  No IV Iron Therapy:  No  2017 ACC/AHA HF Guidelines:   intravenous iron replacement in patients with New York Heart Association (NYHA) class II and III HF and iron deficiency(ferritin <100

## 2019-06-24 ENCOUNTER — OFFICE VISIT (OUTPATIENT)
Dept: CARDIOLOGY CLINIC | Age: 84
End: 2019-06-24
Payer: MEDICARE

## 2019-06-24 VITALS
BODY MASS INDEX: 24.69 KG/M2 | HEART RATE: 70 BPM | DIASTOLIC BLOOD PRESSURE: 64 MMHG | WEIGHT: 139.4 LBS | SYSTOLIC BLOOD PRESSURE: 118 MMHG

## 2019-06-24 DIAGNOSIS — I10 ESSENTIAL HYPERTENSION, MALIGNANT: ICD-10-CM

## 2019-06-24 DIAGNOSIS — I50.32 CHRONIC DIASTOLIC CONGESTIVE HEART FAILURE (HCC): Primary | ICD-10-CM

## 2019-06-24 DIAGNOSIS — R06.02 SHORTNESS OF BREATH: ICD-10-CM

## 2019-06-24 PROCEDURE — G8427 DOCREV CUR MEDS BY ELIG CLIN: HCPCS | Performed by: NURSE PRACTITIONER

## 2019-06-24 PROCEDURE — G8598 ASA/ANTIPLAT THER USED: HCPCS | Performed by: NURSE PRACTITIONER

## 2019-06-24 PROCEDURE — 99214 OFFICE O/P EST MOD 30 MIN: CPT | Performed by: NURSE PRACTITIONER

## 2019-06-24 PROCEDURE — 4040F PNEUMOC VAC/ADMIN/RCVD: CPT | Performed by: NURSE PRACTITIONER

## 2019-06-24 PROCEDURE — 1090F PRES/ABSN URINE INCON ASSESS: CPT | Performed by: NURSE PRACTITIONER

## 2019-06-24 PROCEDURE — 1111F DSCHRG MED/CURRENT MED MERGE: CPT | Performed by: NURSE PRACTITIONER

## 2019-06-24 PROCEDURE — 1036F TOBACCO NON-USER: CPT | Performed by: NURSE PRACTITIONER

## 2019-06-24 PROCEDURE — 1123F ACP DISCUSS/DSCN MKR DOCD: CPT | Performed by: NURSE PRACTITIONER

## 2019-06-24 PROCEDURE — G8420 CALC BMI NORM PARAMETERS: HCPCS | Performed by: NURSE PRACTITIONER

## 2019-06-24 ASSESSMENT — ENCOUNTER SYMPTOMS
SHORTNESS OF BREATH: 0
WHEEZING: 0

## 2019-06-24 NOTE — PATIENT INSTRUCTIONS
Instructions:   1. Medications: Continue current medications. Continue taking Lasix with >2lb wt gain in one day, or >5lb in one week  2. Labs: one week per Home Health  3. Lifestyle Recommendations: Weigh yourself every day in the morning after urination, call Jordan Whitaker if wt increases 2-3lb in one day or 5lb in one week, Limit sodium to 2000mg/day and fluids to 2L or 64oz/day. Add a fish oil supplement.    4. Follow up: Dr. Oskar Mayen 2 weeks       CHF Resource Line: [phone] - Katie Lips

## 2019-06-25 ENCOUNTER — OFFICE VISIT (OUTPATIENT)
Dept: FAMILY MEDICINE CLINIC | Age: 84
End: 2019-06-25
Payer: MEDICARE

## 2019-06-25 ENCOUNTER — CARE COORDINATION (OUTPATIENT)
Dept: CASE MANAGEMENT | Age: 84
End: 2019-06-25

## 2019-06-25 VITALS
HEART RATE: 61 BPM | OXYGEN SATURATION: 99 % | DIASTOLIC BLOOD PRESSURE: 61 MMHG | SYSTOLIC BLOOD PRESSURE: 124 MMHG | RESPIRATION RATE: 12 BRPM

## 2019-06-25 DIAGNOSIS — N18.4 CHRONIC KIDNEY DISEASE, STAGE IV (SEVERE) (HCC): ICD-10-CM

## 2019-06-25 DIAGNOSIS — I11.0 HYPERTENSIVE HEART DISEASE WITH CONGESTIVE HEART FAILURE, UNSPECIFIED HEART FAILURE TYPE (HCC): Primary | ICD-10-CM

## 2019-06-25 DIAGNOSIS — Z28.21 VACCINATION REFUSED BY PATIENT: ICD-10-CM

## 2019-06-25 PROCEDURE — G8420 CALC BMI NORM PARAMETERS: HCPCS | Performed by: FAMILY MEDICINE

## 2019-06-25 PROCEDURE — 4040F PNEUMOC VAC/ADMIN/RCVD: CPT | Performed by: FAMILY MEDICINE

## 2019-06-25 PROCEDURE — 99213 OFFICE O/P EST LOW 20 MIN: CPT | Performed by: FAMILY MEDICINE

## 2019-06-25 PROCEDURE — G8427 DOCREV CUR MEDS BY ELIG CLIN: HCPCS | Performed by: FAMILY MEDICINE

## 2019-06-25 PROCEDURE — 1090F PRES/ABSN URINE INCON ASSESS: CPT | Performed by: FAMILY MEDICINE

## 2019-06-25 PROCEDURE — 1036F TOBACCO NON-USER: CPT | Performed by: FAMILY MEDICINE

## 2019-06-25 PROCEDURE — 1123F ACP DISCUSS/DSCN MKR DOCD: CPT | Performed by: FAMILY MEDICINE

## 2019-06-25 PROCEDURE — 1111F DSCHRG MED/CURRENT MED MERGE: CPT | Performed by: FAMILY MEDICINE

## 2019-06-25 PROCEDURE — G8598 ASA/ANTIPLAT THER USED: HCPCS | Performed by: FAMILY MEDICINE

## 2019-06-25 RX ORDER — AMLODIPINE BESYLATE 10 MG/1
10 TABLET ORAL DAILY
Qty: 30 TABLET | Refills: 5 | Status: ON HOLD | OUTPATIENT
Start: 2019-06-25 | End: 2019-08-21

## 2019-06-25 RX ORDER — PSEUDOEPHEDRINE HCL 30 MG
100 TABLET ORAL DAILY
Qty: 30 CAPSULE | Refills: 5 | Status: SHIPPED | OUTPATIENT
Start: 2019-06-25 | End: 2019-07-25

## 2019-06-25 RX ORDER — AMLODIPINE BESYLATE 10 MG/1
10 TABLET ORAL DAILY
Qty: 30 TABLET | Refills: 0 | Status: SHIPPED | OUTPATIENT
Start: 2019-06-25 | End: 2019-06-25 | Stop reason: SDUPTHER

## 2019-06-25 RX ORDER — FUROSEMIDE 40 MG/1
20 TABLET ORAL DAILY
Qty: 60 TABLET | Refills: 5 | Status: ON HOLD | OUTPATIENT
Start: 2019-06-25 | End: 2019-10-01 | Stop reason: HOSPADM

## 2019-06-25 RX ORDER — ERGOCALCIFEROL (VITAMIN D2) 1250 MCG
50000 CAPSULE ORAL WEEKLY
Qty: 12 CAPSULE | Refills: 0 | Status: SHIPPED | OUTPATIENT
Start: 2019-06-25 | End: 2019-06-25 | Stop reason: SDUPTHER

## 2019-06-25 RX ORDER — FUROSEMIDE 40 MG/1
20 TABLET ORAL DAILY
Qty: 60 TABLET | Refills: 0 | Status: SHIPPED | OUTPATIENT
Start: 2019-06-25 | End: 2019-06-25 | Stop reason: SDUPTHER

## 2019-06-25 RX ORDER — PSEUDOEPHEDRINE HCL 30 MG
100 TABLET ORAL DAILY
Qty: 30 CAPSULE | Refills: 0 | Status: SHIPPED | OUTPATIENT
Start: 2019-06-25 | End: 2019-06-25 | Stop reason: SDUPTHER

## 2019-06-25 RX ORDER — HYDRALAZINE HYDROCHLORIDE 100 MG/1
100 TABLET, FILM COATED ORAL EVERY 8 HOURS SCHEDULED
Qty: 60 TABLET | Refills: 0 | Status: SHIPPED | OUTPATIENT
Start: 2019-06-25 | End: 2019-06-25 | Stop reason: SDUPTHER

## 2019-06-25 RX ORDER — HYDRALAZINE HYDROCHLORIDE 100 MG/1
100 TABLET, FILM COATED ORAL EVERY 8 HOURS SCHEDULED
Qty: 60 TABLET | Refills: 5 | Status: ON HOLD | OUTPATIENT
Start: 2019-06-25 | End: 2019-08-21 | Stop reason: CLARIF

## 2019-06-25 RX ORDER — ERGOCALCIFEROL (VITAMIN D2) 1250 MCG
50000 CAPSULE ORAL WEEKLY
Qty: 12 CAPSULE | Refills: 1 | Status: ON HOLD | OUTPATIENT
Start: 2019-06-25 | End: 2019-08-21

## 2019-06-25 NOTE — PROGRESS NOTES
[facility]  Progress Note  DO Sasha Leroy  5/12/1932 06/26/19    Chief Complaint:   Akila Ordaz is a 80 y.o. female who is here for hospital f/u and HTN    HPI:   Hospital records reviewed. Brought here by son. Is chronically fatigued and uses a wheelchair. States she feels fine and is eating. Taking her medications and has scheduled cardiology appt. Hypertension: Patient here for follow-up of elevated blood pressure. She is not exercising and is adherent to low salt diet. Blood pressure is well controlled at home. Cardiac symptoms none. Patient denies chest pain, chest pressure/discomfort, claudication and dyspnea. Cardiovascular risk factors: advanced age (older than 54 for men, 72 for women), hypertension and sedentary lifestyle. Use of agents associated with hypertension: none. History of target organ damage: heart failure. ROS negative for headache, visionchanges, chest pain, abdominal pain, urinary sx, bowel changes. Past medical, surgical, and social history reviewed. and allergies reviewed. Allergies   Allergen Reactions    Bactrim [Sulfamethoxazole-Trimethoprim]     Nebivolol Hcl Other (See Comments)     Second degree heart block - changed to metoprolol    Other Nausea And Vomiting     Allergic to flu shot per pt, rxn years ago     Prior to Visit Medications    Medication Sig Taking?  Authorizing Provider   hydrALAZINE (APRESOLINE) 100 MG tablet Take 1 tablet by mouth every 8 hours Yes Jeffrey Thrasher, DO   amLODIPine (NORVASC) 10 MG tablet Take 1 tablet by mouth daily Yes Kelley Snowden DO   furosemide (LASIX) 40 MG tablet Take 0.5 tablets by mouth daily If wt increases by 2 pounds in 1 day or or 5 pounds in 1 week then take 20mg twice a day Yes Kelley Snowden DO   docusate (COLACE, DULCOLAX) 100 MG CAPS Take 100 mg by mouth daily Yes Kelley Snowden DO   diclofenac sodium 1 % GEL Apply 4 g topically 4 times daily as needed for Pain Yes Claudine Tapia, DO   ergocalciferol (ERGOCALCIFEROL) 65160 units capsule Take 1 capsule by mouth once a week for 12 doses Yes Nuzhat Snowden DO   atorvastatin (LIPITOR) 40 MG tablet Take 1 tablet by mouth nightly Yes Katy Allen MD   metoprolol succinate (TOPROL XL) 50 MG extended release tablet Take 1 tablet by mouth daily Yes Alejandra Fernandez MD   isosorbide mononitrate (IMDUR) 60 MG extended release tablet Take 1 tablet by mouth daily Yes FRANCESCO Gan CNP   acetaminophen (TYLENOL) 325 MG tablet Take 2 to 3 tablets po every 6 hours as needed for pain. Do not exceed 10 tablets/24h.  Yes Nuzhat Snowden DO   aspirin EC 81 MG EC tablet Take 1 tablet by mouth daily Yes Ralf Ramirez MD          Vitals:    06/25/19 1244   BP: 124/61   Pulse: 61   Resp: 12   SpO2: 99%      Wt Readings from Last 3 Encounters:   06/24/19 139 lb 6.4 oz (63.2 kg)   06/11/19 141 lb 1.5 oz (64 kg)   04/01/19 144 lb 3.2 oz (65.4 kg)     BP Readings from Last 3 Encounters:   06/25/19 124/61   06/24/19 118/64   06/11/19 129/72       Patient Active Problem List   Diagnosis    Chronic kidney disease    Essential hypertension, malignant    PVD (peripheral vascular disease) ([facility])    2nd degree AV block    1st degree AV block    Heart block    Adverse reaction to beta-blocker    History of CVA (cerebrovascular accident)    Frequent PVCs    Near syncope    Other fatigue    Sprain of anterior talofibular ligament of left ankle    Shortness of breath    Chronic diastolic CHF (congestive heart failure) (HCC)    Atrial fibrillation (HCC)    Pulmonary hypertension (HCC)    Pneumonia due to organism    Acute kidney injury superimposed on chronic kidney disease ([facility]    Acute on chronic diastolic CHF (congestive heart failure) ([facility])    NSTEMI (non-ST elevated myocardial infarction) ([facility]    Essential hypertension    Abnormal ECG    (HFpEF) heart failure with preserved ejection fraction ([facility]    Vaccination refused by patient       There is no immunization history for the selected administration types on file for this patient. Past Medical History:   Diagnosis Date    Acute on chronic diastolic CHF (congestive heart failure) (HCC) 3/24/2019    Atrial fibrillation ([facility] 3/24/2019    Chronic kidney disease     Hyperlipidemia     borderline per pt    Hypertension     Peripheral vascular disease ([facility]      No past surgical history on file. Family History   Problem Relation Age of Onset    High Blood Pressure Mother     Kidney Disease Brother      Social History     Socioeconomic History    Marital status:       Spouse name: Not on file    Number of children: Not on file    Years of education: Not on file    Highest education level: Not on file   Occupational History    Not on file   Social Needs    Financial resource strain: Not on file    Food insecurity:     Worry: Not on file     Inability: Not on file    Transportation needs:     Medical: Not on file     Non-medical: Not on file   Tobacco Use    Smoking status: Former Smoker     Packs/day: 0.25     Years: 3.00     Pack years: 0.75    Smokeless tobacco: Never Used   Substance and Sexual Activity    Alcohol use: Yes     Comment: occ    Drug use: No    Sexual activity: Never   Lifestyle    Physical activity:     Days per week: Not on file     Minutes per session: Not on file    Stress: Not on file   Relationships    Social connections:     Talks on phone: Not on file     Gets together: Not on file     Attends Latter-day service: Not on file     Active member of club or organization: Not on file     Attends meetings of clubs or organizations: Not on file     Relationship status: Not on file    Intimate partner violence:     Fear of current or ex partner: Not on file     Emotionally abused: Not on file     Physically abused: Not on file     Forced sexual activity: Not on file   Other Topics Concern    Not on file   Social History Narrative    Not on file       O: /61   Pulse 61   Resp 12   SpO2 99%   Physical Exam  GEN: No acute distress,cooperative, well nourished, alert. HEENT: PEERLA, EOMI , normocephalic/atraumatic, nares and oropharynx clear. Mucus membranes normal, Tympanic membranes clear bilaterally. Neck: soft, supple, no thyromegaly,mass, no Lymphadenopathy  CV: Regular rate and rhythm, no murmur, rubs, gallops. No edema. Resp: Clear to auscultation bilaterally good air entry bilaterally  No crackles, wheeze. Breathing comfortably. Psych: blunt affect. Neuro: AOx3      ASSESSMENT   Diagnosis Orders   1. Hypertensive heart disease with congestive heart failure, unspecified heart failure type ([facility]  COMPREHENSIVE METABOLIC PANEL   2. Chronic kidney disease, stage IV (severe) ([facility]     3. Vaccination refused by patient       #1: Will proceed with monitoring. #2: per nephrology. #3: addressed  Today. PLAN          If applicable, see additional patient information and instructions under \"Patient Instructions. \"    Return in about 3 months (around 9/25/2019) for Hypertension. Patient Instructions   1) Your family doctor sent renewals of prescriptions to [facility] on [address]. 2) Keep your appointments with cardiology. 3) Your family doctor want you to keep up with labwork and to do your labwork this summer. --You can get your lab work, x-ray, MRI, or ultrasound at our nearest [address] location across the parking lot at   [address] [facility]  Lab hours ([address] Monday through Friday; 8AM - noon on Saturdays),   Ph# [phone]  Radiology hours (7:00AM - 5PM Monday through Friday only)  --Call our office in 1 week if you have not heard about the results. Or check [facility] if you have previously enrolled. Please note a portion of this chart was generated using dragon dictation software.  Although every effort was made to ensure the accuracy of this automated transcription,some errors in transcription may have occurred.

## 2019-06-25 NOTE — CARE COORDINATION
[facility] Transitions Follow Up Call    2019    Patient: John Nascimento  Patient : 1932   MRN: [id number]   Reason for Admission: CHF  Discharge Date: 19 RARS: Readmission Risk Score: 24         Spoke with: [address] Transitions Subsequent and Final Call    Schedule Follow Up Appointment with PCP:  Declined  Subsequent and Final Calls  Do you have any ongoing symptoms?:  No  Have your medications changed?:  No  Do you have any questions related to your medications?:  No  Do you currently have any active services?:  Yes  Are you currently active with any services?:  Home Health  Do you have any needs or concerns that I can assist you with?:  No  Identified Barriers:  None  Care Transitions Interventions  No Identified Needs  Other Interventions:          Summary  CTN spoke with patient's daughter Camilo Maldonado this afternoon for final follow up CTN call. Daughter states patient is doing well, denies any complaints of nausea, vomiting, fevers, chills, SOB or Cough. No LE Edema, weight this am was 137.6 lbs. Daughter states she is also following fluid restriction and low sodium diet. SN with [facility] to be in later this week, instructed daughter to let SN know that patient will be returning home soon, as they will need to resume services at her home. Patient had follow up with PCP and Cardiologist, no new or changed medications since last CTN call. No new issues or concerns. CTN advised Pt of use of urgent care or physicians 24 hr access line if assistance is needed after hours or CTN weekend. CTN provided education on s/s that require medical attention and when to seek medical attention.       Follow Up  Future Appointments   Date Time Provider Fany Schreiber   2019  8:30 AM [facility]       Melodie Bowden RN

## 2019-06-26 ENCOUNTER — HOSPITAL ENCOUNTER (OUTPATIENT)
Age: 84
Setting detail: SPECIMEN
Discharge: HOME | End: 2019-06-26
Payer: MEDICARE

## 2019-06-26 LAB
ANION GAP SERPL CALCULATED.3IONS-SCNC: 14 MMOL/L (ref 3–16)
BUN BLDV-MCNC: 43 MG/DL (ref 7–20)
CALCIUM SERPL-MCNC: 8.7 MG/DL (ref 8.3–10.6)
CHLORIDE BLD-SCNC: 104 MMOL/L (ref 99–110)
CO2: 19 MMOL/L (ref 21–32)
CREAT SERPL-MCNC: 2.5 MG/DL (ref 0.6–1.2)
GFR AFRICAN AMERICAN: 22
GFR NON-AFRICAN AMERICAN: 18
GLUCOSE BLD-MCNC: 125 MG/DL (ref 70–99)
POTASSIUM SERPL-SCNC: 3.5 MMOL/L (ref 3.5–5.1)
PRO-BNP: 2120 PG/ML (ref 0–449)
SODIUM BLD-SCNC: 137 MMOL/L (ref 136–145)

## 2019-06-26 PROCEDURE — 36415 COLL VENOUS BLD VENIPUNCTURE: CPT

## 2019-06-26 PROCEDURE — 80048 BASIC METABOLIC PNL TOTAL CA: CPT

## 2019-06-26 PROCEDURE — 83880 ASSAY OF NATRIURETIC PEPTIDE: CPT

## 2019-06-27 ENCOUNTER — TELEPHONE (OUTPATIENT)
Dept: PAIN MANAGEMENT | Age: 84
End: 2019-06-27

## 2019-06-27 NOTE — TELEPHONE ENCOUNTER
Submitted PA for Diclofenac Sodium 1% gel, via CMM. Medication is APPROVED until 06/26/2020. Please advise. Thank you.

## 2019-07-02 ENCOUNTER — TELEPHONE (OUTPATIENT)
Dept: FAMILY MEDICINE CLINIC | Age: 84
End: 2019-07-02

## 2019-07-03 LAB
ANION GAP SERPL CALCULATED.3IONS-SCNC: 18 MMOL/L (ref 3–16)
BUN BLDV-MCNC: 48 MG/DL (ref 7–20)
CALCIUM SERPL-MCNC: 9 MG/DL (ref 8.3–10.6)
CHLORIDE BLD-SCNC: 104 MMOL/L (ref 99–110)
CO2: 16 MMOL/L (ref 21–32)
CREAT SERPL-MCNC: 2.5 MG/DL (ref 0.6–1.2)
GFR AFRICAN AMERICAN: 22
GFR NON-AFRICAN AMERICAN: 18
GLUCOSE BLD-MCNC: 111 MG/DL (ref 70–99)
POTASSIUM SERPL-SCNC: 4.2 MMOL/L (ref 3.5–5.1)
PRO-BNP: 1414 PG/ML (ref 0–449)
SODIUM BLD-SCNC: 138 MMOL/L (ref 136–145)

## 2019-07-08 RX ORDER — ISOSORBIDE MONONITRATE 60 MG/1
60 TABLET, EXTENDED RELEASE ORAL DAILY
Qty: 90 TABLET | Refills: 1 | Status: SHIPPED | OUTPATIENT
Start: 2019-07-08

## 2019-07-09 ENCOUNTER — TELEPHONE (OUTPATIENT)
Dept: FAMILY MEDICINE CLINIC | Age: 84
End: 2019-07-09

## 2019-07-10 LAB
ANION GAP SERPL CALCULATED.3IONS-SCNC: 13 MMOL/L (ref 3–16)
BACTERIA: ABNORMAL /HPF
BILIRUBIN URINE: NEGATIVE
BLOOD, URINE: NEGATIVE
BUN BLDV-MCNC: 26 MG/DL (ref 7–20)
CALCIUM SERPL-MCNC: 8.7 MG/DL (ref 8.3–10.6)
CHLORIDE BLD-SCNC: 108 MMOL/L (ref 99–110)
CLARITY: ABNORMAL
CO2: 18 MMOL/L (ref 21–32)
COLOR: ABNORMAL
CREAT SERPL-MCNC: 1.9 MG/DL (ref 0.6–1.2)
EPITHELIAL CELLS, UA: 4 /HPF (ref 0–5)
GFR AFRICAN AMERICAN: 30
GFR NON-AFRICAN AMERICAN: 25
GLUCOSE BLD-MCNC: 142 MG/DL (ref 70–99)
GLUCOSE URINE: NEGATIVE MG/DL
HYALINE CASTS: 6 /LPF (ref 0–8)
KETONES, URINE: NEGATIVE MG/DL
LEUKOCYTE ESTERASE, URINE: NEGATIVE
MICROSCOPIC EXAMINATION: YES
NITRITE, URINE: NEGATIVE
PH UA: 6 (ref 5–8)
POTASSIUM SERPL-SCNC: 4.1 MMOL/L (ref 3.5–5.1)
PRO-BNP: 1994 PG/ML (ref 0–449)
PROTEIN UA: 30 MG/DL
RBC UA: 1 /HPF (ref 0–4)
SODIUM BLD-SCNC: 139 MMOL/L (ref 136–145)
SPECIFIC GRAVITY UA: 1.02 (ref 1–1.03)
URINE TYPE: ABNORMAL
UROBILINOGEN, URINE: 1 E.U./DL
WBC UA: 3 /HPF (ref 0–5)

## 2019-07-12 ENCOUNTER — TELEPHONE (OUTPATIENT)
Dept: FAMILY MEDICINE CLINIC | Age: 84
End: 2019-07-12

## 2019-07-12 DIAGNOSIS — M25.572 ACUTE LEFT ANKLE PAIN: ICD-10-CM

## 2019-07-12 DIAGNOSIS — M79.672 LEFT FOOT PAIN: Primary | ICD-10-CM

## 2019-07-12 DIAGNOSIS — R26.89 ANTALGIC GAIT: ICD-10-CM

## 2019-07-12 LAB — URINE CULTURE, ROUTINE: NORMAL

## 2019-07-12 NOTE — TELEPHONE ENCOUNTER
Navneet Marrero, occupational therapist from [address] [place] [address], called to give an update on Sasha. She fell 2 days ago and her physical therapist notified our office. Navneet Marrero saw her for OT yesterday and noticed that she cannot put weigh on her left foot and she can barely walk. She recommended that Sasha go to the ER for an x-ray but Sasha and her son refused. Navneet Marrero wanted to make sure Dr. Bony Avila is aware that she tried to advise the pt.           Navneet Marrero [phone]

## 2019-08-19 ENCOUNTER — TELEPHONE (OUTPATIENT)
Dept: FAMILY MEDICINE CLINIC | Age: 84
End: 2019-08-19

## 2019-08-19 RX ORDER — ONDANSETRON 4 MG/1
4 TABLET, FILM COATED ORAL 3 TIMES DAILY PRN
Qty: 12 TABLET | Refills: 0 | Status: SHIPPED | OUTPATIENT
Start: 2019-08-19 | End: 2019-08-19 | Stop reason: SDUPTHER

## 2019-08-19 RX ORDER — ONDANSETRON 4 MG/1
4 TABLET, FILM COATED ORAL 3 TIMES DAILY PRN
Qty: 12 TABLET | Refills: 0 | Status: ON HOLD | OUTPATIENT
Start: 2019-08-19 | End: 2019-08-24 | Stop reason: HOSPADM

## 2019-08-21 ENCOUNTER — HOSPITAL ENCOUNTER (INPATIENT)
Age: 84
LOS: 3 days | Discharge: HOME HEALTH | DRG: 433 | End: 2019-08-24
Attending: EMERGENCY MEDICINE | Admitting: INTERNAL MEDICINE
Payer: MEDICARE

## 2019-08-21 ENCOUNTER — APPOINTMENT (OUTPATIENT)
Dept: ULTRASOUND IMAGING | Age: 84
DRG: 433 | End: 2019-08-21
Payer: MEDICARE

## 2019-08-21 ENCOUNTER — APPOINTMENT (OUTPATIENT)
Dept: GENERAL RADIOLOGY | Age: 84
DRG: 433 | End: 2019-08-21
Payer: MEDICARE

## 2019-08-21 ENCOUNTER — APPOINTMENT (OUTPATIENT)
Dept: CT IMAGING | Age: 84
DRG: 433 | End: 2019-08-21
Payer: MEDICARE

## 2019-08-21 DIAGNOSIS — I73.9 PVD (PERIPHERAL VASCULAR DISEASE) (HCC): ICD-10-CM

## 2019-08-21 DIAGNOSIS — I50.9 ACUTE ON CHRONIC HEART FAILURE, UNSPECIFIED HEART FAILURE TYPE (HCC): Primary | ICD-10-CM

## 2019-08-21 PROBLEM — R18.8 ASCITES: Status: ACTIVE | Noted: 2019-08-21

## 2019-08-21 PROBLEM — E78.5 HYPERLIPIDEMIA: Status: ACTIVE | Noted: 2019-08-21

## 2019-08-21 PROBLEM — R11.2 NAUSEA AND VOMITING: Status: ACTIVE | Noted: 2019-08-21

## 2019-08-21 PROBLEM — N18.30 STAGE 3 CHRONIC KIDNEY DISEASE (HCC): Status: ACTIVE | Noted: 2019-08-21

## 2019-08-21 PROBLEM — R10.9 ABDOMINAL PAIN: Status: ACTIVE | Noted: 2019-08-21

## 2019-08-21 PROBLEM — I50.33 CHF NYHA CLASS II, ACUTE ON CHRONIC, DIASTOLIC (HCC): Status: ACTIVE | Noted: 2019-08-21

## 2019-08-21 LAB
A/G RATIO: 0.7 (ref 1.1–2.2)
ALBUMIN SERPL-MCNC: 2.7 G/DL (ref 3.4–5)
ALP BLD-CCNC: 472 U/L (ref 40–129)
ALT SERPL-CCNC: 26 U/L (ref 10–40)
AMORPHOUS: ABNORMAL /HPF
AMYLASE: 126 U/L (ref 25–115)
ANION GAP SERPL CALCULATED.3IONS-SCNC: 10 MMOL/L (ref 3–16)
AST SERPL-CCNC: 64 U/L (ref 15–37)
BACTERIA: ABNORMAL /HPF
BASE EXCESS VENOUS: -6 (ref -3–3)
BASOPHILS ABSOLUTE: 0.1 K/UL (ref 0–0.2)
BASOPHILS RELATIVE PERCENT: 1.5 %
BILIRUB SERPL-MCNC: 0.8 MG/DL (ref 0–1)
BILIRUBIN URINE: NEGATIVE
BLOOD, URINE: NEGATIVE
BUN BLDV-MCNC: 35 MG/DL (ref 7–20)
CALCIUM SERPL-MCNC: 7.9 MG/DL (ref 8.3–10.6)
CHLORIDE BLD-SCNC: 108 MMOL/L (ref 99–110)
CLARITY: ABNORMAL
CO2: 21 MMOL/L (ref 21–32)
COLOR: YELLOW
CREAT SERPL-MCNC: 2.5 MG/DL (ref 0.6–1.2)
EKG ATRIAL RATE: 84 BPM
EKG DIAGNOSIS: NORMAL
EKG Q-T INTERVAL: 392 MS
EKG QRS DURATION: 112 MS
EKG QTC CALCULATION (BAZETT): 463 MS
EKG R AXIS: -54 DEGREES
EKG T AXIS: 36 DEGREES
EKG VENTRICULAR RATE: 84 BPM
EOSINOPHILS ABSOLUTE: 0.5 K/UL (ref 0–0.6)
EOSINOPHILS RELATIVE PERCENT: 7.2 %
EPITHELIAL CELLS, UA: ABNORMAL /HPF
GFR AFRICAN AMERICAN: 22
GFR NON-AFRICAN AMERICAN: 18
GLOBULIN: 4.1 G/DL
GLUCOSE BLD-MCNC: 111 MG/DL (ref 70–99)
GLUCOSE URINE: NEGATIVE MG/DL
HAV IGM SER IA-ACNC: NORMAL
HCO3 VENOUS: 19.9 MMOL/L (ref 23–29)
HCT VFR BLD CALC: 36.3 % (ref 36–48)
HEMOGLOBIN: 11.8 G/DL (ref 12–16)
HEPATITIS B CORE IGM ANTIBODY: NORMAL
HEPATITIS B SURFACE ANTIGEN INTERPRETATION: NORMAL
HEPATITIS C ANTIBODY INTERPRETATION: NORMAL
INR BLD: 1.03 (ref 0.86–1.14)
KETONES, URINE: NEGATIVE MG/DL
LACTATE: 1.26 MMOL/L (ref 0.4–2)
LEUKOCYTE ESTERASE, URINE: ABNORMAL
LIPASE: 47 U/L (ref 13–60)
LYMPHOCYTES ABSOLUTE: 2.4 K/UL (ref 1–5.1)
LYMPHOCYTES RELATIVE PERCENT: 33 %
MCH RBC QN AUTO: 28 PG (ref 26–34)
MCHC RBC AUTO-ENTMCNC: 32.5 G/DL (ref 31–36)
MCV RBC AUTO: 86.4 FL (ref 80–100)
MICROSCOPIC EXAMINATION: YES
MONOCYTES ABSOLUTE: 0.7 K/UL (ref 0–1.3)
MONOCYTES RELATIVE PERCENT: 9.9 %
NEUTROPHILS ABSOLUTE: 3.6 K/UL (ref 1.7–7.7)
NEUTROPHILS RELATIVE PERCENT: 48.4 %
NITRITE, URINE: NEGATIVE
O2 SAT, VEN: 48 %
PCO2, VEN: 35.6 MM HG (ref 40–50)
PDW BLD-RTO: 20.3 % (ref 12.4–15.4)
PERFORMED ON: ABNORMAL
PH UA: 6 (ref 5–8)
PH VENOUS: 7.35 (ref 7.35–7.45)
PLATELET # BLD: 223 K/UL (ref 135–450)
PMV BLD AUTO: 8.4 FL (ref 5–10.5)
PO2, VEN: 27 MM HG
POC SAMPLE TYPE: ABNORMAL
POTASSIUM SERPL-SCNC: 4.2 MMOL/L (ref 3.5–5.1)
PRO-BNP: 2655 PG/ML (ref 0–449)
PROTEIN UA: ABNORMAL MG/DL
PROTHROMBIN TIME: 11.7 SEC (ref 9.8–13)
RBC # BLD: 4.21 M/UL (ref 4–5.2)
RBC UA: ABNORMAL /HPF (ref 0–2)
SODIUM BLD-SCNC: 139 MMOL/L (ref 136–145)
SPECIFIC GRAVITY UA: 1.01 (ref 1–1.03)
TCO2 CALC VENOUS: 21 MMOL/L
TOTAL PROTEIN: 6.8 G/DL (ref 6.4–8.2)
TROPONIN: 0.03 NG/ML
URINE TYPE: ABNORMAL
UROBILINOGEN, URINE: 0.2 E.U./DL
WBC # BLD: 7.3 K/UL (ref 4–11)
WBC UA: ABNORMAL /HPF (ref 0–5)

## 2019-08-21 PROCEDURE — 6370000000 HC RX 637 (ALT 250 FOR IP): Performed by: INTERNAL MEDICINE

## 2019-08-21 PROCEDURE — 83605 ASSAY OF LACTIC ACID: CPT

## 2019-08-21 PROCEDURE — 85610 PROTHROMBIN TIME: CPT

## 2019-08-21 PROCEDURE — 99285 EMERGENCY DEPT VISIT HI MDM: CPT

## 2019-08-21 PROCEDURE — 82803 BLOOD GASES ANY COMBINATION: CPT

## 2019-08-21 PROCEDURE — 84484 ASSAY OF TROPONIN QUANT: CPT

## 2019-08-21 PROCEDURE — 6360000002 HC RX W HCPCS: Performed by: EMERGENCY MEDICINE

## 2019-08-21 PROCEDURE — 83880 ASSAY OF NATRIURETIC PEPTIDE: CPT

## 2019-08-21 PROCEDURE — 80053 COMPREHEN METABOLIC PANEL: CPT

## 2019-08-21 PROCEDURE — 82150 ASSAY OF AMYLASE: CPT

## 2019-08-21 PROCEDURE — 1200000000 HC SEMI PRIVATE

## 2019-08-21 PROCEDURE — 80074 ACUTE HEPATITIS PANEL: CPT

## 2019-08-21 PROCEDURE — 71046 X-RAY EXAM CHEST 2 VIEWS: CPT

## 2019-08-21 PROCEDURE — 83690 ASSAY OF LIPASE: CPT

## 2019-08-21 PROCEDURE — 85025 COMPLETE CBC W/AUTO DIFF WBC: CPT

## 2019-08-21 PROCEDURE — 82105 ALPHA-FETOPROTEIN SERUM: CPT

## 2019-08-21 PROCEDURE — 83516 IMMUNOASSAY NONANTIBODY: CPT

## 2019-08-21 PROCEDURE — 2580000003 HC RX 258: Performed by: INTERNAL MEDICINE

## 2019-08-21 PROCEDURE — 6360000002 HC RX W HCPCS: Performed by: INTERNAL MEDICINE

## 2019-08-21 PROCEDURE — 74176 CT ABD & PELVIS W/O CONTRAST: CPT

## 2019-08-21 PROCEDURE — 76705 ECHO EXAM OF ABDOMEN: CPT

## 2019-08-21 PROCEDURE — 36415 COLL VENOUS BLD VENIPUNCTURE: CPT

## 2019-08-21 PROCEDURE — 93005 ELECTROCARDIOGRAM TRACING: CPT | Performed by: EMERGENCY MEDICINE

## 2019-08-21 PROCEDURE — 81001 URINALYSIS AUTO W/SCOPE: CPT

## 2019-08-21 RX ORDER — SODIUM CHLORIDE 0.9 % (FLUSH) 0.9 %
10 SYRINGE (ML) INJECTION EVERY 12 HOURS SCHEDULED
Status: DISCONTINUED | OUTPATIENT
Start: 2019-08-21 | End: 2019-08-24 | Stop reason: HOSPADM

## 2019-08-21 RX ORDER — TRAMADOL HYDROCHLORIDE 50 MG/1
50 TABLET ORAL EVERY 6 HOURS PRN
Status: DISCONTINUED | OUTPATIENT
Start: 2019-08-21 | End: 2019-08-23

## 2019-08-21 RX ORDER — ACETAMINOPHEN 325 MG/1
650 TABLET ORAL EVERY 4 HOURS PRN
Status: DISCONTINUED | OUTPATIENT
Start: 2019-08-21 | End: 2019-08-24 | Stop reason: HOSPADM

## 2019-08-21 RX ORDER — FUROSEMIDE 10 MG/ML
40 INJECTION INTRAMUSCULAR; INTRAVENOUS 2 TIMES DAILY
Status: DISCONTINUED | OUTPATIENT
Start: 2019-08-21 | End: 2019-08-22

## 2019-08-21 RX ORDER — METOPROLOL SUCCINATE 50 MG/1
50 TABLET, EXTENDED RELEASE ORAL DAILY
Status: DISCONTINUED | OUTPATIENT
Start: 2019-08-21 | End: 2019-08-24 | Stop reason: HOSPADM

## 2019-08-21 RX ORDER — AMLODIPINE BESYLATE 10 MG/1
10 TABLET ORAL DAILY
Status: DISCONTINUED | OUTPATIENT
Start: 2019-08-21 | End: 2019-08-24 | Stop reason: HOSPADM

## 2019-08-21 RX ORDER — ONDANSETRON 2 MG/ML
4 INJECTION INTRAMUSCULAR; INTRAVENOUS EVERY 6 HOURS PRN
Status: DISCONTINUED | OUTPATIENT
Start: 2019-08-21 | End: 2019-08-24 | Stop reason: HOSPADM

## 2019-08-21 RX ORDER — ASPIRIN 81 MG/1
81 TABLET ORAL DAILY
Status: DISCONTINUED | OUTPATIENT
Start: 2019-08-21 | End: 2019-08-24 | Stop reason: HOSPADM

## 2019-08-21 RX ORDER — HYDRALAZINE HYDROCHLORIDE 20 MG/ML
10 INJECTION INTRAMUSCULAR; INTRAVENOUS ONCE
Status: DISCONTINUED | OUTPATIENT
Start: 2019-08-21 | End: 2019-08-24 | Stop reason: HOSPADM

## 2019-08-21 RX ORDER — DOCUSATE SODIUM 100 MG/1
100 CAPSULE, LIQUID FILLED ORAL DAILY
Status: ON HOLD | COMMUNITY
End: 2019-08-24 | Stop reason: HOSPADM

## 2019-08-21 RX ORDER — ATORVASTATIN CALCIUM 40 MG/1
40 TABLET, FILM COATED ORAL NIGHTLY
Status: DISCONTINUED | OUTPATIENT
Start: 2019-08-21 | End: 2019-08-24 | Stop reason: HOSPADM

## 2019-08-21 RX ORDER — SODIUM CHLORIDE 0.9 % (FLUSH) 0.9 %
10 SYRINGE (ML) INJECTION PRN
Status: DISCONTINUED | OUTPATIENT
Start: 2019-08-21 | End: 2019-08-24 | Stop reason: HOSPADM

## 2019-08-21 RX ORDER — ISOSORBIDE MONONITRATE 60 MG/1
60 TABLET, EXTENDED RELEASE ORAL DAILY
Status: DISCONTINUED | OUTPATIENT
Start: 2019-08-21 | End: 2019-08-24 | Stop reason: HOSPADM

## 2019-08-21 RX ORDER — FUROSEMIDE 10 MG/ML
40 INJECTION INTRAMUSCULAR; INTRAVENOUS ONCE
Status: COMPLETED | OUTPATIENT
Start: 2019-08-21 | End: 2019-08-21

## 2019-08-21 RX ORDER — HEPARIN SODIUM 5000 [USP'U]/ML
5000 INJECTION, SOLUTION INTRAVENOUS; SUBCUTANEOUS EVERY 8 HOURS SCHEDULED
Status: DISCONTINUED | OUTPATIENT
Start: 2019-08-21 | End: 2019-08-24 | Stop reason: HOSPADM

## 2019-08-21 RX ORDER — HYDRALAZINE HYDROCHLORIDE 100 MG/1
100 TABLET, FILM COATED ORAL EVERY 8 HOURS SCHEDULED
Status: DISCONTINUED | OUTPATIENT
Start: 2019-08-21 | End: 2019-08-24 | Stop reason: HOSPADM

## 2019-08-21 RX ADMIN — ASPIRIN 81 MG: 81 TABLET, COATED ORAL at 09:56

## 2019-08-21 RX ADMIN — HEPARIN SODIUM 5000 UNITS: 5000 INJECTION INTRAVENOUS; SUBCUTANEOUS at 06:51

## 2019-08-21 RX ADMIN — ISOSORBIDE MONONITRATE 60 MG: 60 TABLET, EXTENDED RELEASE ORAL at 13:09

## 2019-08-21 RX ADMIN — FUROSEMIDE 40 MG: 10 INJECTION, SOLUTION INTRAMUSCULAR; INTRAVENOUS at 11:23

## 2019-08-21 RX ADMIN — ATORVASTATIN CALCIUM 40 MG: 40 TABLET, FILM COATED ORAL at 21:06

## 2019-08-21 RX ADMIN — FUROSEMIDE 40 MG: 10 INJECTION, SOLUTION INTRAMUSCULAR; INTRAVENOUS at 21:06

## 2019-08-21 RX ADMIN — HYDRALAZINE HYDROCHLORIDE 100 MG: 100 TABLET, FILM COATED ORAL at 06:51

## 2019-08-21 RX ADMIN — HEPARIN SODIUM 5000 UNITS: 5000 INJECTION INTRAVENOUS; SUBCUTANEOUS at 21:06

## 2019-08-21 RX ADMIN — METOPROLOL SUCCINATE 50 MG: 50 TABLET, EXTENDED RELEASE ORAL at 09:55

## 2019-08-21 RX ADMIN — FUROSEMIDE 40 MG: 10 INJECTION, SOLUTION INTRAMUSCULAR; INTRAVENOUS at 06:50

## 2019-08-21 RX ADMIN — TRAMADOL HYDROCHLORIDE 50 MG: 50 TABLET, FILM COATED ORAL at 09:55

## 2019-08-21 RX ADMIN — ONDANSETRON 4 MG: 2 INJECTION INTRAMUSCULAR; INTRAVENOUS at 08:10

## 2019-08-21 RX ADMIN — Medication 10 ML: at 21:07

## 2019-08-21 RX ADMIN — HEPARIN SODIUM 5000 UNITS: 5000 INJECTION INTRAVENOUS; SUBCUTANEOUS at 15:43

## 2019-08-21 RX ADMIN — Medication 10 ML: at 08:11

## 2019-08-21 ASSESSMENT — PAIN DESCRIPTION - FREQUENCY
FREQUENCY: CONTINUOUS
FREQUENCY: CONTINUOUS

## 2019-08-21 ASSESSMENT — PAIN SCALES - GENERAL
PAINLEVEL_OUTOF10: 0
PAINLEVEL_OUTOF10: 0
PAINLEVEL_OUTOF10: 10
PAINLEVEL_OUTOF10: 5
PAINLEVEL_OUTOF10: 5

## 2019-08-21 ASSESSMENT — PAIN DESCRIPTION - ONSET: ONSET: ON-GOING

## 2019-08-21 ASSESSMENT — ENCOUNTER SYMPTOMS
NAUSEA: 0
DIARRHEA: 0
COUGH: 0
WHEEZING: 0
ABDOMINAL PAIN: 1
SHORTNESS OF BREATH: 1
EYE PAIN: 0
VOMITING: 0

## 2019-08-21 ASSESSMENT — PAIN DESCRIPTION - DESCRIPTORS
DESCRIPTORS: CRAMPING;OTHER (COMMENT)
DESCRIPTORS: CRAMPING

## 2019-08-21 ASSESSMENT — PAIN DESCRIPTION - PAIN TYPE
TYPE: ACUTE PAIN
TYPE: ACUTE PAIN

## 2019-08-21 ASSESSMENT — PAIN DESCRIPTION - ORIENTATION: ORIENTATION: LOWER

## 2019-08-21 ASSESSMENT — PAIN DESCRIPTION - LOCATION
LOCATION: ABDOMEN;BACK;NECK
LOCATION: ABDOMEN

## 2019-08-21 ASSESSMENT — PAIN DESCRIPTION - PROGRESSION: CLINICAL_PROGRESSION: GRADUALLY WORSENING

## 2019-08-21 NOTE — PLAN OF CARE
Problem: Falls - Risk of:  Goal: Will remain free from falls  Description  Will remain free from falls  Outcome: Ongoing  Note:   Pt is A&Ox4, is high fall risk, Pt educated and encouraged to use call light to notify and wait for assistance from staff before getting OOB, pt uses call light appropriately, has attempted once to get up without assistance, around toileting, staff will round more frequently. Pt's bed alarm remained on throughout shift, bed in lowest position, 2/4 side rails up, call light and bedside table within reach. No falls so far this shift, will continue to monitor. Problem: OXYGENATION/RESPIRATORY FUNCTION  Goal: Patient will maintain patent airway  Outcome: Ongoing  Note:   Pt's spO2 status remained WDL on room air, lungs are clear, though diminished in bases, pt did feel SOB but only when pain was increased, SOB decreased when pain was controlled. Problem: HEMODYNAMIC STATUS  Goal: Patient has stable vital signs and fluid balance  Outcome: Ongoing  Note:   Pt's BP has been WDL, RN informed Dr Alicia Adam of pt's BP multiple times when pt's BP was in the 100-120s/60-70s prior to BP meds, some BP meds were held, see [facility] for details, Dr Alicia Adam aware throughout. Pt given lasix but only voided twice this shift, missed hat. Will continue to monitor.

## 2019-08-21 NOTE — ED PROVIDER NOTES
hydrALAZINE (APRESOLINE) injection 10 mg    furosemide (LASIX) injection 40 mg       CONSULTS:  Gautam Shetty / YOMI / Diana Herman is a 80 y.o. female who presents with lower abdominal pain. No UTI on UA. Also with dyspnea and fatigue. Possibly volume overloaded with BNP elevation. Troponin elevated but lower than previously. No EKG changes or symptoms to suggest acute ischemia. CT with ascites and cirrhosis. No history of. Lower suspicion for SBP. Possibly due to heart failure and venous congestion. SCr stable. Given IV lasix 40mg. Discussed with hospitalist who will admit for careful diuresis and GI consultation. Clinical Impression     1. Acute on chronic heart failure, unspecified heart failure type ([facility]        Disposition     PATIENT REFERRED TO:  No follow-up provider specified.     DISCHARGE MEDICATIONS:  New Prescriptions    No medications on file       DISPOSITION Decision To Admit 08/21/2019 05:06:46 AM       Lorne Eisenmenger, MD  08/21/19 6438

## 2019-08-21 NOTE — CARE COORDINATION
03/24/2019    K 4.7 03/22/2019     08/21/2019     07/10/2019     07/03/2019    CO2 21 08/21/2019    CO2 18 07/10/2019    CO2 16 07/03/2019    PHOS 3.4 06/11/2019    PHOS 3.9 06/10/2019    PHOS 4.2 06/09/2019    BUN 35 08/21/2019    BUN 26 07/10/2019    BUN 48 07/03/2019    CREATININE 2.5 08/21/2019    CREATININE 1.9 07/10/2019    CREATININE 2.5 07/03/2019     BNP:   Lab Results   Component Value Date    PROBNP 2,655 08/21/2019    PROBNP 1,994 07/10/2019    PROBNP 1,414 07/03/2019           ACTIVITY/FUNCTIONAL ASSESSMENT:     PT/OT:  Discharge Recommendations:    Gonzalo Lin scored a 18/24 on the AM-PAC short mobility form. Current research shows that an AM-PAC score of 18 or greater is typically associated with a discharge to the patient's home setting. Based on the patients AM-PAC score and their current functional mobility deficits, it is recommended that the patient have 2-3 sessions per week of Physical Therapy at d/c to increase the patients independence.       HOME HEALTH CARE: LEVEL 1 STANDARD     - Initial home health evaluation to occur within 24-48 hours, in patient home   - Therapy to evaluate with goal of regaining prior level of functioning   - Therapy to evaluate if patient has [address] needs for personal care        PT Equipment Recommendations  Equipment Needed: No  Other: pt has RW at home     Assessment   Body structures, Functions, Activity limitations: Decreased functional mobility ; Decreased strength;Decreased endurance;Decreased balance  Assessment: Pt requiring Min Ax1 for bed mobility and CGA for transfers/amb with RW. Pt limited by decreased endurance and fatigue. Pt reports planning to d/c home with A from family. Pt would benefit from HHPT but states she doesn't want HHPT. Will continue to follow.    Treatment Diagnosis: Decreased functional mobility and endurance  Prognosis: Good  Patient Education: role of PT, use of call light, d/c planning; pt verb <40% ACE ordered ___, or ARB ___ or contraindication documented ____ EF >40%  If EF <40% Evidence-Based Beta Blocker ordered ____ or contraindication documented____ Toprol XL  If EF 35% or less, K <5on admit, Cr<2 (female) <2.5 (male), Aldosterone antagonist ordered EF >40%  Received Influenza Vaccine (Oct-Mar) N/A  DVT Prophylaxis by Day 2: Yes  Prescription drug coverage: Yes  Can afford prescription co-pay: Yes  Time Spent Educatin minutes      NURSING  Patient Education:       Worsening HF symptoms Yes    fluid restriction  Yes    Tobacco cessation (Smoked in the last 12 months) No  daily weights and parameters  Yes  Avoid NSAIDS in HF Yes  written HF education given Yes     Follow up appointment Yes    ICD counseling No and EF >40%  Patient can Teach Back:   Worsening HF symptoms and when to report Yes   Weight gain to report to healthcare provider Yes  Amount of sodium to eat per day Yes  Name of their Lorenzo Sharp Yes    Time Spent Educatin minutes      Total Time Spent on Patient Education:  60minutesYes    DISCHARGE PLAN:  Services at Discharge: [address] 3x week  Community Resources:   Equipment at Discharge: walker  Destination: [facility]    Patient Self-Management:   Working scale at home: Yes  Reliable transportation: Yes  PCP: Yes  Advance Directive: No  Palliative Care Consult: No    Discharge Instructions:   Daily Weights: Weigh each morning at the same time and write down weights to bring to clinic visit  Goal Weight: 150 LB    Follow Up Instructions: 7 day hospital f/u scheduled  FRANCESCO Cruz CNP  Go on 2019  Heart Failure hospital follow up @ 1:20 pm  [address]  [phone]           I approve the established CHF interdisciplinary plan of care as documented within the medical record of [address], RN  [place] [id number]

## 2019-08-21 NOTE — CARE COORDINATION
assistance Purchasing Medications: Potential Assistance Purchasing Medications: No  Does Patient want to participate in local refill/ meds to beds program?: Yes    Meds To Beds General Rules:  1. Can ONLY be done Monday- Friday between 8:30am-5pm  2. Prescription(s) must be in pharmacy by 3pm to be filled same day  3. Copy of patient's insurance/ prescription drug card and patient face sheet must be sent along with the prescription(s)  4. Cost of Rx cannot be added to hospital bill. If financial assistance is needed, please contact unit  or ;  or  CANNOT provide pharmacy voucher for patients co-pays  5. Patients can then  the prescription on their way out of the hospital at discharge, or pharmacy can deliver to the bedside if staff is available. (payment due at time of pick-up or delivery - cash, check, or card accepted)     Able to afford home medications/ co-pay costs: Yes    ADLS  Support Systems: Children, Family Members    PT AM-PAC:   /24  OT AM-PAC:   /24    [place]: from alone  Steps:     Plans to RETURN to current housing: Yes  Barriers to RETURNING to current housing: none    [place] [address]  Currently ACTIVE with [address]: Yes  [address]: [address]  Phone: [phone]  Fax: [phone]    Currently ACTIVE with [place] on A[facility]: No    DISCHARGE PLAN:  Disposition: Home with [facility] for discharge: family     Factors facilitating achievement of predicted outcomes: Family support, Motivated, Cooperative and Pleasant    Barriers to discharge: none    Additional Case Management Notes: pt from home alone, can stay with daughter if needed, [facility] will follow at home    [address] and her family were provided with choice of provider; she and her family are in agreement with the discharge plan.     Care Transition patient: Yes    Dre Mendoza RN  [facility]  Case

## 2019-08-22 LAB
AMMONIA: 115 UMOL/L (ref 11–51)
ANION GAP SERPL CALCULATED.3IONS-SCNC: 13 MMOL/L (ref 3–16)
BUN BLDV-MCNC: 38 MG/DL (ref 7–20)
CALCIUM SERPL-MCNC: 8.2 MG/DL (ref 8.3–10.6)
CHLORIDE BLD-SCNC: 104 MMOL/L (ref 99–110)
CHOLESTEROL, TOTAL: 136 MG/DL (ref 0–199)
CO2: 18 MMOL/L (ref 21–32)
CREAT SERPL-MCNC: 2.7 MG/DL (ref 0.6–1.2)
GFR AFRICAN AMERICAN: 20
GFR NON-AFRICAN AMERICAN: 17
GLUCOSE BLD-MCNC: 83 MG/DL (ref 70–99)
HDLC SERPL-MCNC: 36 MG/DL (ref 40–60)
LDL CHOLESTEROL CALCULATED: 85 MG/DL
MAGNESIUM: 1.9 MG/DL (ref 1.8–2.4)
POTASSIUM SERPL-SCNC: 4.7 MMOL/L (ref 3.5–5.1)
SODIUM BLD-SCNC: 135 MMOL/L (ref 136–145)
TRIGL SERPL-MCNC: 76 MG/DL (ref 0–150)
VLDLC SERPL CALC-MCNC: 15 MG/DL

## 2019-08-22 PROCEDURE — 2580000003 HC RX 258: Performed by: INTERNAL MEDICINE

## 2019-08-22 PROCEDURE — 6370000000 HC RX 637 (ALT 250 FOR IP): Performed by: STUDENT IN AN ORGANIZED HEALTH CARE EDUCATION/TRAINING PROGRAM

## 2019-08-22 PROCEDURE — 87086 URINE CULTURE/COLONY COUNT: CPT

## 2019-08-22 PROCEDURE — 86038 ANTINUCLEAR ANTIBODIES: CPT

## 2019-08-22 PROCEDURE — 83735 ASSAY OF MAGNESIUM: CPT

## 2019-08-22 PROCEDURE — 1200000000 HC SEMI PRIVATE

## 2019-08-22 PROCEDURE — 86039 ANTINUCLEAR ANTIBODIES (ANA): CPT

## 2019-08-22 PROCEDURE — 6370000000 HC RX 637 (ALT 250 FOR IP): Performed by: INTERNAL MEDICINE

## 2019-08-22 PROCEDURE — 82140 ASSAY OF AMMONIA: CPT

## 2019-08-22 PROCEDURE — 6360000002 HC RX W HCPCS: Performed by: INTERNAL MEDICINE

## 2019-08-22 PROCEDURE — 2580000003 HC RX 258: Performed by: STUDENT IN AN ORGANIZED HEALTH CARE EDUCATION/TRAINING PROGRAM

## 2019-08-22 PROCEDURE — 80048 BASIC METABOLIC PNL TOTAL CA: CPT

## 2019-08-22 PROCEDURE — 80061 LIPID PANEL: CPT

## 2019-08-22 PROCEDURE — 36415 COLL VENOUS BLD VENIPUNCTURE: CPT

## 2019-08-22 RX ORDER — SODIUM CHLORIDE 9 MG/ML
INJECTION, SOLUTION INTRAVENOUS CONTINUOUS
Status: DISCONTINUED | OUTPATIENT
Start: 2019-08-22 | End: 2019-08-23

## 2019-08-22 RX ORDER — LACTULOSE 10 G/15ML
20 SOLUTION ORAL 2 TIMES DAILY
Status: DISCONTINUED | OUTPATIENT
Start: 2019-08-22 | End: 2019-08-24

## 2019-08-22 RX ADMIN — TRAMADOL HYDROCHLORIDE 50 MG: 50 TABLET, FILM COATED ORAL at 16:00

## 2019-08-22 RX ADMIN — ASPIRIN 81 MG: 81 TABLET, COATED ORAL at 10:02

## 2019-08-22 RX ADMIN — AMLODIPINE BESYLATE 10 MG: 10 TABLET ORAL at 11:35

## 2019-08-22 RX ADMIN — HEPARIN SODIUM 5000 UNITS: 5000 INJECTION INTRAVENOUS; SUBCUTANEOUS at 06:52

## 2019-08-22 RX ADMIN — FUROSEMIDE 40 MG: 10 INJECTION, SOLUTION INTRAMUSCULAR; INTRAVENOUS at 09:00

## 2019-08-22 RX ADMIN — HEPARIN SODIUM 5000 UNITS: 5000 INJECTION INTRAVENOUS; SUBCUTANEOUS at 14:51

## 2019-08-22 RX ADMIN — ONDANSETRON 4 MG: 2 INJECTION INTRAMUSCULAR; INTRAVENOUS at 08:59

## 2019-08-22 RX ADMIN — LACTULOSE 20 G: 20 SOLUTION ORAL at 20:59

## 2019-08-22 RX ADMIN — LACTULOSE 20 G: 20 SOLUTION ORAL at 10:02

## 2019-08-22 RX ADMIN — HYDRALAZINE HYDROCHLORIDE 100 MG: 100 TABLET, FILM COATED ORAL at 06:52

## 2019-08-22 RX ADMIN — ISOSORBIDE MONONITRATE 60 MG: 60 TABLET, EXTENDED RELEASE ORAL at 10:02

## 2019-08-22 RX ADMIN — Medication 10 ML: at 09:00

## 2019-08-22 RX ADMIN — HEPARIN SODIUM 5000 UNITS: 5000 INJECTION INTRAVENOUS; SUBCUTANEOUS at 20:59

## 2019-08-22 RX ADMIN — METOPROLOL SUCCINATE 50 MG: 50 TABLET, EXTENDED RELEASE ORAL at 10:02

## 2019-08-22 RX ADMIN — SODIUM CHLORIDE: 9 INJECTION, SOLUTION INTRAVENOUS at 14:44

## 2019-08-22 RX ADMIN — HYDRALAZINE HYDROCHLORIDE 100 MG: 100 TABLET, FILM COATED ORAL at 14:51

## 2019-08-22 RX ADMIN — ATORVASTATIN CALCIUM 40 MG: 40 TABLET, FILM COATED ORAL at 20:59

## 2019-08-22 ASSESSMENT — PAIN SCALES - GENERAL
PAINLEVEL_OUTOF10: 0
PAINLEVEL_OUTOF10: 0
PAINLEVEL_OUTOF10: 7
PAINLEVEL_OUTOF10: 0
PAINLEVEL_OUTOF10: 0

## 2019-08-22 NOTE — PROGRESS NOTES
VSS, Afib and SR w/1st degree block on telemetry. Pt reports feeling much better tonight. Denies pain and nausea. Pt has been sleeping. Resps  Easy and even. Call light in reach. Will continue to monitor.   Electronically signed by Javy Bazzi RN on 8/22/19 at 2:16 AM

## 2019-08-22 NOTE — PROGRESS NOTES
HOSPITAL MEDICINE  - PROGRESS NOTE    Admit Date: 8/21/2019         Interval History: 80 y.o. female with  hypertension, CHF, Atrial fibrillation, CKD   -Admitted with  fatigue,incr weakness,poor appetite and a n increase in abdominal girth, fullness and early  - has been feeling poor over the last week or so  -assoc  generalized pains-unable to localize any one pain as being worse that the re  -decr  energy to do the activities she was normally able to do. -no fever, no chills. -+episodes of diarrhea alternating with normal bowel movement. Occasional drinker     Imaging with cirrhosis-new diagnosis    Subjective: Feels better with decr lower abd pain    Objective: afebrile    Diet: DIET LOW SODIUM 2 GM;      Data:   Scheduled Meds: Reviewed  Continuous Infusions:    Intake/Output Summary (Last 24 hours) at 8/22/2019 0916  Last data filed at 8/22/2019 0909  Gross per 24 hour   Intake 680 ml   Output 200 ml   Net 480 ml     CBC:   Recent Labs     08/21/19  0206   WBC 7.3   HGB 11.8*        BMP:  Recent Labs     08/22/19  0440   *   K 4.7      CO2 18*   BUN 38*   CREATININE 2.7*   GLUCOSE 83     INR:   Recent Labs     08/21/19  0209   INR 1.03         Objective:   Vitals: /65   Pulse 68   Temp 97 °F (36.1 °C) (Oral)   Resp 16   Ht 5' 5\" (1.651 m)   Wt 147 lb 11.3 oz (67 kg)   SpO2 96%   BMI 24.58 kg/m²   General appearance: alert, appears stated age and cooperative  Skin: Skin color, texture, turgor normal.   HEENT: Head: Normocephalic, no lesions, without obvious abnormality.   Neck: supple  Lungs: clear to auscultation bilaterally  Heart: regular rate and rhythm, S1, S2 normal, no murmur, click, rub or gallop  Abdomen: soft, non-tender; bowel sounds normal; no masses,  no organomegaly  Extremities: extremities normal, atraumatic, no cyanosis or edema  Lymphatic: No significant lymph node enlargement papable  Neurologic: Mental status: Alert, oriented, thought content appropriate      Assessment & Plan:      Acute on chronic diastolic CHF -probable  - Diurese with IV Lasix-hold for now as appears euvolemic and Cr increasing  - strict I&Os and daily weights  low sodium diet  discussed      Cirrhosis with Portal HTN-of no clear etiology. -workup in progress. ?paracentesis  -Anti Mitochondrial antibodies, Hepatitis panel , AFP.   -hold Lasix for now  - holding off on spironolactone due to Cr elev      Non-Intractable vomiting with nausea -   - Zofran as needed,      CKD stage III -Cr 2.7 today  Baseline creatinine is 1.9 -likely from hypertensive nephrosclerosis   Renal function is  worse than baseline  -Monitor renal function and avoid Nephrotoxic agents  Will c/s Dr Rose Lawson      Atrial fibrillation   - rate controlled on Metoprolol     Diabetes mellitus II  Contr.  - Lantus, SSI and  premeal scheduled lispro     Essential  hypertension   - continue home meds and monitor blood pressure     Hyperlipidemia -   -on  statin therapy       DVT prophy  PT/OT  Disp-inhouse for >2 days    Discussed at length with son at bedside       Gely Bird MD

## 2019-08-22 NOTE — H&P
Gallbladder and bile ducts:  Unremarkable. Pancreas:  No ductal dilation. Spleen:  Unremarkable. Adrenals:  Unremarkable. Kidneys and ureters:  No obstructing stones. No hydronephrosis. Cysts. Stomach and bowel:  No bowel obstruction. No bowel wall thickening. Colonic diverticulosis. PELVIS:   Appendix:  No appendicitis. Bladder:  No stones. Reproductive:  Unremarkable. ABDOMEN and PELVIS:   Intraperitoneal space: Moderate ascites. Bones/joints:  No acute fractures. Soft tissues: Diffuse soft tissue stranding. Vasculature:  No abdominal aortic aneurysm. Lymph nodes:  No enlarged lymph nodes. 1.  Evidence of portal hypertension as demonstrated by cirrhosis and increased fluid status. 2.  Colonic diverticulosis. 3.  Cardiomegaly. Xr Chest Standard (2 Vw)    Result Date: 8/21/2019  Patient: Ashwin Silverio  Time Out: 03:27 Exam(s): FILM CXR 2 VIEWS  EXAM:   XR Chest, 2 Views  CLINICAL HISTORY:    Reason for exam: dyspnea. Reason for exam:->dyspnea. TECHNIQUE:   Frontal and lateral views of the chest.  COMPARISON: 6/5/19       Unchanged heart size. No consolidation or pleural effusion. Us Abdomen Limited    Result Date: 8/21/2019  Upper abdominal ultrasound limited HISTORY: Cirrhosis Correlation with CT abdomen performed earlier today. Liver is somewhat small with mildly lobulated contour and coarse parenchymal echo pattern, consistent with cirrhosis. Mild perihepatic ascites correlates with CT. Gallbladder is without intraluminal sludge or stones. Gallbladder wall is circumferentially thickened. Few echogenic foci are adherent to the gallbladder wall without acoustic shadowing and are consistent with cholesterol polyps. No overlying probe tenderness during examination Common bile duct normal measuring 5 mm. Pancreatic head and proximal body are without focal abnormality. Bowel gas obscures distal body and tail. Right kidney measures 8.2 cm in length, without hydronephrosis.

## 2019-08-22 NOTE — PLAN OF CARE
Problem: OXYGENATION/RESPIRATORY FUNCTION  Goal: Patient will achieve/maintain normal respiratory rate/effort  Description  Respiratory rate and effort will be within normal limits for the patient  Outcome: Ongoing  Note:   Pt denied SOB aside from during exertion/during pain, lungs are clear, satting well on room air. Will continue to monitor. Problem: FLUID AND ELECTROLYTE IMBALANCE  Goal: Fluid and electrolyte balance are achieved/maintained  Outcome: Ongoing  Note:   Pt has had poor intake the last few shifts d/t nausea and lack of appetite/thirst, IVF started per orders, will continue to monitor. Problem: Nutrition  Goal: Optimal nutrition therapy  8/22/2019 1934 by Joy Champagne RN  Outcome: Not Met This Shift  Note:   Pt has had maybe a bite of every meal before saying she's done, denying wanting the nutritional supplements, will continue to monitor.

## 2019-08-22 NOTE — PROGRESS NOTES
continue low sodium diet   - Continue Lasix 40 mg IV, BID   - holding off on spironolactone due to Kidney disease, would start if acceptable from nephrology  - No signs of bleeding varices  - Hyperammoniemia with some low grade encephalopathy  - start lactulose 20 BID, can titrate to 2 - 3 bowel movements per day. - Will need follow up as outpatient      Thank you for allowing me to participate in the care of your patient.   Please feel free to contact me with any questions or concerns.      Juan Carlos Diaz MD  Will staff with Dr. Mariella Kelly , [address] [facility] and [address]    Patient seen examined discussed agree with note

## 2019-08-23 LAB
AFP: 2.2 UG/L
ANION GAP SERPL CALCULATED.3IONS-SCNC: 13 MMOL/L (ref 3–16)
BUN BLDV-MCNC: 39 MG/DL (ref 7–20)
CALCIUM SERPL-MCNC: 7.8 MG/DL (ref 8.3–10.6)
CHLORIDE BLD-SCNC: 103 MMOL/L (ref 99–110)
CO2: 15 MMOL/L (ref 21–32)
CREAT SERPL-MCNC: 2.8 MG/DL (ref 0.6–1.2)
GFR AFRICAN AMERICAN: 19
GFR NON-AFRICAN AMERICAN: 16
GLUCOSE BLD-MCNC: 87 MG/DL (ref 70–99)
MAGNESIUM: 1.9 MG/DL (ref 1.8–2.4)
MITOCHONDRIAL M2 AB, IGG: 142.8 UNITS (ref 0–20)
POTASSIUM SERPL-SCNC: 3.8 MMOL/L (ref 3.5–5.1)
PRO-BNP: 2680 PG/ML (ref 0–449)
SODIUM BLD-SCNC: 131 MMOL/L (ref 136–145)
URINE CULTURE, ROUTINE: NORMAL

## 2019-08-23 PROCEDURE — 83880 ASSAY OF NATRIURETIC PEPTIDE: CPT

## 2019-08-23 PROCEDURE — 83735 ASSAY OF MAGNESIUM: CPT

## 2019-08-23 PROCEDURE — 97116 GAIT TRAINING THERAPY: CPT

## 2019-08-23 PROCEDURE — 6370000000 HC RX 637 (ALT 250 FOR IP): Performed by: INTERNAL MEDICINE

## 2019-08-23 PROCEDURE — 97165 OT EVAL LOW COMPLEX 30 MIN: CPT

## 2019-08-23 PROCEDURE — 97535 SELF CARE MNGMENT TRAINING: CPT

## 2019-08-23 PROCEDURE — 2500000003 HC RX 250 WO HCPCS: Performed by: STUDENT IN AN ORGANIZED HEALTH CARE EDUCATION/TRAINING PROGRAM

## 2019-08-23 PROCEDURE — 97530 THERAPEUTIC ACTIVITIES: CPT

## 2019-08-23 PROCEDURE — 6370000000 HC RX 637 (ALT 250 FOR IP): Performed by: STUDENT IN AN ORGANIZED HEALTH CARE EDUCATION/TRAINING PROGRAM

## 2019-08-23 PROCEDURE — 97162 PT EVAL MOD COMPLEX 30 MIN: CPT

## 2019-08-23 PROCEDURE — 2580000003 HC RX 258: Performed by: STUDENT IN AN ORGANIZED HEALTH CARE EDUCATION/TRAINING PROGRAM

## 2019-08-23 PROCEDURE — 80048 BASIC METABOLIC PNL TOTAL CA: CPT

## 2019-08-23 PROCEDURE — 1200000000 HC SEMI PRIVATE

## 2019-08-23 PROCEDURE — 6360000002 HC RX W HCPCS: Performed by: INTERNAL MEDICINE

## 2019-08-23 PROCEDURE — 36415 COLL VENOUS BLD VENIPUNCTURE: CPT

## 2019-08-23 RX ORDER — TRAMADOL HYDROCHLORIDE 50 MG/1
25 TABLET ORAL EVERY 6 HOURS PRN
Status: DISCONTINUED | OUTPATIENT
Start: 2019-08-23 | End: 2019-08-24 | Stop reason: HOSPADM

## 2019-08-23 RX ORDER — URSODIOL 300 MG/1
300 CAPSULE ORAL 2 TIMES DAILY
Status: DISCONTINUED | OUTPATIENT
Start: 2019-08-23 | End: 2019-08-24 | Stop reason: HOSPADM

## 2019-08-23 RX ADMIN — HYDRALAZINE HYDROCHLORIDE 100 MG: 100 TABLET, FILM COATED ORAL at 21:35

## 2019-08-23 RX ADMIN — ISOSORBIDE MONONITRATE 60 MG: 60 TABLET, EXTENDED RELEASE ORAL at 10:36

## 2019-08-23 RX ADMIN — SODIUM BICARBONATE: 84 INJECTION, SOLUTION INTRAVENOUS at 13:03

## 2019-08-23 RX ADMIN — LACTULOSE 20 G: 20 SOLUTION ORAL at 10:37

## 2019-08-23 RX ADMIN — HYDRALAZINE HYDROCHLORIDE 100 MG: 100 TABLET, FILM COATED ORAL at 16:25

## 2019-08-23 RX ADMIN — ATORVASTATIN CALCIUM 40 MG: 40 TABLET, FILM COATED ORAL at 21:35

## 2019-08-23 RX ADMIN — METOPROLOL SUCCINATE 50 MG: 50 TABLET, EXTENDED RELEASE ORAL at 10:37

## 2019-08-23 RX ADMIN — AMLODIPINE BESYLATE 10 MG: 10 TABLET ORAL at 10:36

## 2019-08-23 RX ADMIN — URSODIOL 300 MG: 300 CAPSULE ORAL at 10:36

## 2019-08-23 RX ADMIN — TRAMADOL HYDROCHLORIDE 25 MG: 50 TABLET, FILM COATED ORAL at 19:05

## 2019-08-23 RX ADMIN — HEPARIN SODIUM 5000 UNITS: 5000 INJECTION INTRAVENOUS; SUBCUTANEOUS at 21:35

## 2019-08-23 RX ADMIN — URSODIOL 300 MG: 300 CAPSULE ORAL at 21:35

## 2019-08-23 RX ADMIN — SODIUM BICARBONATE: 84 INJECTION, SOLUTION INTRAVENOUS at 22:51

## 2019-08-23 RX ADMIN — HEPARIN SODIUM 5000 UNITS: 5000 INJECTION INTRAVENOUS; SUBCUTANEOUS at 06:39

## 2019-08-23 RX ADMIN — SODIUM CHLORIDE: 9 INJECTION, SOLUTION INTRAVENOUS at 02:56

## 2019-08-23 RX ADMIN — ASPIRIN 81 MG: 81 TABLET, COATED ORAL at 10:36

## 2019-08-23 RX ADMIN — HYDRALAZINE HYDROCHLORIDE 100 MG: 100 TABLET, FILM COATED ORAL at 06:39

## 2019-08-23 RX ADMIN — HEPARIN SODIUM 5000 UNITS: 5000 INJECTION INTRAVENOUS; SUBCUTANEOUS at 16:22

## 2019-08-23 ASSESSMENT — PAIN SCALES - GENERAL
PAINLEVEL_OUTOF10: 0
PAINLEVEL_OUTOF10: 6

## 2019-08-23 NOTE — CARE COORDINATION
noted     Additional Case Management Notes: BLU met with patient at bedside on this date. Patient from home with daughter. Patient uses a walker to ambulate. Patient plans to return home and declined home health care. She reported her daughter would provide transport. IMM completed and copy at bedside at 10:15 am. Patent reported no other needs. BLU provided contact information to patient/family and placed information on white board in room should needs arise. No other needs noted at this time. Sasha and her family were provided with choice of provider; she and her family are in agreement with the discharge plan.     Care Transition patient: CAROL Callahan  [facility]  Case Management Department  Ph: [phone]   Fax: [phone]

## 2019-08-23 NOTE — PROGRESS NOTES
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  - Lantus, SSI and  premeal scheduled lispro     Essential  hypertension   - continue home meds and monitor blood pressure     Hyperlipidemia -   -on  statin therapy       DVT prophy  PT/OT  Disp-inhouse for >2 days    Discussed at length with son at bedside       Mary Díaz MD

## 2019-08-23 NOTE — PROGRESS NOTES
dizzy, edema  Gastrointestinal: nausea, vomiting, diarrhea, constipation,belly pain    Yellow skin, blood in stool  Genitourinary:  dysuria, poor urine flow, flank pain, blood in urine  Neurologic:  vertigo, TIA'S, syncope, seizures, focal weakness  Psychosocial:  insomnia, anxiety, or depression. Additional positive findings:                          All other remaining systems are negative. PMH/PSH/SH/Family History:     Past Medical History:   Diagnosis Date    Acute on chronic diastolic CHF [facility]congestive heart failure) ([facility]) 3/24/2019    [facility] 3/24/2019    Chronic kidney disease     Hyperlipidemia     borderline per pt    Hypertension     Peripheral vascular disease ([facility]        History reviewed. No pertinent surgical history. Social History     Socioeconomic History    Marital status:       Spouse name: Not on file    Number of children: Not on file    Years of education: Not on file    Highest education level: Not on file   Occupational History    Not on file   Social Needs    Financial resource strain: Not on file    Food insecurity:     Worry: Not on file     Inability: Not on file    Transportation needs:     Medical: Not on file     Non-medical: Not on file   Tobacco Use    Smoking status: Former Smoker     Packs/day: 0.25     Years: 3.00     Pack years: 0.75    Smokeless tobacco: Never Used   Substance and Sexual Activity    Alcohol use: Yes     Comment: occ    Drug use: No    Sexual activity: Never   Lifestyle    Physical activity:     Days per week: Not on file     Minutes per session: Not on file    Stress: Not on file   Relationships    Social connections:     Talks on phone: Not on file     Gets together: Not on file     Attends Nondenominational service: Not on file     Active member of club or organization: Not on file     Attends meetings of clubs or organizations: Not on file     Relationship status: Not on file    Intimate partner violence: Fear of current or ex partner: Not on file     Emotionally abused: Not on file     Physically abused: Not on file     Forced sexual activity: Not on file   Other Topics Concern    Not on file   Social History Narrative    Not on file           Problem Relation Age of Onset    High Blood Pressure Mother     Kidney Disease Brother           Medication:     Scheduled Meds:   ursodiol  300 mg Oral BID    lactulose  20 g Oral BID    hydrALAZINE  10 mg Intravenous Once    amLODIPine  10 mg Oral Daily    aspirin EC  81 mg Oral Daily    atorvastatin  40 mg Oral Nightly    hydrALAZINE  100 mg Oral 3 times per day    isosorbide mononitrate  60 mg Oral Daily    metoprolol succinate  50 mg Oral Daily    sodium chloride flush  10 mL Intravenous 2 times per day    heparin (porcine)  5,000 Units Subcutaneous 3 times per day     Continuous Infusions:   IV infusion builder 100 mL/hr at 08/23/19 1303     PRN Meds:.sodium chloride flush, magnesium hydroxide, ondansetron, acetaminophen, traMADol       Vitals :     Vitals:    08/23/19 1207   BP: (!) 154/90   Pulse: 64   Resp: 17   Temp: 97.8 °F (36.6 °C)   SpO2: 97%       I & O :       Intake/Output Summary (Last 24 hours) at 8/23/2019 1316  Last data filed at 8/23/2019 0820  Gross per 24 hour   Intake 1890 ml   Output 150 ml   Net 1740 ml        Physical Examination :     General appearance: Anxious- no, distressed- no, in good spirits- yes  HEENT: Lips- normal, teeth- ok , oral mucosa- moist  Neck : Mass- no, appears symmetrical, JVD- not visible  Respiratory: Respiratory effort-  normal, wheeze- no, crackles - no  Cardiovascular:  Ausculation- No M/R/G, Edema no  Abdomen: visible mass- no, distention- no, scar- no, tenderness- no                            hepatosplenomegaly-  no  Musculoskeletal:  clubbing no,cyanosis- no , digital ischemia- no                           muscle strength- grossly normal , tone - grossly normal  Skin: rashes- no , ulcers- no, induration-

## 2019-08-23 NOTE — PROGRESS NOTES
Therapy Time   Individual Concurrent Group Co-treatment   Time In 0915         Time Out 1000         Minutes 45              This note will serve as d/c summary if pt is discharged from [facility] prior to next OT treatment session.      Timed Code Treatment Minutes: 30     Total Treatment Minutes:  German 96, OT

## 2019-08-23 NOTE — PROGRESS NOTES
VSS, Afib on telemetry. Pt has been sleeping most of the night. Pt did not eat any dinner and reports a decreased appetite. No c/o nausea or pain thought. IVFs infusing as ordered. Pt assisted to BR, voiding without issue. No needs at this time. Call light in reach.   Electronically signed by Rosey Cruz RN on 8/23/19 at 12:04 AM

## 2019-08-23 NOTE — PROGRESS NOTES
with RW  Short term goal 3: amb 150' with RW and supervision   Patient Goals   Patient goals : return home       Therapy Time   Individual Concurrent Group Co-treatment   Time In 0945         Time Out 1008         Minutes 23           Timed Code Treatment Minutes:  8    Total Treatment Minutes:  23    If the patient is discharged before the next treatment session, this note will serve as the discharge summary.      Krystyna Harris, PT, DPT [id number]

## 2019-08-24 VITALS
DIASTOLIC BLOOD PRESSURE: 75 MMHG | TEMPERATURE: 97.5 F | RESPIRATION RATE: 14 BRPM | SYSTOLIC BLOOD PRESSURE: 122 MMHG | BODY MASS INDEX: 25.67 KG/M2 | HEIGHT: 65 IN | OXYGEN SATURATION: 98 % | HEART RATE: 81 BPM | WEIGHT: 154.1 LBS

## 2019-08-24 LAB
ANION GAP SERPL CALCULATED.3IONS-SCNC: 13 MMOL/L (ref 3–16)
BUN BLDV-MCNC: 35 MG/DL (ref 7–20)
CALCIUM SERPL-MCNC: 7.8 MG/DL (ref 8.3–10.6)
CHLORIDE BLD-SCNC: 102 MMOL/L (ref 99–110)
CO2: 19 MMOL/L (ref 21–32)
CREAT SERPL-MCNC: 2.6 MG/DL (ref 0.6–1.2)
GFR AFRICAN AMERICAN: 21
GFR NON-AFRICAN AMERICAN: 17
GLUCOSE BLD-MCNC: 91 MG/DL (ref 70–99)
MAGNESIUM: 1.8 MG/DL (ref 1.8–2.4)
POTASSIUM SERPL-SCNC: 4 MMOL/L (ref 3.5–5.1)
SODIUM BLD-SCNC: 134 MMOL/L (ref 136–145)

## 2019-08-24 PROCEDURE — 36415 COLL VENOUS BLD VENIPUNCTURE: CPT

## 2019-08-24 PROCEDURE — 97116 GAIT TRAINING THERAPY: CPT

## 2019-08-24 PROCEDURE — 83735 ASSAY OF MAGNESIUM: CPT

## 2019-08-24 PROCEDURE — 6370000000 HC RX 637 (ALT 250 FOR IP): Performed by: INTERNAL MEDICINE

## 2019-08-24 PROCEDURE — 97530 THERAPEUTIC ACTIVITIES: CPT

## 2019-08-24 PROCEDURE — 6360000002 HC RX W HCPCS: Performed by: INTERNAL MEDICINE

## 2019-08-24 PROCEDURE — 80048 BASIC METABOLIC PNL TOTAL CA: CPT

## 2019-08-24 PROCEDURE — 2500000003 HC RX 250 WO HCPCS: Performed by: STUDENT IN AN ORGANIZED HEALTH CARE EDUCATION/TRAINING PROGRAM

## 2019-08-24 PROCEDURE — 2580000003 HC RX 258: Performed by: STUDENT IN AN ORGANIZED HEALTH CARE EDUCATION/TRAINING PROGRAM

## 2019-08-24 RX ORDER — LACTULOSE 10 G/15ML
20 SOLUTION ORAL DAILY
Status: DISCONTINUED | OUTPATIENT
Start: 2019-08-25 | End: 2019-08-24 | Stop reason: HOSPADM

## 2019-08-24 RX ORDER — URSODIOL 300 MG/1
300 CAPSULE ORAL 2 TIMES DAILY
Qty: 60 CAPSULE | Refills: 3 | Status: SHIPPED | OUTPATIENT
Start: 2019-08-24

## 2019-08-24 RX ORDER — LACTULOSE 10 G/15ML
20 SOLUTION ORAL DAILY
Qty: 1 BOTTLE | Refills: 3 | Status: ON HOLD | OUTPATIENT
Start: 2019-08-25 | End: 2019-09-22

## 2019-08-24 RX ORDER — TRAMADOL HYDROCHLORIDE 50 MG/1
25 TABLET ORAL EVERY 8 HOURS PRN
Qty: 10 TABLET | Refills: 0 | Status: SHIPPED | OUTPATIENT
Start: 2019-08-24 | End: 2019-08-31

## 2019-08-24 RX ORDER — METOPROLOL SUCCINATE 50 MG/1
50 TABLET, EXTENDED RELEASE ORAL DAILY
Qty: 30 TABLET | Refills: 3 | Status: ON HOLD | OUTPATIENT
Start: 2019-08-25 | End: 2019-11-07 | Stop reason: HOSPADM

## 2019-08-24 RX ADMIN — ASPIRIN 81 MG: 81 TABLET, COATED ORAL at 08:01

## 2019-08-24 RX ADMIN — HYDRALAZINE HYDROCHLORIDE 100 MG: 100 TABLET, FILM COATED ORAL at 05:58

## 2019-08-24 RX ADMIN — URSODIOL 300 MG: 300 CAPSULE ORAL at 08:01

## 2019-08-24 RX ADMIN — RIFAXIMIN 550 MG: 550 TABLET ORAL at 09:19

## 2019-08-24 RX ADMIN — SODIUM BICARBONATE: 84 INJECTION, SOLUTION INTRAVENOUS at 09:19

## 2019-08-24 RX ADMIN — HEPARIN SODIUM 5000 UNITS: 5000 INJECTION INTRAVENOUS; SUBCUTANEOUS at 05:58

## 2019-08-24 RX ADMIN — AMLODIPINE BESYLATE 10 MG: 10 TABLET ORAL at 08:01

## 2019-08-24 RX ADMIN — ISOSORBIDE MONONITRATE 60 MG: 60 TABLET, EXTENDED RELEASE ORAL at 08:01

## 2019-08-24 ASSESSMENT — PAIN SCALES - GENERAL: PAINLEVEL_OUTOF10: 0

## 2019-08-24 NOTE — PROGRESS NOTES
past medical history of Acute on chronic diastolic CHF (congestive heart failure) ([facility] Atrial fibrillation ([facility] Chronic kidney disease, Hyperlipidemia, Hypertension, and Peripheral vascular disease ([facility] has no past surgical history on file. Restrictions  Position Activity Restriction  Other position/activity restrictions: up as tolerated   Subjective   General  Chart Reviewed: Yes  Additional Pertinent Hx: 80 y. o. female with  hypertension, CHF, Atrial fibrillation, CKD admitted on 8/21 with ABD pain. Family / Caregiver Present: Yes(family)  Referring Practitioner: Marily Morrison MD  Subjective  Subjective: Pt found supine in bed upon arrival, denying pain and agreeable to therapy. Orientation  Orientation  Overall Orientation Status: Within Functional Limits  Objective   Bed mobility  Supine to Sit: Minimal assistance(HOB raised)  Sit to Supine: Minimal assistance(for BLE's )  Transfers  Sit to Stand: Contact guard assistance(from EOB )  Stand to sit: Contact guard assistance(to EOB )  Ambulation  Ambulation?: Yes  Ambulation 1  Surface: level tile  Assistance: Contact guard assistance  Quality of Gait: moderate janet, stride length and Emelyn - pt with decreased step height with fatigue.    Distance: 100'   Comments: amb distance limited by fatigue  Stairs/Curb  Stairs?: No        Exercises  Comments: AP, QS, LAQ x10 BLE       AM-PAC Score  AM-PAC Inpatient Mobility Raw Score : 18 (08/24/19 1231)  AM-PAC Inpatient T-Scale Score : 43.63 (08/24/19 1231)  Mobility Inpatient CMS 0-100% Score: 46.58 (08/24/19 1231)  Mobility Inpatient CMS G-Code Modifier : CK (08/24/19 [facility])          Goals  Short term goals  Time Frame for Short term goals: d/c  Short term goal 1: sup<>sit supervision ongoing  Short term goal 2: sit<>stand supervision with RW ongoing  Short term goal 3: amb 80' with RW and supervision ongoing  Patient Goals   Patient goals : return home    Plan    Plan  Times per week: 2-5  Times per

## 2019-08-24 NOTE — PLAN OF CARE
Problem: Falls - Risk of:  Goal: Will remain free from falls  Description  Will remain free from falls  Outcome: Ongoing  Note:   Pt a/o x4. Pt remains free from falls. Bed locked in lowest position, 2/4 rails up, bed alarm on, call light/table within reach, non-skid socks on. Will continue to monitor. Problem: HEMODYNAMIC STATUS  Goal: Patient has stable vital signs and fluid balance  Outcome: Ongoing  Note:   Pt's vitals are stable and sats well on room air. Problem: Nutrition  Goal: Optimal nutrition therapy  Outcome: Ongoing  Note:   Pt states she hasn't been eating well due to poor appetite. Pt encouraged to do oral supplement drinks.

## 2019-08-25 ENCOUNTER — CARE COORDINATION (OUTPATIENT)
Dept: CASE MANAGEMENT | Age: 84
End: 2019-08-25

## 2019-08-25 DIAGNOSIS — N18.30 STAGE 3 CHRONIC KIDNEY DISEASE (HCC): Primary | ICD-10-CM

## 2019-08-25 PROCEDURE — 1111F DSCHRG MED/CURRENT MED MERGE: CPT | Performed by: FAMILY MEDICINE

## 2019-08-25 NOTE — CARE COORDINATION
[facility] Transitions Initial Follow Up Call    Call within 2 business days of discharge: Yes    Patient: Benita Hickman Patient : 1932   MRN: <[id number]>  Reason for Admission: abdominal pain  Discharge Date: 19 RARS: Readmission Risk Score: 24      Last Discharge [employer]       Complaint Diagnosis Description Type Department Provider    19 Abdominal Pain Acute on chronic heart failure, unspecified heart failure type ([facility] . .. ED to Hosp-Admission (Discharged) (ADMITTED) Kareem Ortega MD; Ellen. .. Spoke with:   Patient/  Patient's son    Facility: [facility] services provided:  Assessment and support for treatment adherence and medication management-[facility]    Care Transitions 24 Hour Call    Do you have a copy of your discharge instructions?:  Yes  Do you have all of your prescriptions and are they filled?:  Yes  Have you been contacted by a [address]?:  No  Have you scheduled your follow up appointment?:  Yes  How are you going to get to your appointment?:  Car - family or friend to transport  Were you discharged with any Home Care or Post Acute Services:  No  Post Acute Services:  Home Health (Comment: [facility])  Patient DME:  Straight cane, Walker, Other  Other Patient DME:  [facility]  Do you have support at home?:  Partner/Spouse/SO  Do you feel like you have everything you need to keep you well at home?:  Yes  Are you an active caregiver in your home?:  No  Care Transitions Interventions         Follow Up:  Spoke with patient. Patient reports that she is feeling better; remains weak. Denies nausea, pain or worsening symptoms. Patient reports that her son manages her medications and is going to  her prescriptions today. Requests that CTN speak with him for medication reconciliation. Spoke with patient' son. Reviewed discharge instructions and medications. Medication reconciliation completed.   Denies any questions in r/t patient medications. Reports that he is monitoring patient's blood pressure and checking her weight daily. Reviewed CHF zone management and s/s to report to MD.    Patient's son is agreeable to [place] [address] services per [address]. Informed of plan to follow up. Denies any questions, equipment needs. Agreeable to continued Care Transition. CTN contact information given. Encouraged call back if needs arise. Spoke with [address]. Patient declined resumption of care. Follow up with patient/ son. Patient's son reports that patient does not feel that [place] [address] services are needed. Instructed him to contact patient's provider if needs change. Patient's son verbalized understanding. No patient needs noted.        Future Appointments   Date Time Provider Fany Schreiber   8/28/2019  1:20 PM FRANCESCO Clancy - TOBIAS King RN

## 2019-08-26 NOTE — CARE COORDINATION
[facility]    Spoke with patient regarding [facility] services. Patient aware and agreeable to services. Faxed orders to [facility].     Belén Chau LPN  Care Transition Nurse  [address]  [phone]

## 2019-08-27 ENCOUNTER — TELEPHONE (OUTPATIENT)
Dept: FAMILY MEDICINE CLINIC | Age: 84
End: 2019-08-27

## 2019-08-27 DIAGNOSIS — I50.33 ACUTE ON CHRONIC DIASTOLIC CHF (CONGESTIVE HEART FAILURE) (HCC): ICD-10-CM

## 2019-08-27 DIAGNOSIS — K76.82 ACUTE HEPATIC ENCEPHALOPATHY: Primary | ICD-10-CM

## 2019-08-27 DIAGNOSIS — R53.81 PHYSICAL DEBILITY: ICD-10-CM

## 2019-08-27 LAB
ANA INTERPRETATION: ABNORMAL
ANA PATTERN: ABNORMAL
ANA TITER: ABNORMAL
ANION GAP SERPL CALCULATED.3IONS-SCNC: 10 MMOL/L (ref 6–18)
ANTI-NUCLEAR ANTIBODY (ANA): POSITIVE
B-TYPE NATRIURETIC PEPTIDE: 1436 PG/ML (ref 0–95)
BUN BLDV-MCNC: 41 MG/DL (ref 8–26)
CALCIUM SERPL-MCNC: 8 MG/DL (ref 8.5–10.5)
CHLORIDE BLD-SCNC: 106 MEQ/L (ref 101–111)
CO2: 18 MMOL/L (ref 24–36)
CREAT SERPL-MCNC: 2.43 MG/DL (ref 0.44–1.03)
GFR AFRICAN AMERICAN: 19 ML/MIN/1.73 M2
GFR NON-AFRICAN AMERICAN: 17 ML/MIN/1.73 M2
GLUCOSE BLD-MCNC: 114 MG/DL (ref 70–99)
PATHOLOGIST: ABNORMAL
POTASSIUM SERPL-SCNC: 3.7 MEQ/L (ref 3.6–5.1)
SODIUM BLD-SCNC: 134 MEQ/L (ref 135–145)

## 2019-08-27 NOTE — TELEPHONE ENCOUNTER
Gregory Villar from [facility] called stating they received a referral from the [facility] to set Sasha up with home nursing and an assessment for palliative care through [facility]. Gregory Villar would like to know if she can get a verbal from Dr. Sudeep Nickerson for home health and palliative care assessment. Please advise. Thanks.           Gregory Villar [phone]

## 2019-08-28 ENCOUNTER — CARE COORDINATION (OUTPATIENT)
Dept: CASE MANAGEMENT | Age: 84
End: 2019-08-28

## 2019-08-30 ENCOUNTER — CARE COORDINATION (OUTPATIENT)
Dept: CASE MANAGEMENT | Age: 84
End: 2019-08-30

## 2019-08-30 NOTE — CARE COORDINATION
[facility] Transitions Follow Up Call    2019    Patient: Buzz Jamison  Patient : 1932   MRN: [id number]   Reason for Admission: CHF   Discharge Date: 19 RARS: Readmission Risk Score: 24         Spoke with: Buzz Jamison and Son       Care Transitions Subsequent and Final Call    Schedule Follow Up Appointment with PCP:  Declined  Subsequent and Final Calls  Do you have any ongoing symptoms?:  No  Have your medications changed?:  No  Do you have any questions related to your medications?:  No  Do you currently have any active services?:  Yes  Are you currently active with any services?:  Home Health  Do you have any needs or concerns that I can assist you with?:  No  Identified Barriers:  None  Care Transitions Interventions  No Identified Needs  Other Interventions:          Summary  CTN spoke with patient and Son this am for follow up CTN call. Patient states she is doing well, reports she is not having any difficulty with urination, BM's or Appetite. Stated she had a hard time getting off of commode, but didn't have her walker. CTN encouraged patient to use walker at all times, to prevent any falls. Patient denies any nausea, vomiting, fevers, chills, chest pain or Cough. Some SOB with exertion, instructed to rest as needed, no LE Edema, weight was 154 lbs 2 days ago. Son with concerns regarding SOBE. CTN explained to Son that SOBE was normal, as long as it improves with rest, and she has no LE Edema or complaints of dizziness or lightheadedness she should be okay. CTN did encourage Son to make sure patient is weighing herself daily, as this will help in catching any fluid overload quickly. Son states he will make sure from now on that she weighs herself daily. CTN encouraged son to have patient weigh herself first thing in the am, after first void and before eating. Also instructed to report a 3 lb weight gain overnight, or a 5 lb weight gain in a week.   CTN advised Pt of use

## 2019-09-02 NOTE — DISCHARGE SUMMARY
cooperative  Head: Normocephalic, without obvious abnormality, atraumatic  Eyes: conjunctivae/corneas clear. PERRL, EOM's intact. Ears: External ears -normal  Nose: No drainage or sinus tenderness.   Throat: lips, mucosa, and tongue normal; teeth and gums normal  Neck:supple  Lungs: clear to auscultation bilaterally  Heart: regular rate and rhythm, S1, S2 normal, no murmur,   Abdomen: soft, non-tender; bowel sounds normal;.+mild ascites  Extremities: no edema  Neurologic: Grossly normal    Consults: GI,Nephrology  Significant Diagnostic Studies:  chest x-ray  Treatments: cardiac meds: furosemide  Disposition: home  Discharged Condition: Stable  Follow Up: Primary Care Physician in one week and GI /NEPHROLOGY IN 2 WEEKS  Discharge Medications:   [facility] Medication Instructions [facility]:[id number]    Printed on:09/02/19 8191   Medication Information                      furosemide (LASIX) 40 MG tablet  Take 0.5 tablets by mouth daily If wt increases by 2 pounds in 1 day or or 5 pounds in 1 week then take 20mg twice a day             isosorbide mononitrate (IMDUR) 60 MG extended release tablet  Take 1 tablet by mouth daily             lactulose (CHRONULAC) 10 GM/15ML solution  Take 30 mLs by mouth daily             metoprolol succinate (TOPROL XL) 50 MG extended release tablet  Take 1 tablet by mouth daily             rifaximin (XIFAXAN) 550 MG tablet  Take 1 tablet by mouth 2 times daily             ursodiol (ACTIGALL) 300 MG capsule  Take 1 capsule by mouth 2 times daily                Activity: activity as tolerated  Diet: cardiac dietWound Care: none needed  Time Spent on discharge is more than 30 minutes discussing plan of care and discharge medications with patient and nursing staff    Signed:  MD KATHY  Hospitalist Service

## 2019-09-03 ENCOUNTER — OFFICE VISIT (OUTPATIENT)
Dept: FAMILY MEDICINE CLINIC | Age: 84
End: 2019-09-03
Payer: MEDICARE

## 2019-09-03 VITALS
SYSTOLIC BLOOD PRESSURE: 160 MMHG | OXYGEN SATURATION: 99 % | BODY MASS INDEX: 25.79 KG/M2 | WEIGHT: 155 LBS | RESPIRATION RATE: 15 BRPM | TEMPERATURE: 98.1 F | HEART RATE: 71 BPM | DIASTOLIC BLOOD PRESSURE: 84 MMHG

## 2019-09-03 DIAGNOSIS — R53.81 PHYSICAL DEBILITY: Primary | ICD-10-CM

## 2019-09-03 DIAGNOSIS — N18.4 CHRONIC KIDNEY DISEASE, STAGE IV (SEVERE) (HCC): ICD-10-CM

## 2019-09-03 PROCEDURE — G8598 ASA/ANTIPLAT THER USED: HCPCS | Performed by: FAMILY MEDICINE

## 2019-09-03 PROCEDURE — 1036F TOBACCO NON-USER: CPT | Performed by: FAMILY MEDICINE

## 2019-09-03 PROCEDURE — 1123F ACP DISCUSS/DSCN MKR DOCD: CPT | Performed by: FAMILY MEDICINE

## 2019-09-03 PROCEDURE — 99213 OFFICE O/P EST LOW 20 MIN: CPT | Performed by: FAMILY MEDICINE

## 2019-09-03 PROCEDURE — 1090F PRES/ABSN URINE INCON ASSESS: CPT | Performed by: FAMILY MEDICINE

## 2019-09-03 PROCEDURE — G8419 CALC BMI OUT NRM PARAM NOF/U: HCPCS | Performed by: FAMILY MEDICINE

## 2019-09-03 PROCEDURE — 4040F PNEUMOC VAC/ADMIN/RCVD: CPT | Performed by: FAMILY MEDICINE

## 2019-09-03 PROCEDURE — 1111F DSCHRG MED/CURRENT MED MERGE: CPT | Performed by: FAMILY MEDICINE

## 2019-09-03 PROCEDURE — G8427 DOCREV CUR MEDS BY ELIG CLIN: HCPCS | Performed by: FAMILY MEDICINE

## 2019-09-03 NOTE — PROGRESS NOTES
[facility]  Progress Note  [place], [place]          [address] M[address]ING  5/12/1932 09/03/19    Chief Complaint:   [address] MANNING is a 80 y.o. female who is here for hospital f/u    HPI:   Son Alejandro Rosenbaum is accompanying. Feels about the same. Alejandro Rosenbaum organizes medication. Has f/u with cardiology and nephrology. Continues dialysis M, W, and F.  Her appetite is about the same and has no emesis. She prefers acteminophone for pain control but informed to avoid due to hepatotoxicity. ROS negative for headache, visionchanges, chest pain, shortness of breath, abdominal pain, urinary sx, bowel changes. Past medical, surgical, and social history reviewed. and allergies reviewed. Allergies   Allergen Reactions    Bactrim [Sulfamethoxazole-Trimethoprim]     Nebivolol Hcl Other (See Comments)     Second degree heart block - changed to metoprolol    Other Nausea And Vomiting     Allergic to flu shot per pt, rxn years ago     Prior to Visit Medications    Medication Sig Taking?  Authorizing Provider   ursodiol (ACTIGALL) 300 MG capsule Take 1 capsule by mouth 2 times daily Yes Jud Rinne, MD   lactulose (CHRONULAC) 10 GM/15ML solution Take 30 mLs by mouth daily Yes Jud Rinne, MD   metoprolol succinate (TOPROL XL) 50 MG extended release tablet Take 1 tablet by mouth daily Yes Jud Rinne, MD   rifaximin (XIFAXAN) 550 MG tablet Take 1 tablet by mouth 2 times daily Yes Jud Rinne, MD   isosorbide mononitrate (IMDUR) 60 MG extended release tablet Take 1 tablet by mouth daily Yes FRANCESCO Welsh - CNP   furosemide (LASIX) 40 MG tablet Take 0.5 tablets by mouth daily If wt increases by 2 pounds in 1 day or or 5 pounds in 1 week then take 20mg twice a day  Patient not taking: Reported on 9/3/2019  Tita Snowden DO          Vitals:    09/03/19 0951 09/03/19 1016 09/03/19 1059   BP: (!) 168/99 (!) 164/92 (!) 160/84   Site: Right Upper Arm Right Upper Arm    Position: Sitting Sitting    Cuff Disease Brother      Social History     Socioeconomic History    Marital status:      Spouse name: Not on file    Number of children: Not on file    Years of education: Not on file    Highest education level: Not on file   Occupational History    Not on file   Social Needs    Financial resource strain: Not on file    Food insecurity:     Worry: Not on file     Inability: Not on file    Transportation needs:     Medical: Not on file     Non-medical: Not on file   Tobacco Use    Smoking status: Former Smoker     Packs/day: 0.25     Years: 3.00     Pack years: 0.75    Smokeless tobacco: Never Used   Substance and Sexual Activity    Alcohol use: Yes     Comment: occ    Drug use: No    Sexual activity: Never   Lifestyle    Physical activity:     Days per week: Not on file     Minutes per session: Not on file    Stress: Not on file   Relationships    Social connections:     Talks on phone: Not on file     Gets together: Not on file     Attends Yazidi service: Not on file     Active member of club or organization: Not on file     Attends meetings of clubs or organizations: Not on file     Relationship status: Not on file    Intimate partner violence:     Fear of current or ex partner: Not on file     Emotionally abused: Not on file     Physically abused: Not on file     Forced sexual activity: Not on file   Other Topics Concern    Not on file   Social History Narrative    Not on file       O: BP (!) 160/84   Pulse 71   Temp 98.1 °F (36.7 °C) (Oral)   Resp 15   Wt 155 lb (70.3 kg)   SpO2 99%   Breastfeeding? No   BMI 25.79 kg/m²   Physical Exam  GEN: No acute distress,cooperative, well nourished, alert. HEENT: PEERLA, EOMI , normocephalic/atraumatic, nares and oropharynx clear. Mucus membranes normal, Tympanic membranes clear bilaterally. Neck: soft, supple, no thyromegaly,mass, no Lymphadenopathy  CV: Regular rate and rhythm, no murmur, rubs, gallops. No edema.   Resp: Clear to auscultation bilaterally good air entry bilaterally  No crackles, wheeze. Breathing comfortably. Psych:normal affect. Neuro: AOx3      ASSESSMENT   Diagnosis Orders   1. Physical debility     2. Chronic kidney disease, stage IV (severe) (HCC)       Not much offered today at [facility] f/u.  Keep follow-up with her cardiology clinic and dialysis. Encouraged annual wellness visit in October November      PLAN          If applicable, see additional patient information and instructions under \"Patient Instructions. \"    Return in about 1 month (around 10/3/2019) for AWV. There are no Patient Instructions on file for this visit. Please note a portion of this chart was generated using dragon dictation software. Although every effort was made to ensure the accuracy of this automated transcription,some errors in transcription may have occurred.

## 2019-09-04 ENCOUNTER — CARE COORDINATION (OUTPATIENT)
Dept: CASE MANAGEMENT | Age: 84
End: 2019-09-04

## 2019-09-04 ENCOUNTER — TELEPHONE (OUTPATIENT)
Dept: CARDIOLOGY CLINIC | Age: 84
End: 2019-09-04

## 2019-09-04 NOTE — CARE COORDINATION
[facility] Transitions Follow Up Call    2019    Patient: Nnamdi Harry  Patient : 1932   MRN: [id number]   Reason for Admission: CHF  Discharge Date: 19 RARS: Readmission Risk Score: 24         Spoke with: Nahun SMITH [facility] Transitions Subsequent and Final Call    Schedule Follow Up Appointment with PCP:  Declined  Subsequent and Final Calls  Do you have any ongoing symptoms?:  Yes  Onset of Patient-reported symptoms:  Today  Patient-reported symptoms:  Shortness of Breath  Interventions for patient-reported symptoms:  Notified PCP/Physician  Have your medications changed?:  No  Do you have any questions related to your medications?:  No  Do you currently have any active services?:  Yes  Are you currently active with any services?:  Home Health  Do you have any needs or concerns that I can assist you with?:  No  Identified Barriers:  None  Care Transitions Interventions  No Identified Needs  Other Interventions:          Summary  CTN spoke with patient's Son Aliza Dale this afternoon for follow up CTN call. Son states patient is doing well, states patient is still having SOB with exertion, encouraged to have patient rest as needed, as SOB improves with rest.  Patient with no complaints of nausea, vomiting, fevers, chills, chest pain or Cough. No LE Edema present at this time, weight was 153.6 lbs. Son states he has been making sure patient is getting weighed daily, knows to report a 3 lb weight gain overnight or a 5 lb weight gain in a week. PT with [facility] was in home today, SN to be in later this week. No new or changed medications at this time, patient had follow up with PCP on 2019, no new issues or concerns. CTN advised Pt of use of urgent care or physicians 24 hr access line if assistance is needed after hours or CTN weekend. CTN provided education on s/s that require medical attention and when to seek medical attention.  Pt denies any needs or concerns and is agreeable with

## 2019-09-06 ENCOUNTER — CARE COORDINATION (OUTPATIENT)
Dept: CASE MANAGEMENT | Age: 84
End: 2019-09-06

## 2019-09-12 ENCOUNTER — TELEPHONE (OUTPATIENT)
Dept: FAMILY MEDICINE CLINIC | Age: 84
End: 2019-09-12

## 2019-09-12 DIAGNOSIS — R53.81 PHYSICAL DEBILITY: Primary | ICD-10-CM

## 2019-09-12 DIAGNOSIS — I50.33 ACUTE ON CHRONIC DIASTOLIC CHF (CONGESTIVE HEART FAILURE) (HCC): ICD-10-CM

## 2019-09-12 DIAGNOSIS — K76.82 ACUTE HEPATIC ENCEPHALOPATHY: ICD-10-CM

## 2019-09-12 DIAGNOSIS — I48.0 PAROXYSMAL ATRIAL FIBRILLATION (HCC): ICD-10-CM

## 2019-09-12 NOTE — TELEPHONE ENCOUNTER
Breana Yuen with [facility] believes patient may need a referral to Hospice, or even Palliative Care, if AB thinks is preferable. She states that pt is set to see AB on 10/15/19 for AWV, but she has noted increased noncompliance in pt adhering to her medications, increased lethargy, and general degradation. She states that we can reach back out to her at [phone], or fax orders to [phone]. Thank you!

## 2019-09-17 ENCOUNTER — TELEPHONE (OUTPATIENT)
Dept: FAMILY MEDICINE CLINIC | Age: 84
End: 2019-09-17

## 2019-09-17 DIAGNOSIS — I50.32 CHRONIC DIASTOLIC CHF (CONGESTIVE HEART FAILURE) (HCC): ICD-10-CM

## 2019-09-17 DIAGNOSIS — I27.20 PULMONARY HYPERTENSION (HCC): ICD-10-CM

## 2019-09-17 DIAGNOSIS — R53.81 DEBILITY: Primary | ICD-10-CM

## 2019-09-17 DIAGNOSIS — N18.30 STAGE 3 CHRONIC KIDNEY DISEASE (HCC): ICD-10-CM

## 2019-09-17 DIAGNOSIS — R53.83 OTHER FATIGUE: ICD-10-CM

## 2019-09-17 DIAGNOSIS — I50.33 ACUTE ON CHRONIC HEART FAILURE WITH PRESERVED EJECTION FRACTION (HCC): ICD-10-CM

## 2019-09-18 ENCOUNTER — TELEPHONE (OUTPATIENT)
Dept: FAMILY MEDICINE CLINIC | Age: 84
End: 2019-09-18

## 2019-09-18 DIAGNOSIS — R06.2 WHEEZING: Primary | ICD-10-CM

## 2019-09-18 DIAGNOSIS — R06.02 SHORTNESS OF BREATH: ICD-10-CM

## 2019-09-18 RX ORDER — ALBUTEROL SULFATE 2.5 MG/3ML
2.5 SOLUTION RESPIRATORY (INHALATION) EVERY 4 HOURS PRN
Qty: 120 EACH | Refills: 3 | COMMUNITY
Start: 2019-09-18

## 2019-09-22 ENCOUNTER — APPOINTMENT (OUTPATIENT)
Dept: GENERAL RADIOLOGY | Age: 84
DRG: 871 | End: 2019-09-22
Payer: MEDICARE

## 2019-09-22 ENCOUNTER — HOSPITAL ENCOUNTER (INPATIENT)
Age: 84
LOS: 9 days | Discharge: NURSING, REHAB OR LONG-TERM CARE | DRG: 871 | End: 2019-10-01
Attending: EMERGENCY MEDICINE | Admitting: INTERNAL MEDICINE
Payer: MEDICARE

## 2019-09-22 DIAGNOSIS — J18.9 PNEUMONIA DUE TO ORGANISM: Primary | ICD-10-CM

## 2019-09-22 PROBLEM — R65.20 SEVERE SEPSIS (HCC): Status: ACTIVE | Noted: 2019-09-22

## 2019-09-22 PROBLEM — A41.9 SEVERE SEPSIS (HCC): Status: ACTIVE | Noted: 2019-09-22

## 2019-09-22 LAB
ANION GAP SERPL CALCULATED.3IONS-SCNC: 12 MMOL/L (ref 3–16)
BACTERIA: ABNORMAL /HPF
BASE EXCESS VENOUS: -9.2 MMOL/L (ref -2–3)
BASOPHILS ABSOLUTE: 0.1 K/UL (ref 0–0.2)
BASOPHILS RELATIVE PERCENT: 1 %
BILIRUBIN URINE: NEGATIVE
BLOOD, URINE: NEGATIVE
BUN BLDV-MCNC: 26 MG/DL (ref 7–20)
CALCIUM SERPL-MCNC: 8.3 MG/DL (ref 8.3–10.6)
CARBOXYHEMOGLOBIN: 1 % (ref 0–1.5)
CHLORIDE BLD-SCNC: 111 MMOL/L (ref 99–110)
CLARITY: CLEAR
CO2: 17 MMOL/L (ref 21–32)
COLOR: YELLOW
CREAT SERPL-MCNC: 1.9 MG/DL (ref 0.6–1.2)
EOSINOPHILS ABSOLUTE: 0.2 K/UL (ref 0–0.6)
EOSINOPHILS RELATIVE PERCENT: 1.4 %
EPITHELIAL CELLS, UA: ABNORMAL /HPF
GFR AFRICAN AMERICAN: 30
GFR NON-AFRICAN AMERICAN: 25
GLUCOSE BLD-MCNC: 122 MG/DL (ref 70–99)
GLUCOSE URINE: NEGATIVE MG/DL
HCO3 VENOUS: 17.4 MMOL/L (ref 24–28)
HCT VFR BLD CALC: 40 % (ref 36–48)
HEMOGLOBIN, VEN, REDUCED: 27.4 %
HEMOGLOBIN: 12.6 G/DL (ref 12–16)
INR BLD: 1.09 (ref 0.86–1.14)
KETONES, URINE: NEGATIVE MG/DL
LACTIC ACID: 3.7 MMOL/L (ref 0.4–2)
LACTIC ACID: 4.4 MMOL/L (ref 0.4–2)
LEUKOCYTE ESTERASE, URINE: NEGATIVE
LYMPHOCYTES ABSOLUTE: 6.4 K/UL (ref 1–5.1)
LYMPHOCYTES RELATIVE PERCENT: 53.6 %
MCH RBC QN AUTO: 28.4 PG (ref 26–34)
MCHC RBC AUTO-ENTMCNC: 31.5 G/DL (ref 31–36)
MCV RBC AUTO: 90.1 FL (ref 80–100)
METHEMOGLOBIN VENOUS: 0.5 % (ref 0–1.5)
MICROSCOPIC EXAMINATION: YES
MONOCYTES ABSOLUTE: 0.9 K/UL (ref 0–1.3)
MONOCYTES RELATIVE PERCENT: 7.7 %
NEUTROPHILS ABSOLUTE: 4.4 K/UL (ref 1.7–7.7)
NEUTROPHILS RELATIVE PERCENT: 36.3 %
NITRITE, URINE: NEGATIVE
O2 SAT, VEN: 72 %
PCO2, VEN: 42.1 MMHG (ref 41–51)
PDW BLD-RTO: 18.1 % (ref 12.4–15.4)
PH UA: 6 (ref 5–8)
PH VENOUS: 7.24 (ref 7.35–7.45)
PLATELET # BLD: 237 K/UL (ref 135–450)
PMV BLD AUTO: 9.4 FL (ref 5–10.5)
PO2, VEN: 48.5 MMHG (ref 25–40)
POTASSIUM REFLEX MAGNESIUM: 3.6 MMOL/L (ref 3.5–5.1)
PRO-BNP: ABNORMAL PG/ML (ref 0–449)
PROTEIN UA: 30 MG/DL
PROTHROMBIN TIME: 12.4 SEC (ref 9.8–13)
RBC # BLD: 4.44 M/UL (ref 4–5.2)
RBC UA: ABNORMAL /HPF (ref 0–2)
SODIUM BLD-SCNC: 140 MMOL/L (ref 136–145)
SPECIFIC GRAVITY UA: 1.02 (ref 1–1.03)
TCO2 CALC VENOUS: 19 MMOL/L
TROPONIN: 0.06 NG/ML
URINE REFLEX TO CULTURE: ABNORMAL
URINE TYPE: ABNORMAL
UROBILINOGEN, URINE: 0.2 E.U./DL
WBC # BLD: 12 K/UL (ref 4–11)
WBC UA: ABNORMAL /HPF (ref 0–5)

## 2019-09-22 PROCEDURE — 2060000000 HC ICU INTERMEDIATE R&B

## 2019-09-22 PROCEDURE — 83880 ASSAY OF NATRIURETIC PEPTIDE: CPT

## 2019-09-22 PROCEDURE — 71046 X-RAY EXAM CHEST 2 VIEWS: CPT

## 2019-09-22 PROCEDURE — 84484 ASSAY OF TROPONIN QUANT: CPT

## 2019-09-22 PROCEDURE — 6360000002 HC RX W HCPCS: Performed by: STUDENT IN AN ORGANIZED HEALTH CARE EDUCATION/TRAINING PROGRAM

## 2019-09-22 PROCEDURE — 80048 BASIC METABOLIC PNL TOTAL CA: CPT

## 2019-09-22 PROCEDURE — 36415 COLL VENOUS BLD VENIPUNCTURE: CPT

## 2019-09-22 PROCEDURE — 2580000003 HC RX 258: Performed by: EMERGENCY MEDICINE

## 2019-09-22 PROCEDURE — 96374 THER/PROPH/DIAG INJ IV PUSH: CPT

## 2019-09-22 PROCEDURE — 83605 ASSAY OF LACTIC ACID: CPT

## 2019-09-22 PROCEDURE — 2580000003 HC RX 258: Performed by: STUDENT IN AN ORGANIZED HEALTH CARE EDUCATION/TRAINING PROGRAM

## 2019-09-22 PROCEDURE — 87040 BLOOD CULTURE FOR BACTERIA: CPT

## 2019-09-22 PROCEDURE — 82803 BLOOD GASES ANY COMBINATION: CPT

## 2019-09-22 PROCEDURE — 6360000002 HC RX W HCPCS: Performed by: EMERGENCY MEDICINE

## 2019-09-22 PROCEDURE — 87449 NOS EACH ORGANISM AG IA: CPT

## 2019-09-22 PROCEDURE — 99285 EMERGENCY DEPT VISIT HI MDM: CPT

## 2019-09-22 PROCEDURE — 85025 COMPLETE CBC W/AUTO DIFF WBC: CPT

## 2019-09-22 PROCEDURE — 85610 PROTHROMBIN TIME: CPT

## 2019-09-22 PROCEDURE — 81001 URINALYSIS AUTO W/SCOPE: CPT

## 2019-09-22 RX ORDER — SODIUM CHLORIDE 0.9 % (FLUSH) 0.9 %
10 SYRINGE (ML) INJECTION EVERY 12 HOURS SCHEDULED
Status: DISCONTINUED | OUTPATIENT
Start: 2019-09-22 | End: 2019-10-01 | Stop reason: HOSPADM

## 2019-09-22 RX ORDER — HEPARIN SODIUM 5000 [USP'U]/ML
5000 INJECTION, SOLUTION INTRAVENOUS; SUBCUTANEOUS EVERY 8 HOURS SCHEDULED
Status: DISCONTINUED | OUTPATIENT
Start: 2019-09-22 | End: 2019-10-01 | Stop reason: HOSPADM

## 2019-09-22 RX ORDER — ONDANSETRON 2 MG/ML
4 INJECTION INTRAMUSCULAR; INTRAVENOUS EVERY 6 HOURS PRN
Status: DISCONTINUED | OUTPATIENT
Start: 2019-09-22 | End: 2019-10-01 | Stop reason: HOSPADM

## 2019-09-22 RX ORDER — ACETAMINOPHEN 325 MG/1
650 TABLET ORAL EVERY 4 HOURS PRN
Status: DISCONTINUED | OUTPATIENT
Start: 2019-09-22 | End: 2019-09-23

## 2019-09-22 RX ORDER — PANTOPRAZOLE SODIUM 40 MG/10ML
40 INJECTION, POWDER, LYOPHILIZED, FOR SOLUTION INTRAVENOUS DAILY
Status: DISCONTINUED | OUTPATIENT
Start: 2019-09-23 | End: 2019-09-25

## 2019-09-22 RX ORDER — URSODIOL 300 MG/1
300 CAPSULE ORAL 2 TIMES DAILY
Status: DISCONTINUED | OUTPATIENT
Start: 2019-09-23 | End: 2019-10-01 | Stop reason: HOSPADM

## 2019-09-22 RX ORDER — LACTULOSE 10 G/15ML
20 SOLUTION ORAL DAILY
Status: DISCONTINUED | OUTPATIENT
Start: 2019-09-23 | End: 2019-09-22

## 2019-09-22 RX ORDER — ALBUTEROL SULFATE 2.5 MG/3ML
2.5 SOLUTION RESPIRATORY (INHALATION) EVERY 4 HOURS PRN
Status: DISCONTINUED | OUTPATIENT
Start: 2019-09-22 | End: 2019-10-01 | Stop reason: HOSPADM

## 2019-09-22 RX ORDER — FUROSEMIDE 10 MG/ML
40 INJECTION INTRAMUSCULAR; INTRAVENOUS DAILY
Status: DISCONTINUED | OUTPATIENT
Start: 2019-09-22 | End: 2019-09-22

## 2019-09-22 RX ORDER — FUROSEMIDE 10 MG/ML
20 INJECTION INTRAMUSCULAR; INTRAVENOUS DAILY
Status: DISCONTINUED | OUTPATIENT
Start: 2019-09-22 | End: 2019-09-23

## 2019-09-22 RX ORDER — SODIUM CHLORIDE 0.9 % (FLUSH) 0.9 %
10 SYRINGE (ML) INJECTION PRN
Status: DISCONTINUED | OUTPATIENT
Start: 2019-09-22 | End: 2019-10-01 | Stop reason: HOSPADM

## 2019-09-22 RX ADMIN — CEFEPIME HYDROCHLORIDE 1 G: 1 INJECTION, POWDER, FOR SOLUTION INTRAMUSCULAR; INTRAVENOUS at 22:10

## 2019-09-22 RX ADMIN — VANCOMYCIN HYDROCHLORIDE 1250 MG: 10 INJECTION, POWDER, LYOPHILIZED, FOR SOLUTION INTRAVENOUS at 22:44

## 2019-09-22 ASSESSMENT — PAIN SCALES - GENERAL
PAINLEVEL_OUTOF10: 0
PAINLEVEL_OUTOF10: 0

## 2019-09-23 ENCOUNTER — APPOINTMENT (OUTPATIENT)
Dept: GENERAL RADIOLOGY | Age: 84
DRG: 871 | End: 2019-09-23
Payer: MEDICARE

## 2019-09-23 LAB
ALBUMIN SERPL-MCNC: 2.9 G/DL (ref 3.4–5)
ANION GAP SERPL CALCULATED.3IONS-SCNC: 9 MMOL/L (ref 3–16)
BASOPHILS ABSOLUTE: 0.1 K/UL (ref 0–0.2)
BASOPHILS RELATIVE PERCENT: 0.7 %
BUN BLDV-MCNC: 28 MG/DL (ref 7–20)
CALCIUM SERPL-MCNC: 8.2 MG/DL (ref 8.3–10.6)
CHLORIDE BLD-SCNC: 110 MMOL/L (ref 99–110)
CO2: 21 MMOL/L (ref 21–32)
CREAT SERPL-MCNC: 2 MG/DL (ref 0.6–1.2)
EKG ATRIAL RATE: 87 BPM
EKG DIAGNOSIS: NORMAL
EKG P AXIS: 88 DEGREES
EKG P-R INTERVAL: 280 MS
EKG Q-T INTERVAL: 424 MS
EKG QRS DURATION: 112 MS
EKG QTC CALCULATION (BAZETT): 510 MS
EKG R AXIS: -50 DEGREES
EKG T AXIS: -78 DEGREES
EKG VENTRICULAR RATE: 87 BPM
EOSINOPHILS ABSOLUTE: 0 K/UL (ref 0–0.6)
EOSINOPHILS RELATIVE PERCENT: 0.2 %
GFR AFRICAN AMERICAN: 28
GFR NON-AFRICAN AMERICAN: 24
GLUCOSE BLD-MCNC: 98 MG/DL (ref 70–99)
HCT VFR BLD CALC: 33.3 % (ref 36–48)
HEMOGLOBIN: 10.8 G/DL (ref 12–16)
L. PNEUMOPHILA SEROGP 1 UR AG: NORMAL
LACTIC ACID: 2.1 MMOL/L (ref 0.4–2)
LACTIC ACID: 3.2 MMOL/L (ref 0.4–2)
LYMPHOCYTES ABSOLUTE: 1.1 K/UL (ref 1–5.1)
LYMPHOCYTES RELATIVE PERCENT: 14.5 %
MAGNESIUM: 1.7 MG/DL (ref 1.8–2.4)
MCH RBC QN AUTO: 28.4 PG (ref 26–34)
MCHC RBC AUTO-ENTMCNC: 32.4 G/DL (ref 31–36)
MCV RBC AUTO: 87.7 FL (ref 80–100)
MONOCYTES ABSOLUTE: 0.7 K/UL (ref 0–1.3)
MONOCYTES RELATIVE PERCENT: 9.1 %
NEUTROPHILS ABSOLUTE: 5.7 K/UL (ref 1.7–7.7)
NEUTROPHILS RELATIVE PERCENT: 75.5 %
PARATHYROID HORMONE INTACT: 94.4 PG/ML (ref 14–72)
PDW BLD-RTO: 17.5 % (ref 12.4–15.4)
PHOSPHORUS: 3.8 MG/DL (ref 2.5–4.9)
PLATELET # BLD: 199 K/UL (ref 135–450)
PMV BLD AUTO: 8.8 FL (ref 5–10.5)
POTASSIUM SERPL-SCNC: 4.2 MMOL/L (ref 3.5–5.1)
PROCALCITONIN: 0.5 NG/ML (ref 0–0.15)
RBC # BLD: 3.8 M/UL (ref 4–5.2)
REPORT: NORMAL
RESPIRATORY PANEL PCR: NORMAL
SODIUM BLD-SCNC: 140 MMOL/L (ref 136–145)
TROPONIN: 0.08 NG/ML
TROPONIN: 0.09 NG/ML
URIC ACID, SERUM: 10.6 MG/DL (ref 2.6–6)
WBC # BLD: 7.5 K/UL (ref 4–11)

## 2019-09-23 PROCEDURE — 6370000000 HC RX 637 (ALT 250 FOR IP): Performed by: STUDENT IN AN ORGANIZED HEALTH CARE EDUCATION/TRAINING PROGRAM

## 2019-09-23 PROCEDURE — 97535 SELF CARE MNGMENT TRAINING: CPT

## 2019-09-23 PROCEDURE — 94761 N-INVAS EAR/PLS OXIMETRY MLT: CPT

## 2019-09-23 PROCEDURE — 97116 GAIT TRAINING THERAPY: CPT

## 2019-09-23 PROCEDURE — C9113 INJ PANTOPRAZOLE SODIUM, VIA: HCPCS | Performed by: STUDENT IN AN ORGANIZED HEALTH CARE EDUCATION/TRAINING PROGRAM

## 2019-09-23 PROCEDURE — 2060000000 HC ICU INTERMEDIATE R&B

## 2019-09-23 PROCEDURE — 85025 COMPLETE CBC W/AUTO DIFF WBC: CPT

## 2019-09-23 PROCEDURE — 87633 RESP VIRUS 12-25 TARGETS: CPT

## 2019-09-23 PROCEDURE — 92526 ORAL FUNCTION THERAPY: CPT

## 2019-09-23 PROCEDURE — 84484 ASSAY OF TROPONIN QUANT: CPT

## 2019-09-23 PROCEDURE — 97162 PT EVAL MOD COMPLEX 30 MIN: CPT

## 2019-09-23 PROCEDURE — 93010 ELECTROCARDIOGRAM REPORT: CPT | Performed by: INTERNAL MEDICINE

## 2019-09-23 PROCEDURE — 83605 ASSAY OF LACTIC ACID: CPT

## 2019-09-23 PROCEDURE — 6370000000 HC RX 637 (ALT 250 FOR IP): Performed by: INTERNAL MEDICINE

## 2019-09-23 PROCEDURE — 97530 THERAPEUTIC ACTIVITIES: CPT

## 2019-09-23 PROCEDURE — 87798 DETECT AGENT NOS DNA AMP: CPT

## 2019-09-23 PROCEDURE — 2580000003 HC RX 258: Performed by: STUDENT IN AN ORGANIZED HEALTH CARE EDUCATION/TRAINING PROGRAM

## 2019-09-23 PROCEDURE — 6360000002 HC RX W HCPCS: Performed by: STUDENT IN AN ORGANIZED HEALTH CARE EDUCATION/TRAINING PROGRAM

## 2019-09-23 PROCEDURE — 99222 1ST HOSP IP/OBS MODERATE 55: CPT | Performed by: INTERNAL MEDICINE

## 2019-09-23 PROCEDURE — 87581 M.PNEUMON DNA AMP PROBE: CPT

## 2019-09-23 PROCEDURE — 87486 CHLMYD PNEUM DNA AMP PROBE: CPT

## 2019-09-23 PROCEDURE — 2700000000 HC OXYGEN THERAPY PER DAY

## 2019-09-23 PROCEDURE — 74230 X-RAY XM SWLNG FUNCJ C+: CPT

## 2019-09-23 PROCEDURE — 92611 MOTION FLUOROSCOPY/SWALLOW: CPT

## 2019-09-23 PROCEDURE — 84550 ASSAY OF BLOOD/URIC ACID: CPT

## 2019-09-23 PROCEDURE — 80069 RENAL FUNCTION PANEL: CPT

## 2019-09-23 PROCEDURE — 89220 SPUTUM SPECIMEN COLLECTION: CPT

## 2019-09-23 PROCEDURE — 84145 PROCALCITONIN (PCT): CPT

## 2019-09-23 PROCEDURE — 36415 COLL VENOUS BLD VENIPUNCTURE: CPT

## 2019-09-23 PROCEDURE — 97166 OT EVAL MOD COMPLEX 45 MIN: CPT

## 2019-09-23 PROCEDURE — 92610 EVALUATE SWALLOWING FUNCTION: CPT

## 2019-09-23 PROCEDURE — 83970 ASSAY OF PARATHORMONE: CPT

## 2019-09-23 PROCEDURE — 83735 ASSAY OF MAGNESIUM: CPT

## 2019-09-23 PROCEDURE — 6360000002 HC RX W HCPCS: Performed by: INTERNAL MEDICINE

## 2019-09-23 PROCEDURE — 93005 ELECTROCARDIOGRAM TRACING: CPT | Performed by: INTERNAL MEDICINE

## 2019-09-23 PROCEDURE — 87081 CULTURE SCREEN ONLY: CPT

## 2019-09-23 RX ORDER — MAGNESIUM SULFATE IN WATER 40 MG/ML
2 INJECTION, SOLUTION INTRAVENOUS ONCE
Status: COMPLETED | OUTPATIENT
Start: 2019-09-23 | End: 2019-09-23

## 2019-09-23 RX ORDER — TRAMADOL HYDROCHLORIDE 50 MG/1
25 TABLET ORAL ONCE
Status: COMPLETED | OUTPATIENT
Start: 2019-09-23 | End: 2019-09-23

## 2019-09-23 RX ORDER — HYDRALAZINE HYDROCHLORIDE 50 MG/1
50 TABLET, FILM COATED ORAL EVERY 12 HOURS SCHEDULED
Status: DISCONTINUED | OUTPATIENT
Start: 2019-09-23 | End: 2019-09-27

## 2019-09-23 RX ORDER — ISOSORBIDE MONONITRATE 60 MG/1
60 TABLET, EXTENDED RELEASE ORAL DAILY
Status: DISCONTINUED | OUTPATIENT
Start: 2019-09-23 | End: 2019-09-23

## 2019-09-23 RX ORDER — ACETAMINOPHEN 325 MG/1
650 TABLET ORAL EVERY 8 HOURS PRN
Status: DISCONTINUED | OUTPATIENT
Start: 2019-09-23 | End: 2019-09-23

## 2019-09-23 RX ORDER — ACETAMINOPHEN 650 MG/1
650 SUPPOSITORY RECTAL EVERY 4 HOURS PRN
Status: DISCONTINUED | OUTPATIENT
Start: 2019-09-23 | End: 2019-09-23

## 2019-09-23 RX ORDER — DILTIAZEM HYDROCHLORIDE 120 MG/1
120 CAPSULE, COATED, EXTENDED RELEASE ORAL DAILY
Status: DISCONTINUED | OUTPATIENT
Start: 2019-09-23 | End: 2019-09-27 | Stop reason: DRUGHIGH

## 2019-09-23 RX ORDER — HYDRALAZINE HYDROCHLORIDE 20 MG/ML
10 INJECTION INTRAMUSCULAR; INTRAVENOUS ONCE
Status: DISCONTINUED | OUTPATIENT
Start: 2019-09-23 | End: 2019-09-24

## 2019-09-23 RX ORDER — METOPROLOL SUCCINATE 50 MG/1
50 TABLET, EXTENDED RELEASE ORAL DAILY
Status: DISCONTINUED | OUTPATIENT
Start: 2019-09-23 | End: 2019-09-23

## 2019-09-23 RX ORDER — ACETAMINOPHEN 325 MG/1
650 TABLET ORAL EVERY 8 HOURS PRN
Status: DISCONTINUED | OUTPATIENT
Start: 2019-09-23 | End: 2019-10-01 | Stop reason: HOSPADM

## 2019-09-23 RX ORDER — HYDRALAZINE HYDROCHLORIDE 20 MG/ML
10 INJECTION INTRAMUSCULAR; INTRAVENOUS EVERY 4 HOURS PRN
Status: DISCONTINUED | OUTPATIENT
Start: 2019-09-23 | End: 2019-09-27

## 2019-09-23 RX ORDER — FUROSEMIDE 10 MG/ML
40 INJECTION INTRAMUSCULAR; INTRAVENOUS 2 TIMES DAILY
Status: DISCONTINUED | OUTPATIENT
Start: 2019-09-23 | End: 2019-09-25

## 2019-09-23 RX ADMIN — MAGNESIUM SULFATE HEPTAHYDRATE 2 G: 40 INJECTION, SOLUTION INTRAVENOUS at 06:14

## 2019-09-23 RX ADMIN — Medication 10 ML: at 17:32

## 2019-09-23 RX ADMIN — HEPARIN SODIUM 5000 UNITS: 5000 INJECTION INTRAVENOUS; SUBCUTANEOUS at 06:15

## 2019-09-23 RX ADMIN — HYDRALAZINE HYDROCHLORIDE 10 MG: 20 INJECTION INTRAMUSCULAR; INTRAVENOUS at 00:52

## 2019-09-23 RX ADMIN — Medication 10 ML: at 09:49

## 2019-09-23 RX ADMIN — FUROSEMIDE 20 MG: 10 INJECTION, SOLUTION INTRAMUSCULAR; INTRAVENOUS at 09:48

## 2019-09-23 RX ADMIN — FUROSEMIDE 40 MG: 10 INJECTION, SOLUTION INTRAMUSCULAR; INTRAVENOUS at 17:31

## 2019-09-23 RX ADMIN — HYDRALAZINE HYDROCHLORIDE 50 MG: 50 TABLET, FILM COATED ORAL at 20:22

## 2019-09-23 RX ADMIN — RIFAXIMIN 550 MG: 550 TABLET ORAL at 20:22

## 2019-09-23 RX ADMIN — URSODIOL 300 MG: 300 CAPSULE ORAL at 09:48

## 2019-09-23 RX ADMIN — FUROSEMIDE 20 MG: 10 INJECTION, SOLUTION INTRAMUSCULAR; INTRAVENOUS at 00:52

## 2019-09-23 RX ADMIN — HEPARIN SODIUM 5000 UNITS: 5000 INJECTION INTRAVENOUS; SUBCUTANEOUS at 00:55

## 2019-09-23 RX ADMIN — Medication 10 ML: at 00:52

## 2019-09-23 RX ADMIN — TRAMADOL HYDROCHLORIDE 25 MG: 50 TABLET, FILM COATED ORAL at 02:22

## 2019-09-23 RX ADMIN — URSODIOL 300 MG: 300 CAPSULE ORAL at 20:22

## 2019-09-23 RX ADMIN — RIFAXIMIN 550 MG: 550 TABLET ORAL at 09:48

## 2019-09-23 RX ADMIN — DILTIAZEM HYDROCHLORIDE 120 MG: 120 CAPSULE, COATED, EXTENDED RELEASE ORAL at 09:48

## 2019-09-23 RX ADMIN — HYDRALAZINE HYDROCHLORIDE 50 MG: 50 TABLET, FILM COATED ORAL at 09:48

## 2019-09-23 RX ADMIN — HEPARIN SODIUM 5000 UNITS: 5000 INJECTION INTRAVENOUS; SUBCUTANEOUS at 13:50

## 2019-09-23 RX ADMIN — PANTOPRAZOLE SODIUM 40 MG: 40 INJECTION, POWDER, FOR SOLUTION INTRAVENOUS at 09:48

## 2019-09-23 ASSESSMENT — PAIN DESCRIPTION - DESCRIPTORS: DESCRIPTORS: ACHING;CONSTANT;DISCOMFORT

## 2019-09-23 ASSESSMENT — PAIN SCALES - GENERAL
PAINLEVEL_OUTOF10: 8
PAINLEVEL_OUTOF10: 0
PAINLEVEL_OUTOF10: 0

## 2019-09-23 ASSESSMENT — ENCOUNTER SYMPTOMS
DIARRHEA: 0
NAUSEA: 0
ABDOMINAL PAIN: 0
WHEEZING: 0
SINUS PAIN: 0
BACK PAIN: 0
VOMITING: 0
EYE PAIN: 0
COUGH: 1
CONSTIPATION: 0
COUGH: 0
SHORTNESS OF BREATH: 1

## 2019-09-23 ASSESSMENT — PAIN DESCRIPTION - FREQUENCY: FREQUENCY: CONTINUOUS

## 2019-09-23 ASSESSMENT — PAIN DESCRIPTION - PROGRESSION: CLINICAL_PROGRESSION: NOT CHANGED

## 2019-09-23 ASSESSMENT — PAIN DESCRIPTION - ONSET: ONSET: ON-GOING

## 2019-09-23 ASSESSMENT — PAIN DESCRIPTION - PAIN TYPE: TYPE: ACUTE PAIN

## 2019-09-23 ASSESSMENT — PAIN DESCRIPTION - LOCATION: LOCATION: HEAD;NECK;SHOULDER

## 2019-09-24 LAB
ALBUMIN SERPL-MCNC: 2.9 G/DL (ref 3.4–5)
ANION GAP SERPL CALCULATED.3IONS-SCNC: 14 MMOL/L (ref 3–16)
BASOPHILS ABSOLUTE: 0 K/UL (ref 0–0.2)
BASOPHILS RELATIVE PERCENT: 0.7 %
BUN BLDV-MCNC: 31 MG/DL (ref 7–20)
CALCIUM SERPL-MCNC: 8.3 MG/DL (ref 8.3–10.6)
CHLORIDE BLD-SCNC: 106 MMOL/L (ref 99–110)
CO2: 20 MMOL/L (ref 21–32)
CREAT SERPL-MCNC: 2.1 MG/DL (ref 0.6–1.2)
EOSINOPHILS ABSOLUTE: 0.1 K/UL (ref 0–0.6)
EOSINOPHILS RELATIVE PERCENT: 0.9 %
GFR AFRICAN AMERICAN: 27
GFR NON-AFRICAN AMERICAN: 22
GLUCOSE BLD-MCNC: 72 MG/DL (ref 70–99)
HCT VFR BLD CALC: 35.5 % (ref 36–48)
HEMOGLOBIN: 11.7 G/DL (ref 12–16)
LYMPHOCYTES ABSOLUTE: 1 K/UL (ref 1–5.1)
LYMPHOCYTES RELATIVE PERCENT: 13.8 %
MCH RBC QN AUTO: 28.8 PG (ref 26–34)
MCHC RBC AUTO-ENTMCNC: 33 G/DL (ref 31–36)
MCV RBC AUTO: 87.2 FL (ref 80–100)
MONOCYTES ABSOLUTE: 1 K/UL (ref 0–1.3)
MONOCYTES RELATIVE PERCENT: 13.4 %
NEUTROPHILS ABSOLUTE: 5.4 K/UL (ref 1.7–7.7)
NEUTROPHILS RELATIVE PERCENT: 71.2 %
PDW BLD-RTO: 17.6 % (ref 12.4–15.4)
PHOSPHORUS: 3.9 MG/DL (ref 2.5–4.9)
PLATELET # BLD: 173 K/UL (ref 135–450)
PMV BLD AUTO: 9.1 FL (ref 5–10.5)
POTASSIUM SERPL-SCNC: 3.7 MMOL/L (ref 3.5–5.1)
RBC # BLD: 4.07 M/UL (ref 4–5.2)
SODIUM BLD-SCNC: 140 MMOL/L (ref 136–145)
WBC # BLD: 7.5 K/UL (ref 4–11)

## 2019-09-24 PROCEDURE — C9113 INJ PANTOPRAZOLE SODIUM, VIA: HCPCS | Performed by: STUDENT IN AN ORGANIZED HEALTH CARE EDUCATION/TRAINING PROGRAM

## 2019-09-24 PROCEDURE — 6370000000 HC RX 637 (ALT 250 FOR IP): Performed by: STUDENT IN AN ORGANIZED HEALTH CARE EDUCATION/TRAINING PROGRAM

## 2019-09-24 PROCEDURE — 6360000002 HC RX W HCPCS: Performed by: INTERNAL MEDICINE

## 2019-09-24 PROCEDURE — 2580000003 HC RX 258: Performed by: STUDENT IN AN ORGANIZED HEALTH CARE EDUCATION/TRAINING PROGRAM

## 2019-09-24 PROCEDURE — 99232 SBSQ HOSP IP/OBS MODERATE 35: CPT | Performed by: INTERNAL MEDICINE

## 2019-09-24 PROCEDURE — 6360000002 HC RX W HCPCS: Performed by: STUDENT IN AN ORGANIZED HEALTH CARE EDUCATION/TRAINING PROGRAM

## 2019-09-24 PROCEDURE — 36415 COLL VENOUS BLD VENIPUNCTURE: CPT

## 2019-09-24 PROCEDURE — 2060000000 HC ICU INTERMEDIATE R&B

## 2019-09-24 PROCEDURE — 6370000000 HC RX 637 (ALT 250 FOR IP): Performed by: INTERNAL MEDICINE

## 2019-09-24 PROCEDURE — 85025 COMPLETE CBC W/AUTO DIFF WBC: CPT

## 2019-09-24 PROCEDURE — 92526 ORAL FUNCTION THERAPY: CPT

## 2019-09-24 PROCEDURE — 80069 RENAL FUNCTION PANEL: CPT

## 2019-09-24 RX ORDER — POLYETHYLENE GLYCOL 3350 17 G/17G
17 POWDER, FOR SOLUTION ORAL DAILY
Status: DISCONTINUED | OUTPATIENT
Start: 2019-09-24 | End: 2019-10-01 | Stop reason: HOSPADM

## 2019-09-24 RX ORDER — TRAMADOL HYDROCHLORIDE 50 MG/1
50 TABLET ORAL ONCE
Status: DISCONTINUED | OUTPATIENT
Start: 2019-09-24 | End: 2019-09-24

## 2019-09-24 RX ORDER — TRAMADOL HYDROCHLORIDE 50 MG/1
50 TABLET ORAL 2 TIMES DAILY PRN
Status: DISCONTINUED | OUTPATIENT
Start: 2019-09-24 | End: 2019-10-01 | Stop reason: HOSPADM

## 2019-09-24 RX ADMIN — HEPARIN SODIUM 5000 UNITS: 5000 INJECTION INTRAVENOUS; SUBCUTANEOUS at 13:09

## 2019-09-24 RX ADMIN — HEPARIN SODIUM 5000 UNITS: 5000 INJECTION INTRAVENOUS; SUBCUTANEOUS at 05:54

## 2019-09-24 RX ADMIN — HYDRALAZINE HYDROCHLORIDE 50 MG: 50 TABLET, FILM COATED ORAL at 21:59

## 2019-09-24 RX ADMIN — TRAMADOL HYDROCHLORIDE 50 MG: 50 TABLET, FILM COATED ORAL at 16:42

## 2019-09-24 RX ADMIN — RIFAXIMIN 550 MG: 550 TABLET ORAL at 21:59

## 2019-09-24 RX ADMIN — Medication 10 ML: at 08:09

## 2019-09-24 RX ADMIN — URSODIOL 300 MG: 300 CAPSULE ORAL at 08:08

## 2019-09-24 RX ADMIN — DILTIAZEM HYDROCHLORIDE 120 MG: 120 CAPSULE, COATED, EXTENDED RELEASE ORAL at 08:08

## 2019-09-24 RX ADMIN — FUROSEMIDE 40 MG: 10 INJECTION, SOLUTION INTRAMUSCULAR; INTRAVENOUS at 17:23

## 2019-09-24 RX ADMIN — PANTOPRAZOLE SODIUM 40 MG: 40 INJECTION, POWDER, FOR SOLUTION INTRAVENOUS at 08:08

## 2019-09-24 RX ADMIN — HEPARIN SODIUM 5000 UNITS: 5000 INJECTION INTRAVENOUS; SUBCUTANEOUS at 00:21

## 2019-09-24 RX ADMIN — Medication 10 ML: at 22:00

## 2019-09-24 RX ADMIN — CEFEPIME HYDROCHLORIDE 2 G: 2 INJECTION, POWDER, FOR SOLUTION INTRAVENOUS at 00:21

## 2019-09-24 RX ADMIN — RIFAXIMIN 550 MG: 550 TABLET ORAL at 08:08

## 2019-09-24 RX ADMIN — CEFEPIME HYDROCHLORIDE 2 G: 2 INJECTION, POWDER, FOR SOLUTION INTRAVENOUS at 21:59

## 2019-09-24 RX ADMIN — HYDRALAZINE HYDROCHLORIDE 50 MG: 50 TABLET, FILM COATED ORAL at 08:08

## 2019-09-24 RX ADMIN — FUROSEMIDE 40 MG: 10 INJECTION, SOLUTION INTRAMUSCULAR; INTRAVENOUS at 08:08

## 2019-09-24 RX ADMIN — Medication 10 ML: at 00:23

## 2019-09-24 RX ADMIN — ACETAMINOPHEN 650 MG: 325 TABLET ORAL at 10:09

## 2019-09-24 RX ADMIN — ACETAMINOPHEN 650 MG: 325 TABLET ORAL at 15:49

## 2019-09-24 RX ADMIN — POLYETHYLENE GLYCOL 3350 17 G: 17 POWDER, FOR SOLUTION ORAL at 16:42

## 2019-09-24 RX ADMIN — HEPARIN SODIUM 5000 UNITS: 5000 INJECTION INTRAVENOUS; SUBCUTANEOUS at 21:59

## 2019-09-24 RX ADMIN — URSODIOL 300 MG: 300 CAPSULE ORAL at 21:59

## 2019-09-24 ASSESSMENT — PAIN SCALES - GENERAL
PAINLEVEL_OUTOF10: 8
PAINLEVEL_OUTOF10: 0
PAINLEVEL_OUTOF10: 0
PAINLEVEL_OUTOF10: 5
PAINLEVEL_OUTOF10: 8
PAINLEVEL_OUTOF10: 8

## 2019-09-24 ASSESSMENT — ENCOUNTER SYMPTOMS
SHORTNESS OF BREATH: 1
DIARRHEA: 0
EYE PAIN: 0
CONSTIPATION: 0
BACK PAIN: 0
ABDOMINAL PAIN: 0
SINUS PAIN: 0

## 2019-09-24 ASSESSMENT — PAIN DESCRIPTION - FREQUENCY
FREQUENCY: CONTINUOUS

## 2019-09-24 ASSESSMENT — PAIN DESCRIPTION - PROGRESSION
CLINICAL_PROGRESSION: NOT CHANGED

## 2019-09-24 ASSESSMENT — PAIN DESCRIPTION - DESCRIPTORS
DESCRIPTORS: TINGLING
DESCRIPTORS: TINGLING
DESCRIPTORS: NUMBNESS;TINGLING
DESCRIPTORS: TINGLING

## 2019-09-24 ASSESSMENT — PAIN DESCRIPTION - PAIN TYPE
TYPE: ACUTE PAIN

## 2019-09-24 ASSESSMENT — PAIN DESCRIPTION - LOCATION
LOCATION: HAND

## 2019-09-24 ASSESSMENT — PAIN DESCRIPTION - ONSET
ONSET: ON-GOING

## 2019-09-24 ASSESSMENT — PAIN DESCRIPTION - ORIENTATION
ORIENTATION: RIGHT

## 2019-09-25 LAB
ALBUMIN SERPL-MCNC: 2.8 G/DL (ref 3.4–5)
AMMONIA: 26 UMOL/L (ref 11–51)
ANION GAP SERPL CALCULATED.3IONS-SCNC: 13 MMOL/L (ref 3–16)
BASOPHILS ABSOLUTE: 0 K/UL (ref 0–0.2)
BASOPHILS RELATIVE PERCENT: 0.8 %
BUN BLDV-MCNC: 34 MG/DL (ref 7–20)
CALCIUM SERPL-MCNC: 8.3 MG/DL (ref 8.3–10.6)
CHLORIDE BLD-SCNC: 108 MMOL/L (ref 99–110)
CO2: 19 MMOL/L (ref 21–32)
CREAT SERPL-MCNC: 2.4 MG/DL (ref 0.6–1.2)
EOSINOPHILS ABSOLUTE: 0.2 K/UL (ref 0–0.6)
EOSINOPHILS RELATIVE PERCENT: 3.1 %
GFR AFRICAN AMERICAN: 23
GFR NON-AFRICAN AMERICAN: 19
GLUCOSE BLD-MCNC: 87 MG/DL (ref 70–99)
HCT VFR BLD CALC: 34 % (ref 36–48)
HEMOGLOBIN: 11.2 G/DL (ref 12–16)
LYMPHOCYTES ABSOLUTE: 1.1 K/UL (ref 1–5.1)
LYMPHOCYTES RELATIVE PERCENT: 18 %
MAGNESIUM: 2.1 MG/DL (ref 1.8–2.4)
MCH RBC QN AUTO: 29 PG (ref 26–34)
MCHC RBC AUTO-ENTMCNC: 33 G/DL (ref 31–36)
MCV RBC AUTO: 87.8 FL (ref 80–100)
MONOCYTES ABSOLUTE: 0.8 K/UL (ref 0–1.3)
MONOCYTES RELATIVE PERCENT: 13.2 %
MRSA CULTURE ONLY: NORMAL
NEUTROPHILS ABSOLUTE: 4.1 K/UL (ref 1.7–7.7)
NEUTROPHILS RELATIVE PERCENT: 64.9 %
PDW BLD-RTO: 17.4 % (ref 12.4–15.4)
PHOSPHORUS: 3.7 MG/DL (ref 2.5–4.9)
PLATELET # BLD: 176 K/UL (ref 135–450)
PMV BLD AUTO: 9 FL (ref 5–10.5)
POTASSIUM SERPL-SCNC: 3.5 MMOL/L (ref 3.5–5.1)
RBC # BLD: 3.87 M/UL (ref 4–5.2)
SODIUM BLD-SCNC: 140 MMOL/L (ref 136–145)
WBC # BLD: 6.3 K/UL (ref 4–11)

## 2019-09-25 PROCEDURE — 85025 COMPLETE CBC W/AUTO DIFF WBC: CPT

## 2019-09-25 PROCEDURE — 97530 THERAPEUTIC ACTIVITIES: CPT

## 2019-09-25 PROCEDURE — 2700000000 HC OXYGEN THERAPY PER DAY

## 2019-09-25 PROCEDURE — 6370000000 HC RX 637 (ALT 250 FOR IP): Performed by: STUDENT IN AN ORGANIZED HEALTH CARE EDUCATION/TRAINING PROGRAM

## 2019-09-25 PROCEDURE — 2580000003 HC RX 258: Performed by: STUDENT IN AN ORGANIZED HEALTH CARE EDUCATION/TRAINING PROGRAM

## 2019-09-25 PROCEDURE — 97116 GAIT TRAINING THERAPY: CPT

## 2019-09-25 PROCEDURE — 82140 ASSAY OF AMMONIA: CPT

## 2019-09-25 PROCEDURE — 6360000002 HC RX W HCPCS: Performed by: STUDENT IN AN ORGANIZED HEALTH CARE EDUCATION/TRAINING PROGRAM

## 2019-09-25 PROCEDURE — 6370000000 HC RX 637 (ALT 250 FOR IP): Performed by: INTERNAL MEDICINE

## 2019-09-25 PROCEDURE — 2060000000 HC ICU INTERMEDIATE R&B

## 2019-09-25 PROCEDURE — 92526 ORAL FUNCTION THERAPY: CPT

## 2019-09-25 PROCEDURE — 36415 COLL VENOUS BLD VENIPUNCTURE: CPT

## 2019-09-25 PROCEDURE — 94761 N-INVAS EAR/PLS OXIMETRY MLT: CPT

## 2019-09-25 PROCEDURE — 80069 RENAL FUNCTION PANEL: CPT

## 2019-09-25 PROCEDURE — 97535 SELF CARE MNGMENT TRAINING: CPT

## 2019-09-25 PROCEDURE — 83735 ASSAY OF MAGNESIUM: CPT

## 2019-09-25 RX ORDER — POTASSIUM CHLORIDE 1.5 G/1.77G
40 POWDER, FOR SOLUTION ORAL ONCE
Status: COMPLETED | OUTPATIENT
Start: 2019-09-25 | End: 2019-09-25

## 2019-09-25 RX ORDER — PANTOPRAZOLE SODIUM 40 MG/1
40 TABLET, DELAYED RELEASE ORAL
Status: DISCONTINUED | OUTPATIENT
Start: 2019-09-25 | End: 2019-09-27 | Stop reason: ALTCHOICE

## 2019-09-25 RX ORDER — FUROSEMIDE 40 MG/1
40 TABLET ORAL DAILY
Status: DISCONTINUED | OUTPATIENT
Start: 2019-09-25 | End: 2019-09-29

## 2019-09-25 RX ORDER — LOSARTAN POTASSIUM 50 MG/1
50 TABLET ORAL DAILY
Status: DISCONTINUED | OUTPATIENT
Start: 2019-09-25 | End: 2019-10-01 | Stop reason: HOSPADM

## 2019-09-25 RX ADMIN — URSODIOL 300 MG: 300 CAPSULE ORAL at 09:17

## 2019-09-25 RX ADMIN — PIPERACILLIN SODIUM,TAZOBACTAM SODIUM 3.38 G: 3; .375 INJECTION, POWDER, FOR SOLUTION INTRAVENOUS at 23:48

## 2019-09-25 RX ADMIN — HYDRALAZINE HYDROCHLORIDE 10 MG: 20 INJECTION INTRAMUSCULAR; INTRAVENOUS at 17:32

## 2019-09-25 RX ADMIN — LOSARTAN POTASSIUM 50 MG: 50 TABLET, FILM COATED ORAL at 09:17

## 2019-09-25 RX ADMIN — HYDRALAZINE HYDROCHLORIDE 50 MG: 50 TABLET, FILM COATED ORAL at 09:18

## 2019-09-25 RX ADMIN — HEPARIN SODIUM 5000 UNITS: 5000 INJECTION INTRAVENOUS; SUBCUTANEOUS at 05:17

## 2019-09-25 RX ADMIN — Medication 10 ML: at 21:54

## 2019-09-25 RX ADMIN — Medication 10 ML: at 16:31

## 2019-09-25 RX ADMIN — DILTIAZEM HYDROCHLORIDE 120 MG: 120 CAPSULE, COATED, EXTENDED RELEASE ORAL at 09:18

## 2019-09-25 RX ADMIN — DICLOFENAC 2 G: 10 GEL TOPICAL at 23:49

## 2019-09-25 RX ADMIN — FUROSEMIDE 40 MG: 40 TABLET ORAL at 09:17

## 2019-09-25 RX ADMIN — HEPARIN SODIUM 5000 UNITS: 5000 INJECTION INTRAVENOUS; SUBCUTANEOUS at 21:47

## 2019-09-25 RX ADMIN — ACETAMINOPHEN 650 MG: 325 TABLET ORAL at 11:04

## 2019-09-25 RX ADMIN — HYDRALAZINE HYDROCHLORIDE 10 MG: 20 INJECTION INTRAMUSCULAR; INTRAVENOUS at 21:47

## 2019-09-25 RX ADMIN — RIFAXIMIN 550 MG: 550 TABLET ORAL at 09:18

## 2019-09-25 RX ADMIN — PIPERACILLIN SODIUM,TAZOBACTAM SODIUM 3.38 G: 3; .375 INJECTION, POWDER, FOR SOLUTION INTRAVENOUS at 11:43

## 2019-09-25 RX ADMIN — POTASSIUM CHLORIDE 40 MEQ: 1.5 POWDER, FOR SOLUTION ORAL at 11:59

## 2019-09-25 RX ADMIN — PANTOPRAZOLE SODIUM 40 MG: 40 TABLET, DELAYED RELEASE ORAL at 09:18

## 2019-09-25 RX ADMIN — HEPARIN SODIUM 5000 UNITS: 5000 INJECTION INTRAVENOUS; SUBCUTANEOUS at 16:29

## 2019-09-25 ASSESSMENT — PAIN SCALES - GENERAL
PAINLEVEL_OUTOF10: 0
PAINLEVEL_OUTOF10: 5
PAINLEVEL_OUTOF10: 0
PAINLEVEL_OUTOF10: 0
PAINLEVEL_OUTOF10: 5

## 2019-09-25 ASSESSMENT — PAIN DESCRIPTION - PROGRESSION
CLINICAL_PROGRESSION: NOT CHANGED
CLINICAL_PROGRESSION: NOT CHANGED
CLINICAL_PROGRESSION: GRADUALLY WORSENING
CLINICAL_PROGRESSION: NOT CHANGED

## 2019-09-25 ASSESSMENT — ENCOUNTER SYMPTOMS
DIARRHEA: 0
SINUS PAIN: 0
CHOKING: 0
COLOR CHANGE: 0
SHORTNESS OF BREATH: 1
ABDOMINAL PAIN: 0
CONSTIPATION: 0
COUGH: 0
BACK PAIN: 0
EYE PAIN: 0

## 2019-09-25 ASSESSMENT — PAIN DESCRIPTION - FREQUENCY: FREQUENCY: CONTINUOUS

## 2019-09-25 ASSESSMENT — PAIN DESCRIPTION - LOCATION: LOCATION: HAND

## 2019-09-25 ASSESSMENT — PAIN DESCRIPTION - ONSET: ONSET: ON-GOING

## 2019-09-25 ASSESSMENT — PAIN DESCRIPTION - DESCRIPTORS: DESCRIPTORS: NUMBNESS;PINS AND NEEDLES;TINGLING

## 2019-09-25 ASSESSMENT — PAIN DESCRIPTION - PAIN TYPE: TYPE: ACUTE PAIN

## 2019-09-25 ASSESSMENT — PAIN DESCRIPTION - ORIENTATION: ORIENTATION: RIGHT

## 2019-09-26 ENCOUNTER — APPOINTMENT (OUTPATIENT)
Dept: GENERAL RADIOLOGY | Age: 84
DRG: 871 | End: 2019-09-26
Payer: MEDICARE

## 2019-09-26 ENCOUNTER — APPOINTMENT (OUTPATIENT)
Dept: MRI IMAGING | Age: 84
DRG: 871 | End: 2019-09-26
Payer: MEDICARE

## 2019-09-26 LAB
ALBUMIN SERPL-MCNC: 3.1 G/DL (ref 3.4–5)
ANION GAP SERPL CALCULATED.3IONS-SCNC: 15 MMOL/L (ref 3–16)
BASOPHILS ABSOLUTE: 0 K/UL (ref 0–0.2)
BASOPHILS RELATIVE PERCENT: 0.7 %
BUN BLDV-MCNC: 37 MG/DL (ref 7–20)
CALCIUM SERPL-MCNC: 8.3 MG/DL (ref 8.3–10.6)
CHLORIDE BLD-SCNC: 106 MMOL/L (ref 99–110)
CO2: 21 MMOL/L (ref 21–32)
CREAT SERPL-MCNC: 2.5 MG/DL (ref 0.6–1.2)
EOSINOPHILS ABSOLUTE: 0.1 K/UL (ref 0–0.6)
EOSINOPHILS RELATIVE PERCENT: 1.8 %
GFR AFRICAN AMERICAN: 22
GFR NON-AFRICAN AMERICAN: 18
GLUCOSE BLD-MCNC: 78 MG/DL (ref 70–99)
HCT VFR BLD CALC: 32.8 % (ref 36–48)
HEMOGLOBIN: 10.8 G/DL (ref 12–16)
LYMPHOCYTES ABSOLUTE: 1.2 K/UL (ref 1–5.1)
LYMPHOCYTES RELATIVE PERCENT: 17.3 %
MAGNESIUM: 2 MG/DL (ref 1.8–2.4)
MCH RBC QN AUTO: 28.8 PG (ref 26–34)
MCHC RBC AUTO-ENTMCNC: 33 G/DL (ref 31–36)
MCV RBC AUTO: 87.4 FL (ref 80–100)
MONOCYTES ABSOLUTE: 1 K/UL (ref 0–1.3)
MONOCYTES RELATIVE PERCENT: 14.6 %
NEUTROPHILS ABSOLUTE: 4.6 K/UL (ref 1.7–7.7)
NEUTROPHILS RELATIVE PERCENT: 65.6 %
PDW BLD-RTO: 17.5 % (ref 12.4–15.4)
PHOSPHORUS: 3.3 MG/DL (ref 2.5–4.9)
PLATELET # BLD: 176 K/UL (ref 135–450)
PMV BLD AUTO: 8.9 FL (ref 5–10.5)
POTASSIUM SERPL-SCNC: 3.8 MMOL/L (ref 3.5–5.1)
RBC # BLD: 3.75 M/UL (ref 4–5.2)
SODIUM BLD-SCNC: 142 MMOL/L (ref 136–145)
WBC # BLD: 7 K/UL (ref 4–11)

## 2019-09-26 PROCEDURE — 2060000000 HC ICU INTERMEDIATE R&B

## 2019-09-26 PROCEDURE — 6360000002 HC RX W HCPCS: Performed by: STUDENT IN AN ORGANIZED HEALTH CARE EDUCATION/TRAINING PROGRAM

## 2019-09-26 PROCEDURE — 6370000000 HC RX 637 (ALT 250 FOR IP): Performed by: STUDENT IN AN ORGANIZED HEALTH CARE EDUCATION/TRAINING PROGRAM

## 2019-09-26 PROCEDURE — 2500000003 HC RX 250 WO HCPCS: Performed by: STUDENT IN AN ORGANIZED HEALTH CARE EDUCATION/TRAINING PROGRAM

## 2019-09-26 PROCEDURE — 83735 ASSAY OF MAGNESIUM: CPT

## 2019-09-26 PROCEDURE — 6370000000 HC RX 637 (ALT 250 FOR IP): Performed by: INTERNAL MEDICINE

## 2019-09-26 PROCEDURE — 85025 COMPLETE CBC W/AUTO DIFF WBC: CPT

## 2019-09-26 PROCEDURE — 70551 MRI BRAIN STEM W/O DYE: CPT

## 2019-09-26 PROCEDURE — 80069 RENAL FUNCTION PANEL: CPT

## 2019-09-26 PROCEDURE — 2580000003 HC RX 258: Performed by: STUDENT IN AN ORGANIZED HEALTH CARE EDUCATION/TRAINING PROGRAM

## 2019-09-26 PROCEDURE — 36415 COLL VENOUS BLD VENIPUNCTURE: CPT

## 2019-09-26 RX ORDER — LABETALOL 20 MG/4 ML (5 MG/ML) INTRAVENOUS SYRINGE
10 ONCE
Status: COMPLETED | OUTPATIENT
Start: 2019-09-26 | End: 2019-09-26

## 2019-09-26 RX ADMIN — ACETAMINOPHEN 650 MG: 325 TABLET ORAL at 22:10

## 2019-09-26 RX ADMIN — HEPARIN SODIUM 5000 UNITS: 5000 INJECTION INTRAVENOUS; SUBCUTANEOUS at 14:57

## 2019-09-26 RX ADMIN — RIFAXIMIN 550 MG: 550 TABLET ORAL at 08:20

## 2019-09-26 RX ADMIN — URSODIOL 300 MG: 300 CAPSULE ORAL at 08:20

## 2019-09-26 RX ADMIN — URSODIOL 300 MG: 300 CAPSULE ORAL at 20:46

## 2019-09-26 RX ADMIN — DICLOFENAC 2 G: 10 GEL TOPICAL at 20:45

## 2019-09-26 RX ADMIN — PIPERACILLIN SODIUM,TAZOBACTAM SODIUM 3.38 G: 3; .375 INJECTION, POWDER, FOR SOLUTION INTRAVENOUS at 11:17

## 2019-09-26 RX ADMIN — DILTIAZEM HYDROCHLORIDE 120 MG: 120 CAPSULE, COATED, EXTENDED RELEASE ORAL at 08:20

## 2019-09-26 RX ADMIN — Medication 10 ML: at 20:46

## 2019-09-26 RX ADMIN — HYDRALAZINE HYDROCHLORIDE 50 MG: 50 TABLET, FILM COATED ORAL at 20:45

## 2019-09-26 RX ADMIN — HYDRALAZINE HYDROCHLORIDE 50 MG: 50 TABLET, FILM COATED ORAL at 08:20

## 2019-09-26 RX ADMIN — HEPARIN SODIUM 5000 UNITS: 5000 INJECTION INTRAVENOUS; SUBCUTANEOUS at 20:46

## 2019-09-26 RX ADMIN — HYDRALAZINE HYDROCHLORIDE 10 MG: 20 INJECTION INTRAMUSCULAR; INTRAVENOUS at 16:29

## 2019-09-26 RX ADMIN — HEPARIN SODIUM 5000 UNITS: 5000 INJECTION INTRAVENOUS; SUBCUTANEOUS at 05:43

## 2019-09-26 RX ADMIN — FUROSEMIDE 40 MG: 40 TABLET ORAL at 08:20

## 2019-09-26 RX ADMIN — DICLOFENAC 2 G: 10 GEL TOPICAL at 11:11

## 2019-09-26 RX ADMIN — LOSARTAN POTASSIUM 50 MG: 50 TABLET, FILM COATED ORAL at 08:20

## 2019-09-26 RX ADMIN — Medication 10 ML: at 11:18

## 2019-09-26 RX ADMIN — RIFAXIMIN 550 MG: 550 TABLET ORAL at 20:46

## 2019-09-26 RX ADMIN — LABETALOL 20 MG/4 ML (5 MG/ML) INTRAVENOUS SYRINGE 10 MG: at 18:19

## 2019-09-26 ASSESSMENT — PAIN SCALES - GENERAL
PAINLEVEL_OUTOF10: 0
PAINLEVEL_OUTOF10: 10
PAINLEVEL_OUTOF10: 0
PAINLEVEL_OUTOF10: 0
PAINLEVEL_OUTOF10: 3
PAINLEVEL_OUTOF10: 0

## 2019-09-26 ASSESSMENT — PAIN DESCRIPTION - LOCATION
LOCATION: HAND
LOCATION: HEAD

## 2019-09-26 ASSESSMENT — ENCOUNTER SYMPTOMS
VOMITING: 1
CONSTIPATION: 0
COUGH: 0
ABDOMINAL PAIN: 0
TROUBLE SWALLOWING: 0
NAUSEA: 0
BACK PAIN: 0
ABDOMINAL DISTENTION: 0
SHORTNESS OF BREATH: 1
SINUS PAIN: 0
EYE PAIN: 0
DIARRHEA: 0

## 2019-09-26 ASSESSMENT — PAIN DESCRIPTION - PROGRESSION: CLINICAL_PROGRESSION: NOT CHANGED

## 2019-09-26 ASSESSMENT — PAIN DESCRIPTION - FREQUENCY: FREQUENCY: INTERMITTENT

## 2019-09-26 ASSESSMENT — PAIN DESCRIPTION - DESCRIPTORS: DESCRIPTORS: ACHING

## 2019-09-26 ASSESSMENT — PAIN DESCRIPTION - PAIN TYPE
TYPE: ACUTE PAIN
TYPE: ACUTE PAIN

## 2019-09-26 ASSESSMENT — PAIN DESCRIPTION - ONSET: ONSET: UNABLE TO TELL

## 2019-09-26 ASSESSMENT — PAIN DESCRIPTION - ORIENTATION: ORIENTATION: RIGHT

## 2019-09-27 ENCOUNTER — APPOINTMENT (OUTPATIENT)
Dept: VASCULAR LAB | Age: 84
DRG: 871 | End: 2019-09-27
Payer: MEDICARE

## 2019-09-27 ENCOUNTER — APPOINTMENT (OUTPATIENT)
Dept: GENERAL RADIOLOGY | Age: 84
DRG: 871 | End: 2019-09-27
Payer: MEDICARE

## 2019-09-27 ENCOUNTER — ANESTHESIA EVENT (OUTPATIENT)
Dept: ENDOSCOPY | Age: 84
DRG: 871 | End: 2019-09-27
Payer: MEDICARE

## 2019-09-27 ENCOUNTER — ANESTHESIA (OUTPATIENT)
Dept: ENDOSCOPY | Age: 84
DRG: 871 | End: 2019-09-27
Payer: MEDICARE

## 2019-09-27 VITALS — DIASTOLIC BLOOD PRESSURE: 72 MMHG | SYSTOLIC BLOOD PRESSURE: 120 MMHG | OXYGEN SATURATION: 94 %

## 2019-09-27 PROBLEM — E44.1 MILD MALNUTRITION (HCC): Status: ACTIVE | Noted: 2019-09-27

## 2019-09-27 LAB
ALBUMIN SERPL-MCNC: 2.8 G/DL (ref 3.4–5)
ANION GAP SERPL CALCULATED.3IONS-SCNC: 17 MMOL/L (ref 3–16)
BASOPHILS ABSOLUTE: 0.1 K/UL (ref 0–0.2)
BASOPHILS RELATIVE PERCENT: 1 %
BUN BLDV-MCNC: 37 MG/DL (ref 7–20)
CALCIUM SERPL-MCNC: 8.4 MG/DL (ref 8.3–10.6)
CHLORIDE BLD-SCNC: 104 MMOL/L (ref 99–110)
CO2: 19 MMOL/L (ref 21–32)
CREAT SERPL-MCNC: 2.8 MG/DL (ref 0.6–1.2)
CULTURE, BLOOD 2: NORMAL
EOSINOPHILS ABSOLUTE: 0.1 K/UL (ref 0–0.6)
EOSINOPHILS RELATIVE PERCENT: 1.3 %
GFR AFRICAN AMERICAN: 19
GFR NON-AFRICAN AMERICAN: 16
GLUCOSE BLD-MCNC: 82 MG/DL (ref 70–99)
HCT VFR BLD CALC: 34.1 % (ref 36–48)
HEMOGLOBIN: 11.1 G/DL (ref 12–16)
LYMPHOCYTES ABSOLUTE: 1.1 K/UL (ref 1–5.1)
LYMPHOCYTES RELATIVE PERCENT: 17.3 %
MAGNESIUM: 1.9 MG/DL (ref 1.8–2.4)
MCH RBC QN AUTO: 28.5 PG (ref 26–34)
MCHC RBC AUTO-ENTMCNC: 32.6 G/DL (ref 31–36)
MCV RBC AUTO: 87.4 FL (ref 80–100)
MONOCYTES ABSOLUTE: 0.8 K/UL (ref 0–1.3)
MONOCYTES RELATIVE PERCENT: 13.1 %
NEUTROPHILS ABSOLUTE: 4.4 K/UL (ref 1.7–7.7)
NEUTROPHILS RELATIVE PERCENT: 67.3 %
PDW BLD-RTO: 17.4 % (ref 12.4–15.4)
PHOSPHORUS: 3.5 MG/DL (ref 2.5–4.9)
PLATELET # BLD: 182 K/UL (ref 135–450)
PMV BLD AUTO: 8.8 FL (ref 5–10.5)
POTASSIUM SERPL-SCNC: 3.6 MMOL/L (ref 3.5–5.1)
RBC # BLD: 3.9 M/UL (ref 4–5.2)
SODIUM BLD-SCNC: 140 MMOL/L (ref 136–145)
WBC # BLD: 6.5 K/UL (ref 4–11)

## 2019-09-27 PROCEDURE — 2060000000 HC ICU INTERMEDIATE R&B

## 2019-09-27 PROCEDURE — 74018 RADEX ABDOMEN 1 VIEW: CPT

## 2019-09-27 PROCEDURE — 3700000001 HC ADD 15 MINUTES (ANESTHESIA): Performed by: INTERNAL MEDICINE

## 2019-09-27 PROCEDURE — 2500000003 HC RX 250 WO HCPCS: Performed by: STUDENT IN AN ORGANIZED HEALTH CARE EDUCATION/TRAINING PROGRAM

## 2019-09-27 PROCEDURE — 3609018400 HC EGD ESOPHAGOGASTRODUODENOSCOPY TUBE INSERTION: Performed by: INTERNAL MEDICINE

## 2019-09-27 PROCEDURE — 2500000003 HC RX 250 WO HCPCS: Performed by: INTERNAL MEDICINE

## 2019-09-27 PROCEDURE — 85025 COMPLETE CBC W/AUTO DIFF WBC: CPT

## 2019-09-27 PROCEDURE — 2580000003 HC RX 258: Performed by: STUDENT IN AN ORGANIZED HEALTH CARE EDUCATION/TRAINING PROGRAM

## 2019-09-27 PROCEDURE — 6360000002 HC RX W HCPCS: Performed by: STUDENT IN AN ORGANIZED HEALTH CARE EDUCATION/TRAINING PROGRAM

## 2019-09-27 PROCEDURE — 93975 VASCULAR STUDY: CPT

## 2019-09-27 PROCEDURE — 7100000010 HC PHASE II RECOVERY - FIRST 15 MIN: Performed by: INTERNAL MEDICINE

## 2019-09-27 PROCEDURE — 2720000010 HC SURG SUPPLY STERILE: Performed by: INTERNAL MEDICINE

## 2019-09-27 PROCEDURE — 3700000000 HC ANESTHESIA ATTENDED CARE: Performed by: INTERNAL MEDICINE

## 2019-09-27 PROCEDURE — 80069 RENAL FUNCTION PANEL: CPT

## 2019-09-27 PROCEDURE — 0DH98UZ INSERTION OF FEEDING DEVICE INTO DUODENUM, VIA NATURAL OR ARTIFICIAL OPENING ENDOSCOPIC: ICD-10-PCS | Performed by: INTERNAL MEDICINE

## 2019-09-27 PROCEDURE — 6370000000 HC RX 637 (ALT 250 FOR IP): Performed by: STUDENT IN AN ORGANIZED HEALTH CARE EDUCATION/TRAINING PROGRAM

## 2019-09-27 PROCEDURE — 36415 COLL VENOUS BLD VENIPUNCTURE: CPT

## 2019-09-27 PROCEDURE — 83735 ASSAY OF MAGNESIUM: CPT

## 2019-09-27 PROCEDURE — 6360000002 HC RX W HCPCS: Performed by: NURSE ANESTHETIST, CERTIFIED REGISTERED

## 2019-09-27 PROCEDURE — 7100000011 HC PHASE II RECOVERY - ADDTL 15 MIN: Performed by: INTERNAL MEDICINE

## 2019-09-27 PROCEDURE — 6370000000 HC RX 637 (ALT 250 FOR IP): Performed by: INTERNAL MEDICINE

## 2019-09-27 PROCEDURE — 2580000003 HC RX 258: Performed by: NURSE ANESTHETIST, CERTIFIED REGISTERED

## 2019-09-27 RX ORDER — HYDRALAZINE HYDROCHLORIDE 20 MG/ML
10 INJECTION INTRAMUSCULAR; INTRAVENOUS EVERY 6 HOURS PRN
Status: DISCONTINUED | OUTPATIENT
Start: 2019-09-27 | End: 2019-09-28

## 2019-09-27 RX ORDER — HYDRALAZINE HYDROCHLORIDE 25 MG/1
25 TABLET, FILM COATED ORAL EVERY 8 HOURS SCHEDULED
Status: DISCONTINUED | OUTPATIENT
Start: 2019-09-27 | End: 2019-09-28

## 2019-09-27 RX ORDER — NIFEDIPINE 60 MG/1
60 TABLET, EXTENDED RELEASE ORAL DAILY
Status: DISCONTINUED | OUTPATIENT
Start: 2019-09-27 | End: 2019-09-27

## 2019-09-27 RX ORDER — PROPOFOL 10 MG/ML
INJECTION, EMULSION INTRAVENOUS PRN
Status: DISCONTINUED | OUTPATIENT
Start: 2019-09-27 | End: 2019-09-27 | Stop reason: SDUPTHER

## 2019-09-27 RX ORDER — HYDRALAZINE HYDROCHLORIDE 10 MG/1
10 TABLET, FILM COATED ORAL EVERY 6 HOURS PRN
Status: DISCONTINUED | OUTPATIENT
Start: 2019-09-27 | End: 2019-09-27

## 2019-09-27 RX ORDER — ISOSORBIDE MONONITRATE 60 MG/1
60 TABLET, EXTENDED RELEASE ORAL DAILY
Status: DISCONTINUED | OUTPATIENT
Start: 2019-09-27 | End: 2019-09-27

## 2019-09-27 RX ORDER — PANTOPRAZOLE SODIUM 40 MG/1
40 GRANULE, DELAYED RELEASE ORAL
Status: DISCONTINUED | OUTPATIENT
Start: 2019-09-28 | End: 2019-10-01

## 2019-09-27 RX ORDER — LABETALOL 20 MG/4 ML (5 MG/ML) INTRAVENOUS SYRINGE
10 ONCE
Status: COMPLETED | OUTPATIENT
Start: 2019-09-27 | End: 2019-09-27

## 2019-09-27 RX ORDER — SODIUM CHLORIDE 9 MG/ML
INJECTION, SOLUTION INTRAVENOUS CONTINUOUS PRN
Status: DISCONTINUED | OUTPATIENT
Start: 2019-09-27 | End: 2019-09-27 | Stop reason: SDUPTHER

## 2019-09-27 RX ORDER — LIDOCAINE HYDROCHLORIDE 20 MG/ML
INJECTION, SOLUTION INTRAVENOUS PRN
Status: DISCONTINUED | OUTPATIENT
Start: 2019-09-27 | End: 2019-09-27 | Stop reason: SDUPTHER

## 2019-09-27 RX ADMIN — PROPOFOL 20 MG: 10 INJECTION, EMULSION INTRAVENOUS at 15:33

## 2019-09-27 RX ADMIN — FUROSEMIDE 40 MG: 40 TABLET ORAL at 09:00

## 2019-09-27 RX ADMIN — LABETALOL 20 MG/4 ML (5 MG/ML) INTRAVENOUS SYRINGE 10 MG: at 10:28

## 2019-09-27 RX ADMIN — HYDRALAZINE HYDROCHLORIDE 10 MG: 20 INJECTION INTRAMUSCULAR; INTRAVENOUS at 23:13

## 2019-09-27 RX ADMIN — URSODIOL 300 MG: 300 CAPSULE ORAL at 21:36

## 2019-09-27 RX ADMIN — LOSARTAN POTASSIUM 50 MG: 50 TABLET, FILM COATED ORAL at 09:00

## 2019-09-27 RX ADMIN — PIPERACILLIN SODIUM,TAZOBACTAM SODIUM 3.38 G: 3; .375 INJECTION, POWDER, FOR SOLUTION INTRAVENOUS at 23:13

## 2019-09-27 RX ADMIN — Medication 10 ML: at 21:37

## 2019-09-27 RX ADMIN — SODIUM CHLORIDE: 9 INJECTION, SOLUTION INTRAVENOUS at 15:12

## 2019-09-27 RX ADMIN — URSODIOL 300 MG: 300 CAPSULE ORAL at 09:00

## 2019-09-27 RX ADMIN — DICLOFENAC 2 G: 10 GEL TOPICAL at 09:01

## 2019-09-27 RX ADMIN — HEPARIN SODIUM 5000 UNITS: 5000 INJECTION INTRAVENOUS; SUBCUTANEOUS at 05:56

## 2019-09-27 RX ADMIN — HYDRALAZINE HYDROCHLORIDE 25 MG: 25 TABLET, FILM COATED ORAL at 21:36

## 2019-09-27 RX ADMIN — PIPERACILLIN SODIUM,TAZOBACTAM SODIUM 3.38 G: 3; .375 INJECTION, POWDER, FOR SOLUTION INTRAVENOUS at 12:00

## 2019-09-27 RX ADMIN — RIFAXIMIN 550 MG: 550 TABLET ORAL at 09:00

## 2019-09-27 RX ADMIN — Medication 10 ML: at 09:00

## 2019-09-27 RX ADMIN — DICLOFENAC 2 G: 10 GEL TOPICAL at 21:36

## 2019-09-27 RX ADMIN — PROPOFOL 30 MG: 10 INJECTION, EMULSION INTRAVENOUS at 15:31

## 2019-09-27 RX ADMIN — HYDRALAZINE HYDROCHLORIDE 50 MG: 50 TABLET, FILM COATED ORAL at 09:00

## 2019-09-27 RX ADMIN — PIPERACILLIN SODIUM,TAZOBACTAM SODIUM 3.38 G: 3; .375 INJECTION, POWDER, FOR SOLUTION INTRAVENOUS at 00:24

## 2019-09-27 RX ADMIN — LABETALOL 20 MG/4 ML (5 MG/ML) INTRAVENOUS SYRINGE 10 MG: at 17:53

## 2019-09-27 RX ADMIN — PROPOFOL 30 MG: 10 INJECTION, EMULSION INTRAVENOUS at 15:21

## 2019-09-27 RX ADMIN — PROPOFOL 30 MG: 10 INJECTION, EMULSION INTRAVENOUS at 15:19

## 2019-09-27 RX ADMIN — DILTIAZEM HYDROCHLORIDE 30 MG: 30 TABLET, FILM COATED ORAL at 21:41

## 2019-09-27 RX ADMIN — LIDOCAINE HYDROCHLORIDE 100 MG: 20 INJECTION, SOLUTION INTRAVENOUS at 15:17

## 2019-09-27 RX ADMIN — RIFAXIMIN 550 MG: 550 TABLET ORAL at 21:36

## 2019-09-27 RX ADMIN — HEPARIN SODIUM 5000 UNITS: 5000 INJECTION INTRAVENOUS; SUBCUTANEOUS at 21:48

## 2019-09-27 RX ADMIN — PROPOFOL 20 MG: 10 INJECTION, EMULSION INTRAVENOUS at 15:26

## 2019-09-27 RX ADMIN — PANTOPRAZOLE SODIUM 40 MG: 40 TABLET, DELAYED RELEASE ORAL at 05:56

## 2019-09-27 RX ADMIN — PROPOFOL 30 MG: 10 INJECTION, EMULSION INTRAVENOUS at 15:24

## 2019-09-27 RX ADMIN — PROPOFOL 20 MG: 10 INJECTION, EMULSION INTRAVENOUS at 15:17

## 2019-09-27 ASSESSMENT — PULMONARY FUNCTION TESTS
PIF_VALUE: 0

## 2019-09-27 ASSESSMENT — PAIN SCALES - GENERAL
PAINLEVEL_OUTOF10: 0

## 2019-09-27 ASSESSMENT — PAIN SCALES - WONG BAKER
WONGBAKER_NUMERICALRESPONSE: 0
WONGBAKER_NUMERICALRESPONSE: 0

## 2019-09-27 NOTE — ANESTHESIA PRE PROCEDURE
Department of Anesthesiology  Preprocedure Note       Name:  Curly Sands   Age:  80 y.o.  :  1932                                          MRN:  [id number]         Date:  2019      Surgeon: Gissell Bhatt):  Yassine Tsai MD    Procedure: EGD DIAGNOSTIC ONLY (N/A )    Medications prior to admission:   Prior to Admission medications    Medication Sig Start Date End Date Taking?  Authorizing Provider   albuterol (PROVENTIL) (2.5 MG/3ML) 0.083% nebulizer solution Take 3 mLs by nebulization every 4 hours as needed for Wheezing or Shortness of Breath 19  Yes Navid Mendez DO   ursodiol (ACTIGALL) 300 MG capsule Take 1 capsule by mouth 2 times daily 19  Yes Annita Epps MD   metoprolol succinate (TOPROL XL) 50 MG extended release tablet Take 1 tablet by mouth daily 19  Yes Annita Epps MD   rifaximin (XIFAXAN) 550 MG tablet Take 1 tablet by mouth 2 times daily 19  Yes Annita Epps MD   isosorbide mononitrate (IMDUR) 60 MG extended release tablet Take 1 tablet by mouth daily 19  Yes FRANCESCO Cordero CNP   furosemide (LASIX) 40 MG tablet Take 0.5 tablets by mouth daily If wt increases by 2 pounds in 1 day or or 5 pounds in 1 week then take 20mg twice a day 19  Yes Domenic Snowden DO       Current medications:    Current Facility-Administered Medications   Medication Dose Route Frequency Provider Last Rate Last Dose    [START ON 2019] pantoprazole sodium (PROTONIX) packet 40 mg  40 mg Oral QAM JED Rivera DO        diltiazem (CARDIZEM) tablet 30 mg  30 mg Oral 4 times per day Arsalan De MD        furosemide (LASIX) tablet 40 mg  40 mg Oral Daily Arsalan De MD   40 mg at 19 0900    losartan (COZAAR) tablet 50 mg  50 mg Oral Daily Arsalan De MD   50 mg at 19 0900    piperacillin-tazobactam (ZOSYN) 3.375 g in dextrose 5 % 100 mL IVPB extended infusion (mini-bag)  3.375 g Intravenous Q12H Dot Astudillo, BP Readings from Last 3 Encounters:   09/27/19 (!) 158/72   09/03/19 (!) 160/84   08/24/19 122/75       NPO Status: Time of last liquid consumption: 2300                        Time of last solid consumption: 2300                        Date of last liquid consumption: 09/26/19                        Date of last solid food consumption: 09/26/19    BMI:   Wt Readings from Last 3 Encounters:   09/27/19 134 lb 7.7 oz (61 kg)   09/03/19 155 lb (70.3 kg)   08/24/19 154 lb 1.6 oz (69.9 kg)     Body mass index is 22.38 kg/m². CBC:   Lab Results   Component Value Date    WBC 6.5 09/27/2019    RBC 3.90 09/27/2019    HGB 11.1 09/27/2019    HCT 34.1 09/27/2019    MCV 87.4 09/27/2019    RDW 17.4 09/27/2019     09/27/2019       CMP:   Lab Results   Component Value Date     09/27/2019    K 3.6 09/27/2019    K 3.6 09/22/2019     09/27/2019    CO2 19 09/27/2019    BUN 37 09/27/2019    CREATININE 2.8 09/27/2019    GFRAA 19 09/27/2019    GFRAA 35 02/17/2013    AGRATIO 0.7 08/21/2019    LABGLOM 16 09/27/2019    GLUCOSE 82 09/27/2019    PROT 6.8 08/21/2019    PROT 8.7 02/17/2013    CALCIUM 8.4 09/27/2019    BILITOT 0.8 08/21/2019    ALKPHOS 472 08/21/2019    AST 64 08/21/2019    ALT 26 08/21/2019       POC Tests: No results for input(s): POCGLU, POCNA, POCK, POCCL, POCBUN, POCHEMO, POCHCT in the last 72 hours.     Coags:   Lab Results   Component Value Date    PROTIME 12.4 09/22/2019    INR 1.09 09/22/2019       HCG (If Applicable): No results found for: PREGTESTUR, PREGSERUM, HCG, HCGQUANT     ABGs: No results found for: PHART, PO2ART, RWK8AFP, TWG2GSP, BEART, Z9CKKEZV     Type & Screen (If Applicable):  No results found for: LABABO, [address]    Anesthesia Evaluation  Patient summary reviewed no history of anesthetic complications:   Airway: Mallampati: II  TM distance: >3 FB   Neck ROM: full  Mouth opening: > = 3 FB Dental:    (+) upper dentures and lower dentures      Pulmonary: ROS comment: Pul HTN   Cardiovascular:    (+) hypertension:, past MI:, dysrhythmias: atrial fibrillation, CHF:,                ROS comment: PVD     Neuro/Psych:   (+) CVA:,             GI/Hepatic/Renal:   (+) renal disease: CRI,           Endo/Other:                     Abdominal:           Vascular:                                        Anesthesia Plan      MAC     ASA 3       Induction: intravenous. Anesthetic plan and risks discussed with patient.                       Yvonne Cardenas MD   9/27/2019

## 2019-09-27 NOTE — ANESTHESIA POSTPROCEDURE EVALUATION
Department of Anesthesiology  Postprocedure Note    Patient: Roshan Baca  MRN: [id number]  YOB: 1932  Date of evaluation: 9/27/2019  Time:  3:55 PM     Procedure Summary     Date:  09/27/19 Room / Location:  [facility] 01 / [facility] ENDOSCOPY    Anesthesia Start:  4086 Anesthesia Stop:  9775    Procedure:  EGD ESOPHAGOGASTRODUODENOSCOPY TUBE INSERTION ([place] feeding tube inserted) Diagnosis:  (DYSPHAGIA)    Surgeon:  Elmer Uriostegui MD Responsible Provider:  Hany Card MD    Anesthesia Type:  MAC ASA Status:  3          Anesthesia Type: MAC    Katherine Phase I:      Katherine Phase II:      Last vitals: Reviewed and per EMR flowsheets.        Anesthesia Post Evaluation    Patient location during evaluation: PACU  Patient participation: complete - patient participated  Level of consciousness: confused  Airway patency: patent  Nausea & Vomiting: no nausea and no vomiting  Complications: no  Cardiovascular status: hemodynamically stable  Respiratory status: acceptable  Hydration status: stable

## 2019-09-28 ENCOUNTER — APPOINTMENT (OUTPATIENT)
Dept: CT IMAGING | Age: 84
DRG: 871 | End: 2019-09-28
Payer: MEDICARE

## 2019-09-28 ENCOUNTER — APPOINTMENT (OUTPATIENT)
Dept: GENERAL RADIOLOGY | Age: 84
DRG: 871 | End: 2019-09-28
Payer: MEDICARE

## 2019-09-28 LAB
ALBUMIN SERPL-MCNC: 2.8 G/DL (ref 3.4–5)
ALBUMIN SERPL-MCNC: 3 G/DL (ref 3.4–5)
ALP BLD-CCNC: 136 U/L (ref 40–129)
ALT SERPL-CCNC: 6 U/L (ref 10–40)
AMMONIA: 46 UMOL/L (ref 11–51)
ANION GAP SERPL CALCULATED.3IONS-SCNC: 15 MMOL/L (ref 3–16)
AST SERPL-CCNC: 32 U/L (ref 15–37)
BASOPHILS ABSOLUTE: 0 K/UL (ref 0–0.2)
BASOPHILS RELATIVE PERCENT: 0.5 %
BILIRUB SERPL-MCNC: 0.9 MG/DL (ref 0–1)
BILIRUBIN DIRECT: 0.5 MG/DL (ref 0–0.3)
BILIRUBIN, INDIRECT: 0.4 MG/DL (ref 0–1)
BUN BLDV-MCNC: 37 MG/DL (ref 7–20)
CALCIUM SERPL-MCNC: 8.2 MG/DL (ref 8.3–10.6)
CHLORIDE BLD-SCNC: 106 MMOL/L (ref 99–110)
CO2: 20 MMOL/L (ref 21–32)
CREAT SERPL-MCNC: 2.8 MG/DL (ref 0.6–1.2)
EOSINOPHILS ABSOLUTE: 0.1 K/UL (ref 0–0.6)
EOSINOPHILS RELATIVE PERCENT: 1 %
GFR AFRICAN AMERICAN: 19
GFR NON-AFRICAN AMERICAN: 16
GLUCOSE BLD-MCNC: 60 MG/DL (ref 70–99)
GLUCOSE BLD-MCNC: 61 MG/DL (ref 70–99)
GLUCOSE BLD-MCNC: 62 MG/DL (ref 70–99)
GLUCOSE BLD-MCNC: 64 MG/DL (ref 70–99)
GLUCOSE BLD-MCNC: 71 MG/DL (ref 70–99)
GLUCOSE BLD-MCNC: 77 MG/DL (ref 70–99)
HCT VFR BLD CALC: 34.5 % (ref 36–48)
HEMOGLOBIN: 11.4 G/DL (ref 12–16)
LYMPHOCYTES ABSOLUTE: 1.5 K/UL (ref 1–5.1)
LYMPHOCYTES RELATIVE PERCENT: 20.2 %
MAGNESIUM: 1.9 MG/DL (ref 1.8–2.4)
MCH RBC QN AUTO: 28.5 PG (ref 26–34)
MCHC RBC AUTO-ENTMCNC: 33 G/DL (ref 31–36)
MCV RBC AUTO: 86.3 FL (ref 80–100)
MONOCYTES ABSOLUTE: 1.1 K/UL (ref 0–1.3)
MONOCYTES RELATIVE PERCENT: 15.2 %
NEUTROPHILS ABSOLUTE: 4.7 K/UL (ref 1.7–7.7)
NEUTROPHILS RELATIVE PERCENT: 63.1 %
PDW BLD-RTO: 17.3 % (ref 12.4–15.4)
PERFORMED ON: ABNORMAL
PERFORMED ON: NORMAL
PHOSPHORUS: 3.3 MG/DL (ref 2.5–4.9)
PLATELET # BLD: 179 K/UL (ref 135–450)
PMV BLD AUTO: 8.8 FL (ref 5–10.5)
POTASSIUM SERPL-SCNC: 3.4 MMOL/L (ref 3.5–5.1)
RBC # BLD: 4 M/UL (ref 4–5.2)
SODIUM BLD-SCNC: 141 MMOL/L (ref 136–145)
TOTAL PROTEIN: 6.6 G/DL (ref 6.4–8.2)
WBC # BLD: 7.5 K/UL (ref 4–11)

## 2019-09-28 PROCEDURE — 93005 ELECTROCARDIOGRAM TRACING: CPT | Performed by: INTERNAL MEDICINE

## 2019-09-28 PROCEDURE — 6370000000 HC RX 637 (ALT 250 FOR IP): Performed by: STUDENT IN AN ORGANIZED HEALTH CARE EDUCATION/TRAINING PROGRAM

## 2019-09-28 PROCEDURE — 6370000000 HC RX 637 (ALT 250 FOR IP): Performed by: FAMILY MEDICINE

## 2019-09-28 PROCEDURE — 36415 COLL VENOUS BLD VENIPUNCTURE: CPT

## 2019-09-28 PROCEDURE — 2500000003 HC RX 250 WO HCPCS: Performed by: STUDENT IN AN ORGANIZED HEALTH CARE EDUCATION/TRAINING PROGRAM

## 2019-09-28 PROCEDURE — 85025 COMPLETE CBC W/AUTO DIFF WBC: CPT

## 2019-09-28 PROCEDURE — 6360000002 HC RX W HCPCS: Performed by: STUDENT IN AN ORGANIZED HEALTH CARE EDUCATION/TRAINING PROGRAM

## 2019-09-28 PROCEDURE — 74018 RADEX ABDOMEN 1 VIEW: CPT

## 2019-09-28 PROCEDURE — 82140 ASSAY OF AMMONIA: CPT

## 2019-09-28 PROCEDURE — 6360000002 HC RX W HCPCS: Performed by: INTERNAL MEDICINE

## 2019-09-28 PROCEDURE — 83735 ASSAY OF MAGNESIUM: CPT

## 2019-09-28 PROCEDURE — 2580000003 HC RX 258: Performed by: STUDENT IN AN ORGANIZED HEALTH CARE EDUCATION/TRAINING PROGRAM

## 2019-09-28 PROCEDURE — 80069 RENAL FUNCTION PANEL: CPT

## 2019-09-28 PROCEDURE — 2060000000 HC ICU INTERMEDIATE R&B

## 2019-09-28 PROCEDURE — 6370000000 HC RX 637 (ALT 250 FOR IP): Performed by: PSYCHIATRY & NEUROLOGY

## 2019-09-28 PROCEDURE — 80076 HEPATIC FUNCTION PANEL: CPT

## 2019-09-28 PROCEDURE — 70450 CT HEAD/BRAIN W/O DYE: CPT

## 2019-09-28 RX ORDER — LABETALOL 20 MG/4 ML (5 MG/ML) INTRAVENOUS SYRINGE
10 ONCE
Status: COMPLETED | OUTPATIENT
Start: 2019-09-28 | End: 2019-09-28

## 2019-09-28 RX ORDER — LACTULOSE 10 G/15ML
20 SOLUTION ORAL 3 TIMES DAILY
Status: DISCONTINUED | OUTPATIENT
Start: 2019-09-28 | End: 2019-10-01 | Stop reason: HOSPADM

## 2019-09-28 RX ORDER — LABETALOL 20 MG/4 ML (5 MG/ML) INTRAVENOUS SYRINGE
10 EVERY 6 HOURS PRN
Status: DISCONTINUED | OUTPATIENT
Start: 2019-09-28 | End: 2019-10-01 | Stop reason: HOSPADM

## 2019-09-28 RX ORDER — POTASSIUM CHLORIDE 7.45 MG/ML
10 INJECTION INTRAVENOUS
Status: COMPLETED | OUTPATIENT
Start: 2019-09-28 | End: 2019-09-28

## 2019-09-28 RX ORDER — DEXTROSE MONOHYDRATE 50 MG/ML
INJECTION, SOLUTION INTRAVENOUS CONTINUOUS
Status: ACTIVE | OUTPATIENT
Start: 2019-09-28 | End: 2019-09-28

## 2019-09-28 RX ORDER — ISOSORBIDE MONONITRATE 60 MG/1
60 TABLET, EXTENDED RELEASE ORAL DAILY
Status: DISCONTINUED | OUTPATIENT
Start: 2019-09-28 | End: 2019-10-01 | Stop reason: HOSPADM

## 2019-09-28 RX ORDER — ASPIRIN 81 MG/1
81 TABLET, CHEWABLE ORAL DAILY
Status: DISCONTINUED | OUTPATIENT
Start: 2019-09-28 | End: 2019-10-01 | Stop reason: HOSPADM

## 2019-09-28 RX ORDER — METOPROLOL SUCCINATE 50 MG/1
50 TABLET, EXTENDED RELEASE ORAL DAILY
Status: DISCONTINUED | OUTPATIENT
Start: 2019-09-28 | End: 2019-09-29

## 2019-09-28 RX ADMIN — LACTULOSE 20 G: 10 SOLUTION ORAL at 21:31

## 2019-09-28 RX ADMIN — ISOSORBIDE MONONITRATE 60 MG: 60 TABLET, EXTENDED RELEASE ORAL at 13:50

## 2019-09-28 RX ADMIN — LABETALOL 20 MG/4 ML (5 MG/ML) INTRAVENOUS SYRINGE 10 MG: at 17:07

## 2019-09-28 RX ADMIN — DICLOFENAC 2 G: 10 GEL TOPICAL at 21:31

## 2019-09-28 RX ADMIN — Medication 10 ML: at 08:30

## 2019-09-28 RX ADMIN — LABETALOL 20 MG/4 ML (5 MG/ML) INTRAVENOUS SYRINGE 10 MG: at 08:28

## 2019-09-28 RX ADMIN — METOPROLOL SUCCINATE 50 MG: 50 TABLET, EXTENDED RELEASE ORAL at 13:50

## 2019-09-28 RX ADMIN — HEPARIN SODIUM 5000 UNITS: 5000 INJECTION INTRAVENOUS; SUBCUTANEOUS at 13:50

## 2019-09-28 RX ADMIN — LACTULOSE 20 G: 10 SOLUTION ORAL at 13:46

## 2019-09-28 RX ADMIN — HEPARIN SODIUM 5000 UNITS: 5000 INJECTION INTRAVENOUS; SUBCUTANEOUS at 21:36

## 2019-09-28 RX ADMIN — ASPIRIN 81 MG 81 MG: 81 TABLET ORAL at 21:36

## 2019-09-28 RX ADMIN — POTASSIUM CHLORIDE 10 MEQ: 7.46 INJECTION, SOLUTION INTRAVENOUS at 09:54

## 2019-09-28 RX ADMIN — HYDRALAZINE HYDROCHLORIDE 10 MG: 20 INJECTION INTRAMUSCULAR; INTRAVENOUS at 04:40

## 2019-09-28 RX ADMIN — RIFAXIMIN 550 MG: 550 TABLET ORAL at 21:31

## 2019-09-28 RX ADMIN — LABETALOL 20 MG/4 ML (5 MG/ML) INTRAVENOUS SYRINGE 10 MG: at 11:00

## 2019-09-28 RX ADMIN — DICLOFENAC 2 G: 10 GEL TOPICAL at 08:30

## 2019-09-28 RX ADMIN — PIPERACILLIN SODIUM,TAZOBACTAM SODIUM 3.38 G: 3; .375 INJECTION, POWDER, FOR SOLUTION INTRAVENOUS at 11:33

## 2019-09-28 RX ADMIN — DEXTROSE MONOHYDRATE: 50 INJECTION, SOLUTION INTRAVENOUS at 16:26

## 2019-09-28 RX ADMIN — Medication 10 ML: at 21:31

## 2019-09-28 RX ADMIN — POTASSIUM CHLORIDE 10 MEQ: 10 INJECTION, SOLUTION INTRAVENOUS at 12:27

## 2019-09-28 RX ADMIN — URSODIOL 300 MG: 300 CAPSULE ORAL at 21:31

## 2019-09-28 RX ADMIN — POTASSIUM CHLORIDE 10 MEQ: 7.46 INJECTION, SOLUTION INTRAVENOUS at 08:29

## 2019-09-28 RX ADMIN — POTASSIUM CHLORIDE 10 MEQ: 10 INJECTION, SOLUTION INTRAVENOUS at 10:55

## 2019-09-28 RX ADMIN — HEPARIN SODIUM 5000 UNITS: 5000 INJECTION INTRAVENOUS; SUBCUTANEOUS at 04:40

## 2019-09-28 ASSESSMENT — PAIN SCALES - GENERAL
PAINLEVEL_OUTOF10: 0

## 2019-09-29 ENCOUNTER — APPOINTMENT (OUTPATIENT)
Dept: GENERAL RADIOLOGY | Age: 84
DRG: 871 | End: 2019-09-29
Payer: MEDICARE

## 2019-09-29 LAB
ALBUMIN SERPL-MCNC: 2.8 G/DL (ref 3.4–5)
ANION GAP SERPL CALCULATED.3IONS-SCNC: 13 MMOL/L (ref 3–16)
BASOPHILS ABSOLUTE: 0 K/UL (ref 0–0.2)
BASOPHILS RELATIVE PERCENT: 0.8 %
BUN BLDV-MCNC: 38 MG/DL (ref 7–20)
CALCIUM SERPL-MCNC: 8.1 MG/DL (ref 8.3–10.6)
CHLORIDE BLD-SCNC: 106 MMOL/L (ref 99–110)
CO2: 23 MMOL/L (ref 21–32)
CREAT SERPL-MCNC: 2.8 MG/DL (ref 0.6–1.2)
EKG ATRIAL RATE: 102 BPM
EKG DIAGNOSIS: NORMAL
EKG Q-T INTERVAL: 420 MS
EKG QRS DURATION: 110 MS
EKG QTC CALCULATION (BAZETT): 533 MS
EKG R AXIS: -63 DEGREES
EKG T AXIS: 107 DEGREES
EKG VENTRICULAR RATE: 97 BPM
EOSINOPHILS ABSOLUTE: 0.2 K/UL (ref 0–0.6)
EOSINOPHILS RELATIVE PERCENT: 3.1 %
GFR AFRICAN AMERICAN: 19
GFR NON-AFRICAN AMERICAN: 16
GLUCOSE BLD-MCNC: 112 MG/DL (ref 70–99)
GLUCOSE BLD-MCNC: 141 MG/DL (ref 70–99)
GLUCOSE BLD-MCNC: 149 MG/DL (ref 70–99)
GLUCOSE BLD-MCNC: 67 MG/DL (ref 70–99)
GLUCOSE BLD-MCNC: 77 MG/DL (ref 70–99)
GLUCOSE BLD-MCNC: 93 MG/DL (ref 70–99)
HCT VFR BLD CALC: 32 % (ref 36–48)
HEMOGLOBIN: 10.7 G/DL (ref 12–16)
LYMPHOCYTES ABSOLUTE: 0.9 K/UL (ref 1–5.1)
LYMPHOCYTES RELATIVE PERCENT: 16.5 %
MAGNESIUM: 1.9 MG/DL (ref 1.8–2.4)
MCH RBC QN AUTO: 28.7 PG (ref 26–34)
MCHC RBC AUTO-ENTMCNC: 33.4 G/DL (ref 31–36)
MCV RBC AUTO: 86 FL (ref 80–100)
MONOCYTES ABSOLUTE: 0.8 K/UL (ref 0–1.3)
MONOCYTES RELATIVE PERCENT: 13.7 %
NEUTROPHILS ABSOLUTE: 3.6 K/UL (ref 1.7–7.7)
NEUTROPHILS RELATIVE PERCENT: 65.9 %
PDW BLD-RTO: 17.4 % (ref 12.4–15.4)
PERFORMED ON: ABNORMAL
PERFORMED ON: NORMAL
PERFORMED ON: NORMAL
PHOSPHORUS: 2.7 MG/DL (ref 2.5–4.9)
PLATELET # BLD: 154 K/UL (ref 135–450)
PMV BLD AUTO: 8.1 FL (ref 5–10.5)
POTASSIUM SERPL-SCNC: 3.6 MMOL/L (ref 3.5–5.1)
RBC # BLD: 3.72 M/UL (ref 4–5.2)
SODIUM BLD-SCNC: 142 MMOL/L (ref 136–145)
WBC # BLD: 5.5 K/UL (ref 4–11)

## 2019-09-29 PROCEDURE — 2580000003 HC RX 258: Performed by: STUDENT IN AN ORGANIZED HEALTH CARE EDUCATION/TRAINING PROGRAM

## 2019-09-29 PROCEDURE — 85025 COMPLETE CBC W/AUTO DIFF WBC: CPT

## 2019-09-29 PROCEDURE — 99222 1ST HOSP IP/OBS MODERATE 55: CPT | Performed by: OTOLARYNGOLOGY

## 2019-09-29 PROCEDURE — 83735 ASSAY OF MAGNESIUM: CPT

## 2019-09-29 PROCEDURE — 2500000003 HC RX 250 WO HCPCS: Performed by: STUDENT IN AN ORGANIZED HEALTH CARE EDUCATION/TRAINING PROGRAM

## 2019-09-29 PROCEDURE — 6370000000 HC RX 637 (ALT 250 FOR IP): Performed by: STUDENT IN AN ORGANIZED HEALTH CARE EDUCATION/TRAINING PROGRAM

## 2019-09-29 PROCEDURE — 6370000000 HC RX 637 (ALT 250 FOR IP): Performed by: FAMILY MEDICINE

## 2019-09-29 PROCEDURE — 6360000002 HC RX W HCPCS: Performed by: STUDENT IN AN ORGANIZED HEALTH CARE EDUCATION/TRAINING PROGRAM

## 2019-09-29 PROCEDURE — 31575 DIAGNOSTIC LARYNGOSCOPY: CPT | Performed by: OTOLARYNGOLOGY

## 2019-09-29 PROCEDURE — 80069 RENAL FUNCTION PANEL: CPT

## 2019-09-29 PROCEDURE — 36415 COLL VENOUS BLD VENIPUNCTURE: CPT

## 2019-09-29 PROCEDURE — 92526 ORAL FUNCTION THERAPY: CPT

## 2019-09-29 PROCEDURE — 6370000000 HC RX 637 (ALT 250 FOR IP): Performed by: INTERNAL MEDICINE

## 2019-09-29 PROCEDURE — 93010 ELECTROCARDIOGRAM REPORT: CPT | Performed by: INTERNAL MEDICINE

## 2019-09-29 PROCEDURE — 74018 RADEX ABDOMEN 1 VIEW: CPT

## 2019-09-29 PROCEDURE — 92523 SPEECH SOUND LANG COMPREHEN: CPT

## 2019-09-29 PROCEDURE — 2060000000 HC ICU INTERMEDIATE R&B

## 2019-09-29 PROCEDURE — 2580000003 HC RX 258

## 2019-09-29 PROCEDURE — 6370000000 HC RX 637 (ALT 250 FOR IP): Performed by: PSYCHIATRY & NEUROLOGY

## 2019-09-29 RX ORDER — DEXTROSE MONOHYDRATE 25 G/50ML
25 INJECTION, SOLUTION INTRAVENOUS ONCE
Status: COMPLETED | OUTPATIENT
Start: 2019-09-29 | End: 2019-09-29

## 2019-09-29 RX ORDER — METOPROLOL TARTRATE 50 MG/1
50 TABLET, FILM COATED ORAL 2 TIMES DAILY
Status: DISCONTINUED | OUTPATIENT
Start: 2019-09-29 | End: 2019-10-01 | Stop reason: HOSPADM

## 2019-09-29 RX ORDER — DEXTROSE MONOHYDRATE 25 G/50ML
INJECTION, SOLUTION INTRAVENOUS
Status: COMPLETED
Start: 2019-09-29 | End: 2019-09-29

## 2019-09-29 RX ORDER — METOCLOPRAMIDE HYDROCHLORIDE 5 MG/ML
10 INJECTION INTRAMUSCULAR; INTRAVENOUS EVERY 6 HOURS
Status: DISCONTINUED | OUTPATIENT
Start: 2019-09-29 | End: 2019-10-01 | Stop reason: HOSPADM

## 2019-09-29 RX ORDER — METOPROLOL SUCCINATE 50 MG/1
50 TABLET, EXTENDED RELEASE ORAL 2 TIMES DAILY
Status: DISCONTINUED | OUTPATIENT
Start: 2019-09-29 | End: 2019-09-29

## 2019-09-29 RX ORDER — CLONIDINE 0.2 MG/24H
1 PATCH, EXTENDED RELEASE TRANSDERMAL WEEKLY
Status: DISCONTINUED | OUTPATIENT
Start: 2019-09-29 | End: 2019-10-01 | Stop reason: HOSPADM

## 2019-09-29 RX ORDER — FUROSEMIDE 20 MG/1
20 TABLET ORAL DAILY
Status: DISCONTINUED | OUTPATIENT
Start: 2019-09-30 | End: 2019-10-01 | Stop reason: HOSPADM

## 2019-09-29 RX ORDER — METOCLOPRAMIDE 10 MG/1
10 TABLET ORAL
Status: DISCONTINUED | OUTPATIENT
Start: 2019-09-29 | End: 2019-09-29

## 2019-09-29 RX ADMIN — LABETALOL 20 MG/4 ML (5 MG/ML) INTRAVENOUS SYRINGE 10 MG: at 21:10

## 2019-09-29 RX ADMIN — FUROSEMIDE 40 MG: 40 TABLET ORAL at 08:39

## 2019-09-29 RX ADMIN — DICLOFENAC 2 G: 10 GEL TOPICAL at 23:07

## 2019-09-29 RX ADMIN — DILTIAZEM HYDROCHLORIDE 30 MG: 30 TABLET, FILM COATED ORAL at 23:01

## 2019-09-29 RX ADMIN — DICLOFENAC 2 G: 10 GEL TOPICAL at 08:39

## 2019-09-29 RX ADMIN — LACTULOSE 20 G: 10 SOLUTION ORAL at 08:39

## 2019-09-29 RX ADMIN — HEPARIN SODIUM 5000 UNITS: 5000 INJECTION INTRAVENOUS; SUBCUTANEOUS at 13:17

## 2019-09-29 RX ADMIN — DILTIAZEM HYDROCHLORIDE 30 MG: 30 TABLET, FILM COATED ORAL at 11:40

## 2019-09-29 RX ADMIN — METOCLOPRAMIDE 10 MG: 5 INJECTION, SOLUTION INTRAMUSCULAR; INTRAVENOUS at 15:24

## 2019-09-29 RX ADMIN — LOSARTAN POTASSIUM 50 MG: 50 TABLET, FILM COATED ORAL at 08:39

## 2019-09-29 RX ADMIN — LACTULOSE 20 G: 10 SOLUTION ORAL at 13:17

## 2019-09-29 RX ADMIN — ISOSORBIDE MONONITRATE 60 MG: 60 TABLET, EXTENDED RELEASE ORAL at 08:39

## 2019-09-29 RX ADMIN — DEXTROSE MONOHYDRATE 25 ML: 25 INJECTION, SOLUTION INTRAVENOUS at 20:55

## 2019-09-29 RX ADMIN — RIFAXIMIN 550 MG: 550 TABLET ORAL at 23:01

## 2019-09-29 RX ADMIN — METOCLOPRAMIDE 10 MG: 5 INJECTION, SOLUTION INTRAMUSCULAR; INTRAVENOUS at 11:40

## 2019-09-29 RX ADMIN — LABETALOL 20 MG/4 ML (5 MG/ML) INTRAVENOUS SYRINGE 10 MG: at 15:11

## 2019-09-29 RX ADMIN — PIPERACILLIN SODIUM,TAZOBACTAM SODIUM 3.38 G: 3; .375 INJECTION, POWDER, FOR SOLUTION INTRAVENOUS at 11:41

## 2019-09-29 RX ADMIN — HEPARIN SODIUM 5000 UNITS: 5000 INJECTION INTRAVENOUS; SUBCUTANEOUS at 23:00

## 2019-09-29 RX ADMIN — URSODIOL 300 MG: 300 CAPSULE ORAL at 23:01

## 2019-09-29 RX ADMIN — PIPERACILLIN SODIUM,TAZOBACTAM SODIUM 3.38 G: 3; .375 INJECTION, POWDER, FOR SOLUTION INTRAVENOUS at 23:01

## 2019-09-29 RX ADMIN — METOPROLOL TARTRATE 50 MG: 50 TABLET ORAL at 23:01

## 2019-09-29 RX ADMIN — RIFAXIMIN 550 MG: 550 TABLET ORAL at 08:39

## 2019-09-29 RX ADMIN — METOPROLOL TARTRATE 50 MG: 50 TABLET ORAL at 11:40

## 2019-09-29 RX ADMIN — ASPIRIN 81 MG 81 MG: 81 TABLET ORAL at 08:39

## 2019-09-29 RX ADMIN — LABETALOL 20 MG/4 ML (5 MG/ML) INTRAVENOUS SYRINGE 10 MG: at 03:40

## 2019-09-29 RX ADMIN — METOCLOPRAMIDE 10 MG: 5 INJECTION, SOLUTION INTRAMUSCULAR; INTRAVENOUS at 23:00

## 2019-09-29 RX ADMIN — DEXTROSE 50 % IN WATER (D50W) INTRAVENOUS SYRINGE 25 ML: at 20:55

## 2019-09-29 RX ADMIN — Medication 10 ML: at 08:39

## 2019-09-29 RX ADMIN — PIPERACILLIN SODIUM,TAZOBACTAM SODIUM 3.38 G: 3; .375 INJECTION, POWDER, FOR SOLUTION INTRAVENOUS at 00:03

## 2019-09-29 RX ADMIN — URSODIOL 300 MG: 300 CAPSULE ORAL at 08:39

## 2019-09-29 ASSESSMENT — PAIN SCALES - GENERAL
PAINLEVEL_OUTOF10: 0

## 2019-09-30 ENCOUNTER — APPOINTMENT (OUTPATIENT)
Dept: VASCULAR LAB | Age: 84
DRG: 871 | End: 2019-09-30
Payer: MEDICARE

## 2019-09-30 PROBLEM — E44.1 MILD MALNUTRITION (HCC): Status: RESOLVED | Noted: 2019-09-27 | Resolved: 2019-09-30

## 2019-09-30 PROBLEM — E44.0 MODERATE MALNUTRITION (HCC): Status: ACTIVE | Noted: 2019-09-27

## 2019-09-30 LAB
ALBUMIN SERPL-MCNC: 2.8 G/DL (ref 3.4–5)
AMMONIA: 53 UMOL/L (ref 11–51)
ANION GAP SERPL CALCULATED.3IONS-SCNC: 15 MMOL/L (ref 3–16)
BASOPHILS ABSOLUTE: 0 K/UL (ref 0–0.2)
BASOPHILS RELATIVE PERCENT: 0.7 %
BUN BLDV-MCNC: 37 MG/DL (ref 7–20)
CALCIUM SERPL-MCNC: 8.1 MG/DL (ref 8.3–10.6)
CHLORIDE BLD-SCNC: 104 MMOL/L (ref 99–110)
CO2: 22 MMOL/L (ref 21–32)
CREAT SERPL-MCNC: 2.6 MG/DL (ref 0.6–1.2)
EOSINOPHILS ABSOLUTE: 0.3 K/UL (ref 0–0.6)
EOSINOPHILS RELATIVE PERCENT: 4.1 %
GFR AFRICAN AMERICAN: 21
GFR NON-AFRICAN AMERICAN: 17
GLUCOSE BLD-MCNC: 108 MG/DL (ref 70–99)
GLUCOSE BLD-MCNC: 109 MG/DL (ref 70–99)
GLUCOSE BLD-MCNC: 63 MG/DL (ref 70–99)
GLUCOSE BLD-MCNC: 79 MG/DL (ref 70–99)
GLUCOSE BLD-MCNC: 87 MG/DL (ref 70–99)
GLUCOSE BLD-MCNC: 93 MG/DL (ref 70–99)
HCT VFR BLD CALC: 33.2 % (ref 36–48)
HEMOGLOBIN: 10.8 G/DL (ref 12–16)
LV EF: 50 %
LVEF MODALITY: NORMAL
LYMPHOCYTES ABSOLUTE: 1.5 K/UL (ref 1–5.1)
LYMPHOCYTES RELATIVE PERCENT: 23.3 %
MAGNESIUM: 1.9 MG/DL (ref 1.8–2.4)
MCH RBC QN AUTO: 28.6 PG (ref 26–34)
MCHC RBC AUTO-ENTMCNC: 32.7 G/DL (ref 31–36)
MCV RBC AUTO: 87.4 FL (ref 80–100)
MONOCYTES ABSOLUTE: 0.9 K/UL (ref 0–1.3)
MONOCYTES RELATIVE PERCENT: 14.3 %
NEUTROPHILS ABSOLUTE: 3.7 K/UL (ref 1.7–7.7)
NEUTROPHILS RELATIVE PERCENT: 57.6 %
PDW BLD-RTO: 16.8 % (ref 12.4–15.4)
PERFORMED ON: ABNORMAL
PERFORMED ON: ABNORMAL
PERFORMED ON: NORMAL
PHOSPHORUS: 3 MG/DL (ref 2.5–4.9)
PLATELET # BLD: 141 K/UL (ref 135–450)
PMV BLD AUTO: 8.7 FL (ref 5–10.5)
POTASSIUM SERPL-SCNC: 3.6 MMOL/L (ref 3.5–5.1)
RBC # BLD: 3.8 M/UL (ref 4–5.2)
SODIUM BLD-SCNC: 141 MMOL/L (ref 136–145)
WBC # BLD: 6.4 K/UL (ref 4–11)

## 2019-09-30 PROCEDURE — 6360000002 HC RX W HCPCS: Performed by: STUDENT IN AN ORGANIZED HEALTH CARE EDUCATION/TRAINING PROGRAM

## 2019-09-30 PROCEDURE — 83735 ASSAY OF MAGNESIUM: CPT

## 2019-09-30 PROCEDURE — 2580000003 HC RX 258: Performed by: STUDENT IN AN ORGANIZED HEALTH CARE EDUCATION/TRAINING PROGRAM

## 2019-09-30 PROCEDURE — 80069 RENAL FUNCTION PANEL: CPT

## 2019-09-30 PROCEDURE — 92526 ORAL FUNCTION THERAPY: CPT

## 2019-09-30 PROCEDURE — 6370000000 HC RX 637 (ALT 250 FOR IP): Performed by: FAMILY MEDICINE

## 2019-09-30 PROCEDURE — 6370000000 HC RX 637 (ALT 250 FOR IP): Performed by: INTERNAL MEDICINE

## 2019-09-30 PROCEDURE — 82140 ASSAY OF AMMONIA: CPT

## 2019-09-30 PROCEDURE — 93880 EXTRACRANIAL BILAT STUDY: CPT

## 2019-09-30 PROCEDURE — 6370000000 HC RX 637 (ALT 250 FOR IP): Performed by: STUDENT IN AN ORGANIZED HEALTH CARE EDUCATION/TRAINING PROGRAM

## 2019-09-30 PROCEDURE — 99221 1ST HOSP IP/OBS SF/LOW 40: CPT | Performed by: NURSE PRACTITIONER

## 2019-09-30 PROCEDURE — 2060000000 HC ICU INTERMEDIATE R&B

## 2019-09-30 PROCEDURE — 93306 TTE W/DOPPLER COMPLETE: CPT

## 2019-09-30 PROCEDURE — 36415 COLL VENOUS BLD VENIPUNCTURE: CPT

## 2019-09-30 PROCEDURE — 92507 TX SP LANG VOICE COMM INDIV: CPT

## 2019-09-30 PROCEDURE — 6370000000 HC RX 637 (ALT 250 FOR IP): Performed by: PSYCHIATRY & NEUROLOGY

## 2019-09-30 PROCEDURE — 85025 COMPLETE CBC W/AUTO DIFF WBC: CPT

## 2019-09-30 RX ORDER — DEXTROSE MONOHYDRATE 25 G/50ML
25 INJECTION, SOLUTION INTRAVENOUS ONCE
Status: DISCONTINUED | OUTPATIENT
Start: 2019-09-30 | End: 2019-10-01 | Stop reason: HOSPADM

## 2019-09-30 RX ORDER — NICOTINE POLACRILEX 4 MG
15 LOZENGE BUCCAL PRN
Status: DISCONTINUED | OUTPATIENT
Start: 2019-09-30 | End: 2019-10-01 | Stop reason: HOSPADM

## 2019-09-30 RX ORDER — DEXTROSE MONOHYDRATE 50 MG/ML
100 INJECTION, SOLUTION INTRAVENOUS PRN
Status: DISCONTINUED | OUTPATIENT
Start: 2019-09-30 | End: 2019-10-01 | Stop reason: HOSPADM

## 2019-09-30 RX ORDER — DEXTROSE MONOHYDRATE 25 G/50ML
12.5 INJECTION, SOLUTION INTRAVENOUS PRN
Status: DISCONTINUED | OUTPATIENT
Start: 2019-09-30 | End: 2019-10-01 | Stop reason: HOSPADM

## 2019-09-30 RX ADMIN — METOPROLOL TARTRATE 50 MG: 50 TABLET ORAL at 21:59

## 2019-09-30 RX ADMIN — DEXTROSE 50 % IN WATER (D50W) INTRAVENOUS SYRINGE 12.5 G: at 00:12

## 2019-09-30 RX ADMIN — Medication 10 ML: at 12:19

## 2019-09-30 RX ADMIN — METOCLOPRAMIDE 10 MG: 5 INJECTION, SOLUTION INTRAMUSCULAR; INTRAVENOUS at 21:59

## 2019-09-30 RX ADMIN — ASPIRIN 81 MG 81 MG: 81 TABLET ORAL at 11:23

## 2019-09-30 RX ADMIN — ISOSORBIDE MONONITRATE 60 MG: 60 TABLET, EXTENDED RELEASE ORAL at 13:04

## 2019-09-30 RX ADMIN — DILTIAZEM HYDROCHLORIDE 30 MG: 30 TABLET, FILM COATED ORAL at 05:07

## 2019-09-30 RX ADMIN — DILTIAZEM HYDROCHLORIDE 30 MG: 30 TABLET, FILM COATED ORAL at 13:05

## 2019-09-30 RX ADMIN — LACTULOSE 20 G: 10 SOLUTION ORAL at 12:01

## 2019-09-30 RX ADMIN — DILTIAZEM HYDROCHLORIDE 30 MG: 30 TABLET, FILM COATED ORAL at 18:49

## 2019-09-30 RX ADMIN — DICLOFENAC 2 G: 10 GEL TOPICAL at 13:02

## 2019-09-30 RX ADMIN — HEPARIN SODIUM 5000 UNITS: 5000 INJECTION INTRAVENOUS; SUBCUTANEOUS at 17:03

## 2019-09-30 RX ADMIN — METOPROLOL TARTRATE 50 MG: 50 TABLET ORAL at 11:23

## 2019-09-30 RX ADMIN — URSODIOL 300 MG: 300 CAPSULE ORAL at 11:23

## 2019-09-30 RX ADMIN — METOCLOPRAMIDE 10 MG: 5 INJECTION, SOLUTION INTRAMUSCULAR; INTRAVENOUS at 11:22

## 2019-09-30 RX ADMIN — RIFAXIMIN 550 MG: 550 TABLET ORAL at 12:19

## 2019-09-30 RX ADMIN — FUROSEMIDE 20 MG: 20 TABLET ORAL at 11:23

## 2019-09-30 RX ADMIN — RIFAXIMIN 550 MG: 550 TABLET ORAL at 21:59

## 2019-09-30 RX ADMIN — HEPARIN SODIUM 5000 UNITS: 5000 INJECTION INTRAVENOUS; SUBCUTANEOUS at 05:07

## 2019-09-30 RX ADMIN — URSODIOL 300 MG: 300 CAPSULE ORAL at 21:59

## 2019-09-30 RX ADMIN — PIPERACILLIN SODIUM,TAZOBACTAM SODIUM 3.38 G: 3; .375 INJECTION, POWDER, FOR SOLUTION INTRAVENOUS at 13:05

## 2019-09-30 RX ADMIN — HEPARIN SODIUM 5000 UNITS: 5000 INJECTION INTRAVENOUS; SUBCUTANEOUS at 21:59

## 2019-09-30 RX ADMIN — LOSARTAN POTASSIUM 50 MG: 50 TABLET, FILM COATED ORAL at 11:23

## 2019-09-30 RX ADMIN — DICLOFENAC 2 G: 10 GEL TOPICAL at 22:05

## 2019-09-30 RX ADMIN — METOCLOPRAMIDE 10 MG: 5 INJECTION, SOLUTION INTRAMUSCULAR; INTRAVENOUS at 18:48

## 2019-09-30 RX ADMIN — METOCLOPRAMIDE 10 MG: 5 INJECTION, SOLUTION INTRAMUSCULAR; INTRAVENOUS at 05:07

## 2019-09-30 RX ADMIN — PANTOPRAZOLE SODIUM 40 MG: 40 GRANULE, DELAYED RELEASE ORAL at 05:34

## 2019-09-30 RX ADMIN — Medication 10 ML: at 22:06

## 2019-09-30 ASSESSMENT — PAIN DESCRIPTION - LOCATION: LOCATION: HAND

## 2019-09-30 ASSESSMENT — PAIN DESCRIPTION - ORIENTATION: ORIENTATION: RIGHT

## 2019-09-30 ASSESSMENT — PAIN DESCRIPTION - PAIN TYPE: TYPE: NEUROPATHIC PAIN

## 2019-09-30 ASSESSMENT — PAIN SCALES - GENERAL
PAINLEVEL_OUTOF10: 0

## 2019-09-30 ASSESSMENT — PAIN DESCRIPTION - DESCRIPTORS: DESCRIPTORS: TINGLING

## 2019-10-01 ENCOUNTER — APPOINTMENT (OUTPATIENT)
Dept: GENERAL RADIOLOGY | Age: 84
DRG: 871 | End: 2019-10-01
Payer: MEDICARE

## 2019-10-01 VITALS
OXYGEN SATURATION: 93 % | HEIGHT: 65 IN | WEIGHT: 130.29 LBS | TEMPERATURE: 98 F | HEART RATE: 55 BPM | SYSTOLIC BLOOD PRESSURE: 126 MMHG | RESPIRATION RATE: 16 BRPM | BODY MASS INDEX: 21.71 KG/M2 | DIASTOLIC BLOOD PRESSURE: 62 MMHG

## 2019-10-01 LAB
ALBUMIN SERPL-MCNC: 2.5 G/DL (ref 3.4–5)
ANION GAP SERPL CALCULATED.3IONS-SCNC: 9 MMOL/L (ref 3–16)
BASOPHILS ABSOLUTE: 0 K/UL (ref 0–0.2)
BASOPHILS RELATIVE PERCENT: 0.8 %
BUN BLDV-MCNC: 38 MG/DL (ref 7–20)
CALCIUM SERPL-MCNC: 7.7 MG/DL (ref 8.3–10.6)
CHLORIDE BLD-SCNC: 108 MMOL/L (ref 99–110)
CO2: 23 MMOL/L (ref 21–32)
CREAT SERPL-MCNC: 2.9 MG/DL (ref 0.6–1.2)
EOSINOPHILS ABSOLUTE: 0.3 K/UL (ref 0–0.6)
EOSINOPHILS RELATIVE PERCENT: 4.6 %
GFR AFRICAN AMERICAN: 19
GFR NON-AFRICAN AMERICAN: 15
GLUCOSE BLD-MCNC: 93 MG/DL (ref 70–99)
HCT VFR BLD CALC: 31.1 % (ref 36–48)
HEMOGLOBIN: 10.2 G/DL (ref 12–16)
LYMPHOCYTES ABSOLUTE: 1.3 K/UL (ref 1–5.1)
LYMPHOCYTES RELATIVE PERCENT: 21.3 %
MAGNESIUM: 2 MG/DL (ref 1.8–2.4)
MCH RBC QN AUTO: 28.8 PG (ref 26–34)
MCHC RBC AUTO-ENTMCNC: 33 G/DL (ref 31–36)
MCV RBC AUTO: 87.3 FL (ref 80–100)
MONOCYTES ABSOLUTE: 0.7 K/UL (ref 0–1.3)
MONOCYTES RELATIVE PERCENT: 12.3 %
NEUTROPHILS ABSOLUTE: 3.6 K/UL (ref 1.7–7.7)
NEUTROPHILS RELATIVE PERCENT: 61 %
PDW BLD-RTO: 16.8 % (ref 12.4–15.4)
PHOSPHORUS: 3.2 MG/DL (ref 2.5–4.9)
PLATELET # BLD: 125 K/UL (ref 135–450)
PMV BLD AUTO: 8.4 FL (ref 5–10.5)
POTASSIUM SERPL-SCNC: 3.5 MMOL/L (ref 3.5–5.1)
RBC # BLD: 3.56 M/UL (ref 4–5.2)
SODIUM BLD-SCNC: 140 MMOL/L (ref 136–145)
WBC # BLD: 5.9 K/UL (ref 4–11)

## 2019-10-01 PROCEDURE — 97110 THERAPEUTIC EXERCISES: CPT

## 2019-10-01 PROCEDURE — 74230 X-RAY XM SWLNG FUNCJ C+: CPT

## 2019-10-01 PROCEDURE — 6370000000 HC RX 637 (ALT 250 FOR IP): Performed by: STUDENT IN AN ORGANIZED HEALTH CARE EDUCATION/TRAINING PROGRAM

## 2019-10-01 PROCEDURE — 6360000002 HC RX W HCPCS: Performed by: STUDENT IN AN ORGANIZED HEALTH CARE EDUCATION/TRAINING PROGRAM

## 2019-10-01 PROCEDURE — 80069 RENAL FUNCTION PANEL: CPT

## 2019-10-01 PROCEDURE — 97530 THERAPEUTIC ACTIVITIES: CPT

## 2019-10-01 PROCEDURE — 85025 COMPLETE CBC W/AUTO DIFF WBC: CPT

## 2019-10-01 PROCEDURE — 6370000000 HC RX 637 (ALT 250 FOR IP): Performed by: FAMILY MEDICINE

## 2019-10-01 PROCEDURE — 6370000000 HC RX 637 (ALT 250 FOR IP): Performed by: INTERNAL MEDICINE

## 2019-10-01 PROCEDURE — 2580000003 HC RX 258: Performed by: STUDENT IN AN ORGANIZED HEALTH CARE EDUCATION/TRAINING PROGRAM

## 2019-10-01 PROCEDURE — 36415 COLL VENOUS BLD VENIPUNCTURE: CPT

## 2019-10-01 PROCEDURE — 83735 ASSAY OF MAGNESIUM: CPT

## 2019-10-01 PROCEDURE — 6370000000 HC RX 637 (ALT 250 FOR IP): Performed by: PSYCHIATRY & NEUROLOGY

## 2019-10-01 PROCEDURE — 92611 MOTION FLUOROSCOPY/SWALLOW: CPT | Performed by: SPEECH-LANGUAGE PATHOLOGIST

## 2019-10-01 PROCEDURE — 97116 GAIT TRAINING THERAPY: CPT

## 2019-10-01 RX ORDER — FUROSEMIDE 20 MG/1
20 TABLET ORAL DAILY
Qty: 60 TABLET | Refills: 3 | Status: ON HOLD | OUTPATIENT
Start: 2019-10-02 | End: 2019-11-08 | Stop reason: HOSPADM

## 2019-10-01 RX ORDER — LOSARTAN POTASSIUM 50 MG/1
50 TABLET ORAL DAILY
Qty: 30 TABLET | Refills: 3 | Status: ON HOLD | OUTPATIENT
Start: 2019-10-02 | End: 2019-10-15 | Stop reason: HOSPADM

## 2019-10-01 RX ORDER — ASPIRIN 81 MG/1
81 TABLET, CHEWABLE ORAL DAILY
Qty: 30 TABLET | Refills: 3 | Status: ON HOLD | OUTPATIENT
Start: 2019-10-02 | End: 2019-11-07 | Stop reason: HOSPADM

## 2019-10-01 RX ORDER — PANTOPRAZOLE SODIUM 40 MG/1
40 TABLET, DELAYED RELEASE ORAL
Status: DISCONTINUED | OUTPATIENT
Start: 2019-10-01 | End: 2019-10-01 | Stop reason: HOSPADM

## 2019-10-01 RX ORDER — CLONIDINE 0.2 MG/24H
1 PATCH, EXTENDED RELEASE TRANSDERMAL WEEKLY
Qty: 4 PATCH | Refills: 0 | Status: ON HOLD | OUTPATIENT
Start: 2019-10-06 | End: 2019-10-14

## 2019-10-01 RX ORDER — METOCLOPRAMIDE 10 MG/1
10 TABLET ORAL
Qty: 120 TABLET | Refills: 0 | Status: ON HOLD | OUTPATIENT
Start: 2019-10-01 | End: 2019-10-15 | Stop reason: HOSPADM

## 2019-10-01 RX ORDER — LACTULOSE 10 G/15ML
20 SOLUTION ORAL 3 TIMES DAILY
Qty: 1 BOTTLE | Refills: 0 | Status: ON HOLD | OUTPATIENT
Start: 2019-10-01 | End: 2019-11-07 | Stop reason: HOSPADM

## 2019-10-01 RX ORDER — PANTOPRAZOLE SODIUM 40 MG/1
40 TABLET, DELAYED RELEASE ORAL
Qty: 30 TABLET | Refills: 3 | Status: SHIPPED | OUTPATIENT
Start: 2019-10-02

## 2019-10-01 RX ADMIN — ISOSORBIDE MONONITRATE 60 MG: 60 TABLET, EXTENDED RELEASE ORAL at 10:12

## 2019-10-01 RX ADMIN — DICLOFENAC 2 G: 10 GEL TOPICAL at 10:11

## 2019-10-01 RX ADMIN — PANTOPRAZOLE SODIUM 40 MG: 40 TABLET, DELAYED RELEASE ORAL at 05:54

## 2019-10-01 RX ADMIN — RIFAXIMIN 550 MG: 550 TABLET ORAL at 10:11

## 2019-10-01 RX ADMIN — METOCLOPRAMIDE 10 MG: 5 INJECTION, SOLUTION INTRAMUSCULAR; INTRAVENOUS at 10:12

## 2019-10-01 RX ADMIN — LOSARTAN POTASSIUM 50 MG: 50 TABLET, FILM COATED ORAL at 10:12

## 2019-10-01 RX ADMIN — PIPERACILLIN SODIUM,TAZOBACTAM SODIUM 3.38 G: 3; .375 INJECTION, POWDER, FOR SOLUTION INTRAVENOUS at 00:34

## 2019-10-01 RX ADMIN — HEPARIN SODIUM 5000 UNITS: 5000 INJECTION INTRAVENOUS; SUBCUTANEOUS at 05:54

## 2019-10-01 RX ADMIN — DILTIAZEM HYDROCHLORIDE 30 MG: 30 TABLET, FILM COATED ORAL at 05:54

## 2019-10-01 RX ADMIN — Medication 10 ML: at 10:13

## 2019-10-01 RX ADMIN — DILTIAZEM HYDROCHLORIDE 30 MG: 30 TABLET, FILM COATED ORAL at 00:06

## 2019-10-01 RX ADMIN — METOCLOPRAMIDE 10 MG: 5 INJECTION, SOLUTION INTRAMUSCULAR; INTRAVENOUS at 04:20

## 2019-10-01 RX ADMIN — ASPIRIN 81 MG 81 MG: 81 TABLET ORAL at 10:12

## 2019-10-01 RX ADMIN — URSODIOL 300 MG: 300 CAPSULE ORAL at 10:12

## 2019-10-01 RX ADMIN — FUROSEMIDE 20 MG: 20 TABLET ORAL at 10:12

## 2019-10-01 ASSESSMENT — PAIN SCALES - GENERAL
PAINLEVEL_OUTOF10: 0

## 2019-10-10 ENCOUNTER — TELEPHONE (OUTPATIENT)
Dept: FAMILY MEDICINE CLINIC | Age: 84
End: 2019-10-10
Payer: MEDICARE

## 2019-10-10 DIAGNOSIS — K74.60 CIRRHOSIS OF LIVER WITHOUT ASCITES, UNSPECIFIED HEPATIC CIRRHOSIS TYPE (HCC): ICD-10-CM

## 2019-10-10 DIAGNOSIS — Z87.891 PERSONAL HISTORY OF TOBACCO USE, PRESENTING HAZARDS TO HEALTH: ICD-10-CM

## 2019-10-10 DIAGNOSIS — I73.9 PERIPHERAL VASCULAR DISEASE, UNSPECIFIED (HCC): ICD-10-CM

## 2019-10-10 DIAGNOSIS — I50.33 ACUTE ON CHRONIC DIASTOLIC HEART FAILURE (HCC): ICD-10-CM

## 2019-10-10 DIAGNOSIS — N18.30 CHRONIC KIDNEY DISEASE, STAGE III (MODERATE) (HCC): ICD-10-CM

## 2019-10-10 DIAGNOSIS — I48.91 ATRIAL FIBRILLATION, UNSPECIFIED TYPE (HCC): ICD-10-CM

## 2019-10-10 DIAGNOSIS — I13.0 HYPERTENSIVE HEART AND RENAL DISEASE WITH CONGESTIVE HEART FAILURE (HCC): Primary | ICD-10-CM

## 2019-10-10 PROCEDURE — G0180 MD CERTIFICATION HHA PATIENT: HCPCS | Performed by: FAMILY MEDICINE

## 2019-10-13 ENCOUNTER — APPOINTMENT (OUTPATIENT)
Dept: GENERAL RADIOLOGY | Age: 84
DRG: 640 | End: 2019-10-13
Payer: MEDICARE

## 2019-10-13 ENCOUNTER — HOSPITAL ENCOUNTER (INPATIENT)
Age: 84
LOS: 2 days | Discharge: NURSING, REHAB OR LONG-TERM CARE | DRG: 640 | End: 2019-10-15
Attending: EMERGENCY MEDICINE | Admitting: INTERNAL MEDICINE
Payer: MEDICARE

## 2019-10-13 DIAGNOSIS — E87.20 METABOLIC ACIDOSIS: ICD-10-CM

## 2019-10-13 DIAGNOSIS — I50.9 CONGESTIVE HEART FAILURE, UNSPECIFIED HF CHRONICITY, UNSPECIFIED HEART FAILURE TYPE (HCC): ICD-10-CM
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
## 2019-10-13 DIAGNOSIS — S80.12XA CONTUSION OF LEFT LOWER EXTREMITY, INITIAL ENCOUNTER: ICD-10-CM

## 2019-10-13 DIAGNOSIS — E87.5 HYPERKALEMIA: ICD-10-CM

## 2019-10-13 DIAGNOSIS — I16.0 HYPERTENSIVE URGENCY: Primary | ICD-10-CM

## 2019-10-13 LAB
A/G RATIO: 0.8 (ref 1.1–2.2)
ALBUMIN SERPL-MCNC: 2.9 G/DL (ref 3.4–5)
ALP BLD-CCNC: 144 U/L (ref 40–129)
ALT SERPL-CCNC: 15 U/L (ref 10–40)
ANION GAP SERPL CALCULATED.3IONS-SCNC: 13 MMOL/L (ref 3–16)
AST SERPL-CCNC: 22 U/L (ref 15–37)
BASOPHILS ABSOLUTE: 0 K/UL (ref 0–0.2)
BASOPHILS RELATIVE PERCENT: 0.5 %
BILIRUB SERPL-MCNC: 0.5 MG/DL (ref 0–1)
BUN BLDV-MCNC: 26 MG/DL (ref 7–20)
CALCIUM SERPL-MCNC: 8.4 MG/DL (ref 8.3–10.6)
CHLORIDE BLD-SCNC: 108 MMOL/L (ref 99–110)
CO2: 19 MMOL/L (ref 21–32)
CREAT SERPL-MCNC: 1.9 MG/DL (ref 0.6–1.2)
EOSINOPHILS ABSOLUTE: 0.2 K/UL (ref 0–0.6)
EOSINOPHILS RELATIVE PERCENT: 2.3 %
GFR AFRICAN AMERICAN: 30
GFR NON-AFRICAN AMERICAN: 25
GLOBULIN: 3.8 G/DL
GLUCOSE BLD-MCNC: 97 MG/DL (ref 70–99)
HCT VFR BLD CALC: 31.9 % (ref 36–48)
HEMOGLOBIN: 10.3 G/DL (ref 12–16)
LYMPHOCYTES ABSOLUTE: 0.9 K/UL (ref 1–5.1)
LYMPHOCYTES RELATIVE PERCENT: 13.5 %
MCH RBC QN AUTO: 28.2 PG (ref 26–34)
MCHC RBC AUTO-ENTMCNC: 32.2 G/DL (ref 31–36)
MCV RBC AUTO: 87.7 FL (ref 80–100)
MONOCYTES ABSOLUTE: 0.7 K/UL (ref 0–1.3)
MONOCYTES RELATIVE PERCENT: 10.3 %
NEUTROPHILS ABSOLUTE: 4.9 K/UL (ref 1.7–7.7)
NEUTROPHILS RELATIVE PERCENT: 73.4 %
PDW BLD-RTO: 17.5 % (ref 12.4–15.4)
PLATELET # BLD: 173 K/UL (ref 135–450)
PMV BLD AUTO: 9 FL (ref 5–10.5)
POTASSIUM REFLEX MAGNESIUM: 5.5 MMOL/L (ref 3.5–5.1)
PRO-BNP: 4023 PG/ML (ref 0–449)
RBC # BLD: 3.64 M/UL (ref 4–5.2)
SODIUM BLD-SCNC: 140 MMOL/L (ref 136–145)
TOTAL PROTEIN: 6.7 G/DL (ref 6.4–8.2)
TROPONIN: 0.01 NG/ML
TROPONIN: 0.02 NG/ML
WBC # BLD: 6.7 K/UL (ref 4–11)

## 2019-10-13 PROCEDURE — 6370000000 HC RX 637 (ALT 250 FOR IP): Performed by: INTERNAL MEDICINE

## 2019-10-13 PROCEDURE — 71045 X-RAY EXAM CHEST 1 VIEW: CPT

## 2019-10-13 PROCEDURE — 85025 COMPLETE CBC W/AUTO DIFF WBC: CPT

## 2019-10-13 PROCEDURE — 1200000000 HC SEMI PRIVATE

## 2019-10-13 PROCEDURE — 73590 X-RAY EXAM OF LOWER LEG: CPT

## 2019-10-13 PROCEDURE — 73502 X-RAY EXAM HIP UNI 2-3 VIEWS: CPT

## 2019-10-13 PROCEDURE — 2500000003 HC RX 250 WO HCPCS: Performed by: NURSE PRACTITIONER

## 2019-10-13 PROCEDURE — 36415 COLL VENOUS BLD VENIPUNCTURE: CPT

## 2019-10-13 PROCEDURE — 80053 COMPREHEN METABOLIC PANEL: CPT

## 2019-10-13 PROCEDURE — 99285 EMERGENCY DEPT VISIT HI MDM: CPT

## 2019-10-13 PROCEDURE — 84484 ASSAY OF TROPONIN QUANT: CPT

## 2019-10-13 PROCEDURE — 2580000003 HC RX 258: Performed by: INTERNAL MEDICINE

## 2019-10-13 PROCEDURE — 73552 X-RAY EXAM OF FEMUR 2/>: CPT

## 2019-10-13 PROCEDURE — 6360000002 HC RX W HCPCS: Performed by: INTERNAL MEDICINE

## 2019-10-13 PROCEDURE — 83880 ASSAY OF NATRIURETIC PEPTIDE: CPT

## 2019-10-13 PROCEDURE — 93005 ELECTROCARDIOGRAM TRACING: CPT | Performed by: EMERGENCY MEDICINE

## 2019-10-13 RX ORDER — SODIUM CHLORIDE 0.9 % (FLUSH) 0.9 %
10 SYRINGE (ML) INJECTION EVERY 12 HOURS SCHEDULED
Status: DISCONTINUED | OUTPATIENT
Start: 2019-10-13 | End: 2019-10-15 | Stop reason: HOSPADM

## 2019-10-13 RX ORDER — URSODIOL 300 MG/1
300 CAPSULE ORAL 2 TIMES DAILY
Status: DISCONTINUED | OUTPATIENT
Start: 2019-10-13 | End: 2019-10-15 | Stop reason: HOSPADM

## 2019-10-13 RX ORDER — FUROSEMIDE 10 MG/ML
20 INJECTION INTRAMUSCULAR; INTRAVENOUS ONCE
Status: COMPLETED | OUTPATIENT
Start: 2019-10-13 | End: 2019-10-13

## 2019-10-13 RX ORDER — ACETAMINOPHEN 325 MG/1
650 TABLET ORAL EVERY 4 HOURS PRN
Status: DISCONTINUED | OUTPATIENT
Start: 2019-10-13 | End: 2019-10-15 | Stop reason: HOSPADM

## 2019-10-13 RX ORDER — CLONIDINE 0.2 MG/24H
1 PATCH, EXTENDED RELEASE TRANSDERMAL WEEKLY
Status: DISCONTINUED | OUTPATIENT
Start: 2019-10-13 | End: 2019-10-15 | Stop reason: HOSPADM

## 2019-10-13 RX ORDER — ENALAPRILAT 2.5 MG/2ML
1.25 INJECTION INTRAVENOUS EVERY 6 HOURS PRN
Status: DISCONTINUED | OUTPATIENT
Start: 2019-10-13 | End: 2019-10-15 | Stop reason: HOSPADM

## 2019-10-13 RX ORDER — ALBUTEROL SULFATE 2.5 MG/3ML
2.5 SOLUTION RESPIRATORY (INHALATION) EVERY 4 HOURS PRN
Status: DISCONTINUED | OUTPATIENT
Start: 2019-10-13 | End: 2019-10-15 | Stop reason: HOSPADM

## 2019-10-13 RX ORDER — ISOSORBIDE MONONITRATE 60 MG/1
60 TABLET, EXTENDED RELEASE ORAL DAILY
Status: DISCONTINUED | OUTPATIENT
Start: 2019-10-14 | End: 2019-10-15 | Stop reason: HOSPADM

## 2019-10-13 RX ORDER — HEPARIN SODIUM 5000 [USP'U]/ML
5000 INJECTION, SOLUTION INTRAVENOUS; SUBCUTANEOUS 2 TIMES DAILY
Status: DISCONTINUED | OUTPATIENT
Start: 2019-10-13 | End: 2019-10-15 | Stop reason: HOSPADM

## 2019-10-13 RX ORDER — SODIUM CHLORIDE 0.9 % (FLUSH) 0.9 %
10 SYRINGE (ML) INJECTION PRN
Status: DISCONTINUED | OUTPATIENT
Start: 2019-10-13 | End: 2019-10-15 | Stop reason: HOSPADM

## 2019-10-13 RX ORDER — METOCLOPRAMIDE 10 MG/1
10 TABLET ORAL
Status: DISCONTINUED | OUTPATIENT
Start: 2019-10-13 | End: 2019-10-15

## 2019-10-13 RX ORDER — ONDANSETRON 2 MG/ML
4 INJECTION INTRAMUSCULAR; INTRAVENOUS EVERY 6 HOURS PRN
Status: DISCONTINUED | OUTPATIENT
Start: 2019-10-13 | End: 2019-10-15 | Stop reason: HOSPADM

## 2019-10-13 RX ORDER — METOPROLOL SUCCINATE 50 MG/1
50 TABLET, EXTENDED RELEASE ORAL DAILY
Status: DISCONTINUED | OUTPATIENT
Start: 2019-10-14 | End: 2019-10-15 | Stop reason: HOSPADM

## 2019-10-13 RX ORDER — PANTOPRAZOLE SODIUM 40 MG/1
40 TABLET, DELAYED RELEASE ORAL
Status: DISCONTINUED | OUTPATIENT
Start: 2019-10-14 | End: 2019-10-15 | Stop reason: HOSPADM

## 2019-10-13 RX ORDER — LACTULOSE 10 G/15ML
20 SOLUTION ORAL 3 TIMES DAILY
Status: DISCONTINUED | OUTPATIENT
Start: 2019-10-13 | End: 2019-10-15 | Stop reason: HOSPADM

## 2019-10-13 RX ORDER — ASPIRIN 81 MG/1
81 TABLET, CHEWABLE ORAL DAILY
Status: DISCONTINUED | OUTPATIENT
Start: 2019-10-14 | End: 2019-10-15 | Stop reason: HOSPADM

## 2019-10-13 RX ADMIN — ACETAMINOPHEN 650 MG: 325 TABLET ORAL at 23:01

## 2019-10-13 RX ADMIN — FUROSEMIDE 20 MG: 10 INJECTION, SOLUTION INTRAMUSCULAR; INTRAVENOUS at 16:34

## 2019-10-13 RX ADMIN — SODIUM CHLORIDE, PRESERVATIVE FREE 10 ML: 5 INJECTION INTRAVENOUS at 21:21

## 2019-10-13 RX ADMIN — HEPARIN SODIUM 5000 UNITS: 5000 INJECTION INTRAVENOUS; SUBCUTANEOUS at 21:21

## 2019-10-13 RX ADMIN — METOCLOPRAMIDE HYDROCHLORIDE 10 MG: 10 TABLET ORAL at 16:34

## 2019-10-13 RX ADMIN — RIFAXIMIN 550 MG: 550 TABLET ORAL at 21:20

## 2019-10-13 RX ADMIN — ENALAPRILAT 1.25 MG: 1.25 INJECTION INTRAVENOUS at 22:39

## 2019-10-13 RX ADMIN — LACTULOSE 20 G: 20 SOLUTION ORAL at 16:33

## 2019-10-13 RX ADMIN — URSODIOL 300 MG: 300 CAPSULE ORAL at 21:21

## 2019-10-13 RX ADMIN — LACTULOSE 20 G: 20 SOLUTION ORAL at 21:20

## 2019-10-13 ASSESSMENT — PAIN SCALES - GENERAL
PAINLEVEL_OUTOF10: 8
PAINLEVEL_OUTOF10: 0
PAINLEVEL_OUTOF10: 8
PAINLEVEL_OUTOF10: 0
PAINLEVEL_OUTOF10: 0

## 2019-10-13 ASSESSMENT — PAIN DESCRIPTION - ORIENTATION
ORIENTATION: LEFT
ORIENTATION: RIGHT

## 2019-10-13 ASSESSMENT — PAIN DESCRIPTION - PAIN TYPE
TYPE: ACUTE PAIN;CHRONIC PAIN
TYPE: ACUTE PAIN;CHRONIC PAIN

## 2019-10-13 ASSESSMENT — PAIN DESCRIPTION - LOCATION
LOCATION: LEG
LOCATION: LEG

## 2019-10-13 ASSESSMENT — PAIN DESCRIPTION - DESCRIPTORS
DESCRIPTORS: SPASM;ACHING;DISCOMFORT
DESCRIPTORS: SPASM;ACHING

## 2019-10-13 ASSESSMENT — PAIN DESCRIPTION - FREQUENCY
FREQUENCY: INTERMITTENT
FREQUENCY: CONTINUOUS

## 2019-10-13 ASSESSMENT — PAIN - FUNCTIONAL ASSESSMENT: PAIN_FUNCTIONAL_ASSESSMENT: ACTIVITIES ARE NOT PREVENTED

## 2019-10-13 ASSESSMENT — PAIN DESCRIPTION - ONSET: ONSET: GRADUAL

## 2019-10-13 ASSESSMENT — PAIN DESCRIPTION - PROGRESSION: CLINICAL_PROGRESSION: GRADUALLY WORSENING

## 2019-10-14 LAB
AMMONIA: 54 UMOL/L (ref 11–51)
ANION GAP SERPL CALCULATED.3IONS-SCNC: 14 MMOL/L (ref 3–16)
BASOPHILS ABSOLUTE: 0.1 K/UL (ref 0–0.2)
BASOPHILS RELATIVE PERCENT: 0.7 %
BUN BLDV-MCNC: 26 MG/DL (ref 7–20)
CALCIUM SERPL-MCNC: 8.3 MG/DL (ref 8.3–10.6)
CHLORIDE BLD-SCNC: 107 MMOL/L (ref 99–110)
CO2: 18 MMOL/L (ref 21–32)
CREAT SERPL-MCNC: 2 MG/DL (ref 0.6–1.2)
EKG ATRIAL RATE: 70 BPM
EKG DIAGNOSIS: NORMAL
EKG Q-T INTERVAL: 522 MS
EKG QRS DURATION: 102 MS
EKG QTC CALCULATION (BAZETT): 495 MS
EKG R AXIS: -53 DEGREES
EKG T AXIS: -62 DEGREES
EKG VENTRICULAR RATE: 54 BPM
EOSINOPHILS ABSOLUTE: 0.1 K/UL (ref 0–0.6)
EOSINOPHILS RELATIVE PERCENT: 1.8 %
GFR AFRICAN AMERICAN: 28
GFR NON-AFRICAN AMERICAN: 24
GLUCOSE BLD-MCNC: 65 MG/DL (ref 70–99)
GLUCOSE BLD-MCNC: 88 MG/DL (ref 70–99)
HCT VFR BLD CALC: 34.4 % (ref 36–48)
HEMOGLOBIN: 11 G/DL (ref 12–16)
LYMPHOCYTES ABSOLUTE: 1 K/UL (ref 1–5.1)
LYMPHOCYTES RELATIVE PERCENT: 14.5 %
MCH RBC QN AUTO: 28.1 PG (ref 26–34)
MCHC RBC AUTO-ENTMCNC: 31.9 G/DL (ref 31–36)
MCV RBC AUTO: 88.1 FL (ref 80–100)
MONOCYTES ABSOLUTE: 0.6 K/UL (ref 0–1.3)
MONOCYTES RELATIVE PERCENT: 8.4 %
NEUTROPHILS ABSOLUTE: 5.3 K/UL (ref 1.7–7.7)
NEUTROPHILS RELATIVE PERCENT: 74.6 %
PDW BLD-RTO: 17.6 % (ref 12.4–15.4)
PERFORMED ON: NORMAL
PLATELET # BLD: 171 K/UL (ref 135–450)
PMV BLD AUTO: 9.4 FL (ref 5–10.5)
POTASSIUM REFLEX MAGNESIUM: 5.4 MMOL/L (ref 3.5–5.1)
PROCALCITONIN: 0.23 NG/ML (ref 0–0.15)
RBC # BLD: 3.9 M/UL (ref 4–5.2)
SODIUM BLD-SCNC: 139 MMOL/L (ref 136–145)
WBC # BLD: 7.2 K/UL (ref 4–11)

## 2019-10-14 PROCEDURE — 2580000003 HC RX 258: Performed by: INTERNAL MEDICINE

## 2019-10-14 PROCEDURE — 6360000002 HC RX W HCPCS: Performed by: INTERNAL MEDICINE

## 2019-10-14 PROCEDURE — 2500000003 HC RX 250 WO HCPCS: Performed by: NURSE PRACTITIONER

## 2019-10-14 PROCEDURE — 97535 SELF CARE MNGMENT TRAINING: CPT

## 2019-10-14 PROCEDURE — 36415 COLL VENOUS BLD VENIPUNCTURE: CPT

## 2019-10-14 PROCEDURE — 97530 THERAPEUTIC ACTIVITIES: CPT

## 2019-10-14 PROCEDURE — 6370000000 HC RX 637 (ALT 250 FOR IP): Performed by: HOSPITALIST

## 2019-10-14 PROCEDURE — 1200000000 HC SEMI PRIVATE

## 2019-10-14 PROCEDURE — 97162 PT EVAL MOD COMPLEX 30 MIN: CPT

## 2019-10-14 PROCEDURE — 80048 BASIC METABOLIC PNL TOTAL CA: CPT

## 2019-10-14 PROCEDURE — 85025 COMPLETE CBC W/AUTO DIFF WBC: CPT

## 2019-10-14 PROCEDURE — 97116 GAIT TRAINING THERAPY: CPT

## 2019-10-14 PROCEDURE — 93010 ELECTROCARDIOGRAM REPORT: CPT | Performed by: INTERNAL MEDICINE

## 2019-10-14 PROCEDURE — 6370000000 HC RX 637 (ALT 250 FOR IP): Performed by: INTERNAL MEDICINE

## 2019-10-14 PROCEDURE — 82140 ASSAY OF AMMONIA: CPT

## 2019-10-14 PROCEDURE — 84145 PROCALCITONIN (PCT): CPT

## 2019-10-14 PROCEDURE — 97166 OT EVAL MOD COMPLEX 45 MIN: CPT

## 2019-10-14 RX ORDER — CLONIDINE 0.3 MG/24H
1 PATCH, EXTENDED RELEASE TRANSDERMAL WEEKLY
Status: ON HOLD | COMMUNITY
End: 2019-11-07 | Stop reason: HOSPADM

## 2019-10-14 RX ORDER — FUROSEMIDE 20 MG/1
20 TABLET ORAL DAILY
Status: DISCONTINUED | OUTPATIENT
Start: 2019-10-14 | End: 2019-10-15 | Stop reason: HOSPADM

## 2019-10-14 RX ADMIN — RIFAXIMIN 550 MG: 550 TABLET ORAL at 09:37

## 2019-10-14 RX ADMIN — FUROSEMIDE 20 MG: 20 TABLET ORAL at 18:07

## 2019-10-14 RX ADMIN — URSODIOL 300 MG: 300 CAPSULE ORAL at 09:46

## 2019-10-14 RX ADMIN — METOCLOPRAMIDE HYDROCHLORIDE 10 MG: 10 TABLET ORAL at 16:36

## 2019-10-14 RX ADMIN — ACETAMINOPHEN 650 MG: 325 TABLET ORAL at 20:16

## 2019-10-14 RX ADMIN — SODIUM CHLORIDE, PRESERVATIVE FREE 10 ML: 5 INJECTION INTRAVENOUS at 20:36

## 2019-10-14 RX ADMIN — URSODIOL 300 MG: 300 CAPSULE ORAL at 20:35

## 2019-10-14 RX ADMIN — ASPIRIN 81 MG 81 MG: 81 TABLET ORAL at 09:37

## 2019-10-14 RX ADMIN — ACETAMINOPHEN 650 MG: 325 TABLET ORAL at 06:52

## 2019-10-14 RX ADMIN — METOPROLOL SUCCINATE 50 MG: 50 TABLET, EXTENDED RELEASE ORAL at 09:37

## 2019-10-14 RX ADMIN — PANTOPRAZOLE SODIUM 40 MG: 40 TABLET, DELAYED RELEASE ORAL at 05:25

## 2019-10-14 RX ADMIN — ENALAPRILAT 1.25 MG: 1.25 INJECTION INTRAVENOUS at 05:30

## 2019-10-14 RX ADMIN — METOCLOPRAMIDE HYDROCHLORIDE 10 MG: 10 TABLET ORAL at 09:37

## 2019-10-14 RX ADMIN — ISOSORBIDE MONONITRATE 60 MG: 60 TABLET, EXTENDED RELEASE ORAL at 09:37

## 2019-10-14 RX ADMIN — RIFAXIMIN 550 MG: 550 TABLET ORAL at 20:16

## 2019-10-14 RX ADMIN — METOCLOPRAMIDE HYDROCHLORIDE 10 MG: 10 TABLET ORAL at 12:26

## 2019-10-14 RX ADMIN — HEPARIN SODIUM 5000 UNITS: 5000 INJECTION INTRAVENOUS; SUBCUTANEOUS at 20:17

## 2019-10-14 RX ADMIN — HEPARIN SODIUM 5000 UNITS: 5000 INJECTION INTRAVENOUS; SUBCUTANEOUS at 09:37

## 2019-10-14 RX ADMIN — ACETAMINOPHEN 650 MG: 325 TABLET ORAL at 12:43

## 2019-10-14 RX ADMIN — DILTIAZEM HYDROCHLORIDE 30 MG: 30 TABLET, FILM COATED ORAL at 09:37

## 2019-10-14 ASSESSMENT — PAIN SCALES - GENERAL
PAINLEVEL_OUTOF10: 5
PAINLEVEL_OUTOF10: 0
PAINLEVEL_OUTOF10: 0
PAINLEVEL_OUTOF10: 2
PAINLEVEL_OUTOF10: 6
PAINLEVEL_OUTOF10: 2

## 2019-10-14 ASSESSMENT — PAIN DESCRIPTION - ORIENTATION
ORIENTATION: LEFT;RIGHT
ORIENTATION: RIGHT;LEFT

## 2019-10-14 ASSESSMENT — PAIN DESCRIPTION - PROGRESSION
CLINICAL_PROGRESSION: NOT CHANGED
CLINICAL_PROGRESSION: GRADUALLY WORSENING

## 2019-10-14 ASSESSMENT — PAIN DESCRIPTION - PAIN TYPE
TYPE: ACUTE PAIN;CHRONIC PAIN
TYPE: ACUTE PAIN

## 2019-10-14 ASSESSMENT — PAIN DESCRIPTION - DESCRIPTORS
DESCRIPTORS: ACHING
DESCRIPTORS: SPASM;ACHING;DISCOMFORT

## 2019-10-14 ASSESSMENT — PAIN DESCRIPTION - FREQUENCY
FREQUENCY: INTERMITTENT
FREQUENCY: INTERMITTENT

## 2019-10-14 ASSESSMENT — PAIN - FUNCTIONAL ASSESSMENT
PAIN_FUNCTIONAL_ASSESSMENT: PREVENTS OR INTERFERES SOME ACTIVE ACTIVITIES AND ADLS
PAIN_FUNCTIONAL_ASSESSMENT: ACTIVITIES ARE NOT PREVENTED

## 2019-10-14 ASSESSMENT — PAIN DESCRIPTION - ONSET
ONSET: GRADUAL
ONSET: GRADUAL

## 2019-10-14 ASSESSMENT — PAIN DESCRIPTION - LOCATION
LOCATION: LEG
LOCATION: LEG

## 2019-10-15 VITALS
SYSTOLIC BLOOD PRESSURE: 150 MMHG | OXYGEN SATURATION: 98 % | BODY MASS INDEX: 26.78 KG/M2 | RESPIRATION RATE: 18 BRPM | DIASTOLIC BLOOD PRESSURE: 69 MMHG | HEIGHT: 62 IN | TEMPERATURE: 97.6 F | WEIGHT: 145.5 LBS | HEART RATE: 81 BPM

## 2019-10-15 LAB
A/G RATIO: 0.7 (ref 1.1–2.2)
ALBUMIN SERPL-MCNC: 2.8 G/DL (ref 3.4–5)
ALP BLD-CCNC: 148 U/L (ref 40–129)
ALT SERPL-CCNC: 14 U/L (ref 10–40)
ANION GAP SERPL CALCULATED.3IONS-SCNC: 12 MMOL/L (ref 3–16)
AST SERPL-CCNC: 22 U/L (ref 15–37)
BASOPHILS ABSOLUTE: 0 K/UL (ref 0–0.2)
BASOPHILS RELATIVE PERCENT: 0.5 %
BILIRUB SERPL-MCNC: 0.7 MG/DL (ref 0–1)
BUN BLDV-MCNC: 33 MG/DL (ref 7–20)
CALCIUM SERPL-MCNC: 8.3 MG/DL (ref 8.3–10.6)
CHLORIDE BLD-SCNC: 108 MMOL/L (ref 99–110)
CO2: 19 MMOL/L (ref 21–32)
CREAT SERPL-MCNC: 2.1 MG/DL (ref 0.6–1.2)
EOSINOPHILS ABSOLUTE: 0.2 K/UL (ref 0–0.6)
EOSINOPHILS RELATIVE PERCENT: 2.4 %
GFR AFRICAN AMERICAN: 27
GFR NON-AFRICAN AMERICAN: 22
GLOBULIN: 3.9 G/DL
GLUCOSE BLD-MCNC: 74 MG/DL (ref 70–99)
HCT VFR BLD CALC: 32.2 % (ref 36–48)
HEMOGLOBIN: 10.4 G/DL (ref 12–16)
LYMPHOCYTES ABSOLUTE: 1.6 K/UL (ref 1–5.1)
LYMPHOCYTES RELATIVE PERCENT: 21.3 %
MCH RBC QN AUTO: 28.2 PG (ref 26–34)
MCHC RBC AUTO-ENTMCNC: 32.4 G/DL (ref 31–36)
MCV RBC AUTO: 87.2 FL (ref 80–100)
MONOCYTES ABSOLUTE: 0.7 K/UL (ref 0–1.3)
MONOCYTES RELATIVE PERCENT: 9.8 %
NEUTROPHILS ABSOLUTE: 4.8 K/UL (ref 1.7–7.7)
NEUTROPHILS RELATIVE PERCENT: 66 %
PDW BLD-RTO: 17.4 % (ref 12.4–15.4)
PLATELET # BLD: 181 K/UL (ref 135–450)
PMV BLD AUTO: 9 FL (ref 5–10.5)
POTASSIUM REFLEX MAGNESIUM: 5.2 MMOL/L (ref 3.5–5.1)
POTASSIUM SERPL-SCNC: 5.2 MMOL/L (ref 3.5–5.1)
RBC # BLD: 3.69 M/UL (ref 4–5.2)
SODIUM BLD-SCNC: 139 MMOL/L (ref 136–145)
T4 FREE: 1.4 NG/DL (ref 0.9–1.8)
TOTAL PROTEIN: 6.7 G/DL (ref 6.4–8.2)
TSH REFLEX: 5.42 UIU/ML (ref 0.27–4.2)
WBC # BLD: 7.3 K/UL (ref 4–11)

## 2019-10-15 PROCEDURE — 97110 THERAPEUTIC EXERCISES: CPT

## 2019-10-15 PROCEDURE — 85025 COMPLETE CBC W/AUTO DIFF WBC: CPT

## 2019-10-15 PROCEDURE — 84439 ASSAY OF FREE THYROXINE: CPT

## 2019-10-15 PROCEDURE — 84443 ASSAY THYROID STIM HORMONE: CPT

## 2019-10-15 PROCEDURE — 6370000000 HC RX 637 (ALT 250 FOR IP): Performed by: INTERNAL MEDICINE

## 2019-10-15 PROCEDURE — 2500000003 HC RX 250 WO HCPCS: Performed by: NURSE PRACTITIONER

## 2019-10-15 PROCEDURE — 36415 COLL VENOUS BLD VENIPUNCTURE: CPT

## 2019-10-15 PROCEDURE — 2580000003 HC RX 258: Performed by: INTERNAL MEDICINE

## 2019-10-15 PROCEDURE — 6360000002 HC RX W HCPCS: Performed by: INTERNAL MEDICINE

## 2019-10-15 PROCEDURE — 6370000000 HC RX 637 (ALT 250 FOR IP): Performed by: HOSPITALIST

## 2019-10-15 PROCEDURE — 80053 COMPREHEN METABOLIC PANEL: CPT

## 2019-10-15 PROCEDURE — 94760 N-INVAS EAR/PLS OXIMETRY 1: CPT

## 2019-10-15 RX ORDER — METOCLOPRAMIDE 5 MG/1
5 TABLET ORAL 2 TIMES DAILY WITH MEALS
Qty: 120 TABLET | Refills: 3 | Status: ON HOLD
Start: 2019-10-15 | End: 2019-11-07 | Stop reason: HOSPADM

## 2019-10-15 RX ORDER — METOCLOPRAMIDE 10 MG/1
5 TABLET ORAL 2 TIMES DAILY WITH MEALS
Status: DISCONTINUED | OUTPATIENT
Start: 2019-10-15 | End: 2019-10-15 | Stop reason: HOSPADM

## 2019-10-15 RX ORDER — METOCLOPRAMIDE 10 MG/1
5 TABLET ORAL
Status: DISCONTINUED | OUTPATIENT
Start: 2019-10-15 | End: 2019-10-15

## 2019-10-15 RX ADMIN — ASPIRIN 81 MG 81 MG: 81 TABLET ORAL at 08:06

## 2019-10-15 RX ADMIN — METOCLOPRAMIDE HYDROCHLORIDE 5 MG: 10 TABLET ORAL at 11:12

## 2019-10-15 RX ADMIN — ACETAMINOPHEN 650 MG: 325 TABLET ORAL at 09:58

## 2019-10-15 RX ADMIN — LACTULOSE 20 G: 20 SOLUTION ORAL at 08:08

## 2019-10-15 RX ADMIN — FUROSEMIDE 20 MG: 20 TABLET ORAL at 08:07

## 2019-10-15 RX ADMIN — SODIUM CHLORIDE, PRESERVATIVE FREE 10 ML: 5 INJECTION INTRAVENOUS at 08:13

## 2019-10-15 RX ADMIN — PANTOPRAZOLE SODIUM 40 MG: 40 TABLET, DELAYED RELEASE ORAL at 05:44

## 2019-10-15 RX ADMIN — ISOSORBIDE MONONITRATE 60 MG: 60 TABLET, EXTENDED RELEASE ORAL at 08:07

## 2019-10-15 RX ADMIN — ACETAMINOPHEN 650 MG: 325 TABLET ORAL at 00:37

## 2019-10-15 RX ADMIN — DILTIAZEM HYDROCHLORIDE 30 MG: 30 TABLET, FILM COATED ORAL at 08:05

## 2019-10-15 RX ADMIN — METOPROLOL SUCCINATE 50 MG: 50 TABLET, EXTENDED RELEASE ORAL at 08:05

## 2019-10-15 RX ADMIN — RIFAXIMIN 550 MG: 550 TABLET ORAL at 08:05

## 2019-10-15 RX ADMIN — METOCLOPRAMIDE HYDROCHLORIDE 10 MG: 10 TABLET ORAL at 08:06

## 2019-10-15 RX ADMIN — URSODIOL 300 MG: 300 CAPSULE ORAL at 08:05

## 2019-10-15 RX ADMIN — HEPARIN SODIUM 5000 UNITS: 5000 INJECTION INTRAVENOUS; SUBCUTANEOUS at 08:07

## 2019-10-15 RX ADMIN — ENALAPRILAT 1.25 MG: 1.25 INJECTION INTRAVENOUS at 11:28

## 2019-10-15 ASSESSMENT — PAIN DESCRIPTION - PROGRESSION: CLINICAL_PROGRESSION: NOT CHANGED

## 2019-10-15 ASSESSMENT — PAIN DESCRIPTION - DESCRIPTORS: DESCRIPTORS: ACHING;BURNING

## 2019-10-15 ASSESSMENT — PAIN SCALES - GENERAL
PAINLEVEL_OUTOF10: 0
PAINLEVEL_OUTOF10: 0
PAINLEVEL_OUTOF10: 10
PAINLEVEL_OUTOF10: 2
PAINLEVEL_OUTOF10: 0
PAINLEVEL_OUTOF10: 0

## 2019-10-15 ASSESSMENT — PAIN DESCRIPTION - LOCATION: LOCATION: LEG

## 2019-10-15 ASSESSMENT — PAIN DESCRIPTION - ONSET: ONSET: GRADUAL

## 2019-10-15 ASSESSMENT — PAIN DESCRIPTION - PAIN TYPE: TYPE: ACUTE PAIN

## 2019-10-15 ASSESSMENT — PAIN - FUNCTIONAL ASSESSMENT: PAIN_FUNCTIONAL_ASSESSMENT: ACTIVITIES ARE NOT PREVENTED

## 2019-10-15 ASSESSMENT — PAIN DESCRIPTION - ORIENTATION: ORIENTATION: LEFT

## 2019-10-15 ASSESSMENT — PAIN DESCRIPTION - FREQUENCY: FREQUENCY: INTERMITTENT

## 2019-10-16 ENCOUNTER — CARE COORDINATION (OUTPATIENT)
Dept: CASE MANAGEMENT | Age: 84
End: 2019-10-16

## 2019-10-29 ENCOUNTER — APPOINTMENT (OUTPATIENT)
Dept: VASCULAR LAB | Age: 84
DRG: 291 | End: 2019-10-29
Payer: MEDICARE

## 2019-10-29 ENCOUNTER — HOSPITAL ENCOUNTER (INPATIENT)
Age: 84
LOS: 10 days | Discharge: NURSING, REHAB OR LONG-TERM CARE | DRG: 291 | End: 2019-11-08
Attending: EMERGENCY MEDICINE | Admitting: INTERNAL MEDICINE
Payer: MEDICARE

## 2019-10-29 ENCOUNTER — APPOINTMENT (OUTPATIENT)
Dept: CT IMAGING | Age: 84
DRG: 291 | End: 2019-10-29
Payer: MEDICARE

## 2019-10-29 ENCOUNTER — APPOINTMENT (OUTPATIENT)
Dept: GENERAL RADIOLOGY | Age: 84
DRG: 291 | End: 2019-10-29
Payer: MEDICARE

## 2019-10-29 DIAGNOSIS — J81.0 FLASH PULMONARY EDEMA (HCC): Primary | ICD-10-CM

## 2019-10-29 DIAGNOSIS — R06.03 RESPIRATORY DISTRESS: ICD-10-CM

## 2019-10-29 PROBLEM — I16.1 HYPERTENSIVE EMERGENCY: Status: ACTIVE | Noted: 2019-10-29

## 2019-10-29 LAB
ALBUMIN SERPL-MCNC: 2.9 G/DL (ref 3.4–5)
ANION GAP SERPL CALCULATED.3IONS-SCNC: 12 MMOL/L (ref 3–16)
ANION GAP SERPL CALCULATED.3IONS-SCNC: 12 MMOL/L (ref 3–16)
ANION GAP SERPL CALCULATED.3IONS-SCNC: 13 MMOL/L (ref 3–16)
BASE EXCESS VENOUS: -13 (ref -3–3)
BASOPHILS ABSOLUTE: 0.1 K/UL (ref 0–0.2)
BASOPHILS RELATIVE PERCENT: 0.6 %
BUN BLDV-MCNC: 57 MG/DL (ref 7–20)
BUN BLDV-MCNC: 57 MG/DL (ref 7–20)
BUN BLDV-MCNC: 59 MG/DL (ref 7–20)
CALCIUM SERPL-MCNC: 8.6 MG/DL (ref 8.3–10.6)
CALCIUM SERPL-MCNC: 8.6 MG/DL (ref 8.3–10.6)
CALCIUM SERPL-MCNC: 8.7 MG/DL (ref 8.3–10.6)
CHLORIDE BLD-SCNC: 111 MMOL/L (ref 99–110)
CHLORIDE BLD-SCNC: 114 MMOL/L (ref 99–110)
CHLORIDE BLD-SCNC: 114 MMOL/L (ref 99–110)
CHLORIDE URINE RANDOM: 114 MMOL/L
CO2: 12 MMOL/L (ref 21–32)
CO2: 14 MMOL/L (ref 21–32)
CO2: 15 MMOL/L (ref 21–32)
CREAT SERPL-MCNC: 2.1 MG/DL (ref 0.6–1.2)
CREAT SERPL-MCNC: 2.2 MG/DL (ref 0.6–1.2)
CREAT SERPL-MCNC: 2.3 MG/DL (ref 0.6–1.2)
EKG ATRIAL RATE: 91 BPM
EKG DIAGNOSIS: NORMAL
EKG Q-T INTERVAL: 396 MS
EKG QRS DURATION: 96 MS
EKG QTC CALCULATION (BAZETT): 465 MS
EKG R AXIS: -53 DEGREES
EKG T AXIS: -53 DEGREES
EKG VENTRICULAR RATE: 83 BPM
EOSINOPHILS ABSOLUTE: 0.1 K/UL (ref 0–0.6)
EOSINOPHILS RELATIVE PERCENT: 0.6 %
GFR AFRICAN AMERICAN: 24
GFR AFRICAN AMERICAN: 26
GFR AFRICAN AMERICAN: 27
GFR NON-AFRICAN AMERICAN: 20
GFR NON-AFRICAN AMERICAN: 21
GFR NON-AFRICAN AMERICAN: 22
GLUCOSE BLD-MCNC: 54 MG/DL (ref 70–99)
GLUCOSE BLD-MCNC: 68 MG/DL (ref 70–99)
GLUCOSE BLD-MCNC: 68 MG/DL (ref 70–99)
GLUCOSE BLD-MCNC: 71 MG/DL (ref 70–99)
GLUCOSE BLD-MCNC: 79 MG/DL (ref 70–99)
GLUCOSE BLD-MCNC: 83 MG/DL (ref 70–99)
GLUCOSE BLD-MCNC: 89 MG/DL (ref 70–99)
GLUCOSE BLD-MCNC: 98 MG/DL (ref 70–99)
HCO3 VENOUS: 13.2 MMOL/L (ref 23–29)
HCT VFR BLD CALC: 34.1 % (ref 36–48)
HEMOGLOBIN: 10.7 G/DL (ref 12–16)
LACTIC ACID: 2.2 MMOL/L (ref 0.4–2)
LYMPHOCYTES ABSOLUTE: 1.5 K/UL (ref 1–5.1)
LYMPHOCYTES RELATIVE PERCENT: 14.2 %
MCH RBC QN AUTO: 28.1 PG (ref 26–34)
MCHC RBC AUTO-ENTMCNC: 31.3 G/DL (ref 31–36)
MCV RBC AUTO: 89.7 FL (ref 80–100)
MONOCYTES ABSOLUTE: 1.3 K/UL (ref 0–1.3)
MONOCYTES RELATIVE PERCENT: 11.8 %
NEUTROPHILS ABSOLUTE: 7.9 K/UL (ref 1.7–7.7)
NEUTROPHILS RELATIVE PERCENT: 72.8 %
O2 SAT, VEN: 94 %
PCO2, VEN: 25.9 MM HG (ref 40–50)
PDW BLD-RTO: 18.2 % (ref 12.4–15.4)
PERFORMED ON: ABNORMAL
PERFORMED ON: NORMAL
PERFORMED ON: NORMAL
PH VENOUS: 7.32 (ref 7.35–7.45)
PHOSPHORUS: 4.5 MG/DL (ref 2.5–4.9)
PLATELET # BLD: 267 K/UL (ref 135–450)
PMV BLD AUTO: 8.9 FL (ref 5–10.5)
PO2, VEN: 73 MM HG
POC SAMPLE TYPE: ABNORMAL
POTASSIUM REFLEX MAGNESIUM: 5.8 MMOL/L (ref 3.5–5.1)
POTASSIUM REFLEX MAGNESIUM: 8.3 MMOL/L (ref 3.5–5.1)
POTASSIUM SERPL-SCNC: 5.3 MMOL/L (ref 3.5–5.1)
POTASSIUM, UR: 12.9 MMOL/L
PRO-BNP: ABNORMAL PG/ML (ref 0–449)
RBC # BLD: 3.8 M/UL (ref 4–5.2)
SODIUM BLD-SCNC: 135 MMOL/L (ref 136–145)
SODIUM BLD-SCNC: 141 MMOL/L (ref 136–145)
SODIUM BLD-SCNC: 141 MMOL/L (ref 136–145)
SODIUM URINE: 106 MMOL/L
TCO2 CALC VENOUS: 14 MMOL/L
TROPONIN: 0.02 NG/ML
TROPONIN: 0.03 NG/ML
WBC # BLD: 10.8 K/UL (ref 4–11)

## 2019-10-29 PROCEDURE — 93970 EXTREMITY STUDY: CPT

## 2019-10-29 PROCEDURE — 6360000002 HC RX W HCPCS: Performed by: INTERNAL MEDICINE

## 2019-10-29 PROCEDURE — 84300 ASSAY OF URINE SODIUM: CPT

## 2019-10-29 PROCEDURE — 2580000003 HC RX 258: Performed by: STUDENT IN AN ORGANIZED HEALTH CARE EDUCATION/TRAINING PROGRAM

## 2019-10-29 PROCEDURE — 2700000000 HC OXYGEN THERAPY PER DAY

## 2019-10-29 PROCEDURE — 6360000002 HC RX W HCPCS: Performed by: STUDENT IN AN ORGANIZED HEALTH CARE EDUCATION/TRAINING PROGRAM

## 2019-10-29 PROCEDURE — 6370000000 HC RX 637 (ALT 250 FOR IP): Performed by: INTERNAL MEDICINE

## 2019-10-29 PROCEDURE — 99291 CRITICAL CARE FIRST HOUR: CPT

## 2019-10-29 PROCEDURE — 99223 1ST HOSP IP/OBS HIGH 75: CPT | Performed by: INTERNAL MEDICINE

## 2019-10-29 PROCEDURE — 84484 ASSAY OF TROPONIN QUANT: CPT

## 2019-10-29 PROCEDURE — 94660 CPAP INITIATION&MGMT: CPT

## 2019-10-29 PROCEDURE — 96365 THER/PROPH/DIAG IV INF INIT: CPT

## 2019-10-29 PROCEDURE — 93005 ELECTROCARDIOGRAM TRACING: CPT | Performed by: STUDENT IN AN ORGANIZED HEALTH CARE EDUCATION/TRAINING PROGRAM

## 2019-10-29 PROCEDURE — 83880 ASSAY OF NATRIURETIC PEPTIDE: CPT

## 2019-10-29 PROCEDURE — 84133 ASSAY OF URINE POTASSIUM: CPT

## 2019-10-29 PROCEDURE — 70450 CT HEAD/BRAIN W/O DYE: CPT

## 2019-10-29 PROCEDURE — 82436 ASSAY OF URINE CHLORIDE: CPT

## 2019-10-29 PROCEDURE — 36415 COLL VENOUS BLD VENIPUNCTURE: CPT

## 2019-10-29 PROCEDURE — 82803 BLOOD GASES ANY COMBINATION: CPT

## 2019-10-29 PROCEDURE — 6370000000 HC RX 637 (ALT 250 FOR IP): Performed by: STUDENT IN AN ORGANIZED HEALTH CARE EDUCATION/TRAINING PROGRAM

## 2019-10-29 PROCEDURE — 2500000003 HC RX 250 WO HCPCS: Performed by: STUDENT IN AN ORGANIZED HEALTH CARE EDUCATION/TRAINING PROGRAM

## 2019-10-29 PROCEDURE — 80069 RENAL FUNCTION PANEL: CPT

## 2019-10-29 PROCEDURE — 71045 X-RAY EXAM CHEST 1 VIEW: CPT

## 2019-10-29 PROCEDURE — 83605 ASSAY OF LACTIC ACID: CPT

## 2019-10-29 PROCEDURE — 85025 COMPLETE CBC W/AUTO DIFF WBC: CPT

## 2019-10-29 PROCEDURE — 2060000000 HC ICU INTERMEDIATE R&B

## 2019-10-29 PROCEDURE — 2580000003 HC RX 258: Performed by: INTERNAL MEDICINE

## 2019-10-29 PROCEDURE — 96375 TX/PRO/DX INJ NEW DRUG ADDON: CPT

## 2019-10-29 PROCEDURE — 94761 N-INVAS EAR/PLS OXIMETRY MLT: CPT

## 2019-10-29 PROCEDURE — 94640 AIRWAY INHALATION TREATMENT: CPT

## 2019-10-29 RX ORDER — METOPROLOL SUCCINATE 50 MG/1
50 TABLET, EXTENDED RELEASE ORAL DAILY
Status: DISCONTINUED | OUTPATIENT
Start: 2019-10-29 | End: 2019-11-02

## 2019-10-29 RX ORDER — ASPIRIN 81 MG/1
81 TABLET, CHEWABLE ORAL DAILY
Status: DISCONTINUED | OUTPATIENT
Start: 2019-10-29 | End: 2019-11-06

## 2019-10-29 RX ORDER — FUROSEMIDE 10 MG/ML
40 INJECTION INTRAMUSCULAR; INTRAVENOUS 2 TIMES DAILY
Status: DISCONTINUED | OUTPATIENT
Start: 2019-10-29 | End: 2019-10-30

## 2019-10-29 RX ORDER — CALCIUM GLUCONATE 94 MG/ML
1 INJECTION, SOLUTION INTRAVENOUS ONCE
Status: COMPLETED | OUTPATIENT
Start: 2019-10-29 | End: 2019-10-29

## 2019-10-29 RX ORDER — ACETAMINOPHEN 325 MG/1
650 TABLET ORAL EVERY 6 HOURS PRN
Status: DISCONTINUED | OUTPATIENT
Start: 2019-10-29 | End: 2019-11-08 | Stop reason: HOSPADM

## 2019-10-29 RX ORDER — NICOTINE POLACRILEX 4 MG
15 LOZENGE BUCCAL PRN
Status: DISCONTINUED | OUTPATIENT
Start: 2019-10-29 | End: 2019-11-08 | Stop reason: HOSPADM

## 2019-10-29 RX ORDER — LIDOCAINE 4 G/G
1 PATCH TOPICAL DAILY
Status: DISCONTINUED | OUTPATIENT
Start: 2019-10-30 | End: 2019-11-08 | Stop reason: HOSPADM

## 2019-10-29 RX ORDER — URSODIOL 300 MG/1
300 CAPSULE ORAL 2 TIMES DAILY
Status: DISCONTINUED | OUTPATIENT
Start: 2019-10-29 | End: 2019-11-08 | Stop reason: HOSPADM

## 2019-10-29 RX ORDER — ALBUTEROL SULFATE 2.5 MG/3ML
2.5 SOLUTION RESPIRATORY (INHALATION) EVERY 4 HOURS PRN
Status: DISCONTINUED | OUTPATIENT
Start: 2019-10-29 | End: 2019-11-08 | Stop reason: HOSPADM

## 2019-10-29 RX ORDER — SODIUM CHLORIDE 0.9 % (FLUSH) 0.9 %
10 SYRINGE (ML) INJECTION PRN
Status: DISCONTINUED | OUTPATIENT
Start: 2019-10-29 | End: 2019-11-08 | Stop reason: HOSPADM

## 2019-10-29 RX ORDER — ISOSORBIDE MONONITRATE 60 MG/1
60 TABLET, EXTENDED RELEASE ORAL DAILY
Status: DISCONTINUED | OUTPATIENT
Start: 2019-10-29 | End: 2019-11-05

## 2019-10-29 RX ORDER — METOCLOPRAMIDE 5 MG/1
5 TABLET ORAL 2 TIMES DAILY WITH MEALS
Status: DISCONTINUED | OUTPATIENT
Start: 2019-10-29 | End: 2019-11-02

## 2019-10-29 RX ORDER — DEXTROSE MONOHYDRATE 25 G/50ML
50 INJECTION, SOLUTION INTRAVENOUS ONCE
Status: COMPLETED | OUTPATIENT
Start: 2019-10-29 | End: 2019-10-29

## 2019-10-29 RX ORDER — NIFEDIPINE 30 MG/1
60 TABLET, FILM COATED, EXTENDED RELEASE ORAL DAILY
Status: DISCONTINUED | OUTPATIENT
Start: 2019-10-29 | End: 2019-11-08 | Stop reason: HOSPADM

## 2019-10-29 RX ORDER — LACTULOSE 10 G/15ML
15 SOLUTION ORAL 3 TIMES DAILY
Status: DISCONTINUED | OUTPATIENT
Start: 2019-10-29 | End: 2019-11-02

## 2019-10-29 RX ORDER — ALBUTEROL SULFATE 2.5 MG/3ML
2.5 SOLUTION RESPIRATORY (INHALATION) ONCE
Status: COMPLETED | OUTPATIENT
Start: 2019-10-29 | End: 2019-10-29

## 2019-10-29 RX ORDER — LABETALOL HYDROCHLORIDE 5 MG/ML
10 INJECTION, SOLUTION INTRAVENOUS PRN
Status: DISCONTINUED | OUTPATIENT
Start: 2019-10-29 | End: 2019-11-02

## 2019-10-29 RX ORDER — DEXTROSE MONOHYDRATE 50 MG/ML
100 INJECTION, SOLUTION INTRAVENOUS PRN
Status: DISCONTINUED | OUTPATIENT
Start: 2019-10-29 | End: 2019-11-08 | Stop reason: HOSPADM

## 2019-10-29 RX ORDER — FUROSEMIDE 10 MG/ML
40 INJECTION INTRAMUSCULAR; INTRAVENOUS ONCE
Status: COMPLETED | OUTPATIENT
Start: 2019-10-29 | End: 2019-10-29

## 2019-10-29 RX ORDER — ISOSORBIDE MONONITRATE 30 MG/1
60 TABLET, EXTENDED RELEASE ORAL DAILY
Status: DISCONTINUED | OUTPATIENT
Start: 2019-10-29 | End: 2019-10-29

## 2019-10-29 RX ORDER — NITROGLYCERIN 20 MG/100ML
5 INJECTION INTRAVENOUS CONTINUOUS
Status: DISCONTINUED | OUTPATIENT
Start: 2019-10-29 | End: 2019-10-29

## 2019-10-29 RX ORDER — ONDANSETRON 2 MG/ML
4 INJECTION INTRAMUSCULAR; INTRAVENOUS EVERY 6 HOURS PRN
Status: DISCONTINUED | OUTPATIENT
Start: 2019-10-29 | End: 2019-11-02

## 2019-10-29 RX ORDER — SODIUM CHLORIDE 0.9 % (FLUSH) 0.9 %
10 SYRINGE (ML) INJECTION EVERY 12 HOURS SCHEDULED
Status: DISCONTINUED | OUTPATIENT
Start: 2019-10-29 | End: 2019-11-08 | Stop reason: HOSPADM

## 2019-10-29 RX ORDER — SODIUM BICARBONATE 650 MG/1
325 TABLET ORAL 2 TIMES DAILY
Status: DISCONTINUED | OUTPATIENT
Start: 2019-10-29 | End: 2019-10-30

## 2019-10-29 RX ORDER — DEXTROSE MONOHYDRATE 25 G/50ML
12.5 INJECTION, SOLUTION INTRAVENOUS PRN
Status: DISCONTINUED | OUTPATIENT
Start: 2019-10-29 | End: 2019-11-08 | Stop reason: HOSPADM

## 2019-10-29 RX ORDER — ACETAMINOPHEN 325 MG/1
650 TABLET ORAL EVERY 4 HOURS PRN
Status: DISCONTINUED | OUTPATIENT
Start: 2019-10-29 | End: 2019-10-29 | Stop reason: SDUPTHER

## 2019-10-29 RX ADMIN — Medication 10 ML: at 20:10

## 2019-10-29 RX ADMIN — FUROSEMIDE 40 MG: 10 INJECTION, SOLUTION INTRAMUSCULAR; INTRAVENOUS at 04:21

## 2019-10-29 RX ADMIN — CALCIUM GLUCONATE 1 G: 98 INJECTION, SOLUTION INTRAVENOUS at 04:21

## 2019-10-29 RX ADMIN — ASPIRIN 81 MG 81 MG: 81 TABLET ORAL at 09:06

## 2019-10-29 RX ADMIN — RIFAXIMIN 550 MG: 550 TABLET ORAL at 09:06

## 2019-10-29 RX ADMIN — FUROSEMIDE 40 MG: 10 INJECTION, SOLUTION INTRAVENOUS at 01:35

## 2019-10-29 RX ADMIN — RIFAXIMIN 550 MG: 550 TABLET ORAL at 20:34

## 2019-10-29 RX ADMIN — NIFEDIPINE 60 MG: 30 TABLET, EXTENDED RELEASE ORAL at 16:32

## 2019-10-29 RX ADMIN — NITROGLYCERIN 5 MCG/MIN: 20 INJECTION INTRAVENOUS at 01:49

## 2019-10-29 RX ADMIN — Medication 10 ML: at 09:06

## 2019-10-29 RX ADMIN — INSULIN HUMAN 10 UNITS: 100 INJECTION, SOLUTION PARENTERAL at 04:21

## 2019-10-29 RX ADMIN — ISOSORBIDE MONONITRATE 60 MG: 60 TABLET, EXTENDED RELEASE ORAL at 12:10

## 2019-10-29 RX ADMIN — LACTULOSE 10 G: 20 SOLUTION ORAL at 09:07

## 2019-10-29 RX ADMIN — URSODIOL 300 MG: 300 CAPSULE ORAL at 20:09

## 2019-10-29 RX ADMIN — LABETALOL HYDROCHLORIDE 10 MG: 5 INJECTION INTRAVENOUS at 14:43

## 2019-10-29 RX ADMIN — FUROSEMIDE 40 MG: 10 INJECTION, SOLUTION INTRAMUSCULAR; INTRAVENOUS at 09:08

## 2019-10-29 RX ADMIN — LACTULOSE 10 G: 20 SOLUTION ORAL at 14:52

## 2019-10-29 RX ADMIN — ENOXAPARIN SODIUM 30 MG: 30 INJECTION SUBCUTANEOUS at 09:08

## 2019-10-29 RX ADMIN — DEXTROSE 50 % IN WATER (D50W) INTRAVENOUS SYRINGE 12.5 G: at 05:54

## 2019-10-29 RX ADMIN — DEXTROSE 50 % IN WATER (D50W) INTRAVENOUS SYRINGE 50 ML: at 04:21

## 2019-10-29 RX ADMIN — METOCLOPRAMIDE 5 MG: 5 TABLET ORAL at 18:07

## 2019-10-29 RX ADMIN — SODIUM BICARBONATE 325 MG: 650 TABLET ORAL at 09:07

## 2019-10-29 RX ADMIN — METOPROLOL SUCCINATE 50 MG: 50 TABLET, EXTENDED RELEASE ORAL at 09:07

## 2019-10-29 RX ADMIN — FUROSEMIDE 40 MG: 10 INJECTION, SOLUTION INTRAMUSCULAR; INTRAVENOUS at 20:08

## 2019-10-29 RX ADMIN — LACTULOSE 10 G: 20 SOLUTION ORAL at 20:08

## 2019-10-29 RX ADMIN — METOCLOPRAMIDE 5 MG: 5 TABLET ORAL at 09:06

## 2019-10-29 RX ADMIN — ALBUTEROL SULFATE 2.5 MG: 2.5 SOLUTION RESPIRATORY (INHALATION) at 05:20

## 2019-10-29 RX ADMIN — URSODIOL 300 MG: 300 CAPSULE ORAL at 09:06

## 2019-10-29 RX ADMIN — SODIUM BICARBONATE 325 MG: 650 TABLET ORAL at 20:08

## 2019-10-29 ASSESSMENT — PAIN DESCRIPTION - PAIN TYPE
TYPE: ACUTE PAIN
TYPE: ACUTE PAIN

## 2019-10-29 ASSESSMENT — PAIN DESCRIPTION - ONSET
ONSET: GRADUAL
ONSET: GRADUAL

## 2019-10-29 ASSESSMENT — PAIN SCALES - GENERAL
PAINLEVEL_OUTOF10: 0
PAINLEVEL_OUTOF10: 0
PAINLEVEL_OUTOF10: 5
PAINLEVEL_OUTOF10: 0
PAINLEVEL_OUTOF10: 5
PAINLEVEL_OUTOF10: 0

## 2019-10-29 ASSESSMENT — PAIN DESCRIPTION - LOCATION
LOCATION: LEG
LOCATION: LEG

## 2019-10-29 ASSESSMENT — ENCOUNTER SYMPTOMS
WHEEZING: 0
BACK PAIN: 0
VOMITING: 0
ABDOMINAL PAIN: 0
EYES NEGATIVE: 1
SHORTNESS OF BREATH: 1
CONSTIPATION: 0
NAUSEA: 0
APNEA: 0
COUGH: 0
DIARRHEA: 0
BLOOD IN STOOL: 0
STRIDOR: 0
GASTROINTESTINAL NEGATIVE: 1
CHOKING: 0
CHEST TIGHTNESS: 0
ABDOMINAL DISTENTION: 0

## 2019-10-29 ASSESSMENT — PAIN DESCRIPTION - DESCRIPTORS
DESCRIPTORS: ACHING;RADIATING
DESCRIPTORS: ACHING;RADIATING

## 2019-10-29 ASSESSMENT — PAIN - FUNCTIONAL ASSESSMENT
PAIN_FUNCTIONAL_ASSESSMENT: PREVENTS OR INTERFERES SOME ACTIVE ACTIVITIES AND ADLS
PAIN_FUNCTIONAL_ASSESSMENT: PREVENTS OR INTERFERES SOME ACTIVE ACTIVITIES AND ADLS

## 2019-10-29 ASSESSMENT — PAIN DESCRIPTION - FREQUENCY
FREQUENCY: INTERMITTENT
FREQUENCY: INTERMITTENT

## 2019-10-29 ASSESSMENT — PAIN DESCRIPTION - PROGRESSION
CLINICAL_PROGRESSION: GRADUALLY IMPROVING
CLINICAL_PROGRESSION: GRADUALLY IMPROVING

## 2019-10-29 ASSESSMENT — PAIN DESCRIPTION - ORIENTATION
ORIENTATION: LEFT
ORIENTATION: LEFT

## 2019-10-30 LAB
ALBUMIN SERPL-MCNC: 2.8 G/DL (ref 3.4–5)
AMMONIA: 45 UMOL/L (ref 11–51)
ANION GAP SERPL CALCULATED.3IONS-SCNC: 14 MMOL/L (ref 3–16)
BASOPHILS ABSOLUTE: 0.1 K/UL (ref 0–0.2)
BASOPHILS RELATIVE PERCENT: 0.7 %
BUN BLDV-MCNC: 59 MG/DL (ref 7–20)
CALCIUM SERPL-MCNC: 8.2 MG/DL (ref 8.3–10.6)
CHLORIDE BLD-SCNC: 112 MMOL/L (ref 99–110)
CO2: 14 MMOL/L (ref 21–32)
CREAT SERPL-MCNC: 2.4 MG/DL (ref 0.6–1.2)
EOSINOPHILS ABSOLUTE: 0.1 K/UL (ref 0–0.6)
EOSINOPHILS RELATIVE PERCENT: 0.8 %
GFR AFRICAN AMERICAN: 23
GFR NON-AFRICAN AMERICAN: 19
GLUCOSE BLD-MCNC: 142 MG/DL (ref 70–99)
GLUCOSE BLD-MCNC: 88 MG/DL (ref 70–99)
GLUCOSE BLD-MCNC: 93 MG/DL (ref 70–99)
HCT VFR BLD CALC: 27.8 % (ref 36–48)
HEMOGLOBIN: 9.2 G/DL (ref 12–16)
LYMPHOCYTES ABSOLUTE: 0.9 K/UL (ref 1–5.1)
LYMPHOCYTES RELATIVE PERCENT: 12 %
MCH RBC QN AUTO: 28.9 PG (ref 26–34)
MCHC RBC AUTO-ENTMCNC: 32.9 G/DL (ref 31–36)
MCV RBC AUTO: 87.9 FL (ref 80–100)
MONOCYTES ABSOLUTE: 1 K/UL (ref 0–1.3)
MONOCYTES RELATIVE PERCENT: 13.1 %
NEUTROPHILS ABSOLUTE: 5.8 K/UL (ref 1.7–7.7)
NEUTROPHILS RELATIVE PERCENT: 73.4 %
PDW BLD-RTO: 17.5 % (ref 12.4–15.4)
PERFORMED ON: ABNORMAL
PERFORMED ON: NORMAL
PHOSPHORUS: 4.5 MG/DL (ref 2.5–4.9)
PLATELET # BLD: 214 K/UL (ref 135–450)
PMV BLD AUTO: 8.4 FL (ref 5–10.5)
POTASSIUM SERPL-SCNC: 5.4 MMOL/L (ref 3.5–5.1)
RBC # BLD: 3.17 M/UL (ref 4–5.2)
SODIUM BLD-SCNC: 140 MMOL/L (ref 136–145)
WBC # BLD: 7.8 K/UL (ref 4–11)

## 2019-10-30 PROCEDURE — 6370000000 HC RX 637 (ALT 250 FOR IP): Performed by: STUDENT IN AN ORGANIZED HEALTH CARE EDUCATION/TRAINING PROGRAM

## 2019-10-30 PROCEDURE — 6370000000 HC RX 637 (ALT 250 FOR IP): Performed by: INTERNAL MEDICINE

## 2019-10-30 PROCEDURE — 82140 ASSAY OF AMMONIA: CPT

## 2019-10-30 PROCEDURE — 80069 RENAL FUNCTION PANEL: CPT

## 2019-10-30 PROCEDURE — 6360000002 HC RX W HCPCS: Performed by: INTERNAL MEDICINE

## 2019-10-30 PROCEDURE — 2580000003 HC RX 258: Performed by: STUDENT IN AN ORGANIZED HEALTH CARE EDUCATION/TRAINING PROGRAM

## 2019-10-30 PROCEDURE — 36415 COLL VENOUS BLD VENIPUNCTURE: CPT

## 2019-10-30 PROCEDURE — 85025 COMPLETE CBC W/AUTO DIFF WBC: CPT

## 2019-10-30 PROCEDURE — 6360000002 HC RX W HCPCS: Performed by: STUDENT IN AN ORGANIZED HEALTH CARE EDUCATION/TRAINING PROGRAM

## 2019-10-30 PROCEDURE — 2060000000 HC ICU INTERMEDIATE R&B

## 2019-10-30 RX ORDER — FUROSEMIDE 40 MG/1
40 TABLET ORAL 2 TIMES DAILY
Status: DISCONTINUED | OUTPATIENT
Start: 2019-10-30 | End: 2019-11-08

## 2019-10-30 RX ORDER — SODIUM BICARBONATE 650 MG/1
650 TABLET ORAL 2 TIMES DAILY
Status: DISCONTINUED | OUTPATIENT
Start: 2019-10-30 | End: 2019-10-31

## 2019-10-30 RX ADMIN — METOCLOPRAMIDE 5 MG: 5 TABLET ORAL at 18:12

## 2019-10-30 RX ADMIN — DICLOFENAC 2 G: 10 GEL TOPICAL at 00:27

## 2019-10-30 RX ADMIN — SODIUM BICARBONATE 325 MG: 650 TABLET ORAL at 08:47

## 2019-10-30 RX ADMIN — SODIUM BICARBONATE 650 MG: 650 TABLET ORAL at 21:04

## 2019-10-30 RX ADMIN — LACTULOSE 10 G: 20 SOLUTION ORAL at 14:23

## 2019-10-30 RX ADMIN — ASPIRIN 81 MG 81 MG: 81 TABLET ORAL at 08:46

## 2019-10-30 RX ADMIN — NIFEDIPINE 60 MG: 30 TABLET, EXTENDED RELEASE ORAL at 08:46

## 2019-10-30 RX ADMIN — URSODIOL 300 MG: 300 CAPSULE ORAL at 08:46

## 2019-10-30 RX ADMIN — RIFAXIMIN 550 MG: 550 TABLET ORAL at 21:04

## 2019-10-30 RX ADMIN — URSODIOL 300 MG: 300 CAPSULE ORAL at 21:04

## 2019-10-30 RX ADMIN — RIFAXIMIN 550 MG: 550 TABLET ORAL at 08:46

## 2019-10-30 RX ADMIN — Medication 10 ML: at 08:57

## 2019-10-30 RX ADMIN — ACETAMINOPHEN 650 MG: 325 TABLET ORAL at 18:11

## 2019-10-30 RX ADMIN — FUROSEMIDE 40 MG: 10 INJECTION, SOLUTION INTRAMUSCULAR; INTRAVENOUS at 08:47

## 2019-10-30 RX ADMIN — LACTULOSE 10 G: 20 SOLUTION ORAL at 21:04

## 2019-10-30 RX ADMIN — Medication 10 ML: at 21:11

## 2019-10-30 RX ADMIN — METOCLOPRAMIDE 5 MG: 5 TABLET ORAL at 08:54

## 2019-10-30 RX ADMIN — ENOXAPARIN SODIUM 30 MG: 30 INJECTION SUBCUTANEOUS at 08:46

## 2019-10-30 RX ADMIN — ACETAMINOPHEN 650 MG: 325 TABLET ORAL at 04:49

## 2019-10-30 RX ADMIN — METOPROLOL SUCCINATE 50 MG: 50 TABLET, EXTENDED RELEASE ORAL at 08:47

## 2019-10-30 RX ADMIN — FUROSEMIDE 40 MG: 40 TABLET ORAL at 18:11

## 2019-10-30 RX ADMIN — LACTULOSE 10 G: 20 SOLUTION ORAL at 08:47

## 2019-10-30 RX ADMIN — ISOSORBIDE MONONITRATE 60 MG: 60 TABLET, EXTENDED RELEASE ORAL at 08:47

## 2019-10-30 ASSESSMENT — PAIN DESCRIPTION - DESCRIPTORS: DESCRIPTORS: ACHING

## 2019-10-30 ASSESSMENT — PAIN SCALES - GENERAL
PAINLEVEL_OUTOF10: 6
PAINLEVEL_OUTOF10: 0
PAINLEVEL_OUTOF10: 8

## 2019-10-30 ASSESSMENT — PAIN DESCRIPTION - ONSET: ONSET: GRADUAL

## 2019-10-30 ASSESSMENT — PAIN DESCRIPTION - LOCATION: LOCATION: LEG

## 2019-10-30 ASSESSMENT — PAIN DESCRIPTION - ORIENTATION: ORIENTATION: LEFT

## 2019-10-30 ASSESSMENT — PAIN DESCRIPTION - PAIN TYPE: TYPE: ACUTE PAIN

## 2019-10-30 ASSESSMENT — PAIN DESCRIPTION - PROGRESSION: CLINICAL_PROGRESSION: NOT CHANGED

## 2019-10-30 ASSESSMENT — PAIN DESCRIPTION - FREQUENCY: FREQUENCY: INTERMITTENT

## 2019-10-31 LAB
ALBUMIN SERPL-MCNC: 2.8 G/DL (ref 3.4–5)
ANION GAP SERPL CALCULATED.3IONS-SCNC: 14 MMOL/L (ref 3–16)
BUN BLDV-MCNC: 58 MG/DL (ref 7–20)
CALCIUM SERPL-MCNC: 8.1 MG/DL (ref 8.3–10.6)
CHLORIDE BLD-SCNC: 114 MMOL/L (ref 99–110)
CO2: 14 MMOL/L (ref 21–32)
CREAT SERPL-MCNC: 2.5 MG/DL (ref 0.6–1.2)
GFR AFRICAN AMERICAN: 22
GFR NON-AFRICAN AMERICAN: 18
GLUCOSE BLD-MCNC: 84 MG/DL (ref 70–99)
PHOSPHORUS: 4.6 MG/DL (ref 2.5–4.9)
POTASSIUM SERPL-SCNC: 5.2 MMOL/L (ref 3.5–5.1)
SODIUM BLD-SCNC: 142 MMOL/L (ref 136–145)

## 2019-10-31 PROCEDURE — 2060000000 HC ICU INTERMEDIATE R&B

## 2019-10-31 PROCEDURE — 6370000000 HC RX 637 (ALT 250 FOR IP): Performed by: INTERNAL MEDICINE

## 2019-10-31 PROCEDURE — 97530 THERAPEUTIC ACTIVITIES: CPT

## 2019-10-31 PROCEDURE — 80069 RENAL FUNCTION PANEL: CPT

## 2019-10-31 PROCEDURE — 6370000000 HC RX 637 (ALT 250 FOR IP): Performed by: STUDENT IN AN ORGANIZED HEALTH CARE EDUCATION/TRAINING PROGRAM

## 2019-10-31 PROCEDURE — 97162 PT EVAL MOD COMPLEX 30 MIN: CPT

## 2019-10-31 PROCEDURE — 2580000003 HC RX 258: Performed by: STUDENT IN AN ORGANIZED HEALTH CARE EDUCATION/TRAINING PROGRAM

## 2019-10-31 PROCEDURE — 6360000002 HC RX W HCPCS: Performed by: INTERNAL MEDICINE

## 2019-10-31 PROCEDURE — 97116 GAIT TRAINING THERAPY: CPT

## 2019-10-31 PROCEDURE — 6360000002 HC RX W HCPCS: Performed by: STUDENT IN AN ORGANIZED HEALTH CARE EDUCATION/TRAINING PROGRAM

## 2019-10-31 PROCEDURE — 2500000003 HC RX 250 WO HCPCS: Performed by: STUDENT IN AN ORGANIZED HEALTH CARE EDUCATION/TRAINING PROGRAM

## 2019-10-31 PROCEDURE — 36415 COLL VENOUS BLD VENIPUNCTURE: CPT

## 2019-10-31 PROCEDURE — 2500000003 HC RX 250 WO HCPCS: Performed by: INTERNAL MEDICINE

## 2019-10-31 PROCEDURE — 2580000003 HC RX 258: Performed by: INTERNAL MEDICINE

## 2019-10-31 PROCEDURE — 92610 EVALUATE SWALLOWING FUNCTION: CPT

## 2019-10-31 PROCEDURE — 97166 OT EVAL MOD COMPLEX 45 MIN: CPT

## 2019-10-31 RX ORDER — FUROSEMIDE 10 MG/ML
40 INJECTION INTRAMUSCULAR; INTRAVENOUS ONCE
Status: COMPLETED | OUTPATIENT
Start: 2019-10-31 | End: 2019-10-31

## 2019-10-31 RX ORDER — MORPHINE SULFATE 2 MG/ML
1 INJECTION, SOLUTION INTRAMUSCULAR; INTRAVENOUS EVERY 4 HOURS PRN
Status: COMPLETED | OUTPATIENT
Start: 2019-10-31 | End: 2019-11-05

## 2019-10-31 RX ADMIN — NIFEDIPINE 60 MG: 30 TABLET, EXTENDED RELEASE ORAL at 09:40

## 2019-10-31 RX ADMIN — FUROSEMIDE 40 MG: 40 TABLET ORAL at 17:16

## 2019-10-31 RX ADMIN — ASPIRIN 81 MG 81 MG: 81 TABLET ORAL at 09:40

## 2019-10-31 RX ADMIN — Medication 10 ML: at 20:46

## 2019-10-31 RX ADMIN — ENOXAPARIN SODIUM 30 MG: 30 INJECTION SUBCUTANEOUS at 09:40

## 2019-10-31 RX ADMIN — SODIUM BICARBONATE: 84 INJECTION, SOLUTION INTRAVENOUS at 22:13

## 2019-10-31 RX ADMIN — RIFAXIMIN 550 MG: 550 TABLET ORAL at 09:40

## 2019-10-31 RX ADMIN — METOCLOPRAMIDE 5 MG: 5 TABLET ORAL at 17:18

## 2019-10-31 RX ADMIN — MORPHINE SULFATE 1 MG: 2 INJECTION, SOLUTION INTRAMUSCULAR; INTRAVENOUS at 18:40

## 2019-10-31 RX ADMIN — LABETALOL HYDROCHLORIDE 10 MG: 5 INJECTION INTRAVENOUS at 04:10

## 2019-10-31 RX ADMIN — SODIUM BICARBONATE: 84 INJECTION, SOLUTION INTRAVENOUS at 09:43

## 2019-10-31 RX ADMIN — METOPROLOL SUCCINATE 50 MG: 50 TABLET, EXTENDED RELEASE ORAL at 09:40

## 2019-10-31 RX ADMIN — METOCLOPRAMIDE 5 MG: 5 TABLET ORAL at 09:42

## 2019-10-31 RX ADMIN — Medication 10 ML: at 09:41

## 2019-10-31 RX ADMIN — ISOSORBIDE MONONITRATE 60 MG: 60 TABLET, EXTENDED RELEASE ORAL at 09:40

## 2019-10-31 RX ADMIN — ACETAMINOPHEN 650 MG: 325 TABLET ORAL at 14:24

## 2019-10-31 RX ADMIN — RIFAXIMIN 550 MG: 550 TABLET ORAL at 20:46

## 2019-10-31 RX ADMIN — LACTULOSE 10 G: 20 SOLUTION ORAL at 09:40

## 2019-10-31 RX ADMIN — URSODIOL 300 MG: 300 CAPSULE ORAL at 09:40

## 2019-10-31 RX ADMIN — ACETAMINOPHEN 650 MG: 325 TABLET ORAL at 06:04

## 2019-10-31 RX ADMIN — FUROSEMIDE 40 MG: 40 TABLET ORAL at 09:40

## 2019-10-31 RX ADMIN — FUROSEMIDE 40 MG: 10 INJECTION, SOLUTION INTRAMUSCULAR; INTRAVENOUS at 11:54

## 2019-10-31 RX ADMIN — URSODIOL 300 MG: 300 CAPSULE ORAL at 20:46

## 2019-10-31 ASSESSMENT — PAIN SCALES - GENERAL
PAINLEVEL_OUTOF10: 0
PAINLEVEL_OUTOF10: 8
PAINLEVEL_OUTOF10: 10
PAINLEVEL_OUTOF10: 0

## 2019-10-31 ASSESSMENT — PAIN DESCRIPTION - LOCATION
LOCATION: NECK
LOCATION: LEG

## 2019-10-31 ASSESSMENT — PAIN DESCRIPTION - ORIENTATION
ORIENTATION: MID
ORIENTATION: LOWER;LEFT

## 2019-10-31 ASSESSMENT — PAIN - FUNCTIONAL ASSESSMENT
PAIN_FUNCTIONAL_ASSESSMENT: ACTIVITIES ARE NOT PREVENTED
PAIN_FUNCTIONAL_ASSESSMENT: ACTIVITIES ARE NOT PREVENTED

## 2019-10-31 ASSESSMENT — PAIN DESCRIPTION - PROGRESSION
CLINICAL_PROGRESSION: NOT CHANGED
CLINICAL_PROGRESSION: NOT CHANGED

## 2019-10-31 ASSESSMENT — PAIN DESCRIPTION - DESCRIPTORS
DESCRIPTORS: ACHING
DESCRIPTORS: ACHING

## 2019-10-31 ASSESSMENT — PAIN DESCRIPTION - PAIN TYPE
TYPE: ACUTE PAIN
TYPE: ACUTE PAIN

## 2019-10-31 ASSESSMENT — PAIN DESCRIPTION - ONSET
ONSET: ON-GOING
ONSET: ON-GOING

## 2019-10-31 ASSESSMENT — PAIN DESCRIPTION - FREQUENCY
FREQUENCY: CONTINUOUS
FREQUENCY: CONTINUOUS

## 2019-11-01 ENCOUNTER — APPOINTMENT (OUTPATIENT)
Dept: VASCULAR LAB | Age: 84
DRG: 291 | End: 2019-11-01
Payer: MEDICARE

## 2019-11-01 ENCOUNTER — APPOINTMENT (OUTPATIENT)
Dept: GENERAL RADIOLOGY | Age: 84
DRG: 291 | End: 2019-11-01
Payer: MEDICARE

## 2019-11-01 LAB
ALBUMIN SERPL-MCNC: 2.8 G/DL (ref 3.4–5)
ANION GAP SERPL CALCULATED.3IONS-SCNC: 11 MMOL/L (ref 3–16)
BUN BLDV-MCNC: 55 MG/DL (ref 7–20)
CALCIUM SERPL-MCNC: 8 MG/DL (ref 8.3–10.6)
CHLORIDE BLD-SCNC: 108 MMOL/L (ref 99–110)
CO2: 17 MMOL/L (ref 21–32)
CREAT SERPL-MCNC: 2.3 MG/DL (ref 0.6–1.2)
GFR AFRICAN AMERICAN: 24
GFR NON-AFRICAN AMERICAN: 20
GLUCOSE BLD-MCNC: 76 MG/DL (ref 70–99)
PHOSPHORUS: 4.3 MG/DL (ref 2.5–4.9)
POTASSIUM SERPL-SCNC: 4.6 MMOL/L (ref 3.5–5.1)
SODIUM BLD-SCNC: 136 MMOL/L (ref 136–145)

## 2019-11-01 PROCEDURE — 92611 MOTION FLUOROSCOPY/SWALLOW: CPT

## 2019-11-01 PROCEDURE — 6370000000 HC RX 637 (ALT 250 FOR IP): Performed by: STUDENT IN AN ORGANIZED HEALTH CARE EDUCATION/TRAINING PROGRAM

## 2019-11-01 PROCEDURE — 6370000000 HC RX 637 (ALT 250 FOR IP): Performed by: INTERNAL MEDICINE

## 2019-11-01 PROCEDURE — 92526 ORAL FUNCTION THERAPY: CPT

## 2019-11-01 PROCEDURE — 74230 X-RAY XM SWLNG FUNCJ C+: CPT

## 2019-11-01 PROCEDURE — 6360000002 HC RX W HCPCS: Performed by: INTERNAL MEDICINE

## 2019-11-01 PROCEDURE — 93971 EXTREMITY STUDY: CPT

## 2019-11-01 PROCEDURE — 6360000002 HC RX W HCPCS: Performed by: STUDENT IN AN ORGANIZED HEALTH CARE EDUCATION/TRAINING PROGRAM

## 2019-11-01 PROCEDURE — 2500000003 HC RX 250 WO HCPCS: Performed by: STUDENT IN AN ORGANIZED HEALTH CARE EDUCATION/TRAINING PROGRAM

## 2019-11-01 PROCEDURE — 80069 RENAL FUNCTION PANEL: CPT

## 2019-11-01 PROCEDURE — 2060000000 HC ICU INTERMEDIATE R&B

## 2019-11-01 PROCEDURE — 36415 COLL VENOUS BLD VENIPUNCTURE: CPT

## 2019-11-01 RX ORDER — FUROSEMIDE 10 MG/ML
40 INJECTION INTRAMUSCULAR; INTRAVENOUS ONCE
Status: COMPLETED | OUTPATIENT
Start: 2019-11-01 | End: 2019-11-01

## 2019-11-01 RX ADMIN — FUROSEMIDE 40 MG: 40 TABLET ORAL at 17:26

## 2019-11-01 RX ADMIN — FUROSEMIDE 40 MG: 40 TABLET ORAL at 08:55

## 2019-11-01 RX ADMIN — RIFAXIMIN 550 MG: 550 TABLET ORAL at 08:55

## 2019-11-01 RX ADMIN — RIFAXIMIN 550 MG: 550 TABLET ORAL at 21:07

## 2019-11-01 RX ADMIN — METOPROLOL SUCCINATE 50 MG: 50 TABLET, EXTENDED RELEASE ORAL at 08:55

## 2019-11-01 RX ADMIN — LACTULOSE 10 G: 20 SOLUTION ORAL at 21:07

## 2019-11-01 RX ADMIN — LACTULOSE 10 G: 20 SOLUTION ORAL at 10:42

## 2019-11-01 RX ADMIN — FUROSEMIDE 40 MG: 10 INJECTION, SOLUTION INTRAMUSCULAR; INTRAVENOUS at 13:19

## 2019-11-01 RX ADMIN — ASPIRIN 81 MG 81 MG: 81 TABLET ORAL at 08:49

## 2019-11-01 RX ADMIN — ISOSORBIDE MONONITRATE 60 MG: 60 TABLET, EXTENDED RELEASE ORAL at 08:56

## 2019-11-01 RX ADMIN — NIFEDIPINE 60 MG: 30 TABLET, EXTENDED RELEASE ORAL at 08:55

## 2019-11-01 RX ADMIN — URSODIOL 300 MG: 300 CAPSULE ORAL at 08:50

## 2019-11-01 RX ADMIN — LABETALOL HYDROCHLORIDE 10 MG: 5 INJECTION INTRAVENOUS at 05:26

## 2019-11-01 RX ADMIN — METOCLOPRAMIDE 5 MG: 5 TABLET ORAL at 10:43

## 2019-11-01 RX ADMIN — ACETAMINOPHEN 650 MG: 325 TABLET ORAL at 12:17

## 2019-11-01 RX ADMIN — URSODIOL 300 MG: 300 CAPSULE ORAL at 21:07

## 2019-11-01 RX ADMIN — METOCLOPRAMIDE 5 MG: 5 TABLET ORAL at 17:26

## 2019-11-01 RX ADMIN — LACTULOSE 10 G: 20 SOLUTION ORAL at 14:43

## 2019-11-01 RX ADMIN — ACETAMINOPHEN 650 MG: 325 TABLET ORAL at 18:23

## 2019-11-01 RX ADMIN — ENOXAPARIN SODIUM 30 MG: 30 INJECTION SUBCUTANEOUS at 10:41

## 2019-11-01 ASSESSMENT — PAIN DESCRIPTION - LOCATION
LOCATION: HAND
LOCATION: FINGER (COMMENT WHICH ONE)

## 2019-11-01 ASSESSMENT — PAIN SCALES - GENERAL
PAINLEVEL_OUTOF10: 9
PAINLEVEL_OUTOF10: 0
PAINLEVEL_OUTOF10: 10
PAINLEVEL_OUTOF10: 10

## 2019-11-01 ASSESSMENT — PAIN DESCRIPTION - PAIN TYPE
TYPE: ACUTE PAIN
TYPE: ACUTE PAIN

## 2019-11-01 ASSESSMENT — PAIN DESCRIPTION - ONSET
ONSET: ON-GOING
ONSET: PROGRESSIVE

## 2019-11-01 ASSESSMENT — PAIN DESCRIPTION - FREQUENCY
FREQUENCY: CONTINUOUS
FREQUENCY: CONTINUOUS

## 2019-11-01 ASSESSMENT — PAIN DESCRIPTION - PROGRESSION
CLINICAL_PROGRESSION: NOT CHANGED
CLINICAL_PROGRESSION: NOT CHANGED

## 2019-11-01 ASSESSMENT — PAIN DESCRIPTION - ORIENTATION
ORIENTATION: RIGHT
ORIENTATION: RIGHT

## 2019-11-02 ENCOUNTER — APPOINTMENT (OUTPATIENT)
Dept: GENERAL RADIOLOGY | Age: 84
DRG: 291 | End: 2019-11-02
Payer: MEDICARE

## 2019-11-02 LAB
ALBUMIN SERPL-MCNC: 3.3 G/DL (ref 3.4–5)
ANION GAP SERPL CALCULATED.3IONS-SCNC: 16 MMOL/L (ref 3–16)
BUN BLDV-MCNC: 54 MG/DL (ref 7–20)
CALCIUM SERPL-MCNC: 8.3 MG/DL (ref 8.3–10.6)
CHLORIDE BLD-SCNC: 105 MMOL/L (ref 99–110)
CO2: 18 MMOL/L (ref 21–32)
CREAT SERPL-MCNC: 2.2 MG/DL (ref 0.6–1.2)
GFR AFRICAN AMERICAN: 26
GFR NON-AFRICAN AMERICAN: 21
GLUCOSE BLD-MCNC: 85 MG/DL (ref 70–99)
PHOSPHORUS: 4 MG/DL (ref 2.5–4.9)
POTASSIUM SERPL-SCNC: 4.3 MMOL/L (ref 3.5–5.1)
SODIUM BLD-SCNC: 139 MMOL/L (ref 136–145)

## 2019-11-02 PROCEDURE — 2580000003 HC RX 258: Performed by: INTERNAL MEDICINE

## 2019-11-02 PROCEDURE — 6370000000 HC RX 637 (ALT 250 FOR IP): Performed by: INTERNAL MEDICINE

## 2019-11-02 PROCEDURE — 6370000000 HC RX 637 (ALT 250 FOR IP): Performed by: STUDENT IN AN ORGANIZED HEALTH CARE EDUCATION/TRAINING PROGRAM

## 2019-11-02 PROCEDURE — 99221 1ST HOSP IP/OBS SF/LOW 40: CPT | Performed by: ORTHOPAEDIC SURGERY

## 2019-11-02 PROCEDURE — 2500000003 HC RX 250 WO HCPCS: Performed by: STUDENT IN AN ORGANIZED HEALTH CARE EDUCATION/TRAINING PROGRAM

## 2019-11-02 PROCEDURE — 6360000002 HC RX W HCPCS: Performed by: INTERNAL MEDICINE

## 2019-11-02 PROCEDURE — 2500000003 HC RX 250 WO HCPCS: Performed by: INTERNAL MEDICINE

## 2019-11-02 PROCEDURE — 80069 RENAL FUNCTION PANEL: CPT

## 2019-11-02 PROCEDURE — 2580000003 HC RX 258: Performed by: STUDENT IN AN ORGANIZED HEALTH CARE EDUCATION/TRAINING PROGRAM

## 2019-11-02 PROCEDURE — 2060000000 HC ICU INTERMEDIATE R&B

## 2019-11-02 PROCEDURE — 73130 X-RAY EXAM OF HAND: CPT

## 2019-11-02 PROCEDURE — 36415 COLL VENOUS BLD VENIPUNCTURE: CPT

## 2019-11-02 PROCEDURE — 6360000002 HC RX W HCPCS: Performed by: STUDENT IN AN ORGANIZED HEALTH CARE EDUCATION/TRAINING PROGRAM

## 2019-11-02 RX ORDER — DOXAZOSIN MESYLATE 4 MG/1
4 TABLET ORAL DAILY
Status: DISCONTINUED | OUTPATIENT
Start: 2019-11-02 | End: 2019-11-02

## 2019-11-02 RX ORDER — DOXAZOSIN 2 MG/1
2 TABLET ORAL DAILY
Status: DISCONTINUED | OUTPATIENT
Start: 2019-11-02 | End: 2019-11-03

## 2019-11-02 RX ORDER — ONDANSETRON 2 MG/ML
4 INJECTION INTRAMUSCULAR; INTRAVENOUS EVERY 6 HOURS PRN
Status: DISCONTINUED | OUTPATIENT
Start: 2019-11-02 | End: 2019-11-08 | Stop reason: HOSPADM

## 2019-11-02 RX ORDER — METOPROLOL TARTRATE 50 MG/1
50 TABLET, FILM COATED ORAL 2 TIMES DAILY
Status: DISCONTINUED | OUTPATIENT
Start: 2019-11-02 | End: 2019-11-02

## 2019-11-02 RX ORDER — LABETALOL HYDROCHLORIDE 5 MG/ML
10 INJECTION, SOLUTION INTRAVENOUS EVERY 6 HOURS PRN
Status: DISCONTINUED | OUTPATIENT
Start: 2019-11-02 | End: 2019-11-08 | Stop reason: HOSPADM

## 2019-11-02 RX ORDER — FUROSEMIDE 10 MG/ML
40 INJECTION INTRAMUSCULAR; INTRAVENOUS ONCE
Status: COMPLETED | OUTPATIENT
Start: 2019-11-02 | End: 2019-11-02

## 2019-11-02 RX ORDER — SODIUM BICARBONATE 650 MG/1
1300 TABLET ORAL 3 TIMES DAILY
Status: DISCONTINUED | OUTPATIENT
Start: 2019-11-02 | End: 2019-11-03

## 2019-11-02 RX ORDER — LACTULOSE 10 G/15ML
15 SOLUTION ORAL 2 TIMES DAILY PRN
Status: DISCONTINUED | OUTPATIENT
Start: 2019-11-02 | End: 2019-11-04

## 2019-11-02 RX ORDER — METOCLOPRAMIDE 10 MG/1
5 TABLET ORAL 2 TIMES DAILY PRN
Status: DISCONTINUED | OUTPATIENT
Start: 2019-11-02 | End: 2019-11-08 | Stop reason: HOSPADM

## 2019-11-02 RX ORDER — CARVEDILOL 12.5 MG/1
12.5 TABLET ORAL 2 TIMES DAILY
Status: DISCONTINUED | OUTPATIENT
Start: 2019-11-02 | End: 2019-11-08

## 2019-11-02 RX ADMIN — FUROSEMIDE 40 MG: 40 TABLET ORAL at 09:07

## 2019-11-02 RX ADMIN — ISOSORBIDE MONONITRATE 60 MG: 60 TABLET, EXTENDED RELEASE ORAL at 09:07

## 2019-11-02 RX ADMIN — ACETAMINOPHEN 650 MG: 325 TABLET ORAL at 17:10

## 2019-11-02 RX ADMIN — RIFAXIMIN 550 MG: 550 TABLET ORAL at 09:07

## 2019-11-02 RX ADMIN — ENOXAPARIN SODIUM 30 MG: 30 INJECTION SUBCUTANEOUS at 09:06

## 2019-11-02 RX ADMIN — METOPROLOL SUCCINATE 50 MG: 50 TABLET, EXTENDED RELEASE ORAL at 09:07

## 2019-11-02 RX ADMIN — ASPIRIN 81 MG 81 MG: 81 TABLET ORAL at 09:07

## 2019-11-02 RX ADMIN — METOCLOPRAMIDE 5 MG: 5 TABLET ORAL at 09:07

## 2019-11-02 RX ADMIN — FUROSEMIDE 40 MG: 40 TABLET ORAL at 17:18

## 2019-11-02 RX ADMIN — URSODIOL 300 MG: 300 CAPSULE ORAL at 21:14

## 2019-11-02 RX ADMIN — RIFAXIMIN 550 MG: 550 TABLET ORAL at 21:13

## 2019-11-02 RX ADMIN — SODIUM BICARBONATE 1300 MG: 650 TABLET ORAL at 17:18

## 2019-11-02 RX ADMIN — URSODIOL 300 MG: 300 CAPSULE ORAL at 09:07

## 2019-11-02 RX ADMIN — CARVEDILOL 12.5 MG: 12.5 TABLET, FILM COATED ORAL at 21:13

## 2019-11-02 RX ADMIN — LABETALOL HYDROCHLORIDE 10 MG: 5 INJECTION INTRAVENOUS at 04:00

## 2019-11-02 RX ADMIN — Medication 10 ML: at 21:15

## 2019-11-02 RX ADMIN — NIFEDIPINE 60 MG: 30 TABLET, EXTENDED RELEASE ORAL at 09:07

## 2019-11-02 RX ADMIN — SODIUM BICARBONATE: 84 INJECTION, SOLUTION INTRAVENOUS at 03:48

## 2019-11-02 RX ADMIN — FUROSEMIDE 40 MG: 10 INJECTION, SOLUTION INTRAMUSCULAR; INTRAVENOUS at 14:30

## 2019-11-02 RX ADMIN — ACETAMINOPHEN 650 MG: 325 TABLET ORAL at 06:29

## 2019-11-02 RX ADMIN — SODIUM BICARBONATE 1300 MG: 650 TABLET ORAL at 21:14

## 2019-11-02 ASSESSMENT — PAIN SCALES - GENERAL
PAINLEVEL_OUTOF10: 0
PAINLEVEL_OUTOF10: 5
PAINLEVEL_OUTOF10: 0
PAINLEVEL_OUTOF10: 5
PAINLEVEL_OUTOF10: 8

## 2019-11-02 ASSESSMENT — PAIN DESCRIPTION - ORIENTATION: ORIENTATION: LEFT

## 2019-11-02 ASSESSMENT — PAIN SCALES - WONG BAKER: WONGBAKER_NUMERICALRESPONSE: 0

## 2019-11-02 ASSESSMENT — PAIN DESCRIPTION - PROGRESSION: CLINICAL_PROGRESSION: GRADUALLY WORSENING

## 2019-11-02 ASSESSMENT — PAIN DESCRIPTION - PAIN TYPE: TYPE: ACUTE PAIN

## 2019-11-02 ASSESSMENT — PAIN DESCRIPTION - LOCATION: LOCATION: LEG

## 2019-11-02 ASSESSMENT — PAIN DESCRIPTION - ONSET: ONSET: GRADUAL

## 2019-11-02 ASSESSMENT — PAIN DESCRIPTION - DESCRIPTORS: DESCRIPTORS: ACHING

## 2019-11-02 ASSESSMENT — PAIN DESCRIPTION - FREQUENCY: FREQUENCY: INTERMITTENT

## 2019-11-02 ASSESSMENT — PAIN - FUNCTIONAL ASSESSMENT: PAIN_FUNCTIONAL_ASSESSMENT: ACTIVITIES ARE NOT PREVENTED

## 2019-11-03 LAB
ALBUMIN SERPL-MCNC: 2.9 G/DL (ref 3.4–5)
ANION GAP SERPL CALCULATED.3IONS-SCNC: 17 MMOL/L (ref 3–16)
BUN BLDV-MCNC: 56 MG/DL (ref 7–20)
CALCIUM SERPL-MCNC: 8.5 MG/DL (ref 8.3–10.6)
CHLORIDE BLD-SCNC: 105 MMOL/L (ref 99–110)
CO2: 18 MMOL/L (ref 21–32)
CREAT SERPL-MCNC: 2.5 MG/DL (ref 0.6–1.2)
GFR AFRICAN AMERICAN: 22
GFR NON-AFRICAN AMERICAN: 18
GLUCOSE BLD-MCNC: 86 MG/DL (ref 70–99)
PHOSPHORUS: 4.1 MG/DL (ref 2.5–4.9)
POTASSIUM SERPL-SCNC: 4.3 MMOL/L (ref 3.5–5.1)
SODIUM BLD-SCNC: 140 MMOL/L (ref 136–145)

## 2019-11-03 PROCEDURE — 6370000000 HC RX 637 (ALT 250 FOR IP): Performed by: INTERNAL MEDICINE

## 2019-11-03 PROCEDURE — 2580000003 HC RX 258: Performed by: STUDENT IN AN ORGANIZED HEALTH CARE EDUCATION/TRAINING PROGRAM

## 2019-11-03 PROCEDURE — 6370000000 HC RX 637 (ALT 250 FOR IP): Performed by: STUDENT IN AN ORGANIZED HEALTH CARE EDUCATION/TRAINING PROGRAM

## 2019-11-03 PROCEDURE — 2060000000 HC ICU INTERMEDIATE R&B

## 2019-11-03 PROCEDURE — 36415 COLL VENOUS BLD VENIPUNCTURE: CPT

## 2019-11-03 PROCEDURE — 6360000002 HC RX W HCPCS: Performed by: STUDENT IN AN ORGANIZED HEALTH CARE EDUCATION/TRAINING PROGRAM

## 2019-11-03 PROCEDURE — 80069 RENAL FUNCTION PANEL: CPT

## 2019-11-03 PROCEDURE — 2500000003 HC RX 250 WO HCPCS: Performed by: INTERNAL MEDICINE

## 2019-11-03 RX ADMIN — FUROSEMIDE 40 MG: 40 TABLET ORAL at 09:16

## 2019-11-03 RX ADMIN — URSODIOL 300 MG: 300 CAPSULE ORAL at 21:50

## 2019-11-03 RX ADMIN — ENOXAPARIN SODIUM 30 MG: 30 INJECTION SUBCUTANEOUS at 09:17

## 2019-11-03 RX ADMIN — RIFAXIMIN 550 MG: 550 TABLET ORAL at 21:50

## 2019-11-03 RX ADMIN — ISOSORBIDE MONONITRATE 60 MG: 60 TABLET, EXTENDED RELEASE ORAL at 09:16

## 2019-11-03 RX ADMIN — RIFAXIMIN 550 MG: 550 TABLET ORAL at 09:16

## 2019-11-03 RX ADMIN — LABETALOL HYDROCHLORIDE 10 MG: 5 INJECTION INTRAVENOUS at 05:05

## 2019-11-03 RX ADMIN — ASPIRIN 81 MG 81 MG: 81 TABLET ORAL at 09:16

## 2019-11-03 RX ADMIN — FUROSEMIDE 40 MG: 40 TABLET ORAL at 19:24

## 2019-11-03 RX ADMIN — URSODIOL 300 MG: 300 CAPSULE ORAL at 09:16

## 2019-11-03 RX ADMIN — CARVEDILOL 12.5 MG: 12.5 TABLET, FILM COATED ORAL at 21:50

## 2019-11-03 RX ADMIN — Medication 10 ML: at 09:17

## 2019-11-03 RX ADMIN — SODIUM BICARBONATE 1300 MG: 650 TABLET ORAL at 09:16

## 2019-11-03 RX ADMIN — CARVEDILOL 12.5 MG: 12.5 TABLET, FILM COATED ORAL at 09:16

## 2019-11-03 RX ADMIN — NIFEDIPINE 60 MG: 30 TABLET, EXTENDED RELEASE ORAL at 09:16

## 2019-11-03 ASSESSMENT — PAIN SCALES - GENERAL
PAINLEVEL_OUTOF10: 0

## 2019-11-04 LAB
ALBUMIN SERPL-MCNC: 2.8 G/DL (ref 3.4–5)
AMMONIA: 82 UMOL/L (ref 11–51)
AMORPHOUS: ABNORMAL /HPF
ANION GAP SERPL CALCULATED.3IONS-SCNC: 18 MMOL/L (ref 3–16)
BACTERIA: ABNORMAL /HPF
BASOPHILS ABSOLUTE: 0 K/UL (ref 0–0.2)
BASOPHILS RELATIVE PERCENT: 0.6 %
BILIRUBIN URINE: NEGATIVE
BLOOD, URINE: ABNORMAL
BUN BLDV-MCNC: 58 MG/DL (ref 7–20)
CALCIUM SERPL-MCNC: 8.1 MG/DL (ref 8.3–10.6)
CHLORIDE BLD-SCNC: 108 MMOL/L (ref 99–110)
CLARITY: CLEAR
CO2: 17 MMOL/L (ref 21–32)
COLOR: YELLOW
CREAT SERPL-MCNC: 2.5 MG/DL (ref 0.6–1.2)
EOSINOPHILS ABSOLUTE: 0.1 K/UL (ref 0–0.6)
EOSINOPHILS RELATIVE PERCENT: 1.3 %
EPITHELIAL CELLS, UA: ABNORMAL /HPF
GFR AFRICAN AMERICAN: 22
GFR NON-AFRICAN AMERICAN: 18
GLUCOSE BLD-MCNC: 81 MG/DL (ref 70–99)
GLUCOSE URINE: NEGATIVE MG/DL
HCT VFR BLD CALC: 28.3 % (ref 36–48)
HEMOGLOBIN: 9.3 G/DL (ref 12–16)
KETONES, URINE: NEGATIVE MG/DL
LEUKOCYTE ESTERASE, URINE: ABNORMAL
LYMPHOCYTES ABSOLUTE: 1.2 K/UL (ref 1–5.1)
LYMPHOCYTES RELATIVE PERCENT: 15.6 %
MCH RBC QN AUTO: 28.6 PG (ref 26–34)
MCHC RBC AUTO-ENTMCNC: 33 G/DL (ref 31–36)
MCV RBC AUTO: 86.8 FL (ref 80–100)
MICROSCOPIC EXAMINATION: YES
MONOCYTES ABSOLUTE: 1.1 K/UL (ref 0–1.3)
MONOCYTES RELATIVE PERCENT: 13.9 %
NEUTROPHILS ABSOLUTE: 5.5 K/UL (ref 1.7–7.7)
NEUTROPHILS RELATIVE PERCENT: 68.6 %
NITRITE, URINE: NEGATIVE
PDW BLD-RTO: 17.1 % (ref 12.4–15.4)
PH UA: 6 (ref 5–8)
PHOSPHORUS: 4.7 MG/DL (ref 2.5–4.9)
PLATELET # BLD: 222 K/UL (ref 135–450)
PMV BLD AUTO: 8.5 FL (ref 5–10.5)
POTASSIUM SERPL-SCNC: 4.2 MMOL/L (ref 3.5–5.1)
PROTEIN UA: ABNORMAL MG/DL
RBC # BLD: 3.26 M/UL (ref 4–5.2)
RBC UA: ABNORMAL /HPF (ref 0–2)
SODIUM BLD-SCNC: 143 MMOL/L (ref 136–145)
SPECIFIC GRAVITY UA: 1.01 (ref 1–1.03)
URINE TYPE: ABNORMAL
UROBILINOGEN, URINE: 0.2 E.U./DL
WBC # BLD: 8 K/UL (ref 4–11)
WBC UA: >100 /HPF (ref 0–5)

## 2019-11-04 PROCEDURE — 6360000002 HC RX W HCPCS: Performed by: STUDENT IN AN ORGANIZED HEALTH CARE EDUCATION/TRAINING PROGRAM

## 2019-11-04 PROCEDURE — 80069 RENAL FUNCTION PANEL: CPT

## 2019-11-04 PROCEDURE — 36415 COLL VENOUS BLD VENIPUNCTURE: CPT

## 2019-11-04 PROCEDURE — 2580000003 HC RX 258: Performed by: STUDENT IN AN ORGANIZED HEALTH CARE EDUCATION/TRAINING PROGRAM

## 2019-11-04 PROCEDURE — 6370000000 HC RX 637 (ALT 250 FOR IP): Performed by: STUDENT IN AN ORGANIZED HEALTH CARE EDUCATION/TRAINING PROGRAM

## 2019-11-04 PROCEDURE — 6370000000 HC RX 637 (ALT 250 FOR IP): Performed by: INTERNAL MEDICINE

## 2019-11-04 PROCEDURE — 85025 COMPLETE CBC W/AUTO DIFF WBC: CPT

## 2019-11-04 PROCEDURE — 92526 ORAL FUNCTION THERAPY: CPT

## 2019-11-04 PROCEDURE — 97535 SELF CARE MNGMENT TRAINING: CPT

## 2019-11-04 PROCEDURE — 51701 INSERT BLADDER CATHETER: CPT

## 2019-11-04 PROCEDURE — 6360000002 HC RX W HCPCS: Performed by: INTERNAL MEDICINE

## 2019-11-04 PROCEDURE — 82140 ASSAY OF AMMONIA: CPT

## 2019-11-04 PROCEDURE — 2060000000 HC ICU INTERMEDIATE R&B

## 2019-11-04 PROCEDURE — 81001 URINALYSIS AUTO W/SCOPE: CPT

## 2019-11-04 PROCEDURE — 51798 US URINE CAPACITY MEASURE: CPT

## 2019-11-04 RX ORDER — LACTULOSE 10 G/15ML
15 SOLUTION ORAL 2 TIMES DAILY
Status: DISCONTINUED | OUTPATIENT
Start: 2019-11-04 | End: 2019-11-04

## 2019-11-04 RX ORDER — LACTULOSE 10 G/15ML
15 SOLUTION ORAL 3 TIMES DAILY
Status: DISCONTINUED | OUTPATIENT
Start: 2019-11-04 | End: 2019-11-07

## 2019-11-04 RX ADMIN — MORPHINE SULFATE 1 MG: 2 INJECTION, SOLUTION INTRAMUSCULAR; INTRAVENOUS at 22:26

## 2019-11-04 RX ADMIN — ISOSORBIDE MONONITRATE 60 MG: 60 TABLET, EXTENDED RELEASE ORAL at 07:49

## 2019-11-04 RX ADMIN — URSODIOL 300 MG: 300 CAPSULE ORAL at 22:18

## 2019-11-04 RX ADMIN — RIFAXIMIN 550 MG: 550 TABLET ORAL at 07:49

## 2019-11-04 RX ADMIN — ACETAMINOPHEN 650 MG: 325 TABLET ORAL at 07:30

## 2019-11-04 RX ADMIN — MORPHINE SULFATE 1 MG: 2 INJECTION, SOLUTION INTRAMUSCULAR; INTRAVENOUS at 14:56

## 2019-11-04 RX ADMIN — NIFEDIPINE 60 MG: 30 TABLET, EXTENDED RELEASE ORAL at 07:49

## 2019-11-04 RX ADMIN — ACETAMINOPHEN 650 MG: 325 TABLET ORAL at 01:04

## 2019-11-04 RX ADMIN — CARVEDILOL 12.5 MG: 12.5 TABLET, FILM COATED ORAL at 22:18

## 2019-11-04 RX ADMIN — Medication 10 ML: at 22:18

## 2019-11-04 RX ADMIN — URSODIOL 300 MG: 300 CAPSULE ORAL at 07:49

## 2019-11-04 RX ADMIN — FUROSEMIDE 40 MG: 40 TABLET ORAL at 07:30

## 2019-11-04 RX ADMIN — LACTULOSE 10 G: 20 SOLUTION ORAL at 15:04

## 2019-11-04 RX ADMIN — Medication 10 ML: at 02:24

## 2019-11-04 RX ADMIN — RIFAXIMIN 550 MG: 550 TABLET ORAL at 22:18

## 2019-11-04 RX ADMIN — Medication 10 ML: at 07:56

## 2019-11-04 RX ADMIN — CARVEDILOL 12.5 MG: 12.5 TABLET, FILM COATED ORAL at 07:49

## 2019-11-04 RX ADMIN — FUROSEMIDE 40 MG: 40 TABLET ORAL at 18:26

## 2019-11-04 RX ADMIN — ENOXAPARIN SODIUM 30 MG: 30 INJECTION SUBCUTANEOUS at 07:50

## 2019-11-04 RX ADMIN — ACETAMINOPHEN 650 MG: 325 TABLET ORAL at 17:31

## 2019-11-04 RX ADMIN — ASPIRIN 81 MG 81 MG: 81 TABLET ORAL at 07:49

## 2019-11-04 ASSESSMENT — PAIN DESCRIPTION - ORIENTATION
ORIENTATION: LEFT
ORIENTATION: LEFT
ORIENTATION: RIGHT;LEFT

## 2019-11-04 ASSESSMENT — PAIN SCALES - GENERAL
PAINLEVEL_OUTOF10: 10
PAINLEVEL_OUTOF10: 0
PAINLEVEL_OUTOF10: 0
PAINLEVEL_OUTOF10: 10
PAINLEVEL_OUTOF10: 10

## 2019-11-04 ASSESSMENT — PAIN DESCRIPTION - FREQUENCY
FREQUENCY: INTERMITTENT
FREQUENCY: INTERMITTENT
FREQUENCY: CONTINUOUS
FREQUENCY: CONTINUOUS

## 2019-11-04 ASSESSMENT — PAIN DESCRIPTION - ONSET
ONSET: ON-GOING
ONSET: AWAKENED FROM SLEEP
ONSET: ON-GOING
ONSET: ON-GOING

## 2019-11-04 ASSESSMENT — PAIN DESCRIPTION - PROGRESSION
CLINICAL_PROGRESSION: GRADUALLY WORSENING
CLINICAL_PROGRESSION: NOT CHANGED
CLINICAL_PROGRESSION: GRADUALLY WORSENING
CLINICAL_PROGRESSION: GRADUALLY WORSENING

## 2019-11-04 ASSESSMENT — PAIN DESCRIPTION - DESCRIPTORS
DESCRIPTORS: ACHING;SORE
DESCRIPTORS: ACHING
DESCRIPTORS: THROBBING
DESCRIPTORS: ACHING

## 2019-11-04 ASSESSMENT — PAIN - FUNCTIONAL ASSESSMENT
PAIN_FUNCTIONAL_ASSESSMENT: PREVENTS OR INTERFERES SOME ACTIVE ACTIVITIES AND ADLS
PAIN_FUNCTIONAL_ASSESSMENT: ACTIVITIES ARE NOT PREVENTED
PAIN_FUNCTIONAL_ASSESSMENT: ACTIVITIES ARE NOT PREVENTED

## 2019-11-04 ASSESSMENT — PAIN SCALES - WONG BAKER: WONGBAKER_NUMERICALRESPONSE: 0

## 2019-11-04 ASSESSMENT — PAIN DESCRIPTION - PAIN TYPE
TYPE: ACUTE PAIN

## 2019-11-04 ASSESSMENT — PAIN DESCRIPTION - LOCATION
LOCATION: LEG
LOCATION: LEG
LOCATION: HAND;LEG
LOCATION: FINGER (COMMENT WHICH ONE)

## 2019-11-05 PROBLEM — E44.1 MILD MALNUTRITION (HCC): Chronic | Status: ACTIVE | Noted: 2019-11-05

## 2019-11-05 LAB
ALBUMIN SERPL-MCNC: 2.7 G/DL (ref 3.4–5)
AMMONIA: 58 UMOL/L (ref 11–51)
ANION GAP SERPL CALCULATED.3IONS-SCNC: 13 MMOL/L (ref 3–16)
BASOPHILS ABSOLUTE: 0 K/UL (ref 0–0.2)
BASOPHILS RELATIVE PERCENT: 0.6 %
BUN BLDV-MCNC: 59 MG/DL (ref 7–20)
CALCIUM SERPL-MCNC: 7.6 MG/DL (ref 8.3–10.6)
CHLORIDE BLD-SCNC: 107 MMOL/L (ref 99–110)
CO2: 22 MMOL/L (ref 21–32)
CREAT SERPL-MCNC: 2.5 MG/DL (ref 0.6–1.2)
EOSINOPHILS ABSOLUTE: 0.2 K/UL (ref 0–0.6)
EOSINOPHILS RELATIVE PERCENT: 3 %
GFR AFRICAN AMERICAN: 22
GFR NON-AFRICAN AMERICAN: 18
GLUCOSE BLD-MCNC: 97 MG/DL (ref 70–99)
HCT VFR BLD CALC: 29.1 % (ref 36–48)
HEMOGLOBIN: 9.6 G/DL (ref 12–16)
LYMPHOCYTES ABSOLUTE: 1.1 K/UL (ref 1–5.1)
LYMPHOCYTES RELATIVE PERCENT: 15.8 %
MCH RBC QN AUTO: 28.7 PG (ref 26–34)
MCHC RBC AUTO-ENTMCNC: 33.1 G/DL (ref 31–36)
MCV RBC AUTO: 86.9 FL (ref 80–100)
MONOCYTES ABSOLUTE: 1 K/UL (ref 0–1.3)
MONOCYTES RELATIVE PERCENT: 14.2 %
NEUTROPHILS ABSOLUTE: 4.7 K/UL (ref 1.7–7.7)
NEUTROPHILS RELATIVE PERCENT: 66.4 %
PDW BLD-RTO: 16.9 % (ref 12.4–15.4)
PHOSPHORUS: 4.8 MG/DL (ref 2.5–4.9)
PLATELET # BLD: 201 K/UL (ref 135–450)
PMV BLD AUTO: 8.5 FL (ref 5–10.5)
POTASSIUM SERPL-SCNC: 3.9 MMOL/L (ref 3.5–5.1)
RBC # BLD: 3.35 M/UL (ref 4–5.2)
SODIUM BLD-SCNC: 142 MMOL/L (ref 136–145)
WBC # BLD: 7.1 K/UL (ref 4–11)

## 2019-11-05 PROCEDURE — 6370000000 HC RX 637 (ALT 250 FOR IP): Performed by: INTERNAL MEDICINE

## 2019-11-05 PROCEDURE — 99222 1ST HOSP IP/OBS MODERATE 55: CPT | Performed by: SURGERY

## 2019-11-05 PROCEDURE — 87086 URINE CULTURE/COLONY COUNT: CPT

## 2019-11-05 PROCEDURE — 92526 ORAL FUNCTION THERAPY: CPT

## 2019-11-05 PROCEDURE — 2580000003 HC RX 258: Performed by: INTERNAL MEDICINE

## 2019-11-05 PROCEDURE — 80069 RENAL FUNCTION PANEL: CPT

## 2019-11-05 PROCEDURE — 2060000000 HC ICU INTERMEDIATE R&B

## 2019-11-05 PROCEDURE — 82140 ASSAY OF AMMONIA: CPT

## 2019-11-05 PROCEDURE — 6370000000 HC RX 637 (ALT 250 FOR IP): Performed by: STUDENT IN AN ORGANIZED HEALTH CARE EDUCATION/TRAINING PROGRAM

## 2019-11-05 PROCEDURE — 51798 US URINE CAPACITY MEASURE: CPT

## 2019-11-05 PROCEDURE — 97530 THERAPEUTIC ACTIVITIES: CPT

## 2019-11-05 PROCEDURE — 51701 INSERT BLADDER CATHETER: CPT

## 2019-11-05 PROCEDURE — 6360000002 HC RX W HCPCS: Performed by: STUDENT IN AN ORGANIZED HEALTH CARE EDUCATION/TRAINING PROGRAM

## 2019-11-05 PROCEDURE — 2580000003 HC RX 258: Performed by: STUDENT IN AN ORGANIZED HEALTH CARE EDUCATION/TRAINING PROGRAM

## 2019-11-05 PROCEDURE — 6360000002 HC RX W HCPCS: Performed by: INTERNAL MEDICINE

## 2019-11-05 PROCEDURE — 36415 COLL VENOUS BLD VENIPUNCTURE: CPT

## 2019-11-05 PROCEDURE — 85025 COMPLETE CBC W/AUTO DIFF WBC: CPT

## 2019-11-05 PROCEDURE — 87186 SC STD MICRODIL/AGAR DIL: CPT

## 2019-11-05 PROCEDURE — 87077 CULTURE AEROBIC IDENTIFY: CPT

## 2019-11-05 RX ORDER — HEPARIN SODIUM 5000 [USP'U]/ML
5000 INJECTION, SOLUTION INTRAVENOUS; SUBCUTANEOUS EVERY 8 HOURS SCHEDULED
Status: DISCONTINUED | OUTPATIENT
Start: 2019-11-06 | End: 2019-11-06

## 2019-11-05 RX ORDER — ISOSORBIDE MONONITRATE 60 MG/1
60 TABLET, EXTENDED RELEASE ORAL NIGHTLY
Status: DISCONTINUED | OUTPATIENT
Start: 2019-11-06 | End: 2019-11-08 | Stop reason: HOSPADM

## 2019-11-05 RX ORDER — MORPHINE SULFATE 2 MG/ML
1 INJECTION, SOLUTION INTRAMUSCULAR; INTRAVENOUS EVERY 4 HOURS PRN
Status: DISCONTINUED | OUTPATIENT
Start: 2019-11-05 | End: 2019-11-08 | Stop reason: HOSPADM

## 2019-11-05 RX ADMIN — ASPIRIN 81 MG 81 MG: 81 TABLET ORAL at 08:01

## 2019-11-05 RX ADMIN — MORPHINE SULFATE 1 MG: 2 INJECTION, SOLUTION INTRAMUSCULAR; INTRAVENOUS at 17:35

## 2019-11-05 RX ADMIN — URSODIOL 300 MG: 300 CAPSULE ORAL at 21:11

## 2019-11-05 RX ADMIN — ACETAMINOPHEN 650 MG: 325 TABLET ORAL at 01:29

## 2019-11-05 RX ADMIN — CEFTRIAXONE 1 G: 1 INJECTION, POWDER, FOR SOLUTION INTRAMUSCULAR; INTRAVENOUS at 10:02

## 2019-11-05 RX ADMIN — LACTULOSE 10 G: 20 SOLUTION ORAL at 16:50

## 2019-11-05 RX ADMIN — Medication 10 ML: at 21:12

## 2019-11-05 RX ADMIN — NIFEDIPINE 60 MG: 30 TABLET, EXTENDED RELEASE ORAL at 08:01

## 2019-11-05 RX ADMIN — FUROSEMIDE 40 MG: 40 TABLET ORAL at 08:00

## 2019-11-05 RX ADMIN — Medication 10 ML: at 08:01

## 2019-11-05 RX ADMIN — CARVEDILOL 12.5 MG: 12.5 TABLET, FILM COATED ORAL at 21:11

## 2019-11-05 RX ADMIN — MORPHINE SULFATE 1 MG: 2 INJECTION, SOLUTION INTRAMUSCULAR; INTRAVENOUS at 08:00

## 2019-11-05 RX ADMIN — LACTULOSE 10 G: 20 SOLUTION ORAL at 21:11

## 2019-11-05 RX ADMIN — RIFAXIMIN 550 MG: 550 TABLET ORAL at 21:11

## 2019-11-05 RX ADMIN — CARVEDILOL 12.5 MG: 12.5 TABLET, FILM COATED ORAL at 08:00

## 2019-11-05 RX ADMIN — MORPHINE SULFATE 1 MG: 2 INJECTION, SOLUTION INTRAMUSCULAR; INTRAVENOUS at 12:13

## 2019-11-05 RX ADMIN — RIFAXIMIN 550 MG: 550 TABLET ORAL at 08:00

## 2019-11-05 RX ADMIN — ENOXAPARIN SODIUM 30 MG: 30 INJECTION SUBCUTANEOUS at 08:00

## 2019-11-05 RX ADMIN — ISOSORBIDE MONONITRATE 60 MG: 60 TABLET, EXTENDED RELEASE ORAL at 08:01

## 2019-11-05 RX ADMIN — URSODIOL 300 MG: 300 CAPSULE ORAL at 08:01

## 2019-11-05 RX ADMIN — MORPHINE SULFATE 1 MG: 2 INJECTION, SOLUTION INTRAMUSCULAR; INTRAVENOUS at 22:23

## 2019-11-05 RX ADMIN — MORPHINE SULFATE 1 MG: 2 INJECTION, SOLUTION INTRAMUSCULAR; INTRAVENOUS at 02:53

## 2019-11-05 RX ADMIN — FUROSEMIDE 40 MG: 40 TABLET ORAL at 17:35

## 2019-11-05 RX ADMIN — ACETAMINOPHEN 650 MG: 325 TABLET ORAL at 10:01

## 2019-11-05 ASSESSMENT — PAIN DESCRIPTION - FREQUENCY
FREQUENCY: CONTINUOUS

## 2019-11-05 ASSESSMENT — PAIN SCALES - GENERAL
PAINLEVEL_OUTOF10: 10
PAINLEVEL_OUTOF10: 5
PAINLEVEL_OUTOF10: 5
PAINLEVEL_OUTOF10: 10
PAINLEVEL_OUTOF10: 10
PAINLEVEL_OUTOF10: 6
PAINLEVEL_OUTOF10: 9
PAINLEVEL_OUTOF10: 9
PAINLEVEL_OUTOF10: 10
PAINLEVEL_OUTOF10: 10

## 2019-11-05 ASSESSMENT — PAIN DESCRIPTION - PROGRESSION
CLINICAL_PROGRESSION: GRADUALLY WORSENING

## 2019-11-05 ASSESSMENT — PAIN DESCRIPTION - DESCRIPTORS
DESCRIPTORS: ACHING;SHARP
DESCRIPTORS: ACHING;SHARP
DESCRIPTORS: ACHING
DESCRIPTORS: ACHING;SHARP

## 2019-11-05 ASSESSMENT — PAIN DESCRIPTION - LOCATION
LOCATION: FINGER (COMMENT WHICH ONE)

## 2019-11-05 ASSESSMENT — PAIN DESCRIPTION - ONSET
ONSET: ON-GOING

## 2019-11-05 ASSESSMENT — PAIN DESCRIPTION - PAIN TYPE
TYPE: ACUTE PAIN

## 2019-11-05 ASSESSMENT — PAIN DESCRIPTION - ORIENTATION
ORIENTATION: LEFT
ORIENTATION: RIGHT

## 2019-11-05 ASSESSMENT — PAIN - FUNCTIONAL ASSESSMENT: PAIN_FUNCTIONAL_ASSESSMENT: PREVENTS OR INTERFERES SOME ACTIVE ACTIVITIES AND ADLS

## 2019-11-06 ENCOUNTER — APPOINTMENT (OUTPATIENT)
Dept: VASCULAR LAB | Age: 84
DRG: 291 | End: 2019-11-06
Payer: MEDICARE

## 2019-11-06 LAB
ALBUMIN SERPL-MCNC: 2.9 G/DL (ref 3.4–5)
ANION GAP SERPL CALCULATED.3IONS-SCNC: 18 MMOL/L (ref 3–16)
BASOPHILS ABSOLUTE: 0 K/UL (ref 0–0.2)
BASOPHILS RELATIVE PERCENT: 0.5 %
BUN BLDV-MCNC: 58 MG/DL (ref 7–20)
CALCIUM SERPL-MCNC: 8.2 MG/DL (ref 8.3–10.6)
CHLORIDE BLD-SCNC: 107 MMOL/L (ref 99–110)
CO2: 18 MMOL/L (ref 21–32)
CREAT SERPL-MCNC: 2.6 MG/DL (ref 0.6–1.2)
EOSINOPHILS ABSOLUTE: 0.2 K/UL (ref 0–0.6)
EOSINOPHILS RELATIVE PERCENT: 2.5 %
GFR AFRICAN AMERICAN: 21
GFR NON-AFRICAN AMERICAN: 17
GLUCOSE BLD-MCNC: 91 MG/DL (ref 70–99)
HCT VFR BLD CALC: 31.1 % (ref 36–48)
HEMOGLOBIN: 10.1 G/DL (ref 12–16)
LYMPHOCYTES ABSOLUTE: 1.2 K/UL (ref 1–5.1)
LYMPHOCYTES RELATIVE PERCENT: 17.5 %
MCH RBC QN AUTO: 28.6 PG (ref 26–34)
MCHC RBC AUTO-ENTMCNC: 32.5 G/DL (ref 31–36)
MCV RBC AUTO: 88 FL (ref 80–100)
MONOCYTES ABSOLUTE: 0.9 K/UL (ref 0–1.3)
MONOCYTES RELATIVE PERCENT: 12.1 %
NEUTROPHILS ABSOLUTE: 4.8 K/UL (ref 1.7–7.7)
NEUTROPHILS RELATIVE PERCENT: 67.4 %
PDW BLD-RTO: 17 % (ref 12.4–15.4)
PHOSPHORUS: 4.7 MG/DL (ref 2.5–4.9)
PLATELET # BLD: 218 K/UL (ref 135–450)
PMV BLD AUTO: 8.5 FL (ref 5–10.5)
POTASSIUM SERPL-SCNC: 4 MMOL/L (ref 3.5–5.1)
RBC # BLD: 3.53 M/UL (ref 4–5.2)
SODIUM BLD-SCNC: 143 MMOL/L (ref 136–145)
WBC # BLD: 7.1 K/UL (ref 4–11)

## 2019-11-06 PROCEDURE — 36415 COLL VENOUS BLD VENIPUNCTURE: CPT

## 2019-11-06 PROCEDURE — 2580000003 HC RX 258: Performed by: STUDENT IN AN ORGANIZED HEALTH CARE EDUCATION/TRAINING PROGRAM

## 2019-11-06 PROCEDURE — 6360000002 HC RX W HCPCS: Performed by: INTERNAL MEDICINE

## 2019-11-06 PROCEDURE — 99222 1ST HOSP IP/OBS MODERATE 55: CPT | Performed by: INTERNAL MEDICINE

## 2019-11-06 PROCEDURE — 2060000000 HC ICU INTERMEDIATE R&B

## 2019-11-06 PROCEDURE — 85025 COMPLETE CBC W/AUTO DIFF WBC: CPT

## 2019-11-06 PROCEDURE — 97530 THERAPEUTIC ACTIVITIES: CPT

## 2019-11-06 PROCEDURE — 2580000003 HC RX 258: Performed by: INTERNAL MEDICINE

## 2019-11-06 PROCEDURE — 93931 UPPER EXTREMITY STUDY: CPT

## 2019-11-06 PROCEDURE — 6370000000 HC RX 637 (ALT 250 FOR IP): Performed by: STUDENT IN AN ORGANIZED HEALTH CARE EDUCATION/TRAINING PROGRAM

## 2019-11-06 PROCEDURE — 6370000000 HC RX 637 (ALT 250 FOR IP): Performed by: INTERNAL MEDICINE

## 2019-11-06 PROCEDURE — 80069 RENAL FUNCTION PANEL: CPT

## 2019-11-06 PROCEDURE — C8923 2D TTE W OR W/O FOL W/CON,CO: HCPCS

## 2019-11-06 PROCEDURE — 92526 ORAL FUNCTION THERAPY: CPT

## 2019-11-06 PROCEDURE — 51798 US URINE CAPACITY MEASURE: CPT

## 2019-11-06 PROCEDURE — 97535 SELF CARE MNGMENT TRAINING: CPT

## 2019-11-06 RX ADMIN — FUROSEMIDE 40 MG: 40 TABLET ORAL at 18:13

## 2019-11-06 RX ADMIN — Medication 10 ML: at 11:24

## 2019-11-06 RX ADMIN — FUROSEMIDE 40 MG: 40 TABLET ORAL at 11:14

## 2019-11-06 RX ADMIN — ASPIRIN 81 MG 81 MG: 81 TABLET ORAL at 11:14

## 2019-11-06 RX ADMIN — MORPHINE SULFATE 1 MG: 2 INJECTION, SOLUTION INTRAMUSCULAR; INTRAVENOUS at 11:37

## 2019-11-06 RX ADMIN — URSODIOL 300 MG: 300 CAPSULE ORAL at 21:31

## 2019-11-06 RX ADMIN — LACTULOSE 10 G: 20 SOLUTION ORAL at 21:32

## 2019-11-06 RX ADMIN — URSODIOL 300 MG: 300 CAPSULE ORAL at 11:13

## 2019-11-06 RX ADMIN — CARVEDILOL 12.5 MG: 12.5 TABLET, FILM COATED ORAL at 11:14

## 2019-11-06 RX ADMIN — RIFAXIMIN 550 MG: 550 TABLET ORAL at 21:31

## 2019-11-06 RX ADMIN — ISOSORBIDE MONONITRATE 60 MG: 60 TABLET, EXTENDED RELEASE ORAL at 21:31

## 2019-11-06 RX ADMIN — ONDANSETRON 4 MG: 2 INJECTION INTRAMUSCULAR; INTRAVENOUS at 21:45

## 2019-11-06 RX ADMIN — APIXABAN 2.5 MG: 2.5 TABLET, FILM COATED ORAL at 13:16

## 2019-11-06 RX ADMIN — NIFEDIPINE 60 MG: 30 TABLET, EXTENDED RELEASE ORAL at 11:13

## 2019-11-06 RX ADMIN — Medication 10 ML: at 21:33

## 2019-11-06 RX ADMIN — CARVEDILOL 12.5 MG: 12.5 TABLET, FILM COATED ORAL at 21:31

## 2019-11-06 RX ADMIN — CEFTRIAXONE 1 G: 1 INJECTION, POWDER, FOR SOLUTION INTRAMUSCULAR; INTRAVENOUS at 11:13

## 2019-11-06 RX ADMIN — HEPARIN SODIUM 5000 UNITS: 5000 INJECTION INTRAVENOUS; SUBCUTANEOUS at 06:23

## 2019-11-06 RX ADMIN — MORPHINE SULFATE 1 MG: 2 INJECTION, SOLUTION INTRAMUSCULAR; INTRAVENOUS at 03:36

## 2019-11-06 RX ADMIN — APIXABAN 2.5 MG: 2.5 TABLET, FILM COATED ORAL at 21:31

## 2019-11-06 RX ADMIN — RIFAXIMIN 550 MG: 550 TABLET ORAL at 11:14

## 2019-11-06 ASSESSMENT — PAIN DESCRIPTION - ONSET
ONSET: ON-GOING
ONSET: ON-GOING

## 2019-11-06 ASSESSMENT — PAIN DESCRIPTION - LOCATION
LOCATION: HAND
LOCATION: HAND

## 2019-11-06 ASSESSMENT — PAIN DESCRIPTION - PROGRESSION
CLINICAL_PROGRESSION: GRADUALLY WORSENING
CLINICAL_PROGRESSION: GRADUALLY WORSENING

## 2019-11-06 ASSESSMENT — PAIN SCALES - GENERAL
PAINLEVEL_OUTOF10: 0
PAINLEVEL_OUTOF10: 0
PAINLEVEL_OUTOF10: 7
PAINLEVEL_OUTOF10: 8
PAINLEVEL_OUTOF10: 0
PAINLEVEL_OUTOF10: 8
PAINLEVEL_OUTOF10: 7

## 2019-11-06 ASSESSMENT — PAIN DESCRIPTION - DESCRIPTORS
DESCRIPTORS: ACHING
DESCRIPTORS: ACHING

## 2019-11-06 ASSESSMENT — PAIN DESCRIPTION - ORIENTATION
ORIENTATION: RIGHT
ORIENTATION: RIGHT

## 2019-11-06 ASSESSMENT — PAIN DESCRIPTION - FREQUENCY
FREQUENCY: CONTINUOUS
FREQUENCY: CONTINUOUS

## 2019-11-06 ASSESSMENT — PAIN DESCRIPTION - PAIN TYPE
TYPE: ACUTE PAIN
TYPE: ACUTE PAIN

## 2019-11-07 PROBLEM — I16.1 HYPERTENSIVE EMERGENCY: Status: RESOLVED | Noted: 2019-10-29 | Resolved: 2019-11-07

## 2019-11-07 LAB
ALBUMIN SERPL-MCNC: 2.8 G/DL (ref 3.4–5)
AMMONIA: 73 UMOL/L (ref 11–51)
ANION GAP SERPL CALCULATED.3IONS-SCNC: 19 MMOL/L (ref 3–16)
BASOPHILS ABSOLUTE: 0 K/UL (ref 0–0.2)
BASOPHILS RELATIVE PERCENT: 0.4 %
BUN BLDV-MCNC: 58 MG/DL (ref 7–20)
CALCIUM SERPL-MCNC: 8.1 MG/DL (ref 8.3–10.6)
CHLORIDE BLD-SCNC: 107 MMOL/L (ref 99–110)
CO2: 18 MMOL/L (ref 21–32)
CREAT SERPL-MCNC: 2.7 MG/DL (ref 0.6–1.2)
EOSINOPHILS ABSOLUTE: 0.1 K/UL (ref 0–0.6)
EOSINOPHILS RELATIVE PERCENT: 1.1 %
GFR AFRICAN AMERICAN: 20
GFR NON-AFRICAN AMERICAN: 17
GLUCOSE BLD-MCNC: 100 MG/DL (ref 70–99)
HCT VFR BLD CALC: 33.8 % (ref 36–48)
HEMOGLOBIN: 10.6 G/DL (ref 12–16)
LYMPHOCYTES ABSOLUTE: 1.5 K/UL (ref 1–5.1)
LYMPHOCYTES RELATIVE PERCENT: 14.9 %
MAGNESIUM: 1.8 MG/DL (ref 1.8–2.4)
MCH RBC QN AUTO: 28.7 PG (ref 26–34)
MCHC RBC AUTO-ENTMCNC: 31.3 G/DL (ref 31–36)
MCV RBC AUTO: 91.5 FL (ref 80–100)
MONOCYTES ABSOLUTE: 1.4 K/UL (ref 0–1.3)
MONOCYTES RELATIVE PERCENT: 13.9 %
NEUTROPHILS ABSOLUTE: 7.1 K/UL (ref 1.7–7.7)
NEUTROPHILS RELATIVE PERCENT: 69.7 %
ORGANISM: ABNORMAL
ORGANISM: ABNORMAL
PDW BLD-RTO: 17.7 % (ref 12.4–15.4)
PHOSPHORUS: 5 MG/DL (ref 2.5–4.9)
PLATELET # BLD: 169 K/UL (ref 135–450)
PMV BLD AUTO: 8.7 FL (ref 5–10.5)
POTASSIUM SERPL-SCNC: 4.3 MMOL/L (ref 3.5–5.1)
RBC # BLD: 3.69 M/UL (ref 4–5.2)
SODIUM BLD-SCNC: 144 MMOL/L (ref 136–145)
URINE CULTURE, ROUTINE: ABNORMAL
URINE CULTURE, ROUTINE: ABNORMAL
WBC # BLD: 10.2 K/UL (ref 4–11)

## 2019-11-07 PROCEDURE — 2580000003 HC RX 258: Performed by: STUDENT IN AN ORGANIZED HEALTH CARE EDUCATION/TRAINING PROGRAM

## 2019-11-07 PROCEDURE — 36415 COLL VENOUS BLD VENIPUNCTURE: CPT

## 2019-11-07 PROCEDURE — 6360000002 HC RX W HCPCS: Performed by: INTERNAL MEDICINE

## 2019-11-07 PROCEDURE — 6370000000 HC RX 637 (ALT 250 FOR IP): Performed by: INTERNAL MEDICINE

## 2019-11-07 PROCEDURE — 6370000000 HC RX 637 (ALT 250 FOR IP): Performed by: STUDENT IN AN ORGANIZED HEALTH CARE EDUCATION/TRAINING PROGRAM

## 2019-11-07 PROCEDURE — 82140 ASSAY OF AMMONIA: CPT

## 2019-11-07 PROCEDURE — 80069 RENAL FUNCTION PANEL: CPT

## 2019-11-07 PROCEDURE — 85025 COMPLETE CBC W/AUTO DIFF WBC: CPT

## 2019-11-07 PROCEDURE — 92526 ORAL FUNCTION THERAPY: CPT

## 2019-11-07 PROCEDURE — 2060000000 HC ICU INTERMEDIATE R&B

## 2019-11-07 PROCEDURE — 83735 ASSAY OF MAGNESIUM: CPT

## 2019-11-07 PROCEDURE — 51798 US URINE CAPACITY MEASURE: CPT

## 2019-11-07 RX ORDER — LACTULOSE 10 G/15ML
20 SOLUTION ORAL ONCE
Status: COMPLETED | OUTPATIENT
Start: 2019-11-07 | End: 2019-11-07

## 2019-11-07 RX ORDER — CIPROFLOXACIN 250 MG/1
250 TABLET, FILM COATED ORAL EVERY 12 HOURS SCHEDULED
Qty: 10 TABLET | Refills: 0
Start: 2019-11-07 | End: 2019-11-08

## 2019-11-07 RX ORDER — LACTULOSE 10 G/15ML
15 SOLUTION ORAL 3 TIMES DAILY
Qty: 1 BOTTLE | Refills: 2
Start: 2019-11-07 | End: 2019-11-08

## 2019-11-07 RX ORDER — PROMETHAZINE HYDROCHLORIDE 25 MG/ML
6.25 INJECTION, SOLUTION INTRAMUSCULAR; INTRAVENOUS EVERY 6 HOURS PRN
Status: DISCONTINUED | OUTPATIENT
Start: 2019-11-07 | End: 2019-11-08 | Stop reason: HOSPADM

## 2019-11-07 RX ORDER — MAGNESIUM SULFATE IN WATER 40 MG/ML
2 INJECTION, SOLUTION INTRAVENOUS ONCE
Status: COMPLETED | OUTPATIENT
Start: 2019-11-07 | End: 2019-11-07

## 2019-11-07 RX ORDER — PROMETHAZINE HYDROCHLORIDE 12.5 MG/1
12.5 TABLET ORAL 3 TIMES DAILY PRN
Qty: 12 TABLET | Refills: 0 | Status: ON HOLD
Start: 2019-11-07 | End: 2019-11-20

## 2019-11-07 RX ORDER — CARVEDILOL 12.5 MG/1
12.5 TABLET ORAL 2 TIMES DAILY
Qty: 60 TABLET | Refills: 3
Start: 2019-11-07 | End: 2019-11-08 | Stop reason: HOSPADM

## 2019-11-07 RX ORDER — CIPROFLOXACIN 250 MG/1
250 TABLET, FILM COATED ORAL EVERY 12 HOURS SCHEDULED
Status: DISCONTINUED | OUTPATIENT
Start: 2019-11-07 | End: 2019-11-08 | Stop reason: HOSPADM

## 2019-11-07 RX ORDER — LACTULOSE 10 G/15ML
20 SOLUTION ORAL
Status: DISCONTINUED | OUTPATIENT
Start: 2019-11-08 | End: 2019-11-08 | Stop reason: HOSPADM

## 2019-11-07 RX ORDER — NIFEDIPINE 60 MG/1
60 TABLET, FILM COATED, EXTENDED RELEASE ORAL DAILY
Qty: 30 TABLET | Refills: 3 | Status: ON HOLD
Start: 2019-11-08 | End: 2019-11-20 | Stop reason: HOSPADM

## 2019-11-07 RX ADMIN — ISOSORBIDE MONONITRATE 60 MG: 60 TABLET, EXTENDED RELEASE ORAL at 21:56

## 2019-11-07 RX ADMIN — URSODIOL 300 MG: 300 CAPSULE ORAL at 21:55

## 2019-11-07 RX ADMIN — APIXABAN 2.5 MG: 2.5 TABLET, FILM COATED ORAL at 21:55

## 2019-11-07 RX ADMIN — APIXABAN 2.5 MG: 2.5 TABLET, FILM COATED ORAL at 09:57

## 2019-11-07 RX ADMIN — MORPHINE SULFATE 1 MG: 2 INJECTION, SOLUTION INTRAMUSCULAR; INTRAVENOUS at 18:53

## 2019-11-07 RX ADMIN — URSODIOL 300 MG: 300 CAPSULE ORAL at 09:58

## 2019-11-07 RX ADMIN — MORPHINE SULFATE 1 MG: 2 INJECTION, SOLUTION INTRAMUSCULAR; INTRAVENOUS at 01:50

## 2019-11-07 RX ADMIN — CIPROFLOXACIN HYDROCHLORIDE 250 MG: 250 TABLET, FILM COATED ORAL at 22:09

## 2019-11-07 RX ADMIN — CARVEDILOL 12.5 MG: 12.5 TABLET, FILM COATED ORAL at 21:56

## 2019-11-07 RX ADMIN — RIFAXIMIN 550 MG: 550 TABLET ORAL at 21:55

## 2019-11-07 RX ADMIN — CIPROFLOXACIN HYDROCHLORIDE 250 MG: 250 TABLET, FILM COATED ORAL at 09:57

## 2019-11-07 RX ADMIN — MAGNESIUM SULFATE HEPTAHYDRATE 2 G: 40 INJECTION, SOLUTION INTRAVENOUS at 10:04

## 2019-11-07 RX ADMIN — Medication 10 ML: at 09:57

## 2019-11-07 RX ADMIN — ONDANSETRON 4 MG: 2 INJECTION INTRAMUSCULAR; INTRAVENOUS at 12:15

## 2019-11-07 RX ADMIN — LACTULOSE 10 G: 20 SOLUTION ORAL at 09:57

## 2019-11-07 RX ADMIN — CARVEDILOL 12.5 MG: 12.5 TABLET, FILM COATED ORAL at 09:57

## 2019-11-07 RX ADMIN — RIFAXIMIN 550 MG: 550 TABLET ORAL at 09:57

## 2019-11-07 RX ADMIN — LACTULOSE 20 G: 20 SOLUTION ORAL at 13:50

## 2019-11-07 RX ADMIN — NIFEDIPINE 60 MG: 30 TABLET, EXTENDED RELEASE ORAL at 09:57

## 2019-11-07 RX ADMIN — FUROSEMIDE 40 MG: 40 TABLET ORAL at 09:57

## 2019-11-07 RX ADMIN — FUROSEMIDE 40 MG: 40 TABLET ORAL at 18:53

## 2019-11-07 ASSESSMENT — PAIN SCALES - GENERAL
PAINLEVEL_OUTOF10: 8
PAINLEVEL_OUTOF10: 7
PAINLEVEL_OUTOF10: 0

## 2019-11-07 ASSESSMENT — PAIN DESCRIPTION - LOCATION: LOCATION: HAND

## 2019-11-07 ASSESSMENT — PAIN DESCRIPTION - ORIENTATION: ORIENTATION: RIGHT

## 2019-11-08 VITALS
SYSTOLIC BLOOD PRESSURE: 119 MMHG | DIASTOLIC BLOOD PRESSURE: 65 MMHG | RESPIRATION RATE: 16 BRPM | HEART RATE: 69 BPM | TEMPERATURE: 97 F | OXYGEN SATURATION: 100 % | HEIGHT: 63 IN | BODY MASS INDEX: 26.99 KG/M2 | WEIGHT: 152.34 LBS

## 2019-11-08 PROBLEM — K76.82 HEPATIC ENCEPHALOPATHY: Status: ACTIVE | Noted: 2019-11-08

## 2019-11-08 PROBLEM — N39.0 URINARY TRACT INFECTION WITHOUT HEMATURIA: Status: RESOLVED | Noted: 2019-11-08 | Resolved: 2019-11-08

## 2019-11-08 PROBLEM — I48.91 ATRIAL FIBRILLATION (HCC): Status: RESOLVED | Noted: 2019-03-24 | Resolved: 2019-11-08

## 2019-11-08 PROBLEM — I50.33 ACUTE ON CHRONIC DIASTOLIC CHF (CONGESTIVE HEART FAILURE) (HCC): Status: RESOLVED | Noted: 2019-03-25 | Resolved: 2019-11-08

## 2019-11-08 PROBLEM — K76.82 HEPATIC ENCEPHALOPATHY: Status: RESOLVED | Noted: 2019-11-08 | Resolved: 2019-11-08

## 2019-11-08 PROBLEM — K74.69 OTHER CIRRHOSIS OF LIVER (HCC): Status: ACTIVE | Noted: 2019-11-08

## 2019-11-08 PROBLEM — N39.0 URINARY TRACT INFECTION WITHOUT HEMATURIA: Status: ACTIVE | Noted: 2019-11-08

## 2019-11-08 LAB
ALBUMIN SERPL-MCNC: 2.9 G/DL (ref 3.4–5)
AMMONIA: 80 UMOL/L (ref 11–51)
ANION GAP SERPL CALCULATED.3IONS-SCNC: 15 MMOL/L (ref 3–16)
BUN BLDV-MCNC: 61 MG/DL (ref 7–20)
CALCIUM SERPL-MCNC: 8.4 MG/DL (ref 8.3–10.6)
CHLORIDE BLD-SCNC: 106 MMOL/L (ref 99–110)
CO2: 20 MMOL/L (ref 21–32)
CREAT SERPL-MCNC: 2.9 MG/DL (ref 0.6–1.2)
GFR AFRICAN AMERICAN: 19
GFR NON-AFRICAN AMERICAN: 15
GLUCOSE BLD-MCNC: 92 MG/DL (ref 70–99)
PHOSPHORUS: 5.2 MG/DL (ref 2.5–4.9)
POTASSIUM SERPL-SCNC: 3.8 MMOL/L (ref 3.5–5.1)
SODIUM BLD-SCNC: 141 MMOL/L (ref 136–145)

## 2019-11-08 PROCEDURE — 6370000000 HC RX 637 (ALT 250 FOR IP): Performed by: INTERNAL MEDICINE

## 2019-11-08 PROCEDURE — 36415 COLL VENOUS BLD VENIPUNCTURE: CPT

## 2019-11-08 PROCEDURE — 51798 US URINE CAPACITY MEASURE: CPT

## 2019-11-08 PROCEDURE — 80069 RENAL FUNCTION PANEL: CPT

## 2019-11-08 PROCEDURE — 6370000000 HC RX 637 (ALT 250 FOR IP): Performed by: STUDENT IN AN ORGANIZED HEALTH CARE EDUCATION/TRAINING PROGRAM

## 2019-11-08 PROCEDURE — 82140 ASSAY OF AMMONIA: CPT

## 2019-11-08 RX ORDER — CIPROFLOXACIN 250 MG/1
250 TABLET, FILM COATED ORAL EVERY 12 HOURS SCHEDULED
Qty: 8 TABLET | Refills: 0
Start: 2019-11-08 | End: 2019-11-12

## 2019-11-08 RX ORDER — LACTULOSE 10 G/15ML
20 SOLUTION ORAL 3 TIMES DAILY
Qty: 1 BOTTLE | Refills: 2
Start: 2019-11-08

## 2019-11-08 RX ORDER — FUROSEMIDE 40 MG/1
40 TABLET ORAL DAILY
Status: DISCONTINUED | OUTPATIENT
Start: 2019-11-10 | End: 2019-11-08

## 2019-11-08 RX ORDER — CARVEDILOL 25 MG/1
25 TABLET ORAL 2 TIMES DAILY
Qty: 60 TABLET | Refills: 3 | Status: ON HOLD
Start: 2019-11-08 | End: 2019-11-20 | Stop reason: HOSPADM

## 2019-11-08 RX ORDER — CARVEDILOL 25 MG/1
25 TABLET ORAL 2 TIMES DAILY
Status: DISCONTINUED | OUTPATIENT
Start: 2019-11-08 | End: 2019-11-08 | Stop reason: HOSPADM

## 2019-11-08 RX ADMIN — LACTULOSE 20 G: 20 SOLUTION ORAL at 09:18

## 2019-11-08 RX ADMIN — NIFEDIPINE 60 MG: 30 TABLET, EXTENDED RELEASE ORAL at 09:15

## 2019-11-08 RX ADMIN — APIXABAN 2.5 MG: 2.5 TABLET, FILM COATED ORAL at 09:15

## 2019-11-08 RX ADMIN — URSODIOL 300 MG: 300 CAPSULE ORAL at 09:15

## 2019-11-08 RX ADMIN — CIPROFLOXACIN HYDROCHLORIDE 250 MG: 250 TABLET, FILM COATED ORAL at 09:15

## 2019-11-08 RX ADMIN — RIFAXIMIN 550 MG: 550 TABLET ORAL at 09:15

## 2019-11-08 ASSESSMENT — PAIN SCALES - GENERAL: PAINLEVEL_OUTOF10: 0

## 2019-11-09 ENCOUNTER — CARE COORDINATION (OUTPATIENT)
Dept: CASE MANAGEMENT | Age: 84
End: 2019-11-09

## 2019-11-13 ENCOUNTER — APPOINTMENT (OUTPATIENT)
Dept: GENERAL RADIOLOGY | Age: 84
DRG: 682 | End: 2019-11-13
Payer: MEDICARE

## 2019-11-13 ENCOUNTER — HOSPITAL ENCOUNTER (INPATIENT)
Age: 84
LOS: 7 days | Discharge: NURSING, REHAB OR LONG-TERM CARE | DRG: 682 | End: 2019-11-20
Attending: EMERGENCY MEDICINE | Admitting: INTERNAL MEDICINE
Payer: MEDICARE

## 2019-11-13 ENCOUNTER — APPOINTMENT (OUTPATIENT)
Dept: CT IMAGING | Age: 84
DRG: 682 | End: 2019-11-13
Payer: MEDICARE

## 2019-11-13 DIAGNOSIS — N30.00 ACUTE CYSTITIS WITHOUT HEMATURIA: ICD-10-CM

## 2019-11-13 DIAGNOSIS — B37.9 YEAST INFECTION: ICD-10-CM

## 2019-11-13 DIAGNOSIS — N17.9 ACUTE RENAL FAILURE SUPERIMPOSED ON CHRONIC KIDNEY DISEASE, UNSPECIFIED CKD STAGE, UNSPECIFIED ACUTE RENAL FAILURE TYPE (HCC): Primary | ICD-10-CM

## 2019-11-13 DIAGNOSIS — I73.9 PVD (PERIPHERAL VASCULAR DISEASE) (HCC): ICD-10-CM

## 2019-11-13 DIAGNOSIS — N18.9 ACUTE RENAL FAILURE SUPERIMPOSED ON CHRONIC KIDNEY DISEASE, UNSPECIFIED CKD STAGE, UNSPECIFIED ACUTE RENAL FAILURE TYPE (HCC): Primary | ICD-10-CM

## 2019-11-13 LAB
A/G RATIO: 0.7 (ref 1.1–2.2)
ALBUMIN SERPL-MCNC: 3.1 G/DL (ref 3.4–5)
ALP BLD-CCNC: 157 U/L (ref 40–129)
ALT SERPL-CCNC: 20 U/L (ref 10–40)
AMMONIA: 31 UMOL/L (ref 11–51)
ANION GAP SERPL CALCULATED.3IONS-SCNC: 16 MMOL/L (ref 3–16)
APTT: 34 SEC (ref 26–36)
AST SERPL-CCNC: 48 U/L (ref 15–37)
BACTERIA: ABNORMAL /HPF
BASOPHILS ABSOLUTE: 0 K/UL (ref 0–0.2)
BASOPHILS RELATIVE PERCENT: 0.5 %
BILIRUB SERPL-MCNC: 0.6 MG/DL (ref 0–1)
BILIRUBIN URINE: NEGATIVE
BLOOD, URINE: ABNORMAL
BUN BLDV-MCNC: 81 MG/DL (ref 7–20)
CALCIUM SERPL-MCNC: 8.5 MG/DL (ref 8.3–10.6)
CHLORIDE BLD-SCNC: 106 MMOL/L (ref 99–110)
CLARITY: ABNORMAL
CO2: 19 MMOL/L (ref 21–32)
COLOR: YELLOW
CREAT SERPL-MCNC: 4.9 MG/DL (ref 0.6–1.2)
EOSINOPHILS ABSOLUTE: 0.2 K/UL (ref 0–0.6)
EOSINOPHILS RELATIVE PERCENT: 1.8 %
EPITHELIAL CELLS, UA: 3 /HPF (ref 0–5)
GFR AFRICAN AMERICAN: 10
GFR NON-AFRICAN AMERICAN: 8
GLOBULIN: 4.4 G/DL
GLUCOSE BLD-MCNC: 118 MG/DL (ref 70–99)
GLUCOSE URINE: NEGATIVE MG/DL
HCT VFR BLD CALC: 33.7 % (ref 36–48)
HEMOGLOBIN: 10.8 G/DL (ref 12–16)
INR BLD: 1.4 (ref 0.86–1.14)
KETONES, URINE: NEGATIVE MG/DL
LEUKOCYTE ESTERASE, URINE: ABNORMAL
LYMPHOCYTES ABSOLUTE: 1.2 K/UL (ref 1–5.1)
LYMPHOCYTES RELATIVE PERCENT: 14.2 %
MCH RBC QN AUTO: 28 PG (ref 26–34)
MCHC RBC AUTO-ENTMCNC: 32.1 G/DL (ref 31–36)
MCV RBC AUTO: 87.1 FL (ref 80–100)
MICROSCOPIC EXAMINATION: YES
MONOCYTES ABSOLUTE: 1.1 K/UL (ref 0–1.3)
MONOCYTES RELATIVE PERCENT: 12.7 %
NEUTROPHILS ABSOLUTE: 6.2 K/UL (ref 1.7–7.7)
NEUTROPHILS RELATIVE PERCENT: 70.8 %
NITRITE, URINE: NEGATIVE
PDW BLD-RTO: 17.2 % (ref 12.4–15.4)
PH UA: 5 (ref 5–8)
PLATELET # BLD: 273 K/UL (ref 135–450)
PMV BLD AUTO: 8.8 FL (ref 5–10.5)
POTASSIUM SERPL-SCNC: 4.3 MMOL/L (ref 3.5–5.1)
PROTEIN UA: NEGATIVE MG/DL
PROTHROMBIN TIME: 16 SEC (ref 9.8–13)
RBC # BLD: 3.87 M/UL (ref 4–5.2)
RBC UA: 9 /HPF (ref 0–4)
SODIUM BLD-SCNC: 141 MMOL/L (ref 136–145)
SPECIFIC GRAVITY UA: 1.02 (ref 1–1.03)
TOTAL PROTEIN: 7.5 G/DL (ref 6.4–8.2)
URINE REFLEX TO CULTURE: YES
URINE TYPE: ABNORMAL
UROBILINOGEN, URINE: 0.2 E.U./DL
WBC # BLD: 8.7 K/UL (ref 4–11)
WBC UA: 447 /HPF (ref 0–5)
YEAST: PRESENT /HPF

## 2019-11-13 PROCEDURE — 85610 PROTHROMBIN TIME: CPT

## 2019-11-13 PROCEDURE — 96361 HYDRATE IV INFUSION ADD-ON: CPT

## 2019-11-13 PROCEDURE — 93005 ELECTROCARDIOGRAM TRACING: CPT | Performed by: PHYSICIAN ASSISTANT

## 2019-11-13 PROCEDURE — 2580000003 HC RX 258: Performed by: PHYSICIAN ASSISTANT

## 2019-11-13 PROCEDURE — 1200000000 HC SEMI PRIVATE

## 2019-11-13 PROCEDURE — 81001 URINALYSIS AUTO W/SCOPE: CPT

## 2019-11-13 PROCEDURE — 71045 X-RAY EXAM CHEST 1 VIEW: CPT

## 2019-11-13 PROCEDURE — 87086 URINE CULTURE/COLONY COUNT: CPT

## 2019-11-13 PROCEDURE — 82140 ASSAY OF AMMONIA: CPT

## 2019-11-13 PROCEDURE — 85730 THROMBOPLASTIN TIME PARTIAL: CPT

## 2019-11-13 PROCEDURE — 96374 THER/PROPH/DIAG INJ IV PUSH: CPT

## 2019-11-13 PROCEDURE — 99285 EMERGENCY DEPT VISIT HI MDM: CPT

## 2019-11-13 PROCEDURE — 73120 X-RAY EXAM OF HAND: CPT

## 2019-11-13 PROCEDURE — 74176 CT ABD & PELVIS W/O CONTRAST: CPT

## 2019-11-13 PROCEDURE — 85025 COMPLETE CBC W/AUTO DIFF WBC: CPT

## 2019-11-13 PROCEDURE — 6360000002 HC RX W HCPCS: Performed by: PHYSICIAN ASSISTANT

## 2019-11-13 PROCEDURE — 80053 COMPREHEN METABOLIC PANEL: CPT

## 2019-11-13 RX ORDER — DEXTROSE AND SODIUM CHLORIDE 5; .45 G/100ML; G/100ML
INJECTION, SOLUTION INTRAVENOUS CONTINUOUS
Status: DISCONTINUED | OUTPATIENT
Start: 2019-11-13 | End: 2019-11-16

## 2019-11-13 RX ORDER — CARVEDILOL 25 MG/1
25 TABLET ORAL 2 TIMES DAILY
Status: DISCONTINUED | OUTPATIENT
Start: 2019-11-13 | End: 2019-11-13 | Stop reason: SDUPTHER

## 2019-11-13 RX ORDER — URSODIOL 300 MG/1
300 CAPSULE ORAL 2 TIMES DAILY
Status: DISCONTINUED | OUTPATIENT
Start: 2019-11-13 | End: 2019-11-20 | Stop reason: HOSPADM

## 2019-11-13 RX ORDER — ALBUTEROL SULFATE 2.5 MG/3ML
2.5 SOLUTION RESPIRATORY (INHALATION) EVERY 4 HOURS PRN
Status: DISCONTINUED | OUTPATIENT
Start: 2019-11-13 | End: 2019-11-20 | Stop reason: HOSPADM

## 2019-11-13 RX ORDER — PANTOPRAZOLE SODIUM 40 MG/1
40 TABLET, DELAYED RELEASE ORAL
Status: DISCONTINUED | OUTPATIENT
Start: 2019-11-14 | End: 2019-11-20 | Stop reason: HOSPADM

## 2019-11-13 RX ORDER — PROMETHAZINE HYDROCHLORIDE 25 MG/1
12.5 TABLET ORAL 3 TIMES DAILY PRN
Status: DISCONTINUED | OUTPATIENT
Start: 2019-11-13 | End: 2019-11-20 | Stop reason: HOSPADM

## 2019-11-13 RX ORDER — CARVEDILOL 25 MG/1
25 TABLET ORAL 2 TIMES DAILY WITH MEALS
Status: DISCONTINUED | OUTPATIENT
Start: 2019-11-14 | End: 2019-11-17

## 2019-11-13 RX ORDER — LACTULOSE 10 G/15ML
20 SOLUTION ORAL 3 TIMES DAILY
Status: DISCONTINUED | OUTPATIENT
Start: 2019-11-13 | End: 2019-11-20 | Stop reason: HOSPADM

## 2019-11-13 RX ORDER — 0.9 % SODIUM CHLORIDE 0.9 %
1000 INTRAVENOUS SOLUTION INTRAVENOUS ONCE
Status: COMPLETED | OUTPATIENT
Start: 2019-11-13 | End: 2019-11-13

## 2019-11-13 RX ORDER — NIFEDIPINE 30 MG/1
60 TABLET, EXTENDED RELEASE ORAL DAILY
Status: DISCONTINUED | OUTPATIENT
Start: 2019-11-14 | End: 2019-11-14

## 2019-11-13 RX ORDER — ISOSORBIDE MONONITRATE 60 MG/1
60 TABLET, EXTENDED RELEASE ORAL DAILY
Status: DISCONTINUED | OUTPATIENT
Start: 2019-11-14 | End: 2019-11-17

## 2019-11-13 RX ADMIN — SODIUM CHLORIDE 1000 ML: 9 INJECTION, SOLUTION INTRAVENOUS at 18:11

## 2019-11-13 RX ADMIN — FLUCONAZOLE 100 MG: 200 INJECTION, SOLUTION INTRAVENOUS at 20:34

## 2019-11-13 RX ADMIN — Medication 1 G: at 21:16

## 2019-11-13 ASSESSMENT — PAIN SCALES - WONG BAKER: WONGBAKER_NUMERICALRESPONSE: 0

## 2019-11-13 ASSESSMENT — PAIN SCALES - GENERAL
PAINLEVEL_OUTOF10: 0

## 2019-11-14 PROBLEM — R18.8 ASCITES: Status: RESOLVED | Noted: 2019-08-21 | Resolved: 2019-11-14

## 2019-11-14 PROBLEM — R53.83 OTHER FATIGUE: Status: RESOLVED | Noted: 2019-03-17 | Resolved: 2019-11-14

## 2019-11-14 PROBLEM — I51.7 LEFT VENTRICULAR HYPERTROPHY: Status: ACTIVE | Noted: 2019-11-14

## 2019-11-14 PROBLEM — I16.0 HYPERTENSIVE URGENCY: Status: ACTIVE | Noted: 2019-11-14

## 2019-11-14 PROBLEM — E87.5 HYPERKALEMIA: Status: RESOLVED | Noted: 2019-10-13 | Resolved: 2019-11-14

## 2019-11-14 PROBLEM — A41.9 SEVERE SEPSIS (HCC): Status: RESOLVED | Noted: 2019-09-22 | Resolved: 2019-11-14

## 2019-11-14 PROBLEM — E78.5 HYPERLIPIDEMIA: Status: RESOLVED | Noted: 2019-08-21 | Resolved: 2019-11-14

## 2019-11-14 PROBLEM — R65.20 SEVERE SEPSIS (HCC): Status: RESOLVED | Noted: 2019-09-22 | Resolved: 2019-11-14

## 2019-11-14 PROBLEM — Z28.21 VACCINATION REFUSED BY PATIENT: Status: RESOLVED | Noted: 2019-06-25 | Resolved: 2019-11-14

## 2019-11-14 PROBLEM — R55 NEAR SYNCOPE: Status: RESOLVED | Noted: 2018-07-23 | Resolved: 2019-11-14

## 2019-11-14 PROBLEM — I21.4 NSTEMI (NON-ST ELEVATED MYOCARDIAL INFARCTION) (HCC): Status: RESOLVED | Noted: 2019-06-05 | Resolved: 2019-11-14

## 2019-11-14 PROBLEM — R11.2 NAUSEA AND VOMITING: Status: RESOLVED | Noted: 2019-08-21 | Resolved: 2019-11-14

## 2019-11-14 PROBLEM — I48.20 CHRONIC ATRIAL FIBRILLATION (HCC): Status: ACTIVE | Noted: 2019-03-24

## 2019-11-14 PROBLEM — E44.1 MILD MALNUTRITION (HCC): Chronic | Status: RESOLVED | Noted: 2019-11-05 | Resolved: 2019-11-14

## 2019-11-14 LAB
A/G RATIO: 0.7 (ref 1.1–2.2)
ALBUMIN SERPL-MCNC: 2.6 G/DL (ref 3.4–5)
ALP BLD-CCNC: 126 U/L (ref 40–129)
ALT SERPL-CCNC: 18 U/L (ref 10–40)
ANION GAP SERPL CALCULATED.3IONS-SCNC: 13 MMOL/L (ref 3–16)
AST SERPL-CCNC: 35 U/L (ref 15–37)
BILIRUB SERPL-MCNC: 0.5 MG/DL (ref 0–1)
BUN BLDV-MCNC: 76 MG/DL (ref 7–20)
CALCIUM SERPL-MCNC: 7.6 MG/DL (ref 8.3–10.6)
CHLORIDE BLD-SCNC: 111 MMOL/L (ref 99–110)
CO2: 18 MMOL/L (ref 21–32)
CREAT SERPL-MCNC: 4.4 MG/DL (ref 0.6–1.2)
EKG ATRIAL RATE: 66 BPM
EKG DIAGNOSIS: NORMAL
EKG Q-T INTERVAL: 456 MS
EKG QRS DURATION: 110 MS
EKG QTC CALCULATION (BAZETT): 509 MS
EKG R AXIS: -59 DEGREES
EKG T AXIS: 73 DEGREES
EKG VENTRICULAR RATE: 75 BPM
GFR AFRICAN AMERICAN: 11
GFR NON-AFRICAN AMERICAN: 9
GLOBULIN: 3.6 G/DL
GLUCOSE BLD-MCNC: 99 MG/DL (ref 70–99)
HCT VFR BLD CALC: 29.9 % (ref 36–48)
HEMOGLOBIN: 9.5 G/DL (ref 12–16)
MCH RBC QN AUTO: 27.7 PG (ref 26–34)
MCHC RBC AUTO-ENTMCNC: 31.9 G/DL (ref 31–36)
MCV RBC AUTO: 86.9 FL (ref 80–100)
PDW BLD-RTO: 16.8 % (ref 12.4–15.4)
PLATELET # BLD: 213 K/UL (ref 135–450)
PMV BLD AUTO: 8.5 FL (ref 5–10.5)
POTASSIUM SERPL-SCNC: 3.6 MMOL/L (ref 3.5–5.1)
RBC # BLD: 3.44 M/UL (ref 4–5.2)
SODIUM BLD-SCNC: 142 MMOL/L (ref 136–145)
TOTAL PROTEIN: 6.2 G/DL (ref 6.4–8.2)
WBC # BLD: 7.4 K/UL (ref 4–11)

## 2019-11-14 PROCEDURE — 6370000000 HC RX 637 (ALT 250 FOR IP): Performed by: INTERNAL MEDICINE

## 2019-11-14 PROCEDURE — 2580000003 HC RX 258: Performed by: INTERNAL MEDICINE

## 2019-11-14 PROCEDURE — 6360000002 HC RX W HCPCS: Performed by: INTERNAL MEDICINE

## 2019-11-14 PROCEDURE — 1200000000 HC SEMI PRIVATE

## 2019-11-14 PROCEDURE — 93010 ELECTROCARDIOGRAM REPORT: CPT | Performed by: INTERNAL MEDICINE

## 2019-11-14 PROCEDURE — 80053 COMPREHEN METABOLIC PANEL: CPT

## 2019-11-14 PROCEDURE — 85027 COMPLETE CBC AUTOMATED: CPT

## 2019-11-14 RX ORDER — FLUCONAZOLE 100 MG/1
100 TABLET ORAL DAILY
Status: COMPLETED | OUTPATIENT
Start: 2019-11-14 | End: 2019-11-19

## 2019-11-14 RX ORDER — 0.9 % SODIUM CHLORIDE 0.9 %
250 INTRAVENOUS SOLUTION INTRAVENOUS ONCE
Status: COMPLETED | OUTPATIENT
Start: 2019-11-14 | End: 2019-11-14

## 2019-11-14 RX ORDER — CLONIDINE HYDROCHLORIDE 0.1 MG/1
0.2 TABLET ORAL 2 TIMES DAILY
Status: DISCONTINUED | OUTPATIENT
Start: 2019-11-14 | End: 2019-11-14

## 2019-11-14 RX ORDER — HYDROCODONE BITARTRATE AND ACETAMINOPHEN 7.5; 325 MG/1; MG/1
1 TABLET ORAL EVERY 4 HOURS PRN
Status: DISCONTINUED | OUTPATIENT
Start: 2019-11-14 | End: 2019-11-15

## 2019-11-14 RX ADMIN — DEXTROSE AND SODIUM CHLORIDE: 5; 450 INJECTION, SOLUTION INTRAVENOUS at 10:24

## 2019-11-14 RX ADMIN — ISOSORBIDE MONONITRATE 60 MG: 60 TABLET, EXTENDED RELEASE ORAL at 10:24

## 2019-11-14 RX ADMIN — CLONIDINE HYDROCHLORIDE 0.2 MG: 0.1 TABLET ORAL at 01:49

## 2019-11-14 RX ADMIN — APIXABAN 2.5 MG: 2.5 TABLET, FILM COATED ORAL at 00:18

## 2019-11-14 RX ADMIN — URSODIOL 300 MG: 300 CAPSULE ORAL at 00:18

## 2019-11-14 RX ADMIN — Medication 1 G: at 21:42

## 2019-11-14 RX ADMIN — NIFEDIPINE 60 MG: 30 TABLET, FILM COATED, EXTENDED RELEASE ORAL at 10:24

## 2019-11-14 RX ADMIN — PANTOPRAZOLE SODIUM 40 MG: 40 TABLET, DELAYED RELEASE ORAL at 06:20

## 2019-11-14 RX ADMIN — CARVEDILOL 25 MG: 25 TABLET, FILM COATED ORAL at 10:24

## 2019-11-14 RX ADMIN — HYDROCODONE BITARTRATE AND ACETAMINOPHEN 1 TABLET: 7.5; 325 TABLET ORAL at 13:37

## 2019-11-14 RX ADMIN — FLUCONAZOLE 100 MG: 100 TABLET ORAL at 13:40

## 2019-11-14 RX ADMIN — CLONIDINE HYDROCHLORIDE 0.2 MG: 0.1 TABLET ORAL at 10:24

## 2019-11-14 RX ADMIN — APIXABAN 2.5 MG: 2.5 TABLET, FILM COATED ORAL at 10:24

## 2019-11-14 RX ADMIN — URSODIOL 300 MG: 300 CAPSULE ORAL at 10:24

## 2019-11-14 RX ADMIN — DEXTROSE AND SODIUM CHLORIDE: 5; 450 INJECTION, SOLUTION INTRAVENOUS at 00:15

## 2019-11-14 RX ADMIN — RIFAXIMIN 550 MG: 550 TABLET ORAL at 00:18

## 2019-11-14 RX ADMIN — SODIUM CHLORIDE 250 ML: 9 INJECTION, SOLUTION INTRAVENOUS at 16:37

## 2019-11-14 RX ADMIN — RIFAXIMIN 550 MG: 550 TABLET ORAL at 10:24

## 2019-11-14 ASSESSMENT — PAIN SCALES - GENERAL
PAINLEVEL_OUTOF10: 0
PAINLEVEL_OUTOF10: 0
PAINLEVEL_OUTOF10: 10
PAINLEVEL_OUTOF10: 0

## 2019-11-14 ASSESSMENT — PAIN SCALES - WONG BAKER
WONGBAKER_NUMERICALRESPONSE: 0
WONGBAKER_NUMERICALRESPONSE: 0

## 2019-11-15 LAB
ANION GAP SERPL CALCULATED.3IONS-SCNC: 13 MMOL/L (ref 3–16)
BASOPHILS ABSOLUTE: 0 K/UL (ref 0–0.2)
BASOPHILS RELATIVE PERCENT: 0.3 %
BUN BLDV-MCNC: 68 MG/DL (ref 7–20)
CALCIUM SERPL-MCNC: 7.4 MG/DL (ref 8.3–10.6)
CHLORIDE BLD-SCNC: 111 MMOL/L (ref 99–110)
CO2: 17 MMOL/L (ref 21–32)
CREAT SERPL-MCNC: 3.8 MG/DL (ref 0.6–1.2)
EOSINOPHILS ABSOLUTE: 0.2 K/UL (ref 0–0.6)
EOSINOPHILS RELATIVE PERCENT: 2 %
GFR AFRICAN AMERICAN: 14
GFR NON-AFRICAN AMERICAN: 11
GLUCOSE BLD-MCNC: 138 MG/DL (ref 70–99)
GLUCOSE BLD-MCNC: 161 MG/DL (ref 70–99)
GLUCOSE BLD-MCNC: 276 MG/DL (ref 70–99)
HCT VFR BLD CALC: 28.8 % (ref 36–48)
HEMOGLOBIN: 9.2 G/DL (ref 12–16)
LYMPHOCYTES ABSOLUTE: 1.2 K/UL (ref 1–5.1)
LYMPHOCYTES RELATIVE PERCENT: 13.6 %
MAGNESIUM: 2.2 MG/DL (ref 1.8–2.4)
MCH RBC QN AUTO: 27.8 PG (ref 26–34)
MCHC RBC AUTO-ENTMCNC: 31.8 G/DL (ref 31–36)
MCV RBC AUTO: 87.3 FL (ref 80–100)
MONOCYTES ABSOLUTE: 0.9 K/UL (ref 0–1.3)
MONOCYTES RELATIVE PERCENT: 10.3 %
NEUTROPHILS ABSOLUTE: 6.5 K/UL (ref 1.7–7.7)
NEUTROPHILS RELATIVE PERCENT: 73.8 %
ORGANISM: ABNORMAL
PDW BLD-RTO: 17.1 % (ref 12.4–15.4)
PERFORMED ON: ABNORMAL
PERFORMED ON: ABNORMAL
PLATELET # BLD: 210 K/UL (ref 135–450)
PMV BLD AUTO: 8.3 FL (ref 5–10.5)
POTASSIUM REFLEX MAGNESIUM: 3.5 MMOL/L (ref 3.5–5.1)
RBC # BLD: 3.3 M/UL (ref 4–5.2)
SODIUM BLD-SCNC: 141 MMOL/L (ref 136–145)
URINE CULTURE, ROUTINE: ABNORMAL
WBC # BLD: 8.8 K/UL (ref 4–11)

## 2019-11-15 PROCEDURE — 36415 COLL VENOUS BLD VENIPUNCTURE: CPT

## 2019-11-15 PROCEDURE — 2580000003 HC RX 258: Performed by: INTERNAL MEDICINE

## 2019-11-15 PROCEDURE — 1200000000 HC SEMI PRIVATE

## 2019-11-15 PROCEDURE — 85025 COMPLETE CBC W/AUTO DIFF WBC: CPT

## 2019-11-15 PROCEDURE — 6360000002 HC RX W HCPCS: Performed by: INTERNAL MEDICINE

## 2019-11-15 PROCEDURE — 83735 ASSAY OF MAGNESIUM: CPT

## 2019-11-15 PROCEDURE — 6370000000 HC RX 637 (ALT 250 FOR IP): Performed by: INTERNAL MEDICINE

## 2019-11-15 PROCEDURE — 80048 BASIC METABOLIC PNL TOTAL CA: CPT

## 2019-11-15 RX ORDER — GABAPENTIN 100 MG/1
100 CAPSULE ORAL 2 TIMES DAILY
Status: DISCONTINUED | OUTPATIENT
Start: 2019-11-15 | End: 2019-11-20 | Stop reason: HOSPADM

## 2019-11-15 RX ORDER — MORPHINE SULFATE 2 MG/ML
2 INJECTION, SOLUTION INTRAMUSCULAR; INTRAVENOUS
Status: DISCONTINUED | OUTPATIENT
Start: 2019-11-15 | End: 2019-11-20 | Stop reason: HOSPADM

## 2019-11-15 RX ADMIN — URSODIOL 300 MG: 300 CAPSULE ORAL at 08:28

## 2019-11-15 RX ADMIN — GABAPENTIN 100 MG: 100 CAPSULE ORAL at 21:31

## 2019-11-15 RX ADMIN — CARVEDILOL 25 MG: 25 TABLET, FILM COATED ORAL at 16:25

## 2019-11-15 RX ADMIN — RIFAXIMIN 550 MG: 550 TABLET ORAL at 08:28

## 2019-11-15 RX ADMIN — ISOSORBIDE MONONITRATE 60 MG: 60 TABLET, EXTENDED RELEASE ORAL at 08:28

## 2019-11-15 RX ADMIN — PANTOPRAZOLE SODIUM 40 MG: 40 TABLET, DELAYED RELEASE ORAL at 05:58

## 2019-11-15 RX ADMIN — MORPHINE SULFATE 2 MG: 2 INJECTION, SOLUTION INTRAMUSCULAR; INTRAVENOUS at 09:56

## 2019-11-15 RX ADMIN — DEXTROSE AND SODIUM CHLORIDE: 5; 450 INJECTION, SOLUTION INTRAVENOUS at 21:33

## 2019-11-15 RX ADMIN — APIXABAN 2.5 MG: 2.5 TABLET, FILM COATED ORAL at 21:32

## 2019-11-15 RX ADMIN — RIFAXIMIN 550 MG: 550 TABLET ORAL at 21:31

## 2019-11-15 RX ADMIN — APIXABAN 2.5 MG: 2.5 TABLET, FILM COATED ORAL at 08:28

## 2019-11-15 RX ADMIN — LACTULOSE 20 G: 20 SOLUTION ORAL at 08:42

## 2019-11-15 RX ADMIN — FLUCONAZOLE 100 MG: 100 TABLET ORAL at 08:28

## 2019-11-15 RX ADMIN — GABAPENTIN 100 MG: 100 CAPSULE ORAL at 11:46

## 2019-11-15 RX ADMIN — CARVEDILOL 25 MG: 25 TABLET, FILM COATED ORAL at 08:28

## 2019-11-15 RX ADMIN — URSODIOL 300 MG: 300 CAPSULE ORAL at 21:31

## 2019-11-15 RX ADMIN — LACTULOSE 20 G: 20 SOLUTION ORAL at 14:00

## 2019-11-15 ASSESSMENT — PAIN SCALES - GENERAL
PAINLEVEL_OUTOF10: 0
PAINLEVEL_OUTOF10: 1
PAINLEVEL_OUTOF10: 6
PAINLEVEL_OUTOF10: 3
PAINLEVEL_OUTOF10: 6
PAINLEVEL_OUTOF10: 7
PAINLEVEL_OUTOF10: 1

## 2019-11-15 ASSESSMENT — PAIN DESCRIPTION - ORIENTATION: ORIENTATION: OTHER (COMMENT)

## 2019-11-15 ASSESSMENT — PAIN DESCRIPTION - FREQUENCY
FREQUENCY: CONTINUOUS
FREQUENCY: CONTINUOUS

## 2019-11-15 ASSESSMENT — PAIN DESCRIPTION - LOCATION
LOCATION: GENERALIZED
LOCATION: GENERALIZED;LEG

## 2019-11-15 ASSESSMENT — PAIN SCALES - WONG BAKER: WONGBAKER_NUMERICALRESPONSE: 2

## 2019-11-15 ASSESSMENT — PAIN DESCRIPTION - PROGRESSION
CLINICAL_PROGRESSION: GRADUALLY WORSENING
CLINICAL_PROGRESSION: GRADUALLY WORSENING

## 2019-11-15 ASSESSMENT — PAIN DESCRIPTION - PAIN TYPE
TYPE: ACUTE PAIN
TYPE: ACUTE PAIN

## 2019-11-15 ASSESSMENT — PAIN DESCRIPTION - ONSET
ONSET: PROGRESSIVE
ONSET: PROGRESSIVE

## 2019-11-16 LAB
ALBUMIN SERPL-MCNC: 2.4 G/DL (ref 3.4–5)
ANION GAP SERPL CALCULATED.3IONS-SCNC: 11 MMOL/L (ref 3–16)
BASOPHILS ABSOLUTE: 0 K/UL (ref 0–0.2)
BASOPHILS RELATIVE PERCENT: 0.4 %
BUN BLDV-MCNC: 63 MG/DL (ref 7–20)
CALCIUM SERPL-MCNC: 7.4 MG/DL (ref 8.3–10.6)
CHLORIDE BLD-SCNC: 110 MMOL/L (ref 99–110)
CO2: 17 MMOL/L (ref 21–32)
CREAT SERPL-MCNC: 3.7 MG/DL (ref 0.6–1.2)
EOSINOPHILS ABSOLUTE: 0.2 K/UL (ref 0–0.6)
EOSINOPHILS RELATIVE PERCENT: 2.5 %
GFR AFRICAN AMERICAN: 14
GFR NON-AFRICAN AMERICAN: 12
GLUCOSE BLD-MCNC: 104 MG/DL (ref 70–99)
GLUCOSE BLD-MCNC: 150 MG/DL (ref 70–99)
GLUCOSE BLD-MCNC: 156 MG/DL (ref 70–99)
GLUCOSE BLD-MCNC: 99 MG/DL (ref 70–99)
HCT VFR BLD CALC: 28.9 % (ref 36–48)
HEMOGLOBIN: 9.4 G/DL (ref 12–16)
LYMPHOCYTES ABSOLUTE: 1.2 K/UL (ref 1–5.1)
LYMPHOCYTES RELATIVE PERCENT: 16.2 %
MCH RBC QN AUTO: 28.3 PG (ref 26–34)
MCHC RBC AUTO-ENTMCNC: 32.7 G/DL (ref 31–36)
MCV RBC AUTO: 86.6 FL (ref 80–100)
MONOCYTES ABSOLUTE: 0.9 K/UL (ref 0–1.3)
MONOCYTES RELATIVE PERCENT: 11.4 %
NEUTROPHILS ABSOLUTE: 5.2 K/UL (ref 1.7–7.7)
NEUTROPHILS RELATIVE PERCENT: 69.5 %
PDW BLD-RTO: 17 % (ref 12.4–15.4)
PERFORMED ON: ABNORMAL
PHOSPHORUS: 3.9 MG/DL (ref 2.5–4.9)
PLATELET # BLD: 192 K/UL (ref 135–450)
PMV BLD AUTO: 8.4 FL (ref 5–10.5)
POTASSIUM SERPL-SCNC: 3.7 MMOL/L (ref 3.5–5.1)
RBC # BLD: 3.34 M/UL (ref 4–5.2)
SODIUM BLD-SCNC: 138 MMOL/L (ref 136–145)
WBC # BLD: 7.5 K/UL (ref 4–11)

## 2019-11-16 PROCEDURE — 80069 RENAL FUNCTION PANEL: CPT

## 2019-11-16 PROCEDURE — 1200000000 HC SEMI PRIVATE

## 2019-11-16 PROCEDURE — 36415 COLL VENOUS BLD VENIPUNCTURE: CPT

## 2019-11-16 PROCEDURE — 85025 COMPLETE CBC W/AUTO DIFF WBC: CPT

## 2019-11-16 PROCEDURE — 6360000002 HC RX W HCPCS: Performed by: INTERNAL MEDICINE

## 2019-11-16 PROCEDURE — 6370000000 HC RX 637 (ALT 250 FOR IP): Performed by: INTERNAL MEDICINE

## 2019-11-16 PROCEDURE — 2580000003 HC RX 258: Performed by: INTERNAL MEDICINE

## 2019-11-16 PROCEDURE — 94760 N-INVAS EAR/PLS OXIMETRY 1: CPT

## 2019-11-16 RX ORDER — DEXTROSE AND SODIUM CHLORIDE 5; .45 G/100ML; G/100ML
INJECTION, SOLUTION INTRAVENOUS CONTINUOUS
Status: DISCONTINUED | OUTPATIENT
Start: 2019-11-16 | End: 2019-11-17

## 2019-11-16 RX ADMIN — MORPHINE SULFATE 2 MG: 2 INJECTION, SOLUTION INTRAMUSCULAR; INTRAVENOUS at 08:30

## 2019-11-16 RX ADMIN — RIFAXIMIN 550 MG: 550 TABLET ORAL at 20:26

## 2019-11-16 RX ADMIN — APIXABAN 2.5 MG: 2.5 TABLET, FILM COATED ORAL at 08:25

## 2019-11-16 RX ADMIN — APIXABAN 2.5 MG: 2.5 TABLET, FILM COATED ORAL at 20:27

## 2019-11-16 RX ADMIN — ISOSORBIDE MONONITRATE 60 MG: 60 TABLET, EXTENDED RELEASE ORAL at 08:26

## 2019-11-16 RX ADMIN — CARVEDILOL 25 MG: 25 TABLET, FILM COATED ORAL at 16:20

## 2019-11-16 RX ADMIN — GABAPENTIN 100 MG: 100 CAPSULE ORAL at 08:25

## 2019-11-16 RX ADMIN — URSODIOL 300 MG: 300 CAPSULE ORAL at 08:25

## 2019-11-16 RX ADMIN — GABAPENTIN 100 MG: 100 CAPSULE ORAL at 20:26

## 2019-11-16 RX ADMIN — DEXTROSE AND SODIUM CHLORIDE: 5; 450 INJECTION, SOLUTION INTRAVENOUS at 12:30

## 2019-11-16 RX ADMIN — RIFAXIMIN 550 MG: 550 TABLET ORAL at 08:25

## 2019-11-16 RX ADMIN — URSODIOL 300 MG: 300 CAPSULE ORAL at 20:25

## 2019-11-16 RX ADMIN — FLUCONAZOLE 100 MG: 100 TABLET ORAL at 08:25

## 2019-11-16 RX ADMIN — PANTOPRAZOLE SODIUM 40 MG: 40 TABLET, DELAYED RELEASE ORAL at 05:16

## 2019-11-16 RX ADMIN — DEXTROSE AND SODIUM CHLORIDE: 5; 450 INJECTION, SOLUTION INTRAVENOUS at 20:31

## 2019-11-16 ASSESSMENT — PAIN SCALES - WONG BAKER
WONGBAKER_NUMERICALRESPONSE: 0
WONGBAKER_NUMERICALRESPONSE: 6
WONGBAKER_NUMERICALRESPONSE: 0

## 2019-11-16 ASSESSMENT — PAIN SCALES - GENERAL
PAINLEVEL_OUTOF10: 6
PAINLEVEL_OUTOF10: 0
PAINLEVEL_OUTOF10: 1
PAINLEVEL_OUTOF10: 9

## 2019-11-17 LAB
ALBUMIN SERPL-MCNC: 2.2 G/DL (ref 3.4–5)
ANION GAP SERPL CALCULATED.3IONS-SCNC: 10 MMOL/L (ref 3–16)
BASOPHILS ABSOLUTE: 0 K/UL (ref 0–0.2)
BASOPHILS RELATIVE PERCENT: 0.5 %
BUN BLDV-MCNC: 62 MG/DL (ref 7–20)
CALCIUM SERPL-MCNC: 7.3 MG/DL (ref 8.3–10.6)
CHLORIDE BLD-SCNC: 108 MMOL/L (ref 99–110)
CO2: 16 MMOL/L (ref 21–32)
CREAT SERPL-MCNC: 3.4 MG/DL (ref 0.6–1.2)
EOSINOPHILS ABSOLUTE: 0.2 K/UL (ref 0–0.6)
EOSINOPHILS RELATIVE PERCENT: 2.4 %
GFR AFRICAN AMERICAN: 15
GFR NON-AFRICAN AMERICAN: 13
GLUCOSE BLD-MCNC: 108 MG/DL (ref 70–99)
GLUCOSE BLD-MCNC: 127 MG/DL (ref 70–99)
HCT VFR BLD CALC: 28.4 % (ref 36–48)
HEMOGLOBIN: 9.1 G/DL (ref 12–16)
LYMPHOCYTES ABSOLUTE: 1.3 K/UL (ref 1–5.1)
LYMPHOCYTES RELATIVE PERCENT: 17.4 %
MCH RBC QN AUTO: 27.8 PG (ref 26–34)
MCHC RBC AUTO-ENTMCNC: 32.2 G/DL (ref 31–36)
MCV RBC AUTO: 86.4 FL (ref 80–100)
MONOCYTES ABSOLUTE: 0.8 K/UL (ref 0–1.3)
MONOCYTES RELATIVE PERCENT: 11 %
NEUTROPHILS ABSOLUTE: 5.1 K/UL (ref 1.7–7.7)
NEUTROPHILS RELATIVE PERCENT: 68.7 %
PDW BLD-RTO: 17.6 % (ref 12.4–15.4)
PERFORMED ON: ABNORMAL
PHOSPHORUS: 3.9 MG/DL (ref 2.5–4.9)
PLATELET # BLD: 182 K/UL (ref 135–450)
PMV BLD AUTO: 8.4 FL (ref 5–10.5)
POTASSIUM SERPL-SCNC: 4.1 MMOL/L (ref 3.5–5.1)
RBC # BLD: 3.29 M/UL (ref 4–5.2)
SODIUM BLD-SCNC: 134 MMOL/L (ref 136–145)
WBC # BLD: 7.4 K/UL (ref 4–11)

## 2019-11-17 PROCEDURE — 36415 COLL VENOUS BLD VENIPUNCTURE: CPT

## 2019-11-17 PROCEDURE — 6370000000 HC RX 637 (ALT 250 FOR IP): Performed by: INTERNAL MEDICINE

## 2019-11-17 PROCEDURE — 1200000000 HC SEMI PRIVATE

## 2019-11-17 PROCEDURE — 2500000003 HC RX 250 WO HCPCS: Performed by: INTERNAL MEDICINE

## 2019-11-17 PROCEDURE — 2580000003 HC RX 258: Performed by: INTERNAL MEDICINE

## 2019-11-17 PROCEDURE — 80069 RENAL FUNCTION PANEL: CPT

## 2019-11-17 PROCEDURE — 85025 COMPLETE CBC W/AUTO DIFF WBC: CPT

## 2019-11-17 PROCEDURE — 6360000002 HC RX W HCPCS: Performed by: INTERNAL MEDICINE

## 2019-11-17 RX ORDER — CARVEDILOL 3.12 MG/1
3.12 TABLET ORAL 2 TIMES DAILY WITH MEALS
Status: DISCONTINUED | OUTPATIENT
Start: 2019-11-17 | End: 2019-11-20 | Stop reason: HOSPADM

## 2019-11-17 RX ORDER — ISOSORBIDE MONONITRATE 30 MG/1
30 TABLET, EXTENDED RELEASE ORAL DAILY
Status: DISCONTINUED | OUTPATIENT
Start: 2019-11-18 | End: 2019-11-20 | Stop reason: HOSPADM

## 2019-11-17 RX ORDER — 0.9 % SODIUM CHLORIDE 0.9 %
500 INTRAVENOUS SOLUTION INTRAVENOUS ONCE
Status: DISCONTINUED | OUTPATIENT
Start: 2019-11-17 | End: 2019-11-20

## 2019-11-17 RX ADMIN — FLUCONAZOLE 100 MG: 100 TABLET ORAL at 07:39

## 2019-11-17 RX ADMIN — MORPHINE SULFATE 2 MG: 2 INJECTION, SOLUTION INTRAMUSCULAR; INTRAVENOUS at 07:43

## 2019-11-17 RX ADMIN — URSODIOL 300 MG: 300 CAPSULE ORAL at 22:03

## 2019-11-17 RX ADMIN — PANTOPRAZOLE SODIUM 40 MG: 40 TABLET, DELAYED RELEASE ORAL at 06:27

## 2019-11-17 RX ADMIN — LACTULOSE 20 G: 20 SOLUTION ORAL at 07:42

## 2019-11-17 RX ADMIN — SODIUM BICARBONATE: 84 INJECTION, SOLUTION INTRAVENOUS at 10:23

## 2019-11-17 RX ADMIN — URSODIOL 300 MG: 300 CAPSULE ORAL at 07:39

## 2019-11-17 RX ADMIN — ISOSORBIDE MONONITRATE 60 MG: 60 TABLET, EXTENDED RELEASE ORAL at 07:39

## 2019-11-17 RX ADMIN — GABAPENTIN 100 MG: 100 CAPSULE ORAL at 21:43

## 2019-11-17 RX ADMIN — CARVEDILOL 3.12 MG: 3.12 TABLET, FILM COATED ORAL at 17:06

## 2019-11-17 RX ADMIN — RIFAXIMIN 550 MG: 550 TABLET ORAL at 07:39

## 2019-11-17 RX ADMIN — CARVEDILOL 25 MG: 25 TABLET, FILM COATED ORAL at 07:39

## 2019-11-17 RX ADMIN — RIFAXIMIN 550 MG: 550 TABLET ORAL at 21:43

## 2019-11-17 RX ADMIN — APIXABAN 2.5 MG: 2.5 TABLET, FILM COATED ORAL at 21:43

## 2019-11-17 RX ADMIN — GABAPENTIN 100 MG: 100 CAPSULE ORAL at 07:39

## 2019-11-17 RX ADMIN — APIXABAN 2.5 MG: 2.5 TABLET, FILM COATED ORAL at 07:39

## 2019-11-17 ASSESSMENT — PAIN SCALES - WONG BAKER

## 2019-11-17 ASSESSMENT — PAIN SCALES - GENERAL
PAINLEVEL_OUTOF10: 10
PAINLEVEL_OUTOF10: 10
PAINLEVEL_OUTOF10: 0
PAINLEVEL_OUTOF10: 5
PAINLEVEL_OUTOF10: 0
PAINLEVEL_OUTOF10: 0

## 2019-11-18 LAB
ALBUMIN SERPL-MCNC: 2.1 G/DL (ref 3.4–5)
ANION GAP SERPL CALCULATED.3IONS-SCNC: 10 MMOL/L (ref 3–16)
BASOPHILS ABSOLUTE: 0 K/UL (ref 0–0.2)
BASOPHILS RELATIVE PERCENT: 0.4 %
BUN BLDV-MCNC: 61 MG/DL (ref 7–20)
CALCIUM SERPL-MCNC: 7.6 MG/DL (ref 8.3–10.6)
CHLORIDE BLD-SCNC: 108 MMOL/L (ref 99–110)
CO2: 19 MMOL/L (ref 21–32)
CREAT SERPL-MCNC: 3.3 MG/DL (ref 0.6–1.2)
EOSINOPHILS ABSOLUTE: 0.2 K/UL (ref 0–0.6)
EOSINOPHILS RELATIVE PERCENT: 2.1 %
GFR AFRICAN AMERICAN: 16
GFR NON-AFRICAN AMERICAN: 13
GLUCOSE BLD-MCNC: 100 MG/DL (ref 70–99)
GLUCOSE BLD-MCNC: 103 MG/DL (ref 70–99)
HCT VFR BLD CALC: 29 % (ref 36–48)
HEMOGLOBIN: 9.6 G/DL (ref 12–16)
LYMPHOCYTES ABSOLUTE: 1.4 K/UL (ref 1–5.1)
LYMPHOCYTES RELATIVE PERCENT: 19.6 %
MCH RBC QN AUTO: 28.3 PG (ref 26–34)
MCHC RBC AUTO-ENTMCNC: 33 G/DL (ref 31–36)
MCV RBC AUTO: 85.7 FL (ref 80–100)
MONOCYTES ABSOLUTE: 0.8 K/UL (ref 0–1.3)
MONOCYTES RELATIVE PERCENT: 11.2 %
NEUTROPHILS ABSOLUTE: 4.8 K/UL (ref 1.7–7.7)
NEUTROPHILS RELATIVE PERCENT: 66.7 %
PDW BLD-RTO: 16.9 % (ref 12.4–15.4)
PERFORMED ON: ABNORMAL
PHOSPHORUS: 4.1 MG/DL (ref 2.5–4.9)
PLATELET # BLD: 164 K/UL (ref 135–450)
PMV BLD AUTO: 8.2 FL (ref 5–10.5)
POTASSIUM SERPL-SCNC: 4.1 MMOL/L (ref 3.5–5.1)
RBC # BLD: 3.39 M/UL (ref 4–5.2)
SODIUM BLD-SCNC: 137 MMOL/L (ref 136–145)
WBC # BLD: 7.2 K/UL (ref 4–11)

## 2019-11-18 PROCEDURE — 6370000000 HC RX 637 (ALT 250 FOR IP): Performed by: INTERNAL MEDICINE

## 2019-11-18 PROCEDURE — 2500000003 HC RX 250 WO HCPCS: Performed by: INTERNAL MEDICINE

## 2019-11-18 PROCEDURE — 85025 COMPLETE CBC W/AUTO DIFF WBC: CPT

## 2019-11-18 PROCEDURE — 80069 RENAL FUNCTION PANEL: CPT

## 2019-11-18 PROCEDURE — 36415 COLL VENOUS BLD VENIPUNCTURE: CPT

## 2019-11-18 PROCEDURE — 2580000003 HC RX 258: Performed by: INTERNAL MEDICINE

## 2019-11-18 PROCEDURE — 1200000000 HC SEMI PRIVATE

## 2019-11-18 RX ADMIN — APIXABAN 2.5 MG: 2.5 TABLET, FILM COATED ORAL at 22:41

## 2019-11-18 RX ADMIN — ISOSORBIDE MONONITRATE 30 MG: 30 TABLET, EXTENDED RELEASE ORAL at 10:07

## 2019-11-18 RX ADMIN — PANTOPRAZOLE SODIUM 40 MG: 40 TABLET, DELAYED RELEASE ORAL at 06:10

## 2019-11-18 RX ADMIN — URSODIOL 300 MG: 300 CAPSULE ORAL at 10:07

## 2019-11-18 RX ADMIN — FLUCONAZOLE 100 MG: 100 TABLET ORAL at 10:07

## 2019-11-18 RX ADMIN — RIFAXIMIN 550 MG: 550 TABLET ORAL at 10:07

## 2019-11-18 RX ADMIN — URSODIOL 300 MG: 300 CAPSULE ORAL at 22:41

## 2019-11-18 RX ADMIN — SODIUM BICARBONATE: 84 INJECTION, SOLUTION INTRAVENOUS at 06:10

## 2019-11-18 RX ADMIN — GABAPENTIN 100 MG: 100 CAPSULE ORAL at 10:07

## 2019-11-18 RX ADMIN — APIXABAN 2.5 MG: 2.5 TABLET, FILM COATED ORAL at 10:07

## 2019-11-18 RX ADMIN — CARVEDILOL 3.12 MG: 3.12 TABLET, FILM COATED ORAL at 10:07

## 2019-11-18 RX ADMIN — CARVEDILOL 3.12 MG: 3.12 TABLET, FILM COATED ORAL at 16:53

## 2019-11-18 RX ADMIN — RIFAXIMIN 550 MG: 550 TABLET ORAL at 22:41

## 2019-11-18 RX ADMIN — LACTULOSE 20 G: 20 SOLUTION ORAL at 22:41

## 2019-11-18 RX ADMIN — GABAPENTIN 100 MG: 100 CAPSULE ORAL at 22:41

## 2019-11-18 ASSESSMENT — PAIN SCALES - WONG BAKER
WONGBAKER_NUMERICALRESPONSE: 0

## 2019-11-18 ASSESSMENT — PAIN SCALES - GENERAL
PAINLEVEL_OUTOF10: 0

## 2019-11-19 LAB
ALBUMIN SERPL-MCNC: 2 G/DL (ref 3.4–5)
ANION GAP SERPL CALCULATED.3IONS-SCNC: 10 MMOL/L (ref 3–16)
BASOPHILS ABSOLUTE: 0 K/UL (ref 0–0.2)
BASOPHILS RELATIVE PERCENT: 0.4 %
BUN BLDV-MCNC: 62 MG/DL (ref 7–20)
CALCIUM SERPL-MCNC: 7.4 MG/DL (ref 8.3–10.6)
CHLORIDE BLD-SCNC: 108 MMOL/L (ref 99–110)
CO2: 22 MMOL/L (ref 21–32)
CREAT SERPL-MCNC: 3.1 MG/DL (ref 0.6–1.2)
EOSINOPHILS ABSOLUTE: 0.1 K/UL (ref 0–0.6)
EOSINOPHILS RELATIVE PERCENT: 1.6 %
GFR AFRICAN AMERICAN: 17
GFR NON-AFRICAN AMERICAN: 14
GLUCOSE BLD-MCNC: 102 MG/DL (ref 70–99)
HCT VFR BLD CALC: 27.6 % (ref 36–48)
HEMOGLOBIN: 8.9 G/DL (ref 12–16)
LYMPHOCYTES ABSOLUTE: 1.5 K/UL (ref 1–5.1)
LYMPHOCYTES RELATIVE PERCENT: 18.8 %
MCH RBC QN AUTO: 27.9 PG (ref 26–34)
MCHC RBC AUTO-ENTMCNC: 32.4 G/DL (ref 31–36)
MCV RBC AUTO: 86.3 FL (ref 80–100)
MONOCYTES ABSOLUTE: 1 K/UL (ref 0–1.3)
MONOCYTES RELATIVE PERCENT: 11.9 %
NEUTROPHILS ABSOLUTE: 5.4 K/UL (ref 1.7–7.7)
NEUTROPHILS RELATIVE PERCENT: 67.3 %
PDW BLD-RTO: 16.8 % (ref 12.4–15.4)
PHOSPHORUS: 3.9 MG/DL (ref 2.5–4.9)
PLATELET # BLD: 152 K/UL (ref 135–450)
PMV BLD AUTO: 8.9 FL (ref 5–10.5)
POTASSIUM SERPL-SCNC: 4.1 MMOL/L (ref 3.5–5.1)
RBC # BLD: 3.19 M/UL (ref 4–5.2)
SODIUM BLD-SCNC: 140 MMOL/L (ref 136–145)
WBC # BLD: 8.1 K/UL (ref 4–11)

## 2019-11-19 PROCEDURE — 2500000003 HC RX 250 WO HCPCS: Performed by: INTERNAL MEDICINE

## 2019-11-19 PROCEDURE — 97535 SELF CARE MNGMENT TRAINING: CPT

## 2019-11-19 PROCEDURE — 36415 COLL VENOUS BLD VENIPUNCTURE: CPT

## 2019-11-19 PROCEDURE — 2580000003 HC RX 258: Performed by: INTERNAL MEDICINE

## 2019-11-19 PROCEDURE — 1200000000 HC SEMI PRIVATE

## 2019-11-19 PROCEDURE — 6370000000 HC RX 637 (ALT 250 FOR IP): Performed by: INTERNAL MEDICINE

## 2019-11-19 PROCEDURE — 80069 RENAL FUNCTION PANEL: CPT

## 2019-11-19 PROCEDURE — 97530 THERAPEUTIC ACTIVITIES: CPT

## 2019-11-19 PROCEDURE — 97162 PT EVAL MOD COMPLEX 30 MIN: CPT

## 2019-11-19 PROCEDURE — 97166 OT EVAL MOD COMPLEX 45 MIN: CPT

## 2019-11-19 PROCEDURE — 85025 COMPLETE CBC W/AUTO DIFF WBC: CPT

## 2019-11-19 PROCEDURE — 6360000002 HC RX W HCPCS: Performed by: INTERNAL MEDICINE

## 2019-11-19 RX ORDER — TRAMADOL HYDROCHLORIDE 50 MG/1
50 TABLET ORAL EVERY 6 HOURS PRN
Status: DISCONTINUED | OUTPATIENT
Start: 2019-11-19 | End: 2019-11-20 | Stop reason: HOSPADM

## 2019-11-19 RX ADMIN — GABAPENTIN 100 MG: 100 CAPSULE ORAL at 21:32

## 2019-11-19 RX ADMIN — APIXABAN 2.5 MG: 2.5 TABLET, FILM COATED ORAL at 10:45

## 2019-11-19 RX ADMIN — CARVEDILOL 3.12 MG: 3.12 TABLET, FILM COATED ORAL at 10:44

## 2019-11-19 RX ADMIN — MORPHINE SULFATE 2 MG: 2 INJECTION, SOLUTION INTRAMUSCULAR; INTRAVENOUS at 13:57

## 2019-11-19 RX ADMIN — ISOSORBIDE MONONITRATE 30 MG: 30 TABLET, EXTENDED RELEASE ORAL at 10:45

## 2019-11-19 RX ADMIN — GABAPENTIN 100 MG: 100 CAPSULE ORAL at 10:45

## 2019-11-19 RX ADMIN — FLUCONAZOLE 100 MG: 100 TABLET ORAL at 10:44

## 2019-11-19 RX ADMIN — PANTOPRAZOLE SODIUM 40 MG: 40 TABLET, DELAYED RELEASE ORAL at 06:43

## 2019-11-19 RX ADMIN — RIFAXIMIN 550 MG: 550 TABLET ORAL at 21:32

## 2019-11-19 RX ADMIN — URSODIOL 300 MG: 300 CAPSULE ORAL at 10:44

## 2019-11-19 RX ADMIN — APIXABAN 2.5 MG: 2.5 TABLET, FILM COATED ORAL at 21:32

## 2019-11-19 RX ADMIN — RIFAXIMIN 550 MG: 550 TABLET ORAL at 10:44

## 2019-11-19 RX ADMIN — URSODIOL 300 MG: 300 CAPSULE ORAL at 21:32

## 2019-11-19 RX ADMIN — SODIUM BICARBONATE: 84 INJECTION, SOLUTION INTRAVENOUS at 06:43

## 2019-11-19 RX ADMIN — CARVEDILOL 3.12 MG: 3.12 TABLET, FILM COATED ORAL at 16:18

## 2019-11-19 ASSESSMENT — PAIN SCALES - WONG BAKER
WONGBAKER_NUMERICALRESPONSE: 0

## 2019-11-19 ASSESSMENT — PAIN DESCRIPTION - LOCATION
LOCATION: FINGER (COMMENT WHICH ONE)
LOCATION: FINGER (COMMENT WHICH ONE)

## 2019-11-19 ASSESSMENT — PAIN SCALES - GENERAL
PAINLEVEL_OUTOF10: 7
PAINLEVEL_OUTOF10: 7
PAINLEVEL_OUTOF10: 0

## 2019-11-19 ASSESSMENT — PAIN DESCRIPTION - ORIENTATION: ORIENTATION: RIGHT

## 2019-11-20 VITALS
HEART RATE: 77 BPM | SYSTOLIC BLOOD PRESSURE: 108 MMHG | BODY MASS INDEX: 28.12 KG/M2 | OXYGEN SATURATION: 95 % | TEMPERATURE: 97.4 F | WEIGHT: 158.7 LBS | DIASTOLIC BLOOD PRESSURE: 62 MMHG | RESPIRATION RATE: 16 BRPM | HEIGHT: 63 IN

## 2019-11-20 LAB
ALBUMIN SERPL-MCNC: 1.9 G/DL (ref 3.4–5)
ANION GAP SERPL CALCULATED.3IONS-SCNC: 10 MMOL/L (ref 3–16)
BASOPHILS ABSOLUTE: 0 K/UL (ref 0–0.2)
BASOPHILS RELATIVE PERCENT: 0.5 %
BUN BLDV-MCNC: 63 MG/DL (ref 7–20)
CALCIUM SERPL-MCNC: 7.5 MG/DL (ref 8.3–10.6)
CHLORIDE BLD-SCNC: 103 MMOL/L (ref 99–110)
CO2: 23 MMOL/L (ref 21–32)
CREAT SERPL-MCNC: 3.5 MG/DL (ref 0.6–1.2)
EOSINOPHILS ABSOLUTE: 0.2 K/UL (ref 0–0.6)
EOSINOPHILS RELATIVE PERCENT: 2.8 %
GFR AFRICAN AMERICAN: 15
GFR NON-AFRICAN AMERICAN: 12
GLUCOSE BLD-MCNC: 107 MG/DL (ref 70–99)
HCT VFR BLD CALC: 30.1 % (ref 36–48)
HEMOGLOBIN: 9.8 G/DL (ref 12–16)
LYMPHOCYTES ABSOLUTE: 1.4 K/UL (ref 1–5.1)
LYMPHOCYTES RELATIVE PERCENT: 17.9 %
MCH RBC QN AUTO: 28.1 PG (ref 26–34)
MCHC RBC AUTO-ENTMCNC: 32.6 G/DL (ref 31–36)
MCV RBC AUTO: 86.4 FL (ref 80–100)
MONOCYTES ABSOLUTE: 0.8 K/UL (ref 0–1.3)
MONOCYTES RELATIVE PERCENT: 10.5 %
NEUTROPHILS ABSOLUTE: 5.4 K/UL (ref 1.7–7.7)
NEUTROPHILS RELATIVE PERCENT: 68.3 %
PDW BLD-RTO: 17.4 % (ref 12.4–15.4)
PHOSPHORUS: 4.2 MG/DL (ref 2.5–4.9)
PLATELET # BLD: 164 K/UL (ref 135–450)
PMV BLD AUTO: 8.7 FL (ref 5–10.5)
POTASSIUM SERPL-SCNC: 4.1 MMOL/L (ref 3.5–5.1)
RBC # BLD: 3.48 M/UL (ref 4–5.2)
SODIUM BLD-SCNC: 136 MMOL/L (ref 136–145)
WBC # BLD: 7.9 K/UL (ref 4–11)

## 2019-11-20 PROCEDURE — 6370000000 HC RX 637 (ALT 250 FOR IP): Performed by: INTERNAL MEDICINE

## 2019-11-20 PROCEDURE — 80069 RENAL FUNCTION PANEL: CPT

## 2019-11-20 PROCEDURE — 36415 COLL VENOUS BLD VENIPUNCTURE: CPT

## 2019-11-20 PROCEDURE — 85025 COMPLETE CBC W/AUTO DIFF WBC: CPT

## 2019-11-20 RX ORDER — TRAMADOL HYDROCHLORIDE 50 MG/1
50 TABLET ORAL EVERY 6 HOURS PRN
Qty: 12 TABLET | Refills: 0 | Status: SHIPPED | OUTPATIENT
Start: 2019-11-20 | End: 2019-11-23

## 2019-11-20 RX ORDER — CARVEDILOL 3.12 MG/1
3.12 TABLET ORAL 2 TIMES DAILY WITH MEALS
Qty: 60 TABLET | Refills: 3 | Status: ON HOLD | OUTPATIENT
Start: 2019-11-20 | End: 2019-12-01 | Stop reason: HOSPADM

## 2019-11-20 RX ORDER — GABAPENTIN 100 MG/1
100 CAPSULE ORAL 2 TIMES DAILY
Qty: 60 CAPSULE | Refills: 0 | Status: SHIPPED | OUTPATIENT
Start: 2019-11-20 | End: 2019-12-20

## 2019-11-20 RX ORDER — SODIUM CHLORIDE 9 MG/ML
INJECTION, SOLUTION INTRAVENOUS CONTINUOUS
Status: DISCONTINUED | OUTPATIENT
Start: 2019-11-20 | End: 2019-11-20 | Stop reason: HOSPADM

## 2019-11-20 RX ORDER — PROMETHAZINE HYDROCHLORIDE 12.5 MG/1
12.5 TABLET ORAL 3 TIMES DAILY PRN
Qty: 12 TABLET | Refills: 0 | Status: SHIPPED | OUTPATIENT
Start: 2019-11-20 | End: 2019-11-27

## 2019-11-20 RX ADMIN — PANTOPRAZOLE SODIUM 40 MG: 40 TABLET, DELAYED RELEASE ORAL at 06:25

## 2019-11-20 RX ADMIN — TRAMADOL HYDROCHLORIDE 50 MG: 50 TABLET, FILM COATED ORAL at 01:18

## 2019-11-20 RX ADMIN — ISOSORBIDE MONONITRATE 30 MG: 30 TABLET, EXTENDED RELEASE ORAL at 10:22

## 2019-11-20 RX ADMIN — APIXABAN 2.5 MG: 2.5 TABLET, FILM COATED ORAL at 10:23

## 2019-11-20 RX ADMIN — GABAPENTIN 100 MG: 100 CAPSULE ORAL at 10:22

## 2019-11-20 RX ADMIN — RIFAXIMIN 550 MG: 550 TABLET ORAL at 10:23

## 2019-11-20 RX ADMIN — URSODIOL 300 MG: 300 CAPSULE ORAL at 10:22

## 2019-11-20 ASSESSMENT — PAIN SCALES - GENERAL
PAINLEVEL_OUTOF10: 0
PAINLEVEL_OUTOF10: 0
PAINLEVEL_OUTOF10: 8

## 2019-11-25 PROBLEM — G93.41 ACUTE METABOLIC ENCEPHALOPATHY: Status: ACTIVE | Noted: 2019-11-08

## 2019-11-28 ENCOUNTER — HOSPITAL ENCOUNTER (INPATIENT)
Age: 84
LOS: 5 days | Discharge: NURSING, REHAB OR LONG-TERM CARE | DRG: 641 | End: 2019-12-03
Attending: EMERGENCY MEDICINE | Admitting: INTERNAL MEDICINE
Payer: MEDICARE

## 2019-11-28 PROBLEM — E87.5 HYPERKALEMIA: Status: ACTIVE | Noted: 2019-11-28

## 2019-11-28 PROBLEM — G93.41 ACUTE METABOLIC ENCEPHALOPATHY: Status: RESOLVED | Noted: 2019-11-08 | Resolved: 2019-11-28

## 2019-11-28 PROBLEM — N18.9 ACUTE KIDNEY INJURY SUPERIMPOSED ON CHRONIC KIDNEY DISEASE (HCC): Status: RESOLVED | Noted: 2019-03-25 | Resolved: 2019-11-28

## 2019-11-28 PROBLEM — N17.9 ACUTE KIDNEY INJURY SUPERIMPOSED ON CHRONIC KIDNEY DISEASE (HCC): Status: RESOLVED | Noted: 2019-03-25 | Resolved: 2019-11-28

## 2019-11-28 LAB
ANION GAP SERPL CALCULATED.3IONS-SCNC: 10 MMOL/L (ref 3–16)
ANION GAP SERPL CALCULATED.3IONS-SCNC: 13 MMOL/L (ref 3–16)
BASOPHILS ABSOLUTE: 0.1 K/UL (ref 0–0.2)
BASOPHILS RELATIVE PERCENT: 0.8 %
BUN BLDV-MCNC: 79 MG/DL (ref 7–20)
BUN BLDV-MCNC: 81 MG/DL (ref 7–20)
C DIFF TOXIN/ANTIGEN: NORMAL
CALCIUM SERPL-MCNC: 8.1 MG/DL (ref 8.3–10.6)
CALCIUM SERPL-MCNC: 8.1 MG/DL (ref 8.3–10.6)
CHLORIDE BLD-SCNC: 106 MMOL/L (ref 99–110)
CHLORIDE BLD-SCNC: 109 MMOL/L (ref 99–110)
CO2: 21 MMOL/L (ref 21–32)
CO2: 23 MMOL/L (ref 21–32)
CREAT SERPL-MCNC: 3.4 MG/DL (ref 0.6–1.2)
CREAT SERPL-MCNC: 3.5 MG/DL (ref 0.6–1.2)
EOSINOPHILS ABSOLUTE: 0.2 K/UL (ref 0–0.6)
EOSINOPHILS RELATIVE PERCENT: 3.6 %
GFR AFRICAN AMERICAN: 15
GFR AFRICAN AMERICAN: 15
GFR NON-AFRICAN AMERICAN: 12
GFR NON-AFRICAN AMERICAN: 13
GLUCOSE BLD-MCNC: 118 MG/DL (ref 70–99)
GLUCOSE BLD-MCNC: 125 MG/DL (ref 70–99)
HCT VFR BLD CALC: 31.6 % (ref 36–48)
HEMOGLOBIN: 9.9 G/DL (ref 12–16)
LYMPHOCYTES ABSOLUTE: 1.7 K/UL (ref 1–5.1)
LYMPHOCYTES RELATIVE PERCENT: 26.6 %
MCH RBC QN AUTO: 27.6 PG (ref 26–34)
MCHC RBC AUTO-ENTMCNC: 31.3 G/DL (ref 31–36)
MCV RBC AUTO: 88 FL (ref 80–100)
MONOCYTES ABSOLUTE: 0.7 K/UL (ref 0–1.3)
MONOCYTES RELATIVE PERCENT: 10.2 %
NEUTROPHILS ABSOLUTE: 3.8 K/UL (ref 1.7–7.7)
NEUTROPHILS RELATIVE PERCENT: 58.8 %
PDW BLD-RTO: 17 % (ref 12.4–15.4)
PLATELET # BLD: 212 K/UL (ref 135–450)
PMV BLD AUTO: 8.4 FL (ref 5–10.5)
POTASSIUM REFLEX MAGNESIUM: 6 MMOL/L (ref 3.5–5.1)
POTASSIUM SERPL-SCNC: 6.2 MMOL/L (ref 3.5–5.1)
RBC # BLD: 3.59 M/UL (ref 4–5.2)
SODIUM BLD-SCNC: 140 MMOL/L (ref 136–145)
SODIUM BLD-SCNC: 142 MMOL/L (ref 136–145)
WBC # BLD: 6.4 K/UL (ref 4–11)

## 2019-11-28 PROCEDURE — 1200000000 HC SEMI PRIVATE

## 2019-11-28 PROCEDURE — 2500000003 HC RX 250 WO HCPCS: Performed by: INTERNAL MEDICINE

## 2019-11-28 PROCEDURE — 84132 ASSAY OF SERUM POTASSIUM: CPT

## 2019-11-28 PROCEDURE — 2500000003 HC RX 250 WO HCPCS: Performed by: NURSE PRACTITIONER

## 2019-11-28 PROCEDURE — 87449 NOS EACH ORGANISM AG IA: CPT

## 2019-11-28 PROCEDURE — 36415 COLL VENOUS BLD VENIPUNCTURE: CPT

## 2019-11-28 PROCEDURE — 2580000003 HC RX 258: Performed by: INTERNAL MEDICINE

## 2019-11-28 PROCEDURE — 99284 EMERGENCY DEPT VISIT MOD MDM: CPT

## 2019-11-28 PROCEDURE — 87324 CLOSTRIDIUM AG IA: CPT

## 2019-11-28 PROCEDURE — 85025 COMPLETE CBC W/AUTO DIFF WBC: CPT

## 2019-11-28 PROCEDURE — 80048 BASIC METABOLIC PNL TOTAL CA: CPT

## 2019-11-28 PROCEDURE — 2580000003 HC RX 258: Performed by: NURSE PRACTITIONER

## 2019-11-28 PROCEDURE — 93005 ELECTROCARDIOGRAM TRACING: CPT | Performed by: EMERGENCY MEDICINE

## 2019-11-28 PROCEDURE — 6370000000 HC RX 637 (ALT 250 FOR IP): Performed by: INTERNAL MEDICINE

## 2019-11-28 RX ORDER — GABAPENTIN 100 MG/1
100 CAPSULE ORAL 2 TIMES DAILY
Status: DISCONTINUED | OUTPATIENT
Start: 2019-11-28 | End: 2019-12-03 | Stop reason: HOSPADM

## 2019-11-28 RX ORDER — CARVEDILOL 3.12 MG/1
3.12 TABLET ORAL 2 TIMES DAILY WITH MEALS
Status: DISCONTINUED | OUTPATIENT
Start: 2019-11-28 | End: 2019-11-30

## 2019-11-28 RX ORDER — URSODIOL 300 MG/1
300 CAPSULE ORAL 2 TIMES DAILY
Status: DISCONTINUED | OUTPATIENT
Start: 2019-11-28 | End: 2019-12-03 | Stop reason: HOSPADM

## 2019-11-28 RX ORDER — ALBUTEROL SULFATE 2.5 MG/3ML
2.5 SOLUTION RESPIRATORY (INHALATION) EVERY 4 HOURS PRN
Status: DISCONTINUED | OUTPATIENT
Start: 2019-11-28 | End: 2019-12-03 | Stop reason: HOSPADM

## 2019-11-28 RX ORDER — PANTOPRAZOLE SODIUM 40 MG/1
40 TABLET, DELAYED RELEASE ORAL
Status: DISCONTINUED | OUTPATIENT
Start: 2019-11-29 | End: 2019-12-03 | Stop reason: HOSPADM

## 2019-11-28 RX ORDER — CHOLECALCIFEROL (VITAMIN D3) 10 MCG
1 TABLET ORAL DAILY
COMMUNITY

## 2019-11-28 RX ORDER — LACTULOSE 10 G/15ML
20 SOLUTION ORAL 3 TIMES DAILY
Status: DISCONTINUED | OUTPATIENT
Start: 2019-11-28 | End: 2019-12-03 | Stop reason: HOSPADM

## 2019-11-28 RX ORDER — LANOLIN ALCOHOL/MO/W.PET/CERES
6 CREAM (GRAM) TOPICAL NIGHTLY PRN
COMMUNITY

## 2019-11-28 RX ORDER — ONDANSETRON 4 MG/1
8 TABLET, FILM COATED ORAL EVERY 8 HOURS PRN
COMMUNITY

## 2019-11-28 RX ORDER — DEXTROSE AND SODIUM CHLORIDE 5; .45 G/100ML; G/100ML
INJECTION, SOLUTION INTRAVENOUS CONTINUOUS
Status: DISCONTINUED | OUTPATIENT
Start: 2019-11-28 | End: 2019-11-28

## 2019-11-28 RX ORDER — ISOSORBIDE MONONITRATE 60 MG/1
60 TABLET, EXTENDED RELEASE ORAL DAILY
Status: DISCONTINUED | OUTPATIENT
Start: 2019-11-28 | End: 2019-12-03 | Stop reason: HOSPADM

## 2019-11-28 RX ORDER — SODIUM POLYSTYRENE SULFONATE 15 G/60ML
15 SUSPENSION ORAL; RECTAL EVERY 6 HOURS
Status: COMPLETED | OUTPATIENT
Start: 2019-11-28 | End: 2019-11-29

## 2019-11-28 RX ORDER — TRAMADOL HYDROCHLORIDE 50 MG/1
25 TABLET, FILM COATED ORAL 2 TIMES DAILY
COMMUNITY

## 2019-11-28 RX ADMIN — SODIUM POLYSTYRENE SULFONATE 15 G: 15 SUSPENSION ORAL; RECTAL at 21:24

## 2019-11-28 RX ADMIN — APIXABAN 2.5 MG: 5 TABLET, FILM COATED ORAL at 21:12

## 2019-11-28 RX ADMIN — SODIUM POLYSTYRENE SULFONATE 15 G: 15 SUSPENSION ORAL; RECTAL at 17:49

## 2019-11-28 RX ADMIN — URSODIOL 300 MG: 300 CAPSULE ORAL at 21:24

## 2019-11-28 RX ADMIN — SODIUM BICARBONATE: 84 INJECTION, SOLUTION INTRAVENOUS at 13:45

## 2019-11-28 RX ADMIN — GABAPENTIN 100 MG: 100 CAPSULE ORAL at 21:12

## 2019-11-28 RX ADMIN — CARVEDILOL 3.12 MG: 3.12 TABLET, FILM COATED ORAL at 18:18

## 2019-11-28 RX ADMIN — LACTULOSE 20 G: 20 SOLUTION ORAL at 21:12

## 2019-11-28 RX ADMIN — RIFAXIMIN 550 MG: 550 TABLET ORAL at 21:12

## 2019-11-28 RX ADMIN — SODIUM BICARBONATE: 84 INJECTION, SOLUTION INTRAVENOUS at 18:18

## 2019-11-28 ASSESSMENT — ENCOUNTER SYMPTOMS
ABDOMINAL PAIN: 0
BLOOD IN STOOL: 0
DIARRHEA: 0
CONSTIPATION: 0
NAUSEA: 0
RHINORRHEA: 0
SHORTNESS OF BREATH: 0
SORE THROAT: 0
VOMITING: 0

## 2019-11-28 ASSESSMENT — PAIN SCALES - GENERAL: PAINLEVEL_OUTOF10: 0

## 2019-11-28 NOTE — PROGRESS NOTES
Patient admitted to room 3311 via stretcher with RN and ED tech. Alert and oriented x3, patient unaware of month but easy to reorient. Admission completed. Low kelsey prevention initiated.

## 2019-11-28 NOTE — PROGRESS NOTES
Patient Active Problem List   Diagnosis    Chronic kidney disease    [facility]    History of CVA (cerebrovascular accident)    Shortness of breath    Chronic diastolic CHF (congestive heart failure) (HCC)    Chronic atrial fibrillation    Pulmonary hypertension ([facility]    Essential hypertension    (HFpEF) heart failure with preserved ejection fraction (HCC)    Stage 3 chronic kidney disease (HCC)    Abdominal pain    Moderate malnutrition (HCC)    Other cirrhosis of liver ([facility]    Left ventricular hypertrophy    Hypertensive urgency    Hyperkalemia   H&P dictated

## 2019-11-28 NOTE — ED PROVIDER NOTES
I independently performed a history and physical on Catrina MANNING. All diagnostic, treatment, and disposition decisions were made by myself in conjunction with the advanced practice provider. Briefly, this is a 80 y.o. female here for hyperkalemia. The patient is a resident of [facility]. She was found to have a potassium of 7.4 and they have been giving Kayexalate. However, her potassium is still elevated she has been having diarrhea. The patient feels generally weak and tired, but has no other complaints. Specifically, she denies chest pain, abdominal pain, and other symptoms. .    On exam, patient is sleepy, but easily arousable. She interacts appropriately when I evaluate her. Her heart is regular rate and rhythm. Lungs are clear to auscultation. Abdomen is soft, nontender, nondistended. EKG  The Ekg interpreted by me in the absence of a cardiologist shows. sinus arrhythmia with a rate of 71  Axis is   Left axis deviation  QTc is  normal  Left anterior fascicular block  No specific ST-T wave changes appreciated. No evidence of acute ischemia. No significant change from prior EKG dated 11/13/2019    MDM  Consulted with Dr. Lam Blount of [facility], on-call nephrology. Given the patient's lack of response to treatment, he recommends admission to the hospital for further management. FINAL IMPRESSION  1. Hyperkalemia        Blood pressure 132/73, pulse 75, temperature 98.2 °F (36.8 °C), resp. rate 13, SpO2 100 %, not currently breastfeeding. For further details of [facility] emergency department encounter, please see documentation by advanced practice provider, Nils Nava CNP.         Marley Mchugh MD  11/28/19 3695
(XIFAXAN) 550 MG TABLET    Take 1 tablet by mouth 2 times daily    URSODIOL (ACTIGALL) 300 MG CAPSULE    Take 1 capsule by mouth 2 times daily         ALLERGIES     Hydralazine; Bactrim [sulfamethoxazole-trimethoprim]; Nebivolol hcl; and Other    FAMILY HISTORY       Family History   Problem Relation Age of Onset    High Blood Pressure Mother     Kidney Disease Brother           SOCIAL HISTORY       Social History     Socioeconomic History    Marital status:      Spouse name: None    Number of children: None    Years of education: None    Highest education level: None   Occupational History    None   Social Needs    Financial resource strain: None    Food insecurity:     Worry: None     Inability: None    Transportation needs:     Medical: None     Non-medical: None   Tobacco Use    Smoking status: Former Smoker     Packs/day: 0.25     Years: 3.00     Pack years: 0.75    Smokeless tobacco: Never Used   Substance and Sexual Activity    Alcohol use: Yes     Comment: occ    Drug use: No    Sexual activity: Never   Lifestyle    Physical activity:     Days per week: None     Minutes per session: None    Stress: None   Relationships    Social connections:     Talks on phone: None     Gets together: None     Attends Moravian service: None     Active member of club or organization: None     Attends meetings of clubs or organizations: None     Relationship status: None    Intimate partner violence:     Fear of current or ex partner: None     Emotionally abused: None     Physically abused: None     Forced sexual activity: None   Other Topics Concern    None   Social History Narrative    None       SCREENINGS             PHYSICAL EXAM  (up to 7 for level 4, 8 or more for level 5)     ED Triage Vitals [11/28/19 1136]   BP Temp Temp src Pulse Resp SpO2 Height Weight   137/65 98 °F (36.7 °C) -- 73 16 99 % -- --       Physical Exam  Vitals signs and nursing note reviewed.    Constitutional:

## 2019-11-28 NOTE — CONSULTS
MD Dru Boss MD Acie Grove, MD                                  Office: [phone]                 Fax: [phone]          [employer]                     NEPHROLOGY CONSULTATION NOTE:     PATIENT NAME: Jim Fitzgerald  : 1932  MRN: [id number]      Name:  Jim Fitzgerald Date/Time of Admission: 2019 11:28 AM    CSN: [id number] Attending Provider: Claudia Carty MD   Room/Bed: ED- : 1932 Age: 80 y.o. Reason for Nephrology consult :  Evaluation of patient with worsening renal failure. In critical electrolyte abnormalities            History of Presenting complaint:       Jim Fitzgerald 80 y.o. Has been sent to the emergency room for evaluation from extended care unit. Patient is found to have refractory hyperkalemia and has been given multiple doses of Kayexalate. Apparently her potassium level this morning was 7.2. Repeat potassium in the emergency room 6.0. Also noted to have a BUN of 66 and a creatinine of three-point. Patient has a background history of advanced chronic kidney disease. Stage IV. She is followed by Dr. Thaddeus [facility] nephrology). She was recently admitted to the hospital for worsening renal failure a creatinine of 4.0 and also elevated potassium levels. She is not able to give much by way of history lethargy. Etiology of chronic kidney disease is thought to be multifactorial, worsened by uncontrolled hypertension, hypertensive kidney disease and also has a history of diastolic congestive heart failure. Medications reviewed. Medical records reviewed.           Past Medical History :         Past Medical History:   Diagnosis Date    Acute on chronic diastolic CHF (congestive heart failure) (HCC) 3/24/2019    Ascites     Atrial fibrillation (HCC) 3/24/2019    AV block, 1st degree     AV block, 2nd degree     Chronic kidney disease     Cirrhosis of liver (HCC)     Hyperlipidemia borderline per pt    Hyperparathyroidism ([facility]     Hypertension     Other cirrhosis of liver ([facility] 11/8/2019    Peripheral vascular disease ([facility]     Pulmonary hypertension ([facility]        Medications  Which i have  Reviewed, also have reviewed home medications  Prior to Admission medications    Medication Sig Start Date End Date Taking? Authorizing Provider   gabapentin (NEURONTIN) 100 MG capsule Take 1 capsule by mouth 2 times daily for 30 days. 11/20/19 12/20/19  Lenin Magallanes MD   carvedilol (COREG) 3.125 MG tablet Take 1 tablet by mouth 2 times daily (with meals) 11/20/19   Lenin Magallanes MD   lactulose (CHRONULAC) 10 GM/15ML solution Take 30 mLs by mouth 3 times daily Scheduled. Titrate dose and frequency to Goal 2-3 loose bowel movements daily. 11/8/19   Magdaleno Nix MD   apixaban (ELIQUIS) 2.5 MG TABS tablet Take 1 tablet by mouth 2 times daily 11/7/19   Magdaleno Nix MD   pantoprazole (PROTONIX) 40 MG tablet Take 1 tablet by mouth every morning (before breakfast) 10/2/19   Maria Guadalupe Hurt DO   albuterol (PROVENTIL) (2.5 MG/3ML) 0.083% nebulizer solution Take 3 mLs by nebulization every 4 hours as needed for Wheezing or Shortness of Breath 9/18/19   Claudia Snowden DO   ursodiol (ACTIGALL) 300 MG capsule Take 1 capsule by mouth 2 times daily 8/24/19   Ian Martinez MD   rifaximin (XIFAXAN) 550 MG tablet Take 1 tablet by mouth 2 times daily 8/24/19   Ian Martinez MD   isosorbide mononitrate (IMDUR) 60 MG extended release tablet Take 1 tablet by mouth daily 7/8/19   Brice Dudley APRN - CNP     Current Facility-Administered Medications: sodium bicarbonate 150 mEq in dextrose 5 % 1,000 mL infusion, , Intravenous, Continuous   sodium bicarbonate infusion 75 mL/hr at 11/28/19 1345         Allergies.   Allergies   Allergen Reactions    Hydralazine Other (See Comments)     Acute Encephalopathy    Bactrim [Sulfamethoxazole-Trimethoprim]     Nebivolol Hcl Other (See Comments)     Second degree heart block - changed to metoprolol, also takes coreg without issues    Other Nausea And Vomiting     Allergic to flu shot per pt, rxn years ago       Social History. Social History     Socioeconomic History    Marital status:      Spouse name: Not on file    Number of children: Not on file    Years of education: Not on file    Highest education level: Not on file   Occupational History    Not on file   Social Needs    Financial resource strain: Not on file    Food insecurity:     Worry: Not on file     Inability: Not on file    Transportation needs:     Medical: Not on file     Non-medical: Not on file   Tobacco Use    Smoking status: Former Smoker     Packs/day: 0.25     Years: 3.00     Pack years: 0.75    Smokeless tobacco: Never Used   Substance and Sexual Activity    Alcohol use: Yes     Comment: occ    Drug use: No    Sexual activity: Never   Lifestyle    Physical activity:     Days per week: Not on file     Minutes per session: Not on file    Stress: Not on file   Relationships    Social connections:     Talks on phone: Not on file     Gets together: Not on file     Attends Bahai service: Not on file     Active member of club or organization: Not on file     Attends meetings of clubs or organizations: Not on file     Relationship status: Not on file    Intimate partner violence:     Fear of current or ex partner: Not on file     Emotionally abused: Not on file     Physically abused: Not on file     Forced sexual activity: Not on file   Other Topics Concern    Not on file   Social History Narrative    Not on file       Family History    Family History   Problem Relation Age of Onset    High Blood Pressure Mother     Kidney Disease Brother        Review of Systems. System review is not possible as patient is weak and lethargic. I have reviewed in detail the 10 system review of the admitting physician and other consultants on the case         PHYSICAL EXAMINATION. BP (!) 140/81   Pulse 74   Temp 98.2 °F (36.8 °C)   Resp 13   SpO2 100%   No intake or output data in the 24 hours ending 11/28/19 1428    INTAKE/OUTPUT:  No intake/output data recorded. No intake/output data recorded. Ill Appearing. mild respiratory distress. lethargic.   no hypotension  External exam of the ears and nose are normal  HENT: exam is normal  Eyes: Pupils are equal, round, and reactive to light. Lymph Nodes. No axillary or cervical lymph nodes are palpable. Neck. JVD not visible. No lymph nodes palpable. CVS.  Heart sounds are normal.Palpation of the heart is normal. No murmurs. No pericardial rub.  RS.dullness on percussion of the lower chest wall. Bilateral Basal rales. PA soft , bowel sounds are normal no distension and no tenderness to palpation. Skin No rash , No palpable nodules  Musculoskeletal: Normal range of motion. 1+ edema and no tenderness. CNS  No focal    Edematrace   Lethargic. All pulses are well felt      Labs reviewed by me       CBC:   Recent Labs     11/28/19  1157   WBC 6.4   HGB 9.9*   HCT 31.6*   MCV 88.0        BMP:   Recent Labs     11/28/19  1157      K 6.0*      CO2 21   BUN 79*   CREATININE 3.4*     Magnesium:   Lab Results   Component Value Date    MG 2.20 11/15/2019    MG 1.80 11/07/2019    MG 2.00 10/01/2019                    ASSESSMENT           Acute kidney injury-nonoliguric-worsening. Hyperkalemia-moderate to severe-without EKG changes. Chronic kidney disease. Advance. Stage IV. Hypertensive kidney disease. History of uncontrolled hypertension. History of diastolic congestive heart failure. Deconditioning. PLAN       Patient has presented with refractory critically elevated potassium. In the ER potassium is 6.0. Hyperkalemia has been an ongoing problem. This most likely is related to her advanced chronic kidney disease stage IV. Patient to continue on low potassium diet.   Medications reviewed. Not on ARB. Patient may require potassium binding agents at the time of discharge to be taken on regular basis. Creatinine on admission is 3.4. She has a background of advanced chronic kidney disease and is almost at her baseline level. She is noted to have mild hypovolemia. She will benefit from a potassium bicarbonate infusion. Begin D5W with 150 mEq of sodium bicarbonate to run at approximately 75 mL/h. Repeat the potassium 6 hours later. There are no immediate indications for dialysis at this time. Patient will be a poor candidate for long-term hemodialysis treatment, given multiple high risk factors. Medications reviewed. All nephrotoxic medications have been discontinued. Monitor urine output. May require Grimes catheter to monitor urine output. She will need a renal ultrasound scan done in the a.m. High complexity. Thanks for the consult      Exclude urinary obstruction with renal U/S Scan to R/O hydronephrosis. Work up for acute renal failure has been ordered which include:  Renal Panel study  CBC  Urine Analysis   Urine Electrolytes   U Protein : creatinine ratio  Urine for eosinophil smear exam.    Renal Ultrasound Scan     Daily weight   Strict I and O   Renal Diet   Low Na diet       Thanks for the consult             Electronically Signed:  Pari Santana MD 11/28/2019          Arterial Blood Gasses  No results for input(s): PH, PCO2, PO2 in the last 72 hours. Invalid input(s): Feng Simon    UA:No results for input(s): NITRITE, COLORU, PHUR, LABCAST, WBCUA, RBCUA, MUCUS, TRICHOMONAS, YEAST, BACTERIA, CLARITYU, SPECGRAV, LEUKOCYTESUR, UROBILINOGEN, BILIRUBINUR, BLOODU, GLUCOSEU, AMORPHOUS in the last 72 hours. Invalid input(s): Ginette Camarillo    LIVER PROFILE: No results for input(s): AST, ALT, LIPASE, BILIDIR, BILITOT, ALKPHOS in the last 72 hours. Invalid input(s):   AMYLASE,  ALB  PT/INR:    Lab Results   Component Value Date    PROTIME Gabriele Montaño MD  Procedure Type of Study:   Veins:Lower Extremities DVT Study, VL EXTREMITY VENOUS DUPLEX LEFT. Vascular Sonographer Report  Indications for Study:Leg pain and Swelling. Additional Indications:Patient presents for evaluation due to left lower extremity pain and swelling. The patient states these symptoms are chronic (> 1 year), however, she says they have recently worsened over the past few weeks. She denies a prior H/O DVT. The previous study on 10/29/19 was negative for DVT in the lower extremities bilaterally. Venous Duplex Scan: B-mode imaging of the deep and superficial veins, with compression maneuvers, including color and Doppler spectral waveform analysis. Impressions Right Impression There is no evidence of deep venous thrombosis involving the common femoral vein. Left Impression There is no evidence of deep or superficial venous thrombosis involving the left lower extremity. Incidental finding of a cystic structure at the medial popliteal joint space measuring 4.3 X 2.0 X 1.2 cm consistent with a Baker's cyst. Compared to the previous study on 10/29/19, the venous findings are unchanged, but the Baker's cyst is a new finding. Conclusions   Summary   No evidence of deep vein or superficial thrombosis involving the left lower  extremity and the contralateral proximal common femoral vein. There is evidence of a left popliteal cyst, as described above. Signature   ------------------------------------------------------------------  Electronically signed by Kayleigh Manning MD (Interpreting  physician) on 11/02/2019 at 11:50 PM  ------------------------------------------------------------------  Patient Status:Routine. Study [facility] - Vascular Lab. Technical Quality:Adequate visualization. Risk Factors History +---------+----+-------------------------------------------------------------+ ! Diagnosis! Date! Comments                                                     ! +------------------------+------+------+------------+ ! Location                ! Signal!Reflux! Reflux (sec)! +------------------------+------+------+------------+ ! Sapheno Femoral Junction! Phasic! No    !            ! +------------------------+------+------+------------+ ! Common Femoral          !Phasic! No    !            ! +------------------------+------+------+------------+ ! Femoral                 !Phasic! No    !            ! +------------------------+------+------+------------+ ! Prox Femoral            !Phasic! No    !            ! +------------------------+------+------+------------+ ! Deep Femoral            !Phasic! No    !            ! +------------------------+------+------+------------+ ! Popliteal               !Phasic! No    !            ! +------------------------+------+------+------------+    Xr Chest Portable    Result Date: 11/13/2019  EXAMINATION: ONE XRAY VIEW OF THE CHEST 11/13/2019 5:27 pm COMPARISON: 10/29/2019 HISTORY: ORDERING SYSTEM PROVIDED HISTORY: SOB TECHNOLOGIST PROVIDED HISTORY: Reason for exam:->SOB Reason for Exam: ABNORMAL LABS AND GANGRENE OF RIGHT HAND DIGITS TWO TO FOUR. FORMER SMOKER. HISTORY OF HTN, CIRRHOSIS OF LIVER, CKD, A-FIB AND ASCITES. Acuity: Acute Type of Exam: Initial FINDINGS: The lungs are without acute focal process. Bibasilar hypoaeration. There is no effusion or pneumothorax. The cardiomediastinal silhouette is stable. The osseous structures are stable. No acute process. Bibasilar hypoaeration.      Vl Dup Upper Extremity Arteries Right    Result Date: 11/7/2019  Upper Extremities Arterial Duplex  Demographics   Patient Name       Marisela Copping   Date of Study      11/06/2019         Gender              Female   Patient Number     [id number]         Date of Birth       05/12/1932   Visit Number       [id number]          Age                 80 year(s)   Accession Number   [id number]          Room Number         [address] ID       [id number]            Sonographer Pressure:Right arm 154/ mmHg. Left arm 162/ mmHg. Patient Status:Routine. Study 13[facility] - Vascular Lab. Technical Quality:Adequate visualization. Risk Factors History +---------+----+------------------------------------------------+ ! Diagnosis! Date! Comments                                        ! +---------+----+------------------------------------------------+ ! CHF      ! !                                                ! +---------+----+------------------------------------------------+ ! Other    ! !H/O CKD 3, pulmonary HTN, heart block, arrythmia! +---------+----+------------------------------------------------+ ! PVD      ! !                                                ! +---------+----+------------------------------------------------+   - The patient's risk factor(s) include: atrial fibrillation, dyslipidemia,     arterial hypertension and prior MI .   - The patient has a former tobacco history. Velocities are measured in cm/s ; Diameters are measured in mm Right Upper Extremities Duplex Measurements +---------------+----+----------------+ ! Location       ! PSV ! Wave Description! +---------------+----+----------------+ ! Prox Subclavian! 135 ! Multiphasic     ! +---------------+----+----------------+ ! Dist Subclavian!74. 1! Multiphasic     ! +---------------+----+----------------+ ! Axillary       ! 105 ! Multiphasic     ! +---------------+----+----------------+ ! Prox Brachial  !82. 6! Multiphasic     ! +---------------+----+----------------+ ! Dist Brachial  !82. 9! Multiphasic     ! +---------------+----+----------------+ ! Prox Radial    !81. 5! Multiphasic     ! +---------------+----+----------------+ ! Mid Radial     !93. 9! Multiphasic     ! +---------------+----+----------------+ ! Dist Radial    !89. 1! Monophasic      ! +---------------+----+----------------+ ! Prox Ulnar     !63. 6! Multiphasic     ! +---------------+----+----------------+ ! Mid Ulnar      !92. 4! Multiphasic     !

## 2019-11-29 LAB
ANION GAP SERPL CALCULATED.3IONS-SCNC: 11 MMOL/L (ref 3–16)
BUN BLDV-MCNC: 78 MG/DL (ref 7–20)
CALCIUM SERPL-MCNC: 7.9 MG/DL (ref 8.3–10.6)
CHLORIDE BLD-SCNC: 106 MMOL/L (ref 99–110)
CO2: 24 MMOL/L (ref 21–32)
CREAT SERPL-MCNC: 3.4 MG/DL (ref 0.6–1.2)
EKG ATRIAL RATE: 71 BPM
EKG DIAGNOSIS: NORMAL
EKG P AXIS: 54 DEGREES
EKG P-R INTERVAL: 214 MS
EKG Q-T INTERVAL: 428 MS
EKG QRS DURATION: 114 MS
EKG QTC CALCULATION (BAZETT): 465 MS
EKG R AXIS: -53 DEGREES
EKG T AXIS: 14 DEGREES
EKG VENTRICULAR RATE: 71 BPM
GFR AFRICAN AMERICAN: 15
GFR NON-AFRICAN AMERICAN: 13
GLUCOSE BLD-MCNC: 118 MG/DL (ref 70–99)
GLUCOSE BLD-MCNC: 148 MG/DL (ref 70–99)
GLUCOSE BLD-MCNC: 172 MG/DL (ref 70–99)
GLUCOSE BLD-MCNC: 88 MG/DL (ref 70–99)
GLUCOSE BLD-MCNC: 91 MG/DL (ref 70–99)
PERFORMED ON: ABNORMAL
PERFORMED ON: NORMAL
POTASSIUM SERPL-SCNC: 5.6 MMOL/L (ref 3.5–5.1)
POTASSIUM SERPL-SCNC: 6.1 MMOL/L (ref 3.5–5.1)
SODIUM BLD-SCNC: 141 MMOL/L (ref 136–145)

## 2019-11-29 PROCEDURE — 6370000000 HC RX 637 (ALT 250 FOR IP): Performed by: INTERNAL MEDICINE

## 2019-11-29 PROCEDURE — 80048 BASIC METABOLIC PNL TOTAL CA: CPT

## 2019-11-29 PROCEDURE — 1200000000 HC SEMI PRIVATE

## 2019-11-29 PROCEDURE — 2500000003 HC RX 250 WO HCPCS: Performed by: INTERNAL MEDICINE

## 2019-11-29 PROCEDURE — 36415 COLL VENOUS BLD VENIPUNCTURE: CPT

## 2019-11-29 PROCEDURE — 93010 ELECTROCARDIOGRAM REPORT: CPT | Performed by: INTERNAL MEDICINE

## 2019-11-29 PROCEDURE — 2580000003 HC RX 258: Performed by: INTERNAL MEDICINE

## 2019-11-29 RX ORDER — ACETAMINOPHEN 160 MG/5ML
650 SOLUTION ORAL EVERY 4 HOURS PRN
Status: DISCONTINUED | OUTPATIENT
Start: 2019-11-29 | End: 2019-12-03 | Stop reason: HOSPADM

## 2019-11-29 RX ORDER — TRAMADOL HYDROCHLORIDE 50 MG/1
25 TABLET ORAL 2 TIMES DAILY
Status: DISCONTINUED | OUTPATIENT
Start: 2019-11-29 | End: 2019-12-03 | Stop reason: HOSPADM

## 2019-11-29 RX ADMIN — APIXABAN 2.5 MG: 5 TABLET, FILM COATED ORAL at 20:37

## 2019-11-29 RX ADMIN — URSODIOL 300 MG: 300 CAPSULE ORAL at 20:48

## 2019-11-29 RX ADMIN — TRAMADOL HYDROCHLORIDE 25 MG: 50 TABLET, FILM COATED ORAL at 20:36

## 2019-11-29 RX ADMIN — RIFAXIMIN 550 MG: 550 TABLET ORAL at 11:14

## 2019-11-29 RX ADMIN — SODIUM POLYSTYRENE SULFONATE 15 G: 15 SUSPENSION ORAL; RECTAL at 04:02

## 2019-11-29 RX ADMIN — GABAPENTIN 100 MG: 100 CAPSULE ORAL at 20:36

## 2019-11-29 RX ADMIN — LACTULOSE 20 G: 20 SOLUTION ORAL at 11:11

## 2019-11-29 RX ADMIN — RIFAXIMIN 550 MG: 550 TABLET ORAL at 20:37

## 2019-11-29 RX ADMIN — ACETAMINOPHEN ORAL SOLUTION 650 MG: 650 SOLUTION ORAL at 16:57

## 2019-11-29 RX ADMIN — LACTULOSE 20 G: 20 SOLUTION ORAL at 15:14

## 2019-11-29 RX ADMIN — CARVEDILOL 3.12 MG: 3.12 TABLET, FILM COATED ORAL at 16:57

## 2019-11-29 RX ADMIN — GABAPENTIN 100 MG: 100 CAPSULE ORAL at 11:10

## 2019-11-29 RX ADMIN — SODIUM BICARBONATE: 84 INJECTION, SOLUTION INTRAVENOUS at 13:05

## 2019-11-29 RX ADMIN — ISOSORBIDE MONONITRATE 60 MG: 60 TABLET, EXTENDED RELEASE ORAL at 11:10

## 2019-11-29 RX ADMIN — CARVEDILOL 3.12 MG: 3.12 TABLET, FILM COATED ORAL at 11:09

## 2019-11-29 RX ADMIN — TRAMADOL HYDROCHLORIDE 25 MG: 50 TABLET, FILM COATED ORAL at 11:14

## 2019-11-29 RX ADMIN — PANTOPRAZOLE SODIUM 40 MG: 40 TABLET, DELAYED RELEASE ORAL at 06:42

## 2019-11-29 RX ADMIN — APIXABAN 2.5 MG: 5 TABLET, FILM COATED ORAL at 11:10

## 2019-11-29 ASSESSMENT — PAIN DESCRIPTION - LOCATION
LOCATION: LEG
LOCATION: HAND

## 2019-11-29 ASSESSMENT — PAIN SCALES - GENERAL
PAINLEVEL_OUTOF10: 0
PAINLEVEL_OUTOF10: 3
PAINLEVEL_OUTOF10: 8
PAINLEVEL_OUTOF10: 0
PAINLEVEL_OUTOF10: 0

## 2019-11-29 ASSESSMENT — PAIN DESCRIPTION - ORIENTATION: ORIENTATION: RIGHT;LEFT

## 2019-11-29 ASSESSMENT — PAIN DESCRIPTION - PAIN TYPE
TYPE: CHRONIC PAIN
TYPE: ACUTE PAIN

## 2019-11-29 ASSESSMENT — PAIN DESCRIPTION - DESCRIPTORS: DESCRIPTORS: PATIENT UNABLE TO DESCRIBE

## 2019-11-29 NOTE — PROGRESS NOTES
Assessment and med pass complete. Meds taken crushed in applesauce, water given nectar thick. Assisted with bedpan. Changed solid brief and pad and repositioned with pillow support. Resting in bed watching TV. Denies other needs, call light in reach, bed alarm engaged.

## 2019-11-29 NOTE — PROGRESS NOTES
Nutrition Assessment (Low Risk)    Type and Reason for Visit: Initial, Positive Nutrition Screen(Wound-supplement rec's. MST=1)    Nutrition Recommendations:   Diet consistency as tolerated  Ensure Enlive or magic cup TID (Ensure must be thickened to nectar consistency)      Nutrition Assessment:  Pt is adequately nourished AEB po intake greater than 75% of meals & no wt loss per hx. Pt has increased nutrieint needs d/t skin breakdown. Likes magic cups & Ensure; will provide with meals. No further nutrition compromise @ this time.      Malnutrition Assessment:  · Malnutrition Status: No malnutrition    Nutrition Risk Level   Risk Level: Low    Nutrition Diagnosis:   · Problem: Increased nutrient needs  · Etiology: Increased demand for energy/nutrients    Signs and symptoms: Presence of wounds    Nutrition Intervention:  Food and/or Delivery: Continue current diet, Start ONS  Nutrition Education/Counseling/Coordination of Care:  Continued Inpatient Monitoring, Education Not Indicated      Electronically signed by Deisy Carr RD, LD on 11/29/19 at 9:14 AM    Contact Number: [phone]

## 2019-11-29 NOTE — H&P
[facility]                     [facility], [address]                              HISTORY AND PHYSICAL    PATIENT NAME: Letty Meier                      :        1932  MED REC NO:   [id number]                          ROOM:       5333  ACCOUNT NO:   [de-identified]                           ADMIT DATE: 2019  PROVIDER:     Renay Issa MD    HISTORY OF PRESENT ILLNESS:  The patient is an 49-year-old black  American woman came to the emergency room with history of abnormal labs  with hyperkalemia being treated at [facility] without success. The  patient is on Kayexalate therapy having diarrhea. The patient does have  renal insufficiency, which has been progressive. The patient denies any  chest pain. The patient does admit to some shortness of breath and  fatigue and she will feel some orthopnea. She also has poor appetite. PAST MEDICAL HISTORY:  Pertinent for pulmonary hypertension, peripheral  vascular disease, cirrhosis of liver, hypertension, hyperparathyroidism,  hepatic encephalopathy, hyperlipidemia, chronic renal insufficiency,  second degree AV block, atrial fibrillation, ascites, acute on chronic  diastolic heart failure. PAST SURGICAL HISTORY:  Pertinent for upper GI endoscopy. MEDICATIONS:  The patient is on Proventil, Eliquis, Nephrocaps,  carvedilol, gabapentin, Imdur, Chronulac, melatonin, multivitamin,  Zofran, Protonix, Veltassa, rifaximin, tramadol and Actigall. ALLERGIES:  The patient is allergic to HYDRALAZINE, BACTRIM, BYSTOLIC. SOCIAL HISTORY:  She is a . She had five children, now there are  four living. She quit smoking 30 years ago. There is no history of  alcohol usage of any significance. 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 No history of substance  abuse. She is institutionalized for snf care.     FAMILY HISTORY:  Both the parents are . Mother had high blood  pressure. Father  of natural causes. One brother is  due  to chronic kidney disease. REVIEW OF SYSTEMS:  Negative for loss of consciousness. Does admit to  some dizziness, poor appetite. Does have generalized weakness and  malaise. Does have exertional and resting shortness of breath. There  is mild orthopnea. There is no exertional angina. Denies any abdominal  pain. No hematemesis or melena. The patient is oliguric. The patient  is somewhat edematous. Denies any intermittent claudication. Does have  chronic musculoskeletal pain. PHYSICAL EXAMINATION:  GENERAL:  Alert, awake, oriented x0 moderately distressed disoriented  elderly black woman looking consistent with her stated age, looking  somewhat pale and edematous. VITAL SIGNS:  Temperature 98, blood pressure 137/65, respirations 16,  heart rate 73. HEENT:  Oral mucosa dry. SKIN:  Warm and dry. NECK:  Supple. Mild jugular venous distention. Faint carotid bruit. No lymphadenopathy. No thyromegaly. LUNGS:  Vesicular breath sounds. Poor inspiration. Scattered crackles  at the base. HEART:  Irregular rate and rhythm. S1, S2, 1/6 holosystolic murmur. There is no gallop rhythm. ABDOMEN:  Soft and nontender. Bowel sounds present. EXTREMITIES:  Shows bilateral 2+ pitting edema. NEUROLOGIC:  The patient has generalized neuromuscular weakness. There  is some late effect stroke with hemiparesis. LABORATORY EVALUATION:  Shows sodium 140, potassium 6.0, chloride 109,  CO2 21, BUN 79, creatinine 3.4, anion gap is 10, calcium is 8.1. White  blood cell count 6.4, hemoglobin and hematocrit 9.9 and 31.6, platelet  count 674, there is no left shift. DIAGNOSTIC DATA:  EKG shows _____ sinus arrhythmia and first degree AV  block, left anterior fascicular block, nonspecific T-wave abnormality.     ASSESSMENT:  Hyperkalemia, stage III kidney disease, peripheral vascular  disease, pulmonary hypertension, cirrhosis of liver, history of hepatic  encephalopathy, moderate degree of malnutrition, left ventricular  hypertrophy, hypertensive urgency, late effect stroke, chronic kidney  disease, chronic diastolic heart failure, chronic atrial fibrillation,  does have heart failure with preserved ejection fraction. PLAN:  Get her admitted. Put her on Kayexalate. We will give her IV  D5. Monitor potassium level serially. Put her on telemetry. She is a  full code which is regrettable. We will also get Dr. Ramy Barker,  nephrology consultation.         Berta Milan MD    D: 11/28/2019 16:22:51       T: 11/28/2019 22:01:20     [email or web address]  Job#: [id number]     Doc#: [id number]    CC:

## 2019-11-29 NOTE — PLAN OF CARE
Problem: Falls - Risk of:  Goal: Will remain free from falls  Description  Will remain free from falls  Note:   Bed alarm on. Room door open. Call light within reach. Bedside table within reach. Patient reminded to use call light when needing assistance.

## 2019-11-30 LAB
ALBUMIN SERPL-MCNC: 1.8 G/DL (ref 3.4–5)
ANION GAP SERPL CALCULATED.3IONS-SCNC: 9 MMOL/L (ref 3–16)
BUN BLDV-MCNC: 75 MG/DL (ref 7–20)
CALCIUM SERPL-MCNC: 7.5 MG/DL (ref 8.3–10.6)
CHLORIDE BLD-SCNC: 106 MMOL/L (ref 99–110)
CO2: 27 MMOL/L (ref 21–32)
CREAT SERPL-MCNC: 3.4 MG/DL (ref 0.6–1.2)
GFR AFRICAN AMERICAN: 15
GFR NON-AFRICAN AMERICAN: 13
GLUCOSE BLD-MCNC: 126 MG/DL (ref 70–99)
GLUCOSE BLD-MCNC: 139 MG/DL (ref 70–99)
GLUCOSE BLD-MCNC: 92 MG/DL (ref 70–99)
GLUCOSE BLD-MCNC: 94 MG/DL (ref 70–99)
PERFORMED ON: ABNORMAL
PERFORMED ON: ABNORMAL
PERFORMED ON: NORMAL
PHOSPHORUS: 5.5 MG/DL (ref 2.5–4.9)
POTASSIUM SERPL-SCNC: 4.9 MMOL/L (ref 3.5–5.1)
SODIUM BLD-SCNC: 142 MMOL/L (ref 136–145)

## 2019-11-30 PROCEDURE — 80069 RENAL FUNCTION PANEL: CPT

## 2019-11-30 PROCEDURE — 6370000000 HC RX 637 (ALT 250 FOR IP): Performed by: INTERNAL MEDICINE

## 2019-11-30 PROCEDURE — 2580000003 HC RX 258: Performed by: INTERNAL MEDICINE

## 2019-11-30 PROCEDURE — 36415 COLL VENOUS BLD VENIPUNCTURE: CPT

## 2019-11-30 PROCEDURE — 1200000000 HC SEMI PRIVATE

## 2019-11-30 RX ORDER — SODIUM CHLORIDE 0.9 % (FLUSH) 0.9 %
10 SYRINGE (ML) INJECTION 2 TIMES DAILY
Status: DISCONTINUED | OUTPATIENT
Start: 2019-11-30 | End: 2019-12-03 | Stop reason: HOSPADM

## 2019-11-30 RX ORDER — CARVEDILOL 6.25 MG/1
12.5 TABLET ORAL 2 TIMES DAILY WITH MEALS
Status: DISCONTINUED | OUTPATIENT
Start: 2019-11-30 | End: 2019-12-03 | Stop reason: HOSPADM

## 2019-11-30 RX ORDER — AMLODIPINE BESYLATE 5 MG/1
5 TABLET ORAL NIGHTLY
Status: DISCONTINUED | OUTPATIENT
Start: 2019-11-30 | End: 2019-12-03 | Stop reason: HOSPADM

## 2019-11-30 RX ADMIN — RIFAXIMIN 550 MG: 550 TABLET ORAL at 21:39

## 2019-11-30 RX ADMIN — URSODIOL 300 MG: 300 CAPSULE ORAL at 08:42

## 2019-11-30 RX ADMIN — CARVEDILOL 12.5 MG: 6.25 TABLET, FILM COATED ORAL at 17:51

## 2019-11-30 RX ADMIN — SODIUM ZIRCONIUM CYCLOSILICATE 10 G: 10 POWDER, FOR SUSPENSION ORAL at 11:26

## 2019-11-30 RX ADMIN — ISOSORBIDE MONONITRATE 60 MG: 60 TABLET, EXTENDED RELEASE ORAL at 08:38

## 2019-11-30 RX ADMIN — PANTOPRAZOLE SODIUM 40 MG: 40 TABLET, DELAYED RELEASE ORAL at 06:00

## 2019-11-30 RX ADMIN — LACTULOSE 20 G: 20 SOLUTION ORAL at 15:13

## 2019-11-30 RX ADMIN — CARVEDILOL 3.12 MG: 3.12 TABLET, FILM COATED ORAL at 06:00

## 2019-11-30 RX ADMIN — TRAMADOL HYDROCHLORIDE 25 MG: 50 TABLET, FILM COATED ORAL at 21:39

## 2019-11-30 RX ADMIN — LACTULOSE 20 G: 20 SOLUTION ORAL at 08:38

## 2019-11-30 RX ADMIN — APIXABAN 2.5 MG: 5 TABLET, FILM COATED ORAL at 08:38

## 2019-11-30 RX ADMIN — APIXABAN 2.5 MG: 5 TABLET, FILM COATED ORAL at 21:39

## 2019-11-30 RX ADMIN — GABAPENTIN 100 MG: 100 CAPSULE ORAL at 21:39

## 2019-11-30 RX ADMIN — GABAPENTIN 100 MG: 100 CAPSULE ORAL at 08:38

## 2019-11-30 RX ADMIN — Medication 10 ML: at 21:40

## 2019-11-30 RX ADMIN — URSODIOL 300 MG: 300 CAPSULE ORAL at 21:40

## 2019-11-30 RX ADMIN — ACETAMINOPHEN ORAL SOLUTION 650 MG: 650 SOLUTION ORAL at 03:12

## 2019-11-30 RX ADMIN — RIFAXIMIN 550 MG: 550 TABLET ORAL at 08:38

## 2019-11-30 RX ADMIN — AMLODIPINE BESYLATE 5 MG: 5 TABLET ORAL at 21:40

## 2019-11-30 RX ADMIN — TRAMADOL HYDROCHLORIDE 25 MG: 50 TABLET, FILM COATED ORAL at 08:38

## 2019-11-30 RX ADMIN — Medication 10 ML: at 11:30

## 2019-11-30 ASSESSMENT — PAIN SCALES - GENERAL
PAINLEVEL_OUTOF10: 8
PAINLEVEL_OUTOF10: 0
PAINLEVEL_OUTOF10: 5

## 2019-11-30 ASSESSMENT — PAIN DESCRIPTION - PAIN TYPE: TYPE: CHRONIC PAIN

## 2019-11-30 ASSESSMENT — PAIN DESCRIPTION - ORIENTATION: ORIENTATION: RIGHT

## 2019-11-30 ASSESSMENT — PAIN DESCRIPTION - DESCRIPTORS: DESCRIPTORS: NUMBNESS;THROBBING

## 2019-11-30 ASSESSMENT — PAIN DESCRIPTION - LOCATION: LOCATION: FINGER (COMMENT WHICH ONE)

## 2019-11-30 NOTE — PROGRESS NOTES
Department of Internal Medicine  General Internal Medicine   Progress Note      SUBJECTIVE  Feeling weak, poor initiative for ambulation     History obtained from chart review and the patient's nursing staff  General ROS: positive for  - fatigue and malaise  negative for - chills, fever or night sweats  Psychological ROS: positive for - anxiety, disorientation, irritability and memory difficulties  negative for - behavioral disorder, hallucinations or hostility  Ophthalmic ROS: negative  Respiratory ROS: no cough, shortness of breath, or wheezing  Cardiovascular ROS: positive for - dyspnea on exertion  negative for - chest pain  Gastrointestinal ROS: positive for - abdominal pain, appetite loss and bowels constipated   negative for - blood in stools, hematemesis or melena  Genito-Urinary ROS: no dysuria, trouble voiding, or hematuria  Musculoskeletal ROS: chronic pain   Neurological ROS: no TIA or stroke symptoms  Dermatological ROS: negative    OBJECTIVE      Medications      Current Facility-Administered Medications: carvedilol (COREG) tablet 12.5 mg, 12.5 mg, Oral, BID WC  amLODIPine (NORVASC) tablet 5 mg, 5 mg, Oral, Nightly  sodium chloride flush 0.9 % injection 10 mL, 10 mL, Intravenous, BID  sodium zirconium cyclosilicate (LOKELMA) oral suspension 10 g, 10 g, Oral, Daily  traMADol (ULTRAM) tablet 25 mg, 25 mg, Oral, BID  acetaminophen (TYLENOL) 160 MG/5ML solution 650 mg, 650 mg, Oral, Q4H PRN  albuterol (PROVENTIL) nebulizer solution 2.5 mg, 2.5 mg, Nebulization, Q4H PRN  apixaban (ELIQUIS) tablet 2.5 mg, 2.5 mg, Oral, BID  gabapentin (NEURONTIN) capsule 100 mg, 100 mg, Oral, BID  isosorbide mononitrate (IMDUR) extended release tablet 60 mg, 60 mg, Oral, Daily  lactulose (CHRONULAC) 10 GM/15ML solution 20 g, 20 g, Oral, TID  pantoprazole (PROTONIX) tablet 40 mg, 40 mg, Oral, QAM AC  rifaximin (XIFAXAN) tablet 550 mg, 550 mg, Oral, BID  ursodiol (ACTIGALL) capsule 300 mg, 300 mg, Oral, BID    Physical Vitals: /83   Pulse 80   Temp 97.8 °F (36.6 °C) (Axillary)   Resp 17   Ht 5' 3\" (1.6 m)   Wt 170 lb 4.8 oz (77.2 kg)   SpO2 96%   BMI 30.17 kg/m²   Temp: Temp: 97.8 °F (36.6 °C)  Max: Temp  Av °F (36.7 °C)  Min: 97.6 °F (36.4 °C)  Max: 98.7 °F (37.1 °C)  Respiration range:  Resp  Av.2  Min: 16  Max: 17  Pulse Range:  Pulse  Av.3  Min: 75  Max: 88  Blood pressure range:  Systolic (20WPY), SDK:973 , Min:124 , ZBA:924   , Diastolic (03MON), VQF:37, Min:68, Max:88    SpO2  Av.2 %  Min: 96 %  Max: 99 %    Intake/Output Summary (Last 24 hours) at 2019 1649  Last data filed at 2019 1402  Gross per 24 hour   Intake 480 ml   Output --   Net 480 ml       Vent settings:  Pulse  Av.2  Min: 50  Max: 97  Resp  Av.1  Min: 12  Max: 27  SpO2  Av.6 %  Min: 76 %  Max: 100 %    CONSTITUTIONAL:  fatigued, somnolent, uncooperative, distracted, mild distress and appears older than stated age  EYES:  Unremarkable   NECK:  Mild JVD  and supple, symmetrical, trachea midline  BACK:  symmetric and no curvature  LUNGS:  No increased work of breathing, good air exchange, clear to auscultation bilaterally, no crackles or wheezing  CARDIOVASCULAR:  normal apical pulses, regular rate and rhythm with ectopic beats, normal S1 and S2 and no S3  ABDOMEN:  Soft BS + non tender   MUSCULOSKELETAL:   trace edema   NEUROLOGIC:  No acute foical deficit but does have prior stroke effects   SKIN:  Warm and dry  and no bruising or bleeding    Data      Recent Results (from the past 96 hour(s))   EKG 12 Lead    Collection Time: 19 11:42 AM   Result Value Ref Range    Ventricular Rate 71 BPM    Atrial Rate 71 BPM    P-R Interval 214 ms    QRS Duration 114 ms    Q-T Interval 428 ms    QTc Calculation (Bazett) 465 ms    P Axis 54 degrees    R Axis -53 degrees    T Axis 14 degrees    Diagnosis       Sinus rhythm with sinus arrhythmia with 1st degree A-V blockLeft anterior fascicular

## 2019-12-01 PROBLEM — I96 GANGRENE OF FINGER OF BOTH HANDS (HCC): Status: ACTIVE | Noted: 2019-12-01

## 2019-12-01 LAB
ALBUMIN SERPL-MCNC: 1.8 G/DL (ref 3.4–5)
ANION GAP SERPL CALCULATED.3IONS-SCNC: 10 MMOL/L (ref 3–16)
BUN BLDV-MCNC: 77 MG/DL (ref 7–20)
CALCIUM SERPL-MCNC: 7.6 MG/DL (ref 8.3–10.6)
CHLORIDE BLD-SCNC: 107 MMOL/L (ref 99–110)
CO2: 25 MMOL/L (ref 21–32)
CREAT SERPL-MCNC: 3.3 MG/DL (ref 0.6–1.2)
GFR AFRICAN AMERICAN: 16
GFR NON-AFRICAN AMERICAN: 13
GLUCOSE BLD-MCNC: 127 MG/DL (ref 70–99)
GLUCOSE BLD-MCNC: 133 MG/DL (ref 70–99)
GLUCOSE BLD-MCNC: 137 MG/DL (ref 70–99)
GLUCOSE BLD-MCNC: 84 MG/DL (ref 70–99)
GLUCOSE BLD-MCNC: 87 MG/DL (ref 70–99)
PERFORMED ON: ABNORMAL
PERFORMED ON: NORMAL
PHOSPHORUS: 5.1 MG/DL (ref 2.5–4.9)
POTASSIUM SERPL-SCNC: 4.4 MMOL/L (ref 3.5–5.1)
SODIUM BLD-SCNC: 142 MMOL/L (ref 136–145)

## 2019-12-01 PROCEDURE — 80069 RENAL FUNCTION PANEL: CPT

## 2019-12-01 PROCEDURE — 2580000003 HC RX 258: Performed by: INTERNAL MEDICINE

## 2019-12-01 PROCEDURE — 36415 COLL VENOUS BLD VENIPUNCTURE: CPT

## 2019-12-01 PROCEDURE — 6370000000 HC RX 637 (ALT 250 FOR IP): Performed by: INTERNAL MEDICINE

## 2019-12-01 PROCEDURE — 1200000000 HC SEMI PRIVATE

## 2019-12-01 PROCEDURE — 99222 1ST HOSP IP/OBS MODERATE 55: CPT | Performed by: ORTHOPAEDIC SURGERY

## 2019-12-01 RX ORDER — CARVEDILOL 12.5 MG/1
12.5 TABLET ORAL 2 TIMES DAILY WITH MEALS
Qty: 60 TABLET | Refills: 3 | Status: SHIPPED | OUTPATIENT
Start: 2019-12-01

## 2019-12-01 RX ORDER — AMLODIPINE BESYLATE 5 MG/1
5 TABLET ORAL NIGHTLY
Qty: 30 TABLET | Refills: 3 | Status: SHIPPED | OUTPATIENT
Start: 2019-12-01

## 2019-12-01 RX ADMIN — LACTULOSE 20 G: 20 SOLUTION ORAL at 21:00

## 2019-12-01 RX ADMIN — URSODIOL 300 MG: 300 CAPSULE ORAL at 21:02

## 2019-12-01 RX ADMIN — TRAMADOL HYDROCHLORIDE 25 MG: 50 TABLET, FILM COATED ORAL at 20:59

## 2019-12-01 RX ADMIN — LACTULOSE 20 G: 20 SOLUTION ORAL at 10:51

## 2019-12-01 RX ADMIN — GABAPENTIN 100 MG: 100 CAPSULE ORAL at 10:48

## 2019-12-01 RX ADMIN — GABAPENTIN 100 MG: 100 CAPSULE ORAL at 20:58

## 2019-12-01 RX ADMIN — RIFAXIMIN 550 MG: 550 TABLET ORAL at 20:59

## 2019-12-01 RX ADMIN — CARVEDILOL 12.5 MG: 6.25 TABLET, FILM COATED ORAL at 10:48

## 2019-12-01 RX ADMIN — SODIUM ZIRCONIUM CYCLOSILICATE 10 G: 10 POWDER, FOR SUSPENSION ORAL at 10:48

## 2019-12-01 RX ADMIN — RIFAXIMIN 550 MG: 550 TABLET ORAL at 10:50

## 2019-12-01 RX ADMIN — ISOSORBIDE MONONITRATE 60 MG: 60 TABLET, EXTENDED RELEASE ORAL at 10:49

## 2019-12-01 RX ADMIN — PANTOPRAZOLE SODIUM 40 MG: 40 TABLET, DELAYED RELEASE ORAL at 06:21

## 2019-12-01 RX ADMIN — AMLODIPINE BESYLATE 5 MG: 5 TABLET ORAL at 20:58

## 2019-12-01 RX ADMIN — APIXABAN 2.5 MG: 5 TABLET, FILM COATED ORAL at 10:48

## 2019-12-01 RX ADMIN — Medication 10 ML: at 10:51

## 2019-12-01 RX ADMIN — CARVEDILOL 12.5 MG: 6.25 TABLET, FILM COATED ORAL at 17:26

## 2019-12-01 RX ADMIN — Medication 10 ML: at 21:00

## 2019-12-01 RX ADMIN — APIXABAN 2.5 MG: 5 TABLET, FILM COATED ORAL at 20:58

## 2019-12-01 RX ADMIN — URSODIOL 300 MG: 300 CAPSULE ORAL at 10:48

## 2019-12-01 RX ADMIN — TRAMADOL HYDROCHLORIDE 25 MG: 50 TABLET, FILM COATED ORAL at 10:50

## 2019-12-01 ASSESSMENT — PAIN SCALES - GENERAL
PAINLEVEL_OUTOF10: 0
PAINLEVEL_OUTOF10: 5
PAINLEVEL_OUTOF10: 0

## 2019-12-01 ASSESSMENT — PAIN DESCRIPTION - LOCATION: LOCATION: HAND

## 2019-12-01 ASSESSMENT — PAIN DESCRIPTION - PAIN TYPE: TYPE: CHRONIC PAIN

## 2019-12-01 ASSESSMENT — PAIN DESCRIPTION - ORIENTATION: ORIENTATION: RIGHT

## 2019-12-01 NOTE — PROGRESS NOTES
Speech Language Pathology  Sasha Luevano   5/12/1932     Speech Therapy/Clinical Swallow Evaluation order received. Per discussion with nurse, clinical swallow evaluation is not indicated at this time due to Pt pending discharge back to SNF this date. Per chart review, Pt is currently on recommended diet per recent MBS study completed at [facility] on 11/1/19. Nurse reports no difficulty with current diet level.  Therefore, current clinical swallow evaluation order will be discontinued per nurse request.    [place] [facility] [id number]

## 2019-12-01 NOTE — PROGRESS NOTES
31.6*   MCV 88.0        BMP:   Recent Labs     11/29/19  0517 11/30/19  0814 12/01/19  0709    142 142   K 5.6* 4.9 4.4    106 107   CO2 24 27 25   PHOS  --  5.5* 5.1*   BUN 78* 75* 77*   CREATININE 3.4* 3.4* 3.3*     Magnesium:   Lab Results   Component Value Date    MG 2.20 11/15/2019    MG 1.80 11/07/2019    MG 2.00 10/01/2019     Lab Results   Component Value Date    CREATININE 3.3 12/01/2019       Arterial Blood Gasses  No results for input(s): PH, PCO2, PO2 in the last 72 hours. Invalid input(s): Krishna Batres    UA:No results for input(s): NITRITE, COLORU, PHUR, LABCAST, WBCUA, RBCUA, MUCUS, TRICHOMONAS, YEAST, BACTERIA, CLARITYU, SPECGRAV, LEUKOCYTESUR, UROBILINOGEN, BILIRUBINUR, BLOODU, GLUCOSEU, AMORPHOUS in the last 72 hours. Invalid input(s): Daphine Mole    LIVER PROFILE: No results for input(s): AST, ALT, LIPASE, BILIDIR, BILITOT, ALKPHOS in the last 72 hours. Invalid input(s): AMYLASE,  ALB  PT/INR:    Lab Results   Component Value Date    PROTIME 16.0 11/13/2019    PROTIME 12.4 09/22/2019    PROTIME 11.7 08/21/2019    INR 1.40 11/13/2019    INR 1.09 09/22/2019    INR 1.03 08/21/2019     PTT:    Lab Results   Component Value Date    APTT 34.0 11/13/2019     JOEY:    Lab Results   Component Value Date    ANATITER 1:1280 08/22/2019    JOEY POSITIVE 08/22/2019     CHEMISTRY COMMON GROUP :   Lab Results   Component Value Date    GLUCOSE 84 12/01/2019    TSH 1.89 03/17/2019     Recent Labs     11/28/19  1157 11/28/19  1803 11/29/19  0517 11/30/19  0814 12/01/19  0709   GLUCOSE 125* 118* 91 92 84   CALCIUM 8.1* 8.1* 7.9* 7.5* 7.6*         RADIOLOGY:        Imaging Results. Chest X Ray reviwed by me    Chest Xray Reviewed by me  Renal Ultrasound Reviewed by me    EKG reviewed by me.                 Electronically Signed: Stu Roberts MD 12/1/2019 11:30 AM

## 2019-12-02 LAB
ALBUMIN SERPL-MCNC: 1.9 G/DL (ref 3.4–5)
ANION GAP SERPL CALCULATED.3IONS-SCNC: 13 MMOL/L (ref 3–16)
BUN BLDV-MCNC: 75 MG/DL (ref 7–20)
CALCIUM SERPL-MCNC: 7.8 MG/DL (ref 8.3–10.6)
CHLORIDE BLD-SCNC: 107 MMOL/L (ref 99–110)
CO2: 24 MMOL/L (ref 21–32)
CREAT SERPL-MCNC: 3.4 MG/DL (ref 0.6–1.2)
GFR AFRICAN AMERICAN: 15
GFR NON-AFRICAN AMERICAN: 13
GLUCOSE BLD-MCNC: 107 MG/DL (ref 70–99)
GLUCOSE BLD-MCNC: 125 MG/DL (ref 70–99)
GLUCOSE BLD-MCNC: 143 MG/DL (ref 70–99)
GLUCOSE BLD-MCNC: 170 MG/DL (ref 70–99)
GLUCOSE BLD-MCNC: 94 MG/DL (ref 70–99)
PERFORMED ON: ABNORMAL
PHOSPHORUS: 5.5 MG/DL (ref 2.5–4.9)
POTASSIUM SERPL-SCNC: 4.6 MMOL/L (ref 3.5–5.1)
SODIUM BLD-SCNC: 144 MMOL/L (ref 136–145)

## 2019-12-02 PROCEDURE — 6370000000 HC RX 637 (ALT 250 FOR IP): Performed by: INTERNAL MEDICINE

## 2019-12-02 PROCEDURE — 97161 PT EVAL LOW COMPLEX 20 MIN: CPT

## 2019-12-02 PROCEDURE — 1200000000 HC SEMI PRIVATE

## 2019-12-02 PROCEDURE — 2580000003 HC RX 258: Performed by: INTERNAL MEDICINE

## 2019-12-02 PROCEDURE — 80069 RENAL FUNCTION PANEL: CPT

## 2019-12-02 PROCEDURE — 97530 THERAPEUTIC ACTIVITIES: CPT

## 2019-12-02 PROCEDURE — 36415 COLL VENOUS BLD VENIPUNCTURE: CPT

## 2019-12-02 RX ADMIN — PANTOPRAZOLE SODIUM 40 MG: 40 TABLET, DELAYED RELEASE ORAL at 06:51

## 2019-12-02 RX ADMIN — AMLODIPINE BESYLATE 5 MG: 5 TABLET ORAL at 20:42

## 2019-12-02 RX ADMIN — Medication 10 ML: at 19:48

## 2019-12-02 RX ADMIN — TRAMADOL HYDROCHLORIDE 25 MG: 50 TABLET, FILM COATED ORAL at 09:20

## 2019-12-02 RX ADMIN — ISOSORBIDE MONONITRATE 60 MG: 60 TABLET, EXTENDED RELEASE ORAL at 09:20

## 2019-12-02 RX ADMIN — GABAPENTIN 100 MG: 100 CAPSULE ORAL at 09:20

## 2019-12-02 RX ADMIN — SODIUM ZIRCONIUM CYCLOSILICATE 10 G: 10 POWDER, FOR SUSPENSION ORAL at 09:20

## 2019-12-02 RX ADMIN — RIFAXIMIN 550 MG: 550 TABLET ORAL at 09:19

## 2019-12-02 RX ADMIN — APIXABAN 2.5 MG: 5 TABLET, FILM COATED ORAL at 20:42

## 2019-12-02 RX ADMIN — Medication 10 ML: at 09:20

## 2019-12-02 RX ADMIN — CARVEDILOL 12.5 MG: 6.25 TABLET, FILM COATED ORAL at 09:20

## 2019-12-02 RX ADMIN — APIXABAN 2.5 MG: 5 TABLET, FILM COATED ORAL at 09:20

## 2019-12-02 RX ADMIN — URSODIOL 300 MG: 300 CAPSULE ORAL at 09:20

## 2019-12-02 RX ADMIN — CARVEDILOL 12.5 MG: 6.25 TABLET, FILM COATED ORAL at 16:48

## 2019-12-02 RX ADMIN — TRAMADOL HYDROCHLORIDE 25 MG: 50 TABLET, FILM COATED ORAL at 20:42

## 2019-12-02 RX ADMIN — URSODIOL 300 MG: 300 CAPSULE ORAL at 20:42

## 2019-12-02 RX ADMIN — RIFAXIMIN 550 MG: 550 TABLET ORAL at 20:42

## 2019-12-02 RX ADMIN — LACTULOSE 20 G: 20 SOLUTION ORAL at 09:20

## 2019-12-02 RX ADMIN — GABAPENTIN 100 MG: 100 CAPSULE ORAL at 20:42

## 2019-12-02 ASSESSMENT — PAIN SCALES - GENERAL
PAINLEVEL_OUTOF10: 0
PAINLEVEL_OUTOF10: 3
PAINLEVEL_OUTOF10: 0
PAINLEVEL_OUTOF10: 0
PAINLEVEL_OUTOF10: 3
PAINLEVEL_OUTOF10: 0
PAINLEVEL_OUTOF10: 0

## 2019-12-02 ASSESSMENT — PAIN DESCRIPTION - ORIENTATION: ORIENTATION: RIGHT

## 2019-12-02 ASSESSMENT — PAIN DESCRIPTION - PAIN TYPE: TYPE: CHRONIC PAIN

## 2019-12-02 ASSESSMENT — PAIN DESCRIPTION - LOCATION: LOCATION: HAND

## 2019-12-02 NOTE — PROGRESS NOTES
includes Upper gastrointestinal endoscopy (9/27/2019). Restrictions  Restrictions/Precautions  Restrictions/Precautions: Fall Risk, Modified Diet, Swallowing - Thickened Liquids(HIGH FALL RISK; dysphagia soft and bite-sized, nectar thick liquids)  Required Braces or Orthoses?: No  Position Activity Restriction  Other position/activity restrictions: Hector Brito is a 80 y.o. female who presents ER with concern for an elevated potassium level. Symptoms are identified about a day and a half ago. Started on Kayexalate. She is having diarrhea at the facility where she lives but her potassium is still elevated. She was sent to the ER for evaluation. Vision/Hearing  Vision: Impaired  Vision Exceptions: Wears glasses for distance  Hearing: Within functional limits       Subjective  General  Chart Reviewed: Yes  Response To Previous Treatment: Not applicable  Family / Caregiver Present: No  Diagnosis: hyperkalemia  Follows Commands: Within Functional Limits  General Comment  Comments: Pt supine in bed upon arrival.   Subjective  Subjective: Pt reporting no pain at this time, does state pain occurs in index and middle finger of R hand but not providing pain rating during session. Pain Screening  Patient Currently in Pain: Denies(at rest)  Vital Signs  Patient Currently in Pain: Denies(at rest)       Orientation  Orientation  Overall Orientation Status: Impaired  Orientation Level: Oriented to person;Disoriented to place; Disoriented to situation;Disoriented to time     Social/Functional History  Social/Functional History  Lives With: Son  Type of Home: House  Home Layout: Two level(pt able to live on first floor)  Home Access: Level entry, Stairs to enter without rails  Entrance Stairs - Number of Steps: 1 MIRIAM  Bathroom Shower/Tub: Tub/Shower unit, Shower chair without back  Bathroom Toilet: Standard  Bathroom Equipment: Hand-held shower, Shower chair  Bathroom Accessibility: Walker accessible  Home Equipment:

## 2019-12-02 NOTE — PROGRESS NOTES
Pulse 70   Temp 97.8 °F (36.6 °C) (Oral)   Resp 18   Ht 5' 3\" (1.6 m)   Wt 172 lb 1.6 oz (78.1 kg)   SpO2 97%   BMI 30.49 kg/m²   Temp: Temp: 97.8 °F (36.6 °C)  Max: Temp  Av °F (36.7 °C)  Min: 97.5 °F (36.4 °C)  Max: 98.7 °F (37.1 °C)  Respiration range:  Resp  Av  Min: 18  Max: 18  Pulse Range:  Pulse  Av.5  Min: 70  Max: 79  Blood pressure range:  Systolic (33WFO), XMK:278 , Min:112 , CIT:208   , Diastolic (70YWQ), QDA:36, Min:63, Max:81    SpO2  Av.2 %  Min: 96 %  Max: 99 %  No intake or output data in the 24 hours ending 19 2358    Vent settings:  Pulse  Av.5  Min: 50  Max: 97  Resp  Av.2  Min: 12  Max: 27  SpO2  Av.5 %  Min: 76 %  Max: 100 %    CONSTITUTIONAL:  fatigued, somnolent, uncooperative, distracted, mild distress and appears older than stated age  EYES:  Unremarkable   NECK:  Mild JVD  and supple, symmetrical, trachea midline  BACK:  symmetric and no curvature  LUNGS:  No increased work of breathing, good air exchange, clear to auscultation bilaterally, no crackles or wheezing  CARDIOVASCULAR:  normal apical pulses, regular rate and rhythm with ectopic beats, normal S1 and S2 and no S3  ABDOMEN:  Soft BS + non tender   MUSCULOSKELETAL:   trace edema   NEUROLOGIC:  No acute foical deficit but does have prior stroke effects   SKIN:  Warm and dry  and no bruising or bleeding    Data      Recent Results (from the past 96 hour(s))   EKG 12 Lead    Collection Time: 19 11:42 AM   Result Value Ref Range    Ventricular Rate 71 BPM    Atrial Rate 71 BPM    P-R Interval 214 ms    QRS Duration 114 ms    Q-T Interval 428 ms    QTc Calculation (Bazett) 465 ms    P Axis 54 degrees    R Axis -53 degrees    T Axis 14 degrees    Diagnosis       Sinus rhythm with sinus arrhythmia with 1st degree A-V blockLeft anterior fascicular blockNonspecific T wave abnormalityAbnormal ECGConfirmed by GERALDINE OSBORNE MD ([id number]) on 2019 8:22:20 AM   Basic Metabolic Panel w/ Reflex to [facility]    Collection Time: 11/28/19 11:57 AM   Result Value Ref Range    Sodium 140 136 - 145 mmol/L    Potassium reflex Magnesium 6.0 (HH) 3.5 - 5.1 mmol/L    Chloride 109 99 - 110 mmol/L    CO2 21 21 - 32 mmol/L    Anion Gap 10 3 - 16    Glucose 125 (H) 70 - 99 mg/dL    BUN 79 (H) 7 - 20 mg/dL    CREATININE 3.4 (H) 0.6 - 1.2 mg/dL    GFR Non-African American 13 (A) >60    GFR  15 (A) >60    Calcium 8.1 (L) 8.3 - 10.6 mg/dL   CBC Auto Differential    Collection Time: 11/28/19 11:57 AM   Result Value Ref Range    WBC 6.4 4.0 - 11.0 K/uL    RBC 3.59 (L) 4.00 - 5.20 M/uL    Hemoglobin 9.9 (L) 12.0 - 16.0 g/dL    Hematocrit 31.6 (L) 36.0 - 48.0 %    MCV 88.0 80.0 - 100.0 fL    MCH 27.6 26.0 - 34.0 pg    MCHC 31.3 31.0 - 36.0 g/dL    RDW 17.0 (H) 12.4 - 15.4 %    Platelets 968 122 - 153 K/uL    MPV 8.4 5.0 - 10.5 fL    Neutrophils % 58.8 %    Lymphocytes % 26.6 %    Monocytes % 10.2 %    Eosinophils % 3.6 %    Basophils % 0.8 %    Neutrophils Absolute 3.8 1.7 - 7.7 K/uL    Lymphocytes Absolute 1.7 1.0 - 5.1 K/uL    Monocytes Absolute 0.7 0.0 - 1.3 K/uL    Eosinophils Absolute 0.2 0.0 - 0.6 K/uL    Basophils Absolute 0.1 0.0 - 0.2 K/uL   Basic metabolic panel    Collection Time: 11/28/19  6:03 PM   Result Value Ref Range    Sodium 142 136 - 145 mmol/L    Potassium 6.2 (HH) 3.5 - 5.1 mmol/L    Chloride 106 99 - 110 mmol/L    CO2 23 21 - 32 mmol/L    Anion Gap 13 3 - 16    Glucose 118 (H) 70 - 99 mg/dL    BUN 81 (HH) 7 - 20 mg/dL    CREATININE 3.5 (H) 0.6 - 1.2 mg/dL    GFR Non- 12 (A) >60    GFR  15 (A) >60    Calcium 8.1 (L) 8.3 - 10.6 mg/dL   Clostridium difficile toxin/antigen    Collection Time: 11/28/19 10:45 PM   Result Value Ref Range    C.diff Toxin/Antigen       Negative for Clostridium difficile antigen and toxin  Normal Range: Negative     Potassium    Collection Time: 11/28/19 11:11 PM   Result Value Ref Range    Potassium 6.1 (HH) 3.5 - 5.1 mmol/L   Basic Metabolic Panel    Collection Time: 11/29/19  5:17 AM   Result Value Ref Range    Sodium 141 136 - 145 mmol/L    Potassium 5.6 (H) 3.5 - 5.1 mmol/L    Chloride 106 99 - 110 mmol/L    CO2 24 21 - 32 mmol/L    Anion Gap 11 3 - 16    Glucose 91 70 - 99 mg/dL    BUN 78 (H) 7 - 20 mg/dL    CREATININE 3.4 (H) 0.6 - 1.2 mg/dL    GFR Non-African American 13 (A) >60    GFR  15 (A) >60    Calcium 7.9 (L) 8.3 - 10.6 mg/dL   POCT Glucose    Collection Time: 11/29/19  7:35 AM   Result Value Ref Range    POC Glucose 88 70 - 99 mg/dl    Performed on ACCU-CHEK    POCT Glucose    Collection Time: 11/29/19 11:07 AM   Result Value Ref Range    POC Glucose 118 (H) 70 - 99 mg/dl    Performed on ACCU-CHEK    POCT Glucose    Collection Time: 11/29/19  4:25 PM   Result Value Ref Range    POC Glucose 148 (H) 70 - 99 mg/dl    Performed on ACCU-CHEK    POCT Glucose    Collection Time: 11/29/19  8:19 PM   Result Value Ref Range    POC Glucose 172 (H) 70 - 99 mg/dl    Performed on ACCU-CHEK    POCT Glucose    Collection Time: 11/30/19  7:28 AM   Result Value Ref Range    POC Glucose 94 70 - 99 mg/dl    Performed on ACCU-CHEK    Renal function panel    Collection Time: 11/30/19  8:14 AM   Result Value Ref Range    Sodium 142 136 - 145 mmol/L    Potassium 4.9 3.5 - 5.1 mmol/L    Chloride 106 99 - 110 mmol/L    CO2 27 21 - 32 mmol/L    Anion Gap 9 3 - 16    Glucose 92 70 - 99 mg/dL    BUN 75 (H) 7 - 20 mg/dL    CREATININE 3.4 (H) 0.6 - 1.2 mg/dL    GFR Non-African American 13 (A) >60    GFR  15 (A) >60    Calcium 7.5 (L) 8.3 - 10.6 mg/dL    Phosphorus 5.5 (H) 2.5 - 4.9 mg/dL    Alb 1.8 (L) 3.4 - 5.0 g/dL   POCT Glucose    Collection Time: 11/30/19 11:16 AM   Result Value Ref Range    POC Glucose 126 (H) 70 - 99 mg/dl    Performed on ACCU-CHEK    POCT Glucose    Collection Time: 11/30/19  4:21 PM   Result Value Ref Range    POC Glucose 139 (H) 70 - 99 mg/dl    Performed on ACCU-CHEK    Renal function panel Collection Time: 12/01/19  7:09 AM   Result Value Ref Range    Sodium 142 136 - 145 mmol/L    Potassium 4.4 3.5 - 5.1 mmol/L    Chloride 107 99 - 110 mmol/L    CO2 25 21 - 32 mmol/L    Anion Gap 10 3 - 16    Glucose 84 70 - 99 mg/dL    BUN 77 (H) 7 - 20 mg/dL    CREATININE 3.3 (H) 0.6 - 1.2 mg/dL    GFR Non-African American 13 (A) >60    GFR  16 (A) >60    Calcium 7.6 (L) 8.3 - 10.6 mg/dL    Phosphorus 5.1 (H) 2.5 - 4.9 mg/dL    Alb 1.8 (L) 3.4 - 5.0 g/dL   POCT Glucose    Collection Time: 12/01/19  7:38 AM   Result Value Ref Range    POC Glucose 87 70 - 99 mg/dl    Performed on ACCU-CHEK    POCT Glucose    Collection Time: 12/01/19 11:23 AM   Result Value Ref Range    POC Glucose 127 (H) 70 - 99 mg/dl    Performed on ACCU-CHEK    POCT Glucose    Collection Time: 12/01/19  4:28 PM   Result Value Ref Range    POC Glucose 137 (H) 70 - 99 mg/dl    Performed on ACCU-CHEK    POCT Glucose    Collection Time: 12/01/19  8:08 PM   Result Value Ref Range    POC Glucose 133 (H) 70 - 99 mg/dl    Performed on ACCU-CHEK        ASSESSMENT AND PLAN     Active Problems:    Chronic kidney disease    PVD (peripheral vascular disease) (HCC)    History of CVA (cerebrovascular accident)    Chronic diastolic CHF (congestive heart failure) (HCC)    Chronic atrial fibrillation    Pulmonary hypertension (HCC)    Essential hypertension    (HFpEF) heart failure with preserved ejection fraction (HCC)    Moderate malnutrition (HCC)    Other cirrhosis of liver (HCC)    Left ventricular hypertrophy    Hypertensive urgency    Hyperkalemia    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  *    Ct present care , wound care consult suggested to hand surgery , will call orthopedics

## 2019-12-02 NOTE — CONSULTS
ORTHOPAEDIC CONSULTATION NOTE    Chief Complaint   Patient presents with    Abnormal Lab     elevated potassium, from [facility] in from [facility].  reports they have been giving kayexlate but only making patient have diarrhea       HPI  80 y.o. female seen in consultation at the request of Apryl Russo MD for evaluation of right IF and MF necrosis    Onset - 4-6 months per patient  Injury/trauma - none  Pain is located N/A  Worse with N/A  Better with N/A  Associated with gangrene, no drainage   Progressively getting larger   Also small superficial ulcer right small finger and left middle finger   Stiffness in fingers    CKD, not on dialysis yet  PVD    denies tobacco use  Denies DM    Review of Systems  Constitutional - denies fevers, weight loss  Cardiovascular - denies chest pain, palpitations, peripheral edema, blood clots  Respiratory - denies SOB, cough  Gastrointestinal - denies abdominal pain, nausea, vomiting  Genitourinary - denies dysuria, discharge  Musculoskeletal - per HPI  Integumentary - per HPI  Neurologic - denies numbness, tingling, paresthesias  Hematologic - denies abnormal bleeding, blood clots  Allergic/Immunologic - denies metal allergies, recurrent infections    Allergies   Allergen Reactions    Hydralazine Other (See Comments)     Acute Encephalopathy    Bactrim [Sulfamethoxazole-Trimethoprim]     Nebivolol Hcl Other (See Comments)     Second degree heart block - changed to metoprolol, also takes coreg without issues    Other Nausea And Vomiting     Allergic to flu shot per pt, rxn years ago        Current Facility-Administered Medications   Medication Dose Route Frequency Provider Last Rate Last Dose    carvedilol (COREG) tablet 12.5 mg  12.5 mg Oral BID  Terry Sorto MD   12.5 mg at 12/01/19 1726    amLODIPine (NORVASC) tablet 5 mg  5 mg Oral Nightly Terry Sorto MD   5 mg at 11/30/19 2140    sodium chloride flush 0.9 % injection 10 mL  10 mL Intravenous BID Terry L palpable, no arterial calcifications are seen on radiographs    Possible embolic source should be considered, and may warrant further workup with vascular surgery    In the meantime, keep fingers/hands clean  Avoid cold weather/temperatures, keep fingers/hands warm   anticoagulation - on eliquis  Calcium channel blockers - on norvasc    Finger necrosis/dry gangrene is likely still evolving and declaring itself based on above observation  No surgical intervention warranted at this time  Will need outpatient hand surgery followup with Dr Karen Lemus or Dr Hailee Rodriguez, and likely amputation once necrosis stabilizes    Will sign off    Julio Choudhury

## 2019-12-03 VITALS
WEIGHT: 170 LBS | OXYGEN SATURATION: 96 % | BODY MASS INDEX: 30.12 KG/M2 | TEMPERATURE: 97.7 F | HEART RATE: 65 BPM | SYSTOLIC BLOOD PRESSURE: 92 MMHG | DIASTOLIC BLOOD PRESSURE: 56 MMHG | HEIGHT: 63 IN | RESPIRATION RATE: 16 BRPM

## 2019-12-03 LAB
ALBUMIN SERPL-MCNC: 2 G/DL (ref 3.4–5)
ANION GAP SERPL CALCULATED.3IONS-SCNC: 11 MMOL/L (ref 3–16)
BUN BLDV-MCNC: 81 MG/DL (ref 7–20)
CALCIUM SERPL-MCNC: 7.7 MG/DL (ref 8.3–10.6)
CHLORIDE BLD-SCNC: 109 MMOL/L (ref 99–110)
CO2: 24 MMOL/L (ref 21–32)
CREAT SERPL-MCNC: 3.4 MG/DL (ref 0.6–1.2)
GFR AFRICAN AMERICAN: 15
GFR NON-AFRICAN AMERICAN: 13
GLUCOSE BLD-MCNC: 165 MG/DL (ref 70–99)
GLUCOSE BLD-MCNC: 90 MG/DL (ref 70–99)
GLUCOSE BLD-MCNC: 91 MG/DL (ref 70–99)
PERFORMED ON: ABNORMAL
PERFORMED ON: NORMAL
PHOSPHORUS: 5.5 MG/DL (ref 2.5–4.9)
POTASSIUM SERPL-SCNC: 4.3 MMOL/L (ref 3.5–5.1)
SODIUM BLD-SCNC: 144 MMOL/L (ref 136–145)

## 2019-12-03 PROCEDURE — 97535 SELF CARE MNGMENT TRAINING: CPT

## 2019-12-03 PROCEDURE — 6370000000 HC RX 637 (ALT 250 FOR IP): Performed by: INTERNAL MEDICINE

## 2019-12-03 PROCEDURE — 97166 OT EVAL MOD COMPLEX 45 MIN: CPT

## 2019-12-03 PROCEDURE — 80069 RENAL FUNCTION PANEL: CPT

## 2019-12-03 PROCEDURE — 2580000003 HC RX 258: Performed by: INTERNAL MEDICINE

## 2019-12-03 PROCEDURE — 97530 THERAPEUTIC ACTIVITIES: CPT

## 2019-12-03 PROCEDURE — 36415 COLL VENOUS BLD VENIPUNCTURE: CPT

## 2019-12-03 RX ADMIN — CARVEDILOL 12.5 MG: 6.25 TABLET, FILM COATED ORAL at 08:22

## 2019-12-03 RX ADMIN — URSODIOL 300 MG: 300 CAPSULE ORAL at 08:21

## 2019-12-03 RX ADMIN — PANTOPRAZOLE SODIUM 40 MG: 40 TABLET, DELAYED RELEASE ORAL at 06:14

## 2019-12-03 RX ADMIN — APIXABAN 2.5 MG: 5 TABLET, FILM COATED ORAL at 08:22

## 2019-12-03 RX ADMIN — TRAMADOL HYDROCHLORIDE 25 MG: 50 TABLET, FILM COATED ORAL at 08:22

## 2019-12-03 RX ADMIN — LACTULOSE 20 G: 20 SOLUTION ORAL at 08:21

## 2019-12-03 RX ADMIN — Medication 10 ML: at 10:34

## 2019-12-03 RX ADMIN — GABAPENTIN 100 MG: 100 CAPSULE ORAL at 08:22

## 2019-12-03 RX ADMIN — ISOSORBIDE MONONITRATE 60 MG: 60 TABLET, EXTENDED RELEASE ORAL at 08:21

## 2019-12-03 ASSESSMENT — PAIN SCALES - GENERAL
PAINLEVEL_OUTOF10: 0
PAINLEVEL_OUTOF10: 0
PAINLEVEL_OUTOF10: 4

## 2019-12-03 NOTE — PROGRESS NOTES
input(s): AST, ALT, LIPASE, BILIDIR, BILITOT, ALKPHOS in the last 72 hours. Invalid input(s): AMYLASE,  ALB  PT/INR:    Lab Results   Component Value Date    PROTIME 16.0 11/13/2019    PROTIME 12.4 09/22/2019    PROTIME 11.7 08/21/2019    INR 1.40 11/13/2019    INR 1.09 09/22/2019    INR 1.03 08/21/2019     PTT:    Lab Results   Component Value Date    APTT 34.0 11/13/2019     JOEY:    Lab Results   Component Value Date    ANATITER 1:1280 08/22/2019    JOEY POSITIVE 08/22/2019     CHEMISTRY COMMON GROUP :   Lab Results   Component Value Date    GLUCOSE 90 12/03/2019    TSH 1.89 03/17/2019     Recent Labs     12/01/19  0709 12/02/19  0435 12/03/19  0543   GLUCOSE 84 94 90   CALCIUM 7.6* 7.8* 7.7*         RADIOLOGY:        Imaging Results. Chest X Ray reviwed by me    Chest Xray Reviewed by me  Renal Ultrasound Reviewed by me    EKG reviewed by me.                 Electronically Signed: Bebo Guerrero MD 12/3/2019 10:37 AM

## 2019-12-03 NOTE — PROGRESS NOTES
Pulmonary hypertension ([address] has a past surgical history that includes Upper gastrointestinal endoscopy (9/27/2019). Treatment Diagnosis: Above deficits associated with hyperkalemia      Restrictions  Restrictions/Precautions  Restrictions/Precautions: Fall Risk, Modified Diet, Swallowing - Thickened Liquids(HIGH FALL RISK; dysphagia soft and bite-sized, nectar thick liquids)  Required Braces or Orthoses?: No  Position Activity Restriction  Other position/activity restrictions: Desirae Marie is a 80 y.o. female who presents ER with concern for an elevated potassium level. Symptoms are identified about a day and a half ago. Started on Kayexalate. She is having diarrhea at the facility where she lives but her potassium is still elevated. She was sent to the ER for evaluation. Subjective   General  Chart Reviewed: Yes  Family / Caregiver Present: Yes(Son present)  Diagnosis: Hyperkalemia  Subjective  Subjective: Pt supine in bed at time of therapist arrival; agreeable to OT/PT session.   Patient Currently in Pain: Denies  Pain Assessment  Pain Level: 4    Social/Functional History  Social/Functional History  Lives With: Son  Type of Home: House  Home Layout: Two level(pt able to live on first floor)  Home Access: Level entry, Stairs to enter without rails  Entrance Stairs - Number of Steps: 1 MIRIAM  Bathroom Shower/Tub: Tub/Shower unit, Shower chair without back  Bathroom Toilet: Standard  Bathroom Equipment: Hand-held shower, Shower chair  Bathroom Accessibility: Walker accessible  Home Equipment: Stoney Coyle, Rolling walker(pt reports rollator available in room but does not use it)  Receives Help From: Family  ADL Assistance: Independent  Homemaking Assistance: Needs assistance(son was doing cooking, cleaning, and laundry)  Ambulation Assistance: Independent(pt reports furniture walking)  Transfer Assistance: Independent  Active : No  Patient's  Info: son drives  Occupation: Retired  Additional Comments: Prior to admission to [facility] last week, pt was living at home with pt as documented above. Since being at [facility]. Pt questionable historian, states staff assists with all ADLs and functional mobility but is not able to provide further information regarding assist levels. Objective   Vision: Impaired  Vision Exceptions: Wears glasses for distance  Hearing: Within functional limits    Orientation  Overall Orientation Status: Impaired  Orientation Level: Oriented to person;Disoriented to person;Disoriented to place; Disoriented to time     Balance  Sitting Balance: Contact guard assistance  Standing Balance: Dependent/Total(max x2 static stance)  Standing Balance  Time: ~1-2 min total  Activity: static stance EOB, SPT from EOB > recliner,   ADL  UE Bathing: Stand by assistance; Increased time to complete;Setup  LE Bathing: Moderate assistance(BLE only, assist for feet)  UE Dressing: Contact guard assistance(gown)  LE Dressing: Dependent/Total(socks)  Tone RUE  RUE Tone: Normotonic  Tone LUE  LUE Tone: Normotonic     Bed mobility  Supine to Sit: Contact guard assistance  Scooting: Maximal assistance  Comment: Pt initiates movements, requires increased A and VC for positioning to complete  Transfers  Stand Pivot Transfers: Dependent/Total(mod x2 from EOB > recliner to the (L))  Sit to stand: Dependent/Total(min x2 progressing mod x2, from EOB)  Stand to sit: Dependent/Total(min x2 progressing mod x2, to EOB)     Cognition  Overall Cognitive Status: Exceptions  Arousal/Alertness: Appropriate responses to stimuli  Following Commands: Follows one step commands with increased time; Follows one step commands with repetition  Attention Span: Attends with cues to redirect  Memory: Decreased recall of recent events  Problem Solving: Assistance required to implement solutions;Assistance required to generate solutions  Insights: Decreased awareness of deficits  Initiation: Requires cues for

## 2019-12-03 NOTE — PROGRESS NOTES
Physical Therapy  Facility/Department: [address] NURSING  Daily Treatment Note  NAME: Nori Carpio  : 1932  MRN: [id number]    Date of Service: 12/3/2019    Discharge Recommendations: Nori Carpio scored a 10/24 on the AM-PAC short mobility form. Current research shows that an AM-PAC score of 17 or less is typically not associated with a discharge to the patient's home setting. Based on the patients AM-PAC score and their current functional mobility deficits, it is recommended that the patient have 3-5 sessions per week of Physical Therapy at d/c to increase the patients independence. PT Equipment Recommendations  Equipment Needed: No    Assessment   Body structures, Functions, Activity limitations: Decreased functional mobility ; Decreased strength;Decreased safe awareness;Decreased balance;Decreased endurance  Assessment: Pt with improved initiation of tasks this date however required 2 person assist for all transfers this date. Pt required decreased assist for supine>sit this date. Pt would benefit from continued skilled PT services to address deficits. Treatment Diagnosis: impaired functional mobility  Prognosis: Good  Decision Making: Low Complexity  Clinical Presentation: stable  Patient Education: PT KATALINA and patti d/c recommendations, transfer training, general safety  Barriers to Learning: cognition  REQUIRES PT FOLLOW UP: Yes  Activity Tolerance  Activity Tolerance: Patient limited by fatigue;Patient limited by endurance     Patient Diagnosis(es): The encounter diagnosis was Hyperkalemia. has a past medical history of Acute on chronic diastolic CHF (congestive heart failure) ([address] Ascites, Atrial fibrillation ([address] AV block, 1st degree, AV block, 2nd degree, Chronic kidney disease, Cirrhosis of liver ([address] Hyperlipidemia, Hyperparathyroidism ([address] Hypertension, Other cirrhosis of liver ([address] Peripheral vascular disease ([address] and Pulmonary hypertension ([address]    has a past surgical history that includes Upper gastrointestinal endoscopy (9/27/2019). Restrictions  Restrictions/Precautions  Restrictions/Precautions: Fall Risk, Modified Diet, Swallowing - Thickened Liquids(HIGH FALL RISK; dysphagia soft and bite-sized, nectar thick liquids)  Required Braces or Orthoses?: No  Position Activity Restriction  Other position/activity restrictions: Debra Quiroz is a 80 y.o. female who presents ER with concern for an elevated potassium level. Symptoms are identified about a day and a half ago. Started on Kayexalate. She is having diarrhea at the facility where she lives but her potassium is still elevated. She was sent to the ER for evaluation. Subjective   General  Chart Reviewed: Yes  Response To Previous Treatment: Patient with no complaints from previous session. Family / Caregiver Present: Yes(son )  Subjective  Subjective: Pt reporting no pain at this time. Agreeable to PT/OT session. General Comment  Comments: Pt supine in bed upon arrival.   Pain Screening  Patient Currently in Pain: Denies  Vital Signs  Patient Currently in Pain: Denies       Orientation  Orientation  Overall Orientation Status: Impaired  Orientation Level: Oriented to person;Disoriented to place; Disoriented to situation;Disoriented to time     Objective   Bed mobility  Supine to Sit: Contact guard assistance  Scooting: Maximal assistance  Comment: Pt able to initiate movement for supine>sit and scooting however required increased assist and VC for positioning and to complete task. Transfers  Sit to Stand: Dependent/Total  Stand to sit: Dependent/Total  Bed to Chair: Dependent/Total  Stand Pivot Transfers: Dependent/Total  Comment: Min Ax2>>>Mod Ax2 to perform STS from EOB with RW. Pt with improved initiation with stand this date however continues to present with posterior lean resulting in posterior tilt of RW. Pt unable to correct despite VC/TC. Pt able to satnd ~15 seconds max and required seated rest break.  Pt Vicki Espino, DPT [id number]

## 2019-12-03 NOTE — PROGRESS NOTES
Patient discharged. Patient verbalized understanding. Patient left floor with transportation. Patient left with all belongings.

## 2019-12-26 ENCOUNTER — CARE COORDINATION (OUTPATIENT)
Dept: CASE MANAGEMENT | Age: 84
End: 2019-12-26

## 2020-01-03 NOTE — DISCHARGE SUMMARY
PT, OT, and speech evaluation. Necessary dietary  modifications were made. Condition started improving. The patient is  definitely not a candidate for chronic hemodialysis at this stage in her  life, and family was discouraged to consider that. Her hyperkalemia is  an ongoing problem. She does have stage IV kidney disease, that is why  the patient was not on any ARB. The patient also had mild hypovolemia,  was treated with IV dextrose with 150 mEq sodium bicarbonate running at  75 an hour with serial monitoring of the potassium. After the end of  about 5 days, the patient was discharged in stable condition. Dr. Michael Carias also saw the patient for orthopedic followup. The patient also had  some gangrenous changes in two fingers on the right hand side at the tip  of right index and right middle finger. The distal phalanx was pretty  much gone. It looked black and shrunken. According to Dr. Chanel Acevedo no  surgical intervention was warranted. The patient may need outpatient  hand surgery followup with Dr. Ana Andujar with likely amputation. On  12/02/2019, the patient was discharged back to skilled nursing facility  in a stable condition. She was discharged in stable condition to Home  at [facility]. Home at [facility] declined because of the  exorbitant cost of one of the medication, rifaximin, that she takes. The patient was discharged to [facility]. Rifaximin was  discontinued. Narcotics prescriptions were faxed. DISCHARGE MEDICATIONS:  1. Coreg 12.5 mg twice a day. 2.  Norvasc 5 mg once a day. 3.  Nephrocaps one capsule everyday. 4.  Tramadol 25 mg twice a day. 5.  Melatonin 6 mg at bedtime. 6.  Multivitamin once a day. 7.  Gabapentin 100 mg twice a day. 8.  Chronulac 30 mL three times a day. 9.  Eliquis 2.5 mg twice a day. 10.  Protonix 40 mg once a day. 11.  Proventil nebulized aerosol one every 4 hours p.r.n. 12.  Actigall or Ursodiol 300 mg twice a day.   14.  Isosorbide 60

## 2020-01-28 ENCOUNTER — TELEPHONE (OUTPATIENT)
Dept: ORTHOPEDIC SURGERY | Age: 85
End: 2020-01-28

## 2020-01-28 ENCOUNTER — OFFICE VISIT (OUTPATIENT)
Dept: ORTHOPEDIC SURGERY | Age: 85
End: 2020-01-28
Payer: MEDICARE

## 2020-01-28 VITALS
HEIGHT: 65 IN | HEART RATE: 92 BPM | BODY MASS INDEX: 27.99 KG/M2 | RESPIRATION RATE: 16 BRPM | DIASTOLIC BLOOD PRESSURE: 71 MMHG | SYSTOLIC BLOOD PRESSURE: 108 MMHG | WEIGHT: 168 LBS

## 2020-01-28 PROCEDURE — 1123F ACP DISCUSS/DSCN MKR DOCD: CPT | Performed by: ORTHOPAEDIC SURGERY

## 2020-01-28 PROCEDURE — G8484 FLU IMMUNIZE NO ADMIN: HCPCS | Performed by: ORTHOPAEDIC SURGERY

## 2020-01-28 PROCEDURE — 1090F PRES/ABSN URINE INCON ASSESS: CPT | Performed by: ORTHOPAEDIC SURGERY

## 2020-01-28 PROCEDURE — 4040F PNEUMOC VAC/ADMIN/RCVD: CPT | Performed by: ORTHOPAEDIC SURGERY

## 2020-01-28 PROCEDURE — G8428 CUR MEDS NOT DOCUMENT: HCPCS | Performed by: ORTHOPAEDIC SURGERY

## 2020-01-28 PROCEDURE — 99213 OFFICE O/P EST LOW 20 MIN: CPT | Performed by: ORTHOPAEDIC SURGERY

## 2020-01-28 PROCEDURE — 1036F TOBACCO NON-USER: CPT | Performed by: ORTHOPAEDIC SURGERY

## 2020-01-28 PROCEDURE — G8417 CALC BMI ABV UP PARAM F/U: HCPCS | Performed by: ORTHOPAEDIC SURGERY

## 2020-01-28 NOTE — TELEPHONE ENCOUNTER
Keyana from [facility] req orders and ov notes on this pt. Please advise.       Call [phone]     Fx [phone]

## 2020-01-28 NOTE — TELEPHONE ENCOUNTER
Called and spoke to Dorothea, informed her that OV note has been faxed over and that patient needs to see wound care. Dorothea stated they have in house wound care.

## 2020-01-28 NOTE — PROGRESS NOTES
Medical: Not on file     Non-medical: Not on file   Tobacco Use    Smoking status: Former Smoker     Packs/day: 0.25     Years: 3.00     Pack years: 0.75    Smokeless tobacco: Never Used   Substance and Sexual Activity    Alcohol use: Yes     Comment: occ    Drug use: No    Sexual activity: Never   Lifestyle    Physical activity:     Days per week: Not on file     Minutes per session: Not on file    Stress: Not on file   Relationships    Social connections:     Talks on phone: Not on file     Gets together: Not on file     Attends Muslim service: Not on file     Active member of club or organization: Not on file     Attends meetings of clubs or organizations: Not on file     Relationship status: Not on file    Intimate partner violence:     Fear of current or ex partner: Not on file     Emotionally abused: Not on file     Physically abused: Not on file     Forced sexual activity: Not on file   Other Topics Concern    Not on file   Social History Narrative    Not on file       Family History   Problem Relation Age of Onset    High Blood Pressure Mother     Kidney Disease Brother        Current Outpatient Medications on File Prior to Visit   Medication Sig Dispense Refill    carvedilol (COREG) 12.5 MG tablet Take 1 tablet by mouth 2 times daily (with meals) 60 tablet 3    amLODIPine (NORVASC) 5 MG tablet Take 1 tablet by mouth nightly 30 tablet 3    b complex-C-folic acid (NEPHROCAPS) 1 MG capsule Take 1 capsule by mouth daily      traMADol (ULTRAM) 25 MG split-tablet Take 25 mg by mouth 2 times daily.  melatonin 3 MG TABS tablet Take 6 mg by mouth nightly as needed      Multiple Vitamins-Iron (DAILY MULTIVITAMINS/IRON PO) Take 1 tablet by mouth daily      ondansetron (ZOFRAN) 4 MG tablet Take 8 mg by mouth every 8 hours as needed for Nausea or Vomiting      lactulose (CHRONULAC) 10 GM/15ML solution Take 30 mLs by mouth 3 times daily Scheduled.  Titrate dose and frequency to Goal 2-3 loose bowel movements daily. 1 Bottle 2    apixaban (ELIQUIS) 2.5 MG TABS tablet Take 1 tablet by mouth 2 times daily 60 tablet 2    pantoprazole (PROTONIX) 40 MG tablet Take 1 tablet by mouth every morning (before breakfast) 30 tablet 3    albuterol (PROVENTIL) (2.5 MG/3ML) 0.083% nebulizer solution Take 3 mLs by nebulization every 4 hours as needed for Wheezing or Shortness of Breath 120 each 3    ursodiol (ACTIGALL) 300 MG capsule Take 1 capsule by mouth 2 times daily 60 capsule 3    isosorbide mononitrate (IMDUR) 60 MG extended release tablet Take 1 tablet by mouth daily 90 tablet 1    gabapentin (NEURONTIN) 100 MG capsule Take 1 capsule by mouth 2 times daily for 30 days. 60 capsule 0     No current facility-administered medications on file prior to visit. Pertinent items are noted in HPI  Review of systems reviewed from Patient History Form dated on 1/28/2020 from the nursing home and available in the patient's chart under the Media tab. No change noted. PHYSICAL EXAMINATION:  Ms. Alis Celeste is a very pleasant 80 y.o.  female who presents today in no acute distress, awake, alert, and oriented. She is well dressed, nourished and  groomed. Patient with normal affect. Height is  5' 5\" (1.651 m), weight is 168 lb (76.2 kg), Body mass index is 27.96 kg/m². Resting respiratory rate is 16. Examination of the gait, showed that the patient walks with assistance of a wheelchair. Examination of both lower extremities showing a decreased range of motion of the bilateral ankles. There is mild swelling that can be seen, as well as wound over posterior  right heel. No erythema or drainage. There are no signs of infection. She  has intact sensation and good distal pulses. She has tenderness on deep palpation over the right heel. IMAGING: [employer] were reviewed, dated today in office , 3 views of the right goot, and showed no displaced fracture.   There is diffuse

## 2020-01-29 ENCOUNTER — OFFICE VISIT (OUTPATIENT)
Dept: ORTHOPEDIC SURGERY | Age: 85
End: 2020-01-29
Payer: MEDICARE

## 2020-01-29 VITALS — HEIGHT: 65 IN | BODY MASS INDEX: 27.99 KG/M2 | RESPIRATION RATE: 16 BRPM | WEIGHT: 168 LBS

## 2020-01-29 PROBLEM — L89.612 PRESSURE INJURY OF RIGHT HEEL, STAGE 2 (HCC): Status: ACTIVE | Noted: 2020-01-29

## 2020-01-29 PROCEDURE — G8427 DOCREV CUR MEDS BY ELIG CLIN: HCPCS | Performed by: ORTHOPAEDIC SURGERY

## 2020-01-29 PROCEDURE — G8484 FLU IMMUNIZE NO ADMIN: HCPCS | Performed by: ORTHOPAEDIC SURGERY

## 2020-01-29 PROCEDURE — 99203 OFFICE O/P NEW LOW 30 MIN: CPT | Performed by: ORTHOPAEDIC SURGERY

## 2020-01-29 PROCEDURE — 1090F PRES/ABSN URINE INCON ASSESS: CPT | Performed by: ORTHOPAEDIC SURGERY

## 2020-01-29 PROCEDURE — G8417 CALC BMI ABV UP PARAM F/U: HCPCS | Performed by: ORTHOPAEDIC SURGERY

## 2020-01-29 NOTE — PATIENT INSTRUCTIONS
Thank you for choosing [facility]) Physicians for your Hand and Upper Extremity needs. If we can be of any further assistance to you, please do not hesitate to contact us.     Office Phone Number:  [phone]  or  [phone]

## 2020-01-29 NOTE — PROGRESS NOTES
Ms. Arley Alexis is a 80 y.o. woman who is seen today in Hand Surgical Consultation at the request of Layne Guzman DO. She is seen today regarding a several month history of finger difficulty on the right hand. She is not communicative today and the entire history is provided by her son. He reports no known history of  injuring her right Whole Hand. 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 The patient's , past medical history, medications, allergies,  family history, social history, and review of systems have been updated, reviewed and have been scanned into the chart today. Physical Exam:  Ms. Elysia Tam most recent vitals:  Vitals  Resp: 16  Height: 5' 5\" (165.1 cm)  Weight: 168 lb (76.2 kg)    She is well nourished, oriented to person, place & time. She demonstrates appropriate mood and affect as well as normal gait and station. Skin: There is diffuse discoloration with obvious distal ischemic dry necrosis of the right Index Finger , Middle Finger na d  Small Finger on the right hand. No other digits show sign of skin injury bilaterally. Digital range of motion is not able to be evaluated due to the associated patient cooperation   Wrist range of motion is similarly limited. Sensation is unable to be assessed due to patient inability to communicate or participate in the examination. 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 Swelling is moderate in the Index Finger, Middle Finger, Ring Finger and Small Finger, all other digits demonstrate no evidence of swelling bilaterally. There is no evidence of gross joint instability bilaterally.             Impression:  Ms. 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 She  presents requesting further treatment. Plan:    I have discussed with Ms. Natalia Wade her son the various treatment options for treatment of right  Index Finger, Middle Finger and Small Finger tip necrosis. We discussed the options of Conservative treatment, and Surgical revision amputation of the damaged fingers. I have explained the pre-, carly- ane post-operative considerations to Them. Due to her very grave medical condition, we have agreed that aggressive surgical treatment at this time may not be in her best interest.     I have asked Ms. Natalia Wade her son  to feel free to contact me or schedule a follow-up appointment at any time that she feels the need for any further evaluation or treatment for her upper extremitiy condition. I will remain available to continue her care at any time in the future.

## 2020-01-30 ENCOUNTER — TELEPHONE (OUTPATIENT)
Dept: ORTHOPEDIC SURGERY | Age: 85
End: 2020-01-30

## 2020-01-30 NOTE — TELEPHONE ENCOUNTER
Called and spoke to [employer], informed her note has been faxed to number below. Also gave her number to [facility] wound care center.  Ambar in agreement to plan and will call wound care center to schedule

## 2020-01-30 NOTE — TELEPHONE ENCOUNTER
Requesting for office notes and any recommendations for DOS: 01/29/20    Fax # [phone]    Please call back to discuss if need to.

## (undated) DEVICE — CORFLO* NASOGASTRIC/NASOINTESTINAL FEEDING TUBE WITH STYLET: Brand: AVANOS